# Patient Record
Sex: FEMALE | Race: WHITE | URBAN - METROPOLITAN AREA
[De-identification: names, ages, dates, MRNs, and addresses within clinical notes are randomized per-mention and may not be internally consistent; named-entity substitution may affect disease eponyms.]

---

## 2018-08-23 ENCOUNTER — OFFICE (OUTPATIENT)
Dept: URBAN - METROPOLITAN AREA CLINIC 42 | Facility: CLINIC | Age: 73
End: 2018-08-23

## 2018-08-23 VITALS
HEART RATE: 80 BPM | HEIGHT: 63 IN | SYSTOLIC BLOOD PRESSURE: 141 MMHG | DIASTOLIC BLOOD PRESSURE: 95 MMHG | WEIGHT: 188 LBS

## 2018-08-23 DIAGNOSIS — K22.70 BARRETT'S ESOPHAGUS WITHOUT DYSPLASIA: ICD-10-CM

## 2018-08-23 DIAGNOSIS — R10.13 EPIGASTRIC PAIN: ICD-10-CM

## 2018-08-23 DIAGNOSIS — K21.9 GASTRO-ESOPHAGEAL REFLUX DISEASE WITHOUT ESOPHAGITIS: ICD-10-CM

## 2018-08-23 PROCEDURE — 99214 OFFICE O/P EST MOD 30 MIN: CPT

## 2018-08-23 RX ORDER — ESOMEPRAZOLE 20 MG/1
CAPSULE, DELAYED RELEASE ORAL
Qty: 60 | Refills: 0 | Status: ACTIVE
Start: 2018-08-23

## 2018-11-15 ENCOUNTER — OFFICE (OUTPATIENT)
Dept: URBAN - METROPOLITAN AREA CLINIC 42 | Facility: CLINIC | Age: 73
End: 2018-11-15

## 2018-11-15 DIAGNOSIS — K22.70 BARRETT'S ESOPHAGUS WITHOUT DYSPLASIA: ICD-10-CM

## 2018-11-15 DIAGNOSIS — K21.9 GASTRO-ESOPHAGEAL REFLUX DISEASE WITHOUT ESOPHAGITIS: ICD-10-CM

## 2018-11-15 PROCEDURE — 99213 OFFICE O/P EST LOW 20 MIN: CPT

## 2018-11-16 VITALS
SYSTOLIC BLOOD PRESSURE: 124 MMHG | WEIGHT: 191 LBS | DIASTOLIC BLOOD PRESSURE: 75 MMHG | HEART RATE: 80 BPM | HEIGHT: 63 IN

## 2019-04-23 ENCOUNTER — OFFICE (OUTPATIENT)
Dept: URBAN - METROPOLITAN AREA CLINIC 42 | Facility: CLINIC | Age: 74
End: 2019-04-23

## 2019-04-23 VITALS
TEMPERATURE: 97.8 F | WEIGHT: 189 LBS | SYSTOLIC BLOOD PRESSURE: 123 MMHG | DIASTOLIC BLOOD PRESSURE: 82 MMHG | HEART RATE: 84 BPM | HEIGHT: 63 IN

## 2019-04-23 DIAGNOSIS — K21.9 GASTRO-ESOPHAGEAL REFLUX DISEASE WITHOUT ESOPHAGITIS: ICD-10-CM

## 2019-04-23 DIAGNOSIS — K59.00 CONSTIPATION, UNSPECIFIED: ICD-10-CM

## 2019-04-23 DIAGNOSIS — Z80.0 FAMILY HISTORY OF MALIGNANT NEOPLASM OF DIGESTIVE ORGANS: ICD-10-CM

## 2019-04-23 DIAGNOSIS — K22.70 BARRETT'S ESOPHAGUS WITHOUT DYSPLASIA: ICD-10-CM

## 2019-04-23 PROCEDURE — 99213 OFFICE O/P EST LOW 20 MIN: CPT

## 2019-04-23 NOTE — SERVICEHPINOTES
Patient returns for follow-up visit. She has history of GERD and BE. Reflux symptoms well-controlled on esomeprazole 40 mg daily. She comes in today with complaint of constipation. She has always had mild constipation, recently worsened. She had a bout of food poisoning last week associated with nausea, vomiting, and diarrhea. Symptoms resolved after 2-3 days, now more constipated. She is doing well otherwise, no abdominal pain or overt GI bleed. Her appetite and weight unchanged. Colonoscopy (2015)BREGD (2017)

## 2019-04-23 NOTE — SERVICENOTES
Surveillance colonoscopy due 2020, patient prefers to wait to have surveillance EGD at the same time as colonoscopy

## 2021-11-06 ENCOUNTER — APPOINTMENT (OUTPATIENT)
Dept: MRI IMAGING | Facility: HOSPITAL | Age: 76
End: 2021-11-06

## 2021-11-06 ENCOUNTER — HOSPITAL ENCOUNTER (OUTPATIENT)
Facility: HOSPITAL | Age: 76
Setting detail: OBSERVATION
LOS: 1 days | Discharge: HOME | End: 2021-11-07
Attending: EMERGENCY MEDICINE | Admitting: INTERNAL MEDICINE

## 2021-11-06 ENCOUNTER — APPOINTMENT (OUTPATIENT)
Dept: CT IMAGING | Facility: HOSPITAL | Age: 76
End: 2021-11-06

## 2021-11-06 DIAGNOSIS — R42 VERTIGO: Primary | ICD-10-CM

## 2021-11-06 DIAGNOSIS — I63.532 CEREBROVASCULAR ACCIDENT (CVA) DUE TO OCCLUSION OF LEFT POSTERIOR CEREBRAL ARTERY (HCC): ICD-10-CM

## 2021-11-06 DIAGNOSIS — H81.11 BENIGN PAROXYSMAL POSITIONAL VERTIGO OF RIGHT EAR: ICD-10-CM

## 2021-11-06 DIAGNOSIS — G45.9 TIA (TRANSIENT ISCHEMIC ATTACK): ICD-10-CM

## 2021-11-06 DIAGNOSIS — R27.0 ATAXIA: ICD-10-CM

## 2021-11-06 DIAGNOSIS — I65.02 OCCLUSION OF LEFT VERTEBRAL ARTERY: ICD-10-CM

## 2021-11-06 PROBLEM — I63.9 ACUTE ISCHEMIC STROKE: Status: ACTIVE | Noted: 2021-11-06

## 2021-11-06 PROBLEM — I73.9 SMALL VESSEL DISEASE: Status: ACTIVE | Noted: 2021-11-06

## 2021-11-06 PROBLEM — E78.2 MIXED HYPERLIPIDEMIA: Status: ACTIVE | Noted: 2021-11-06

## 2021-11-06 LAB
ALBUMIN SERPL-MCNC: 4.2 G/DL (ref 3.5–5.2)
ALBUMIN/GLOB SERPL: 1.8 G/DL
ALP SERPL-CCNC: 46 U/L (ref 39–117)
ALT SERPL W P-5'-P-CCNC: 6 U/L (ref 1–33)
ANION GAP SERPL CALCULATED.3IONS-SCNC: 12.3 MMOL/L (ref 5–15)
AST SERPL-CCNC: 10 U/L (ref 1–32)
BASOPHILS # BLD AUTO: 0.03 10*3/MM3 (ref 0–0.2)
BASOPHILS NFR BLD AUTO: 0.5 % (ref 0–1.5)
BILIRUB SERPL-MCNC: 0.5 MG/DL (ref 0–1.2)
BUN SERPL-MCNC: 12 MG/DL (ref 8–23)
BUN/CREAT SERPL: 19 (ref 7–25)
CALCIUM SPEC-SCNC: 9.1 MG/DL (ref 8.6–10.5)
CHLORIDE SERPL-SCNC: 106 MMOL/L (ref 98–107)
CHOLEST SERPL-MCNC: 232 MG/DL (ref 0–200)
CO2 SERPL-SCNC: 24.7 MMOL/L (ref 22–29)
CREAT SERPL-MCNC: 0.63 MG/DL (ref 0.57–1)
DEPRECATED RDW RBC AUTO: 40.9 FL (ref 37–54)
EOSINOPHIL # BLD AUTO: 0.09 10*3/MM3 (ref 0–0.4)
EOSINOPHIL NFR BLD AUTO: 1.5 % (ref 0.3–6.2)
ERYTHROCYTE [DISTWIDTH] IN BLOOD BY AUTOMATED COUNT: 12.1 % (ref 12.3–15.4)
GFR SERPL CREATININE-BSD FRML MDRD: 92 ML/MIN/1.73
GLOBULIN UR ELPH-MCNC: 2.4 GM/DL
GLUCOSE SERPL-MCNC: 98 MG/DL (ref 65–99)
HCT VFR BLD AUTO: 41.2 % (ref 34–46.6)
HDLC SERPL-MCNC: 59 MG/DL (ref 40–60)
HGB BLD-MCNC: 14.5 G/DL (ref 12–15.9)
HOLD SPECIMEN: NORMAL
HOLD SPECIMEN: NORMAL
IMM GRANULOCYTES # BLD AUTO: 0.01 10*3/MM3 (ref 0–0.05)
IMM GRANULOCYTES NFR BLD AUTO: 0.2 % (ref 0–0.5)
LDLC SERPL CALC-MCNC: 160 MG/DL (ref 0–100)
LDLC/HDLC SERPL: 2.67 {RATIO}
LYMPHOCYTES # BLD AUTO: 1.64 10*3/MM3 (ref 0.7–3.1)
LYMPHOCYTES NFR BLD AUTO: 27.3 % (ref 19.6–45.3)
MCH RBC QN AUTO: 32.9 PG (ref 26.6–33)
MCHC RBC AUTO-ENTMCNC: 35.2 G/DL (ref 31.5–35.7)
MCV RBC AUTO: 93.4 FL (ref 79–97)
MONOCYTES # BLD AUTO: 0.53 10*3/MM3 (ref 0.1–0.9)
MONOCYTES NFR BLD AUTO: 8.8 % (ref 5–12)
NEUTROPHILS NFR BLD AUTO: 3.71 10*3/MM3 (ref 1.7–7)
NEUTROPHILS NFR BLD AUTO: 61.7 % (ref 42.7–76)
NRBC BLD AUTO-RTO: 0 /100 WBC (ref 0–0.2)
PLATELET # BLD AUTO: 168 10*3/MM3 (ref 140–450)
PMV BLD AUTO: 9.7 FL (ref 6–12)
POTASSIUM SERPL-SCNC: 3.8 MMOL/L (ref 3.5–5.2)
PROT SERPL-MCNC: 6.6 G/DL (ref 6–8.5)
QT INTERVAL: 432 MS
RBC # BLD AUTO: 4.41 10*6/MM3 (ref 3.77–5.28)
SARS-COV-2 RNA RESP QL NAA+PROBE: NOT DETECTED
SODIUM SERPL-SCNC: 143 MMOL/L (ref 136–145)
TRIGL SERPL-MCNC: 77 MG/DL (ref 0–150)
TROPONIN T SERPL-MCNC: <0.01 NG/ML (ref 0–0.03)
VLDLC SERPL-MCNC: 13 MG/DL (ref 5–40)
WBC # BLD AUTO: 6.01 10*3/MM3 (ref 3.4–10.8)
WHOLE BLOOD HOLD SPECIMEN: NORMAL
WHOLE BLOOD HOLD SPECIMEN: NORMAL

## 2021-11-06 PROCEDURE — 70498 CT ANGIOGRAPHY NECK: CPT

## 2021-11-06 PROCEDURE — 92610 EVALUATE SWALLOWING FUNCTION: CPT

## 2021-11-06 PROCEDURE — 80061 LIPID PANEL: CPT | Performed by: INTERNAL MEDICINE

## 2021-11-06 PROCEDURE — 84484 ASSAY OF TROPONIN QUANT: CPT | Performed by: PHYSICIAN ASSISTANT

## 2021-11-06 PROCEDURE — 85025 COMPLETE CBC W/AUTO DIFF WBC: CPT | Performed by: PHYSICIAN ASSISTANT

## 2021-11-06 PROCEDURE — G0378 HOSPITAL OBSERVATION PER HR: HCPCS

## 2021-11-06 PROCEDURE — 70496 CT ANGIOGRAPHY HEAD: CPT

## 2021-11-06 PROCEDURE — 93010 ELECTROCARDIOGRAM REPORT: CPT | Performed by: INTERNAL MEDICINE

## 2021-11-06 PROCEDURE — 96372 THER/PROPH/DIAG INJ SC/IM: CPT

## 2021-11-06 PROCEDURE — 25010000002 ENOXAPARIN PER 10 MG: Performed by: INTERNAL MEDICINE

## 2021-11-06 PROCEDURE — 99284 EMERGENCY DEPT VISIT MOD MDM: CPT

## 2021-11-06 PROCEDURE — 70551 MRI BRAIN STEM W/O DYE: CPT

## 2021-11-06 PROCEDURE — 99204 OFFICE O/P NEW MOD 45 MIN: CPT | Performed by: PSYCHIATRY & NEUROLOGY

## 2021-11-06 PROCEDURE — U0003 INFECTIOUS AGENT DETECTION BY NUCLEIC ACID (DNA OR RNA); SEVERE ACUTE RESPIRATORY SYNDROME CORONAVIRUS 2 (SARS-COV-2) (CORONAVIRUS DISEASE [COVID-19]), AMPLIFIED PROBE TECHNIQUE, MAKING USE OF HIGH THROUGHPUT TECHNOLOGIES AS DESCRIBED BY CMS-2020-01-R: HCPCS | Performed by: PHYSICIAN ASSISTANT

## 2021-11-06 PROCEDURE — C9803 HOPD COVID-19 SPEC COLLECT: HCPCS

## 2021-11-06 PROCEDURE — 80053 COMPREHEN METABOLIC PANEL: CPT | Performed by: PHYSICIAN ASSISTANT

## 2021-11-06 PROCEDURE — 25010000002 IOPAMIDOL 61 % SOLUTION: Performed by: EMERGENCY MEDICINE

## 2021-11-06 PROCEDURE — 93005 ELECTROCARDIOGRAM TRACING: CPT | Performed by: PHYSICIAN ASSISTANT

## 2021-11-06 RX ORDER — ACETAMINOPHEN 650 MG/1
650 SUPPOSITORY RECTAL EVERY 4 HOURS PRN
Status: DISCONTINUED | OUTPATIENT
Start: 2021-11-06 | End: 2021-11-07 | Stop reason: HOSPADM

## 2021-11-06 RX ORDER — ACETAMINOPHEN 325 MG/1
650 TABLET ORAL EVERY 4 HOURS PRN
Status: DISCONTINUED | OUTPATIENT
Start: 2021-11-06 | End: 2021-11-07 | Stop reason: HOSPADM

## 2021-11-06 RX ORDER — MELATONIN
1 DAILY
COMMUNITY

## 2021-11-06 RX ORDER — ASPIRIN 81 MG/1
324 TABLET, CHEWABLE ORAL ONCE
Status: COMPLETED | OUTPATIENT
Start: 2021-11-06 | End: 2021-11-06

## 2021-11-06 RX ORDER — CHOLECALCIFEROL (VITAMIN D3) 125 MCG
5 CAPSULE ORAL NIGHTLY PRN
Status: DISCONTINUED | OUTPATIENT
Start: 2021-11-06 | End: 2021-11-07 | Stop reason: HOSPADM

## 2021-11-06 RX ORDER — ATORVASTATIN CALCIUM 80 MG/1
80 TABLET, FILM COATED ORAL NIGHTLY
Status: DISCONTINUED | OUTPATIENT
Start: 2021-11-06 | End: 2021-11-07 | Stop reason: HOSPADM

## 2021-11-06 RX ORDER — SODIUM CHLORIDE 0.9 % (FLUSH) 0.9 %
10 SYRINGE (ML) INJECTION AS NEEDED
Status: DISCONTINUED | OUTPATIENT
Start: 2021-11-06 | End: 2021-11-07 | Stop reason: HOSPADM

## 2021-11-06 RX ORDER — CLOPIDOGREL BISULFATE 75 MG/1
75 TABLET ORAL DAILY
Status: DISCONTINUED | OUTPATIENT
Start: 2021-11-07 | End: 2021-11-07

## 2021-11-06 RX ORDER — LANOLIN ALCOHOL/MO/W.PET/CERES
1000 CREAM (GRAM) TOPICAL DAILY
COMMUNITY

## 2021-11-06 RX ORDER — DOCUSATE SODIUM 100 MG/1
100 CAPSULE, LIQUID FILLED ORAL 2 TIMES DAILY PRN
Status: DISCONTINUED | OUTPATIENT
Start: 2021-11-06 | End: 2021-11-07 | Stop reason: HOSPADM

## 2021-11-06 RX ORDER — LABETALOL HYDROCHLORIDE 5 MG/ML
20 INJECTION, SOLUTION INTRAVENOUS
Status: DISCONTINUED | OUTPATIENT
Start: 2021-11-06 | End: 2021-11-07 | Stop reason: HOSPADM

## 2021-11-06 RX ORDER — ONDANSETRON 4 MG/1
4 TABLET, FILM COATED ORAL EVERY 6 HOURS PRN
Status: DISCONTINUED | OUTPATIENT
Start: 2021-11-06 | End: 2021-11-07 | Stop reason: HOSPADM

## 2021-11-06 RX ORDER — SODIUM CHLORIDE 0.9 % (FLUSH) 0.9 %
3 SYRINGE (ML) INJECTION EVERY 12 HOURS SCHEDULED
Status: DISCONTINUED | OUTPATIENT
Start: 2021-11-06 | End: 2021-11-07 | Stop reason: HOSPADM

## 2021-11-06 RX ORDER — FAMOTIDINE 20 MG/1
20 TABLET, FILM COATED ORAL 2 TIMES DAILY PRN
Status: DISCONTINUED | OUTPATIENT
Start: 2021-11-06 | End: 2021-11-07 | Stop reason: HOSPADM

## 2021-11-06 RX ORDER — SODIUM CHLORIDE 0.9 % (FLUSH) 0.9 %
3-10 SYRINGE (ML) INJECTION AS NEEDED
Status: DISCONTINUED | OUTPATIENT
Start: 2021-11-06 | End: 2021-11-07 | Stop reason: HOSPADM

## 2021-11-06 RX ORDER — CLOPIDOGREL BISULFATE 75 MG/1
300 TABLET ORAL ONCE
Status: COMPLETED | OUTPATIENT
Start: 2021-11-06 | End: 2021-11-06

## 2021-11-06 RX ORDER — ONDANSETRON 2 MG/ML
4 INJECTION INTRAMUSCULAR; INTRAVENOUS EVERY 6 HOURS PRN
Status: DISCONTINUED | OUTPATIENT
Start: 2021-11-06 | End: 2021-11-07 | Stop reason: HOSPADM

## 2021-11-06 RX ORDER — ACETAMINOPHEN 160 MG/5ML
650 SOLUTION ORAL EVERY 4 HOURS PRN
Status: DISCONTINUED | OUTPATIENT
Start: 2021-11-06 | End: 2021-11-07 | Stop reason: HOSPADM

## 2021-11-06 RX ORDER — ASPIRIN 81 MG/1
81 TABLET ORAL DAILY
Status: DISCONTINUED | OUTPATIENT
Start: 2021-11-07 | End: 2021-11-07 | Stop reason: HOSPADM

## 2021-11-06 RX ORDER — MULTIVIT WITH MINERALS/LUTEIN
1000 TABLET ORAL DAILY
COMMUNITY

## 2021-11-06 RX ORDER — SODIUM CHLORIDE 0.9 % (FLUSH) 0.9 %
10 SYRINGE (ML) INJECTION EVERY 12 HOURS SCHEDULED
Status: DISCONTINUED | OUTPATIENT
Start: 2021-11-06 | End: 2021-11-07 | Stop reason: HOSPADM

## 2021-11-06 RX ADMIN — ENOXAPARIN SODIUM 40 MG: 40 INJECTION SUBCUTANEOUS at 14:07

## 2021-11-06 RX ADMIN — IOPAMIDOL 85 ML: 612 INJECTION, SOLUTION INTRAVENOUS at 09:32

## 2021-11-06 RX ADMIN — ATORVASTATIN CALCIUM 80 MG: 80 TABLET, FILM COATED ORAL at 20:30

## 2021-11-06 RX ADMIN — SODIUM CHLORIDE, PRESERVATIVE FREE 10 ML: 5 INJECTION INTRAVENOUS at 20:31

## 2021-11-06 RX ADMIN — SODIUM CHLORIDE, PRESERVATIVE FREE 3 ML: 5 INJECTION INTRAVENOUS at 20:30

## 2021-11-06 RX ADMIN — ASPIRIN 324 MG: 81 TABLET, CHEWABLE ORAL at 10:29

## 2021-11-06 RX ADMIN — Medication 5 MG: at 20:29

## 2021-11-06 RX ADMIN — CLOPIDOGREL 300 MG: 75 TABLET, FILM COATED ORAL at 10:29

## 2021-11-06 NOTE — THERAPY EVALUATION
"Acute Care - Speech Language Pathology   Swallow Initial Evaluation [facility]     Patient Name: Brigid Carballo  : 1945  MRN: [id number]  Today's Date: 2021               Admit Date: 2021    Visit Dx:     ICD-10-CM ICD-9-CM   1. Cerebrovascular accident (CVA) due to occlusion of left posterior cerebral artery (HCC)  I63.532 434.91   2. Occlusion of left vertebral artery  I65.02 433.20   3. Ataxia  R27.0 781.3     Patient Active Problem List   Diagnosis   • Occlusion of left vertebral artery   • Small vessel disease (HCC)   • Ataxia   • Vertigo   • Mixed hyperlipidemia     History reviewed. No pertinent past medical history.  No past surgical history on file.    SLP Recommendation and Plan  SLP Swallowing Diagnosis: swallow WFL (21)  SLP Diet Recommendation: regular textures, thin liquids (21)  Recommended Precautions and Strategies: upright posture during/after eating (21)  SLP Rec. for Method of Medication Administration: meds whole, as tolerated (21)     Monitor for Signs of Aspiration: yes (21)  Recommended Diagnostics: SLE/Cog/Motor Speech Evaluation (21)  Swallow Criteria for Skilled Therapeutic Interventions Met: no problems identified which require skilled intervention (21)  Anticipated Discharge Disposition (SLP): unknown (21)     Therapy Frequency (Swallow): evaluation only (21)                            Plan of Care Reviewed With: patient, spouse  Outcome Summary: Clinical swallow eval completed. When SLP arrived, pt getting up to walk to the bathroom on her own. SLP stopped her and indicated she would need help to avoid a fall. She did not understand the need for this, stating \"Why? They didn't give me any strong medicines or anything.\" SLP explained fall risk with ataxia to pt and  and they verbalized understanding. No s/s of aspiration or patterns of dysphagia. OK for regular " diet/thin liquids. Meds whole as tolerated. Upright for PO. Recommend cognitive evaluation.      SWALLOW EVALUATION (last 72 hours)     SLP Adult Swallow Evaluation     Row Name 11/06/21 1600                   Rehab Evaluation    Document Type evaluation  -CP        Subjective Information no complaints  -CP        Patient Observations alert; cooperative  -CP        Care Plan Review evaluation/treatment results reviewed; care plan/treatment goals reviewed; patient/other agree to care plan  -CP        Care Plan Review, Other Participant(s) significant other  -CP        Patient Effort good  -CP        Symptoms Noted During/After Treatment none  -CP                  General Information    Patient Profile Reviewed yes  -CP        Pertinent History Of Current Problem Admitted with acute PCA CVA, ataxia  -CP        Current Method of Nutrition NPO  -CP        Precautions/Limitations, Vision WFL  -CP        Precautions/Limitations, Hearing WFL  -CP        Prior Level of Function-Communication WFL  -CP        Prior Level of Function-Swallowing no diet consistency restrictions  -CP        Plans/Goals Discussed with patient; agreed upon  -CP        Barriers to Rehab none identified  -CP        Patient's Goals for Discharge return home; return to all previous roles/activities  -CP                  Pain    Additional Documentation Pain Scale: Numbers Pre/Post-Treatment (Group)  -CP                  Pain Scale: Numbers Pre/Post-Treatment    Pretreatment Pain Rating 0/10 - no pain  -CP        Posttreatment Pain Rating 0/10 - no pain  -CP                  Oral Motor Structure and Function    Dentition Assessment upper dentures/partial in place; lower dentures/partial in place  -CP        Secretion Management WNL/WFL  -CP        Mucosal Quality moist, healthy  -CP                  Oral Musculature and Cranial Nerve Assessment    Oral Motor General Assessment WFL  -CP                  General Eating/Swallowing Observations     "Respiratory Support Currently in Use room air  -CP        Eating/Swallowing Skills self-fed  -CP        Positioning During Eating upright 90 degree  -CP        Utensils Used cup; spoon; straw  -CP        Consistencies Trialed thin liquids; pureed; regular textures  -CP                  Clinical Swallow Eval    Clinical Swallow Evaluation Summary Clinical swallow eval completed. When SLP arrived, pt getting up to walk to the bathroom on her own. SLP stopped her and indicated she would need help to avoid a fall. She did not understand the need for this, stating \"Why? They didn't give me any strong medicines or anything.\" SLP explained fall risk with ataxia to pt and  and they verbalized understanding. No s/s of aspiration or patterns of dysphagia. OK for regular diet/thin liquids. Meds whole as tolerated. Upright for PO.  -CP                  Clinical Impression    SLP Swallowing Diagnosis swallow WFL  -CP        Functional Impact no impact on function  -CP        Swallow Criteria for Skilled Therapeutic Interventions Met no problems identified which require skilled intervention  -CP                  Recommendations    Therapy Frequency (Swallow) evaluation only  -CP        SLP Diet Recommendation regular textures; thin liquids  -CP        Recommended Diagnostics SLE/Cog/Motor Speech Evaluation  -CP        Recommended Precautions and Strategies upright posture during/after eating  -CP        Oral Care Recommendations Oral Care BID/PRN  -CP        SLP Rec. for Method of Medication Administration meds whole; as tolerated  -CP        Monitor for Signs of Aspiration yes  -CP        Anticipated Discharge Disposition (SLP) unknown  -CP              User Key  (r) = Recorded By, (t) = Taken By, (c) = Cosigned By    Initials Name Effective Dates    CP Amanda Fallon, MS CCC-SLP 06/16/21 -                 EDUCATION  The patient has been educated in the following areas:   Dysphagia (Swallowing Impairment).         SLP " Outcome Measures (last 72 hours)     SLP Outcome Measures     Row Name 11/06/21 1600             SLP Outcome Measures    Outcome Measure Used? Adult NOMS  -CP              Adult FCM Scores    FCM Chosen Swallowing  -CP      Swallowing FCM Score 7  -CP            User Key  (r) = Recorded By, (t) = Taken By, (c) = Cosigned By    Initials Name Effective Dates    CP Amanda Fallon MS CCC-SLP 06/16/21 -                  Time Calculation:    Time Calculation- SLP     Row Name 11/06/21 1643             Time Calculation- SLP    SLP Start Time 1400  -CP      SLP Received On 11/06/21  -CP              Untimed Charges    SLP Eval/Re-eval  ST Eval Oral Pharyng Swallow - 66300  -CP      12810-WS Eval Oral Pharyng Swallow Minutes 45  -CP              Total Minutes    Untimed Charges Total Minutes 45  -CP       Total Minutes 45  -CP            User Key  (r) = Recorded By, (t) = Taken By, (c) = Cosigned By    Initials Name Provider Type    CP Amanda Fallon MS CCC-SLP Speech and Language Pathologist                Therapy Charges for Today     Code Description Service Date Service Provider Modifiers Qty    [id number] HC ST EVAL ORAL PHARYNG SWALLOW 3 11/6/2021 Amanda Fallon MS CCC-SLP GN 1        Patient was not wearing a face mask during this therapy encounter. Therapist used appropriate personal protective equipment including mask, eye protection and gloves.  Mask used was standard procedure mask. Appropriate PPE was worn during the entire therapy session. Hand hygiene was completed before and after therapy session. Patient is not in enhanced droplet precautions.            MS VIELKA Shah  11/6/2021

## 2021-11-06 NOTE — ED PROVIDER NOTES
Pt presents to the ED c/o  dizziness and vertigo which started yesterday evening after getting her hair done.  Vertigo is described as a spinning sensation and was associated nausea and vomiting.  She remains ataxic this morning.     On exam,   Awake and alert.  No acute distress  CV-regular rate  Lungs are clear to auscultation bilaterally  Neuro-NIH is 0     Plan: CT angiogram of the brain showed occlusion of the mid to upper left vertebral artery.  No acute intracranial disease were found.  The patient will be admitted for a MRI of the brain.  She was given 30 mg of Plavix and full dose aspirin.  Neurology will see in consultation.      Appropriate PPE was worn by myself and the patient throughout her entire interaction.       Attestation:  The MARIANGEL and I have discussed this patient's history, physical exam, and treatment plan.  I have reviewed the documentation and personally had a face to face interaction with the patient. I affirm the documentation and agree with the treatment and plan.  The attached note describes my personal findings.            Pineda Fernandes MD  11/06/21 4885

## 2021-11-06 NOTE — ED PROVIDER NOTES
EMERGENCY DEPARTMENT ENCOUNTER    Room Number:  06/06  Date of encounter:  11/6/2021  PCP: Aditi Spangler MD  Historian: Patient, spouse      I used full protective equipment while examining this patient.  This includes face mask, gloves and protective eyewear.  I washed my hands before entering the room and immediately upon leaving the room      HPI:  Chief Complaint: Dizziness, off-balance  A complete HPI/ROS/PMH/PSH/SH/FH are unobtainable due to: Nothing    Context: Brigid Carballo is a 76 y.o. female who presents to the ED c/o sudden onset of vertiginous dizziness yesterday evening.  Patient states she was getting her hair done with her head in the sink.  She states that she went to pull her head out of the sink and had a sudden onset of vertiginous dizziness at 6 PM yesterday.  Patient describes it as a room spinning sensation.  The symptoms at that time are worse with movement.  She did have associated nausea and vomiting.  She had difficulty walking at that time.  She was able to make it home and eventually make it to the bed.  This morning she states her dizziness has completely resolved, however she states she is unable to walk straight.  She states she is staggering and having a difficult time ambulating.  Patient is otherwise healthy and denies any comorbidities of hypertension, diabetes, hyperlipidemia.  She is a daily smoker.    Review of Medical Records  I reviewed patient's last family medicine office visit from 8/23/2021.  Patient being treated for hyperlipidemia.    PAST MEDICAL HISTORY  Active Ambulatory Problems     Diagnosis Date Noted   • No Active Ambulatory Problems     Resolved Ambulatory Problems     Diagnosis Date Noted   • No Resolved Ambulatory Problems     No Additional Past Medical History         PAST SURGICAL HISTORY  No past surgical history on file.      FAMILY HISTORY  No family history on file.      SOCIAL HISTORY  Social History     Socioeconomic History   • Marital status:           ALLERGIES  Codeine        REVIEW OF SYSTEMS  All systems reviewed and negative except for those discussed in HPI.       PHYSICAL EXAM    I have reviewed the triage vital signs and nursing notes.    ED Triage Vitals [11/06/21 0633]   Temp Heart Rate Resp BP SpO2   97.1 °F (36.2 °C) 74 16 138/80 98 %      Temp src Heart Rate Source Patient Position BP Location FiO2 (%)   Temporal Monitor Sitting Right arm --       Physical Exam  GENERAL: Alert, oriented, not distressed  HENT: head atraumatic, no nuchal rigidity  EYES: Extraocular movements intact, no nystagmus  CV: regular rhythm, regular rate, no murmur  RESPIRATORY: normal effort, CTA  ABDOMEN: soft, nontender  MUSCULOSKELETAL: no deformity, FROM, no calf swelling or tenderness  NEURO: alert, normal cerebellar function, cranial nerves II through XII grossly intact  NIH: 0  SKIN: warm, dry        LAB RESULTS  Recent Results (from the past 24 hour(s))   Green Top (Gel)    Collection Time: 11/06/21  6:49 AM   Result Value Ref Range    Extra Tube Hold for add-ons.    Lavender Top    Collection Time: 11/06/21  6:49 AM   Result Value Ref Range    Extra Tube hold for add-on    Gold Top - SST    Collection Time: 11/06/21  6:49 AM   Result Value Ref Range    Extra Tube Hold for add-ons.    Light Blue Top    Collection Time: 11/06/21  6:49 AM   Result Value Ref Range    Extra Tube hold for add-on    Comprehensive Metabolic Panel    Collection Time: 11/06/21  6:49 AM    Specimen: Blood   Result Value Ref Range    Glucose 98 65 - 99 mg/dL    BUN 12 8 - 23 mg/dL    Creatinine 0.63 0.57 - 1.00 mg/dL    Sodium 143 136 - 145 mmol/L    Potassium 3.8 3.5 - 5.2 mmol/L    Chloride 106 98 - 107 mmol/L    CO2 24.7 22.0 - 29.0 mmol/L    Calcium 9.1 8.6 - 10.5 mg/dL    Total Protein 6.6 6.0 - 8.5 g/dL    Albumin 4.20 3.50 - 5.20 g/dL    ALT (SGPT) 6 1 - 33 U/L    AST (SGOT) 10 1 - 32 U/L    Alkaline Phosphatase 46 39 - 117 U/L    Total Bilirubin 0.5 0.0 - 1.2 mg/dL    eGFR Non   Amer 92 >60 mL/min/1.73    Globulin 2.4 gm/dL    A/G Ratio 1.8 g/dL    BUN/Creatinine Ratio 19.0 7.0 - 25.0    Anion Gap 12.3 5.0 - 15.0 mmol/L   Troponin    Collection Time: 11/06/21  6:49 AM    Specimen: Blood   Result Value Ref Range    Troponin T <0.010 0.000 - 0.030 ng/mL   CBC Auto Differential    Collection Time: 11/06/21  6:49 AM    Specimen: Blood   Result Value Ref Range    WBC 6.01 3.40 - 10.80 10*3/mm3    RBC 4.41 3.77 - 5.28 10*6/mm3    Hemoglobin 14.5 12.0 - 15.9 g/dL    Hematocrit 41.2 34.0 - 46.6 %    MCV 93.4 79.0 - 97.0 fL    MCH 32.9 26.6 - 33.0 pg    MCHC 35.2 31.5 - 35.7 g/dL    RDW 12.1 (L) 12.3 - 15.4 %    RDW-SD 40.9 37.0 - 54.0 fl    MPV 9.7 6.0 - 12.0 fL    Platelets 168 140 - 450 10*3/mm3    Neutrophil % 61.7 42.7 - 76.0 %    Lymphocyte % 27.3 19.6 - 45.3 %    Monocyte % 8.8 5.0 - 12.0 %    Eosinophil % 1.5 0.3 - 6.2 %    Basophil % 0.5 0.0 - 1.5 %    Immature Grans % 0.2 0.0 - 0.5 %    Neutrophils, Absolute 3.71 1.70 - 7.00 10*3/mm3    Lymphocytes, Absolute 1.64 0.70 - 3.10 10*3/mm3    Monocytes, Absolute 0.53 0.10 - 0.90 10*3/mm3    Eosinophils, Absolute 0.09 0.00 - 0.40 10*3/mm3    Basophils, Absolute 0.03 0.00 - 0.20 10*3/mm3    Immature Grans, Absolute 0.01 0.00 - 0.05 10*3/mm3    nRBC 0.0 0.0 - 0.2 /100 WBC   ECG 12 Lead    Collection Time: 11/06/21  8:14 AM   Result Value Ref Range    QT Interval 432 ms   COVID-19, CHARLI IN-HOUSE CEPHEID/HAMIDA NP SWAB IN TRANSPORT MEDIA 8-12 HR TAT - Swab, Nasopharynx    Collection Time: 11/06/21  8:48 AM    Specimen: Nasopharynx; Swab   Result Value Ref Range    COVID19 Not Detected Not Detected - Ref. Range       Ordered the above labs and independently reviewed the results.        RADIOLOGY  CT Angiogram Head, CT Angiogram Neck    Result Date: 11/6/2021  CT ANGIOGRAM HEAD-, CT ANGIOGRAM NECK-  INDICATIONS: Vertigo, ataxia  TECHNIQUE: Radiation dose reduction techniques were utilized, including automated exposure control and exposure  modulation based on body size.Noncontrast head CT was performed, followed by enhanced CT angiography of the head and neck. Three-dimensional reconstructions were created, reviewed, and assessed according to NASCET criteria.  COMPARISON: None available  FINDINGS:  No acute intracranial hemorrhage, midline shift or mass effect. No acute territorial infarct is identified.  Mild periventricular hypodensity suggests chronic small vessel ischemic change in a patient this age.  No enhancing lesions of brain.  Ventricles, cisterns, cerebral sulci are unremarkable for patient age.  The visualized paranasal sinuses, orbits, mastoid air cells are unremarkable.  The CT angiography images show occlusion of the mid to upper left vertebral artery (to the level of C1). Right vertebral artery is dominant. About 20% narrowing at the origins of the bilateral ICAs. Scattered calcifications are seen elsewhere in the carotid arteries without high-grade stenosis (0-49% stenosis). Otherwise, no hemodynamically significant focal stenosis, aneurysm, or dissection in the cervical carotid or vertebral arteries, or in the arteries at the base of the brain.  Bilateral thyroid nodularity is suggested but suboptimally evaluated with this technique, ultrasound correlation is suggested as indicated.  Facet and uncovertebral joint hypertrophy contribute to neuroforaminal narrowing, more prominent on the left at C5/6. Disc osteophyte complex contributes to mild central stenosis at C4/5, C5/6, C6/7.  Biapical pulmonary fibronodular and emphysematous changes are seen.        1. 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 Otherwise, no hemodynamically significant focal stenosis, aneurysm, or dissection in the cervical carotid or vertebral arteries or the arteries at the base of the brain.  2. No acute intracranial hemorrhage or hydrocephalus. No enhancing lesion in brain. If there is further clinical concern, MRI could be considered for further  evaluation.  3. Incidental findings, as noted above.  Discussed by telephone with  Martín Gamez at time of interpretation, 1006, 11/06/2021.  This report was finalized on 11/6/2021 10:09 AM by Dr. Jonathan Wolfe M.D.        I ordered the above noted radiological studies. Reviewed by me and discussed with radiologist.  See dictation for official radiology interpretation.      MEDICATIONS GIVEN IN ER    Medications   sodium chloride 0.9 % flush 10 mL (has no administration in time range)   sodium chloride 0.9 % flush 10 mL (has no administration in time range)   sodium chloride 0.9 % flush 10 mL (has no administration in time range)   sodium chloride 0.9 % flush 10 mL (has no administration in time range)   atorvastatin (LIPITOR) tablet 80 mg (has no administration in time range)   enoxaparin (LOVENOX) syringe 40 mg (has no administration in time range)   labetalol (NORMODYNE,TRANDATE) injection 20 mg (has no administration in time range)   sodium chloride 0.9 % flush 3 mL (has no administration in time range)   sodium chloride 0.9 % flush 3-10 mL (has no administration in time range)   acetaminophen (TYLENOL) tablet 650 mg (has no administration in time range)     Or   acetaminophen (TYLENOL) 160 MG/5ML solution 650 mg (has no administration in time range)     Or   acetaminophen (TYLENOL) suppository 650 mg (has no administration in time range)   famotidine (PEPCID) tablet 20 mg (has no administration in time range)   melatonin tablet 5 mg (has no administration in time range)   ondansetron (ZOFRAN) tablet 4 mg (has no administration in time range)     Or   ondansetron (ZOFRAN) injection 4 mg (has no administration in time range)   docusate sodium (COLACE) capsule 100 mg (has no administration in time range)   aspirin EC tablet 81 mg (has no administration in time range)   clopidogrel (PLAVIX) tablet 75 mg (has no administration in time range)   iopamidol (ISOVUE-300) 61 % injection 100 mL (85 mL Intravenous Given  by Other 11/6/21 0932)   clopidogrel (PLAVIX) tablet 300 mg (300 mg Oral Given 11/6/21 1029)   aspirin chewable tablet 324 mg (324 mg Oral Given 11/6/21 1029)         PROGRESS, DATA ANALYSIS, CONSULTS, AND MEDICAL DECISION MAKING    All labs have been independently reviewed by me.  All radiology studies have been reviewed by me and discussed with radiologist dictating the report.   EKG's independently viewed and interpreted by me.  Discussion below represents my analysis of pertinent findings related to patient's condition, differential diagnosis, treatment plan and final disposition.    I have discussed case with Dr. Fernandes, emergency room physician.  He has performed his own bedside examination and agrees with treatment plan.    ED Course as of 11/06/21 1250   Sat Nov 06, 2021   0725 Patient presents with sudden onset of vertigo yesterday.  She states her dizziness has resolved, however this morning she feels off balance.  Patient is ataxic with walking here.  No other neuro deficits.  Plan to discuss with stroke neurology. [EE]   0728 I discussed case with Dr. Dobson, stroke neurology.  He would like us to perform a CTA of the head and neck, with likely subsequent admission for MRI.  Patient is not a TPA candidate given onset of symptoms. [EE]   0732 NIH Stroke scale 0 [EE]   0758 WBC: 6.01 [EE]   0758 Hemoglobin: 14.5 [EE]   0758 Creatinine: 0.63 [EE]   0758 Troponin T: <0.010 [EE]   0926 Dr. Dobson has seen and evaluated patient.  He believes symptoms are likely BPPV in nature however it is difficult to account for patient's continued ataxia.  We are still awaiting CTA of the head neck.  He would like to continue plan for admission and MRI. [EE]   1009 I discussed CTA findings of the head and neck with Dr. Wolfe. Patient does have a complete occlusion of the mid to upper left vertebral artery. No evidence of acute hemorrhage. [EE]   1014 I reviewed CTA findings with Dr. Dobson. He request that we give  Plavix 300 mg, aspirin, and admit to hospital. [EE]   1014 I discussed case with Dr. Velazquez [facility].  He agrees to admit the patient. [EE]      ED Course User Index  [EE] Martín Gamez PA       AS OF 12:50 EDT VITALS:    BP - 151/89  HR - 91  TEMP - 98.4 °F (36.9 °C) (Temporal)  O2 SATS - 96%        DIAGNOSIS  Final diagnoses:   Occlusion of left vertebral artery   Ataxia   Cerebrovascular accident (CVA) due to occlusion of left posterior cerebral artery (HCC)         DISPOSITION  Admitted           Martín Gamez PA  11/06/21 1250

## 2021-11-06 NOTE — CONSULTS
"Neurology Consult Note    Consult Date: 11/6/2021    Referring MD: Dr. Fernandes    Reason for Consult I have been asked to see the patient in neurological consultation to render advice and opinion regarding ataxia, vertigo    Brigid Carballo is a 76 y.o. female with past medical history of cardiomyopathy, no prior history of stroke or A. fib.  She was at her hairdresser yesterday and when she sat up had abrupt onset of counterclockwise vertigo.  This was worse with changing position or turning her head.  This morning her vertigo had improved however she noticed upon awakening that she was markedly ataxic.  She feels she is \"walking like I am drunk\".  She denies any associated unilateral weakness or clumsiness or vision loss or diplopia.  Her ataxia has not improved since onset.  We were asked to see her for possible stroke.  I performed Sebastián-Hallpike at the bedside which was positive on the right side.  Epley maneuver improved her vertigo however it did not improve her ataxia and she still has a very significant ataxia on exam that is unchanged.    Past Medical/Surgical Hx:  Cardiomyopathy, tobacco abuse      Allergies:  Allergies   Allergen Reactions   • Codeine GI Intolerance       Social Hx:  Social History     Socioeconomic History   • Marital status:        Family Hx:  No family history of early CAD or stroke    Review of systems  Constitutional: [No fevers, chills]  Eye: [No vision loss, diplopia]  HEENT: [no hearing loss, + tinnitus, vertigo]  Cardiovascular: [No Chest pain, palpitations]  Neurologic: [No weakness, numbness, + ataxia]  Psychiatric: [No anxiety, depression]    All other systems reviewed and are negative    Exam    /80   Pulse 61   Temp 97.1 °F (36.2 °C) (Temporal)   Resp 16   Ht 162.6 cm (64\")   Wt 68.4 kg (150 lb 11.2 oz)   SpO2 98%   BMI 25.87 kg/m²   gen: NAD, vitals reviewed  Eyes: fundus sharp with no papilledema or retinal hemorrhages  HEENT: no nuchal rigidity  CVS: RRR, " S1, S2  MS: oriented x3, recent/remote memory intact, normal attention/concentration, language intact, no neglect, normal fund of knowledge  CN: visual acuity grossly normal, visual fields full, PERRL, EOMI with some slight left-sided nystagmus, positive head impulse test of the right, positive Sebastián-Hallpike to the right, facial sensation equal, no facial droop, hearing symmetric, palate elevates symmetrically, shoulder shrug equal, tongue midline  Motor: 5/5 throughout upper and lower extremities, normal tone  Sensation: intact to vibration and temperature throughout  Reflexes: 2+ throughout upper and lower extremities, downgoing plantars  Coordination: no dysmetria with finger to nose bilaterally  Gait: Unsteady station, moderate ataxia, unable to walk without assistance    DATA:    Lab Results   Component Value Date    GLUCOSE 98 11/06/2021    CALCIUM 9.1 11/06/2021     11/06/2021    K 3.8 11/06/2021    CO2 24.7 11/06/2021     11/06/2021    BUN 12 11/06/2021    CREATININE 0.63 11/06/2021    EGFRIFNONA 92 11/06/2021    BCR 19.0 11/06/2021    ANIONGAP 12.3 11/06/2021     Lab Results   Component Value Date    WBC 6.01 11/06/2021    HGB 14.5 11/06/2021    HCT 41.2 11/06/2021    MCV 93.4 11/06/2021     11/06/2021     No results found for: LDL  No results found for: HGBA1C  No results found for: INR, PROTIME    Lab review: CBC, BMP unremarkable    Imaging review: CTA head and neck ordered    Diagnoses:  Acute ataxia  Benign paroxysmal positional vertigo, right ear  History of cardiomyopathy    Comment: History is suggestive of BPPV and Sebastián-Hallpike appears consistent with that however despite some improvement of symptoms with Epley she still has a very significant grade 2 ataxia which is a red flag for an acute cerebellar stroke.  In light of her examination findings and stroke risk factors I do think the patient needs to be ruled out despite coexistent symptoms of BPPV.    PLAN:  CTA head and  neck  MRI brain without contrast  2D echocardiogram complete  Aspirin, statin    Recommendations discussed with LATHA Lazo. We will follow    Addendum: CTA prelim read showing left vertebral occlusion. I reviewed the images. I think her left vertebral artery may be dissected.  Recommend inpatient admission and MRI as well as aspirin and 300 mg Plavix load.

## 2021-11-06 NOTE — H&P
[facility] Medicine Services  HISTORY AND PHYSICAL    Patient Name: Brigid Carballo  : 1945  MRN: [id number]  Primary Care Physician: Aditi Spangler MD  Date of admission: 2021    Subjective   Subjective   Chief Complaint:  Dizzy    HPI:  Brigid Carballo is a 76 y.o. female with past medical history of longstanding smoking who reports yesterday when she was at her hairdresser and was abruptly moving her head she developed sudden onset of severe vertigo that was worse with movement of the head and improved sometimes when laying down.  Patient felt that she had difficulty controlling her left leg.  She denied any vision changes.  She feels her arm movement has improved much.  She denies taking any home prescription medications.  She is an ongoing smoker.  She was found to have cerebrovascular disease on CT scan in the emergency room and there is concern for ischemic stroke.  She is being hospitalized for further medical management.      Review of Systems   Constitutional: Negative.    HENT: Negative.    Eyes: Negative.    Respiratory: Negative.    Cardiovascular: Negative.    Gastrointestinal: Negative.    Endocrine: Negative.    Genitourinary: Negative.    Musculoskeletal: Negative.    Skin: Negative.    Allergic/Immunologic: Negative.    Neurological: Positive for dizziness. Negative for headaches.   Hematological: Negative.    Psychiatric/Behavioral: Negative.         All other systems reviewed and are negative.     Personal History     No past medical history on file.   Past medical history includes tobacco use and hyperlipidemia      Family History: pertinent FHx was reviewed and unremarkable.     Social History:    Patient is current smoker    Medications:  Available home medication information reviewed.    Allergies   Allergen Reactions   • Codeine GI Intolerance       Objective   Objective   Vital Signs:   Temp:  [97.1 °F (36.2 °C)-98.4 °F (36.9 °C)] 98.4 °F (36.9 °C)  Heart Rate:  [61-91]  91  Resp:  [16-18] 18  BP: (138-151)/(80-89) 151/89        Physical Exam   Constitutional: Awake, alert  Eyes: PERRLA, sclerae anicteric, no conjunctival injection  HENT: NCAT, mucous membranes moist  Neck: Supple, no thyromegaly, no lymphadenopathy, trachea midline  Respiratory: Clear to auscultation bilaterally, nonlabored respirations   Cardiovascular: RRR,+ radial pulses bilaterally  Gastrointestinal: Positive bowel sounds, soft, nontender, nondistended  Musculoskeletal: No bilateral ankle edema, no clubbing or cyanosis to extremities  Psychiatric: Appropriate affect, cooperative  Neurologic: Patient is oriented, no signs of confusion, she has ataxia with her left foot, coordination appears intact in her upper extremities, no slurred speech or facial droop, follows commands well  Skin: No rashes or jaundice    Results from last 7 days   Lab Units 11/06/21  0649   WBC 10*3/mm3 6.01   HEMOGLOBIN g/dL 14.5   HEMATOCRIT % 41.2   PLATELETS 10*3/mm3 168     Results from last 7 days   Lab Units 11/06/21  0649   SODIUM mmol/L 143   POTASSIUM mmol/L 3.8   CHLORIDE mmol/L 106   CO2 mmol/L 24.7   BUN mg/dL 12   CREATININE mg/dL 0.63   GLUCOSE mg/dL 98   CALCIUM mg/dL 9.1   ALT (SGPT) U/L 6   AST (SGOT) U/L 10   TROPONIN T ng/mL <0.010     Estimated Creatinine Clearance: 56 mL/min (by C-G formula based on SCr of 0.63 mg/dL).  Brief Urine Lab Results     None        Imaging Results (Last 24 Hours)     Procedure Component Value Units Date/Time    CT Angiogram Head [[id number]] Collected: 11/06/21 0959     Updated: 11/06/21 1012    Narrative:      CT ANGIOGRAM HEAD-, CT ANGIOGRAM NECK-     INDICATIONS: Vertigo, ataxia     TECHNIQUE: Radiation dose reduction techniques were utilized, including  automated exposure control and exposure modulation based on body  size.Noncontrast head CT was performed, followed by enhanced CT  angiography of the head and neck. Three-dimensional reconstructions were  created, reviewed, and assessed  according to NASCET criteria.     COMPARISON: None available     FINDINGS:     No acute intracranial hemorrhage, midline shift or mass effect. No acute  territorial infarct is identified.     Mild periventricular hypodensity suggests chronic small vessel ischemic  change in a patient this age.     No enhancing lesions of brain.     Ventricles, cisterns, cerebral sulci are unremarkable for patient age.     The visualized paranasal sinuses, orbits, mastoid air cells are  unremarkable.      The CT angiography images show occlusion of the mid to upper left  vertebral artery (to the level of C1). Right vertebral artery is  dominant. About 20% narrowing at the origins of the bilateral ICAs.  Scattered calcifications are seen elsewhere in the carotid arteries  without high-grade stenosis (0-49% stenosis). Otherwise, no  hemodynamically significant focal stenosis, aneurysm, or dissection in  the cervical carotid or vertebral arteries, or in the arteries at the  base of the brain.     Bilateral thyroid nodularity is suggested but suboptimally evaluated  with this technique, ultrasound correlation is suggested as indicated.     Facet and uncovertebral joint hypertrophy contribute to neuroforaminal  narrowing, more prominent on the left at C5/6. Disc osteophyte complex  contributes to mild central stenosis at C4/5, C5/6, C6/7.     Biapical pulmonary fibronodular and emphysematous changes are seen.             Impression:      1. 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 Otherwise, no  hemodynamically significant focal stenosis, aneurysm, or dissection in  the cervical carotid or vertebral arteries or the arteries at the base  of the brain.     2. No acute intracranial hemorrhage or hydrocephalus. No enhancing  lesion in brain. If there is further clinical concern, MRI could be  considered for further evaluation.     3. Incidental findings, as noted above.     Discussed by telephone with  Martín Gamez at time of  interpretation, 1006,  11/06/2021.     This report was finalized on 11/6/2021 10:09 AM by Dr. Jonathan Wolfe M.D.       CT Angiogram Neck [[id number]] Collected: 11/06/21 0959     Updated: 11/06/21 1012    Narrative:      CT ANGIOGRAM HEAD-, CT ANGIOGRAM NECK-     INDICATIONS: Vertigo, ataxia     TECHNIQUE: Radiation dose reduction techniques were utilized, including  automated exposure control and exposure modulation based on body  size.Noncontrast head CT was performed, followed by enhanced CT  angiography of the head and neck. Three-dimensional reconstructions were  created, reviewed, and assessed according to NASCET criteria.     COMPARISON: None available     FINDINGS:     No acute intracranial hemorrhage, midline shift or mass effect. No acute  territorial infarct is identified.     Mild periventricular hypodensity suggests chronic small vessel ischemic  change in a patient this age.     No enhancing lesions of brain.     Ventricles, cisterns, cerebral sulci are unremarkable for patient age.     The visualized paranasal sinuses, orbits, mastoid air cells are  unremarkable.      The CT angiography images show occlusion of the mid to upper left  vertebral artery (to the level of C1). Right vertebral artery is  dominant. About 20% narrowing at the origins of the bilateral ICAs.  Scattered calcifications are seen elsewhere in the carotid arteries  without high-grade stenosis (0-49% stenosis). Otherwise, no  hemodynamically significant focal stenosis, aneurysm, or dissection in  the cervical carotid or vertebral arteries, or in the arteries at the  base of the brain.     Bilateral thyroid nodularity is suggested but suboptimally evaluated  with this technique, ultrasound correlation is suggested as indicated.     Facet and uncovertebral joint hypertrophy contribute to neuroforaminal  narrowing, more prominent on the left at C5/6. Disc osteophyte complex  contributes to mild central stenosis at C4/5, C5/6,  C6/7.     Biapical pulmonary fibronodular and emphysematous changes are seen.             Impression:      1. 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 Otherwise, no  hemodynamically significant focal stenosis, aneurysm, or dissection in  the cervical carotid or vertebral arteries or the arteries at the base  of the brain.     2. No acute intracranial hemorrhage or hydrocephalus. No enhancing  lesion in brain. If there is further clinical concern, MRI could be  considered for further evaluation.     3. Incidental findings, as noted above.     Discussed by telephone with  Martín Gamez at time of interpretation, 1006,  11/06/2021.     This report was finalized on 11/6/2021 10:09 AM by Dr. Jonathan Wolfe M.D.               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    Discussion/plan:  Plan for antiplatelet therapy.  Statin therapy.  Echocardiogram.  MRI of the brain.  Neurochecks.  Supportive care and symptom treatment.  Check lipids.  Check A1c.  Physical therapy assessment.    DVT prophylaxis:  Lovenox  CODE STATUS:     Code Status and Medical Interventions:   Ordered at: 11/06/21 1028     Level Of Support Discussed With:    Patient     Code Status (Patient has no pulse and is not breathing):    CPR (Attempt to Resuscitate)     Medical Interventions (Patient has pulse or is breathing):    Full Support       Admission Status:  I believe this patient meets OBSERVATION status, however if further evaluation or treatment plans warrant, status may  change.  Based upon current information, I predict patient's care encounter to be less than or equal to 2 midnights.  Patient will become inpatient MRI confirmed stroke.      Ray Velazquez MD  11/06/21

## 2021-11-06 NOTE — ED NOTES
"Pt to triage from home with c/o dizziness that started last night.  Pt states is \"staggering all over the place.\"  Pt states dizziness gets worse with movement and change in position. Pt also c/o abdominal pain currently.   Pt wearing mask in triage.  Triage personnel wore appropriate PPE       Charley Ho RN  11/06/21 0636    "

## 2021-11-06 NOTE — PLAN OF CARE
"Goal Outcome Evaluation:  Plan of Care Reviewed With: patient, spouse             Problem: Adult Inpatient Plan of Care  Goal: Plan of Care Review  Flowsheets (Taken 11/6/2021 0412)  Plan of Care Reviewed With:   patient   spouse  Outcome Summary: Clinical swallow eval completed. When SLP arrived, pt getting up to walk to the bathroom on her own. SLP stopped her and indicated she would need help to avoid a fall. She did not understand the need for this, stating \"Why? They didn't give me any strong medicines or anything.\" SLP explained fall risk with ataxia to pt and  and they verbalized understanding. No s/s of aspiration or patterns of dysphagia.   OK for regular diet/thin liquids.   Meds whole as tolerated. Upright for PO.   Recommend cognitive evaluation.     "

## 2021-11-06 NOTE — PROGRESS NOTES
Clinical Pharmacy Services: Medication History    Brigid Carballo is a 76 y.o. female presenting to [facility] for   Chief Complaint   Patient presents with   • Dizziness   • Abdominal Pain   • Nausea       She  has no past medical history on file.    Allergies as of 11/06/2021 - Reviewed 11/06/2021   Allergen Reaction Noted   • Amoxicillin-pot clavulanate Itching 11/06/2021   • Codeine Itching 11/06/2021       Medication information was obtained from: Patient  Pharmacy and Phone Number:     Prior to Admission Medications     Prescriptions Last Dose Informant Patient Reported? Taking?    ascorbic acid (VITAMIN C) 1000 MG tablet  Self Yes Yes    Take 1,000 mg by mouth Daily.    cholecalciferol (Cholecalciferol) 25 MCG (1000 UT) tablet   Yes Yes    Take 1 tablet by mouth Daily.    vitamin B-12 (CYANOCOBALAMIN) 1000 MCG tablet  Self Yes Yes    Take 1,000 mcg by mouth Daily.            Medication notes: Patient clarified with me that she is allergic to Codeine which cause her to itch.     This medication list is complete to the best of my knowledge as of 11/6/2021    Please call if questions.      11/6/2021 13:42 EDT

## 2021-11-06 NOTE — ED NOTES
Pt wearing mask. Myself had mask, and eye wear/PPE when present with pt. Hand hygiene performed before and after pt care.     Lulu Iyer, PCT  11/06/21 0823

## 2021-11-07 ENCOUNTER — DOCUMENTATION (OUTPATIENT)
Dept: NEUROLOGY | Facility: CLINIC | Age: 76
End: 2021-11-07

## 2021-11-07 VITALS
DIASTOLIC BLOOD PRESSURE: 81 MMHG | RESPIRATION RATE: 18 BRPM | OXYGEN SATURATION: 94 % | HEIGHT: 64 IN | HEART RATE: 66 BPM | WEIGHT: 150.7 LBS | SYSTOLIC BLOOD PRESSURE: 115 MMHG | TEMPERATURE: 98.1 F | BODY MASS INDEX: 25.73 KG/M2

## 2021-11-07 PROBLEM — R27.0 ATAXIA: Status: RESOLVED | Noted: 2021-11-06 | Resolved: 2021-11-07

## 2021-11-07 PROBLEM — R42 VERTIGO: Status: RESOLVED | Noted: 2021-11-06 | Resolved: 2021-11-07

## 2021-11-07 PROBLEM — G45.9 TIA (TRANSIENT ISCHEMIC ATTACK): Status: ACTIVE | Noted: 2021-11-07

## 2021-11-07 PROBLEM — H81.10 BPV (BENIGN POSITIONAL VERTIGO): Status: ACTIVE | Noted: 2021-11-07

## 2021-11-07 LAB
ANION GAP SERPL CALCULATED.3IONS-SCNC: 9.5 MMOL/L (ref 5–15)
BASOPHILS # BLD AUTO: 0.05 10*3/MM3 (ref 0–0.2)
BASOPHILS NFR BLD AUTO: 0.6 % (ref 0–1.5)
BUN SERPL-MCNC: 9 MG/DL (ref 8–23)
BUN/CREAT SERPL: 18.8 (ref 7–25)
CALCIUM SPEC-SCNC: 8.6 MG/DL (ref 8.6–10.5)
CHLORIDE SERPL-SCNC: 108 MMOL/L (ref 98–107)
CHOLEST SERPL-MCNC: 192 MG/DL (ref 0–200)
CO2 SERPL-SCNC: 24.5 MMOL/L (ref 22–29)
CREAT SERPL-MCNC: 0.48 MG/DL (ref 0.57–1)
DEPRECATED RDW RBC AUTO: 38.7 FL (ref 37–54)
EOSINOPHIL # BLD AUTO: 0.1 10*3/MM3 (ref 0–0.4)
EOSINOPHIL NFR BLD AUTO: 1.2 % (ref 0.3–6.2)
ERYTHROCYTE [DISTWIDTH] IN BLOOD BY AUTOMATED COUNT: 11.7 % (ref 12.3–15.4)
GFR SERPL CREATININE-BSD FRML MDRD: 125 ML/MIN/1.73
GLUCOSE BLDC GLUCOMTR-MCNC: 145 MG/DL (ref 70–130)
GLUCOSE BLDC GLUCOMTR-MCNC: 84 MG/DL (ref 70–130)
GLUCOSE SERPL-MCNC: 83 MG/DL (ref 65–99)
HBA1C MFR BLD: 4.96 % (ref 4.8–5.6)
HCT VFR BLD AUTO: 39.1 % (ref 34–46.6)
HDLC SERPL-MCNC: 45 MG/DL (ref 40–60)
HGB BLD-MCNC: 13.4 G/DL (ref 12–15.9)
IMM GRANULOCYTES # BLD AUTO: 0.02 10*3/MM3 (ref 0–0.05)
IMM GRANULOCYTES NFR BLD AUTO: 0.2 % (ref 0–0.5)
LDLC SERPL CALC-MCNC: 132 MG/DL (ref 0–100)
LDLC/HDLC SERPL: 2.9 {RATIO}
LYMPHOCYTES # BLD AUTO: 1.78 10*3/MM3 (ref 0.7–3.1)
LYMPHOCYTES NFR BLD AUTO: 21.7 % (ref 19.6–45.3)
MCH RBC QN AUTO: 31.8 PG (ref 26.6–33)
MCHC RBC AUTO-ENTMCNC: 34.3 G/DL (ref 31.5–35.7)
MCV RBC AUTO: 92.7 FL (ref 79–97)
MONOCYTES # BLD AUTO: 0.69 10*3/MM3 (ref 0.1–0.9)
MONOCYTES NFR BLD AUTO: 8.4 % (ref 5–12)
NEUTROPHILS NFR BLD AUTO: 5.55 10*3/MM3 (ref 1.7–7)
NEUTROPHILS NFR BLD AUTO: 67.9 % (ref 42.7–76)
NRBC BLD AUTO-RTO: 0 /100 WBC (ref 0–0.2)
PLATELET # BLD AUTO: 150 10*3/MM3 (ref 140–450)
PMV BLD AUTO: 9.9 FL (ref 6–12)
POTASSIUM SERPL-SCNC: 3.8 MMOL/L (ref 3.5–5.2)
RBC # BLD AUTO: 4.22 10*6/MM3 (ref 3.77–5.28)
SODIUM SERPL-SCNC: 142 MMOL/L (ref 136–145)
TRIGL SERPL-MCNC: 83 MG/DL (ref 0–150)
VLDLC SERPL-MCNC: 15 MG/DL (ref 5–40)
WBC # BLD AUTO: 8.19 10*3/MM3 (ref 3.4–10.8)

## 2021-11-07 PROCEDURE — 82962 GLUCOSE BLOOD TEST: CPT

## 2021-11-07 PROCEDURE — 80061 LIPID PANEL: CPT | Performed by: INTERNAL MEDICINE

## 2021-11-07 PROCEDURE — 97161 PT EVAL LOW COMPLEX 20 MIN: CPT

## 2021-11-07 PROCEDURE — 80048 BASIC METABOLIC PNL TOTAL CA: CPT | Performed by: INTERNAL MEDICINE

## 2021-11-07 PROCEDURE — 83036 HEMOGLOBIN GLYCOSYLATED A1C: CPT | Performed by: INTERNAL MEDICINE

## 2021-11-07 PROCEDURE — 36415 COLL VENOUS BLD VENIPUNCTURE: CPT | Performed by: INTERNAL MEDICINE

## 2021-11-07 PROCEDURE — G0378 HOSPITAL OBSERVATION PER HR: HCPCS

## 2021-11-07 PROCEDURE — 99214 OFFICE O/P EST MOD 30 MIN: CPT | Performed by: NURSE PRACTITIONER

## 2021-11-07 PROCEDURE — 85025 COMPLETE CBC W/AUTO DIFF WBC: CPT | Performed by: INTERNAL MEDICINE

## 2021-11-07 RX ORDER — ATORVASTATIN CALCIUM 40 MG/1
40 TABLET, FILM COATED ORAL NIGHTLY
Qty: 30 TABLET | Refills: 0 | Status: SHIPPED | OUTPATIENT
Start: 2021-11-07 | End: 2022-04-11 | Stop reason: SDUPTHER

## 2021-11-07 RX ORDER — ASPIRIN 81 MG/1
81 TABLET ORAL DAILY
Qty: 90 TABLET | Refills: 0 | Status: SHIPPED | OUTPATIENT
Start: 2021-11-08

## 2021-11-07 RX ORDER — ACETAMINOPHEN 325 MG/1
650 TABLET ORAL EVERY 6 HOURS PRN
Start: 2021-11-07

## 2021-11-07 RX ADMIN — ASPIRIN 81 MG: 81 TABLET, COATED ORAL at 08:09

## 2021-11-07 RX ADMIN — SODIUM CHLORIDE, PRESERVATIVE FREE 3 ML: 5 INJECTION INTRAVENOUS at 08:09

## 2021-11-07 RX ADMIN — CLOPIDOGREL 75 MG: 75 TABLET, FILM COATED ORAL at 08:09

## 2021-11-07 RX ADMIN — SODIUM CHLORIDE, PRESERVATIVE FREE 10 ML: 5 INJECTION INTRAVENOUS at 08:09

## 2021-11-07 NOTE — DISCHARGE SUMMARY
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 female with ongoing cigarette smoking, history of hyperlipidemia, and reported history of cardiomyopathy presents to the hospital with acute ataxia and vertigo that occurred at the hairdresser when she had her hair back in the sink concerning for posterior stroke and was found to have occlusion of left vertebral artery on CT angiogram.  MRI of the brain was checked and did show small vessel disease but no acute ischemic stroke.  Patient symptoms have now completely resolved.  She is thought to have a TIA.  I spoke with neurologist and it is unclear if perhaps due to patient's cerebrovascular disease in the posterior aspect of her brain perhaps positionally when she was leaning back in the hairdresser sink this could have altered blood flow temporarily causing the symptoms however transient ischemia of a different nature is also very likely as she has risk factors for stroke.  She was found to have mixed hyperlipidemia and will need to be on statin at discharge.  She was initially placed on aspirin and Plavix but  after stroke was ruled out it was felt she could benefit from aspirin monotherapy.  Patient did have some very scant blood-tinged urine on dual antiplatelet therapy after the Plavix load.  I recommend primary care follow-up and urinalysis at follow-up.  If patient continues to have microscopic hematuria recommend referral to outpatient urology for consideration for cystoscopy.  Patient is not having any gross hematuria at this time.  I have talked to patient at great length about her need to quit his smoking.  It does not appear she is extremely motivated but I have tried to give her strong encouragement.  I recommend she get nicotine gum and nicotine patches at discharge and wear the patch and if she still has a desire to smoke to chew the gum as well.  She agrees with this plan.  I also told her she could discuss Wellbutrin with her primary care provider at follow-up if gum and patch does not work.  Patient is now back to her baseline and requesting discharge home as soon as possible.  She appears he medically stable to discharge.  Neurology team wish to follow her up in 3 months for repeat CT angiogram to see if the left vertebral artery will recanalize.     At the time of discharge patient was told to take all medications as prescribed, keep all follow-up appointments, and call their doctor or return to the hospital with any worsening or concerning symptoms.    Please note that this note was made using Dragon voice recognition software        Day of Discharge     HPI:   Ataxia resolved.  Feels back to her baseline.  No further issues and no new neurologic symptoms.  Vertigo resolved.    ROS:  No current fevers or chills  No current shortness of breath or cough  No current nausea, vomiting, or diarrhea  No current chest pain or palpitations    Vital Signs:   Temp:  [97.9 °F (36.6 °C)-98.1 °F (36.7 °C)] 98.1 °F (36.7 °C)  Heart Rate:  [65-79] 66  Resp:  [16-18] 18  BP: (104-144)/(59-84) 115/81     Physical  Exam:  Constitutional:Awake, alert  HENT: NCAT, mucous membranes moist, neck supple  Respiratory: Clear to auscultation bilaterally, respiratory effort normal, nonlabored breathing   Cardiovascular: RRR, normal radial pulses  Gastrointestinal: Positive bowel sounds, soft, nontender, nondistended  Musculoskeletal: Left foot ataxia resolved, normal musculature for age, no lower extremity edema, BMI 25  Psychiatric: Appropriate affect, cooperative, conversational  Neurologic: Left foot ataxia resolved, no slurred speech or facial droop, follows commands  Skin: No rashes or jaundice, warm      Pertinent  and/or Most Recent Results     Results from last 7 days   Lab Units 11/07/21  0658 11/06/21  0649   WBC 10*3/mm3 8.19 6.01   HEMOGLOBIN g/dL 13.4 14.5   HEMATOCRIT % 39.1 41.2   PLATELETS 10*3/mm3 150 168   SODIUM mmol/L 142 143   POTASSIUM mmol/L 3.8 3.8   CHLORIDE mmol/L 108* 106   CO2 mmol/L 24.5 24.7   BUN mg/dL 9 12   CREATININE mg/dL 0.48* 0.63   GLUCOSE mg/dL 83 98   CALCIUM mg/dL 8.6 9.1     Results from last 7 days   Lab Units 11/06/21  0649   BILIRUBIN mg/dL 0.5   ALK PHOS U/L 46   ALT (SGPT) U/L 6   AST (SGOT) U/L 10     Results from last 7 days   Lab Units 11/07/21  0658 11/06/21  0649   CHOLESTEROL mg/dL 192 232*   TRIGLYCERIDES mg/dL 83 77   HDL CHOL mg/dL 45 59     Results from last 7 days   Lab Units 11/07/21  0658 11/06/21  0649   HEMOGLOBIN A1C % 4.96  --    TROPONIN T ng/mL  --  <0.010       Brief Urine Lab Results     None          Microbiology Results Abnormal     Procedure Component Value - Date/Time    COVID PRE-OP / PRE-PROCEDURE SCREENING ORDER (NO ISOLATION) - Swab, Nasopharynx [[id number]]  (Normal) Collected: 11/06/21 0848    Lab Status: Final result Specimen: Swab from Nasopharynx Updated: 11/06/21 1025    Narrative:      The following orders were created for panel order COVID PRE-OP / PRE-PROCEDURE SCREENING ORDER (NO ISOLATION) - Swab, Nasopharynx.  Procedure                                Abnormality         Status                     ---------                               -----------         ------                     COVID-19,[facility] IN-HOUSE...[[id number]]  Normal              Final result                 Please view results for these tests on the individual orders.    COVID-19,[facility] IN-HOUSE CEPHEID/HAMIDA NP SWAB IN TRANSPORT MEDIA 8-12 HR TAT - Swab, Nasopharynx [[id number]]  (Normal) Collected: 11/06/21 0848    Lab Status: Final result Specimen: Swab from Nasopharynx Updated: 11/06/21 1025     COVID19 Not Detected    Narrative:      Fact sheet for providers: https://www.fda.gov/media/611105/download     Fact sheet for patients: https://www.fda.gov/media/813689/download          Imaging Results (All)     Procedure Component Value Units Date/Time    MRI Brain Without Contrast [[id number]] Collected: 11/06/21 2018     Updated: 11/06/21 2025    Narrative:      MRI BRAIN WO CONTRAST-     CLINICAL HISTORY: Vertigo. Ataxia. Left vertebral occlusion seen on CTA.     TECHNIQUE: Multiple axial T1, T2, gradient echo and diffusion images  were obtained. Sagittal T1-weighted images were also acquired.      COMPARISON: CTA of the head and neck dated 11/06/2021.     FINDINGS: The brain and ventricular system appear structurally normal  for a patient this age. There is no evidence of acute infarct or  hemorrhage. No foci of restricted diffusion are identified within the  brain or brainstem. There are scattered small nodular foci of T2  hyperintensity within the white matter of both cerebral hemispheres and  also in the roseline that are consistent with sequela of mild small vessel  chronic ischemic change. There are no masses. Normal flow voids are  observed in the major vascular structures. The paranasal sinuses are  well aerated.       Impression:      Evidence of mild small vessel chronic ischemic change as  described. Otherwise unremarkable MRI of the brain.     This report was finalized on 11/6/2021 8:22 PM by  Dr. Juancarlos Mcclellan M.D.       CT Angiogram Head [[id number]] Collected: 11/06/21 0959     Updated: 11/06/21 1012    Narrative:      CT ANGIOGRAM HEAD-, CT ANGIOGRAM NECK-     INDICATIONS: Vertigo, ataxia     TECHNIQUE: Radiation dose reduction techniques were utilized, including  automated exposure control and exposure modulation based on body  size.Noncontrast head CT was performed, followed by enhanced CT  angiography of the head and neck. Three-dimensional reconstructions were  created, reviewed, and assessed according to NASCET criteria.     COMPARISON: None available     FINDINGS:     No acute intracranial hemorrhage, midline shift or mass effect. No acute  territorial infarct is identified.     Mild periventricular hypodensity suggests chronic small vessel ischemic  change in a patient this age.     No enhancing lesions of brain.     Ventricles, cisterns, cerebral sulci are unremarkable for patient age.     The visualized paranasal sinuses, orbits, mastoid air cells are  unremarkable.      The CT angiography images show occlusion of the mid to upper left  vertebral artery (to the level of C1). Right vertebral artery is  dominant. About 20% narrowing at the origins of the bilateral ICAs.  Scattered calcifications are seen elsewhere in the carotid arteries  without high-grade stenosis (0-49% stenosis). Otherwise, no  hemodynamically significant focal stenosis, aneurysm, or dissection in  the cervical carotid or vertebral arteries, or in the arteries at the  base of the brain.     Bilateral thyroid nodularity is suggested but suboptimally evaluated  with this technique, ultrasound correlation is suggested as indicated.     Facet and uncovertebral joint hypertrophy contribute to neuroforaminal  narrowing, more prominent on the left at C5/6. Disc osteophyte complex  contributes to mild central stenosis at C4/5, C5/6, C6/7.     Biapical pulmonary fibronodular and emphysematous changes are seen.              Impression:      1. 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 Otherwise, no  hemodynamically significant focal stenosis, aneurysm, or dissection in  the cervical carotid or vertebral arteries or the arteries at the base  of the brain.     2. No acute intracranial hemorrhage or hydrocephalus. No enhancing  lesion in brain. If there is further clinical concern, MRI could be  considered for further evaluation.     3. Incidental findings, as noted above.     Discussed by telephone with  Martín Gamez at time of interpretation, 1006,  11/06/2021.     This report was finalized on 11/6/2021 10:09 AM by Dr. Jonathan Wolfe M.D.       CT Angiogram Neck [[id number]] Collected: 11/06/21 0959     Updated: 11/06/21 1012    Narrative:      CT ANGIOGRAM HEAD-, CT ANGIOGRAM NECK-     INDICATIONS: Vertigo, ataxia     TECHNIQUE: Radiation dose reduction techniques were utilized, including  automated exposure control and exposure modulation based on body  size.Noncontrast head CT was performed, followed by enhanced CT  angiography of the head and neck. Three-dimensional reconstructions were  created, reviewed, and assessed according to NASCET criteria.     COMPARISON: None available     FINDINGS:     No acute intracranial hemorrhage, midline shift or mass effect. No acute  territorial infarct is identified.     Mild periventricular hypodensity suggests chronic small vessel ischemic  change in a patient this age.     No enhancing lesions of brain.     Ventricles, cisterns, cerebral sulci are unremarkable for patient age.     The visualized paranasal sinuses, orbits, mastoid air cells are  unremarkable.      The CT angiography images show occlusion of the mid to upper left  vertebral artery (to the level of C1). Right vertebral artery is  dominant. About 20% narrowing at the origins of the bilateral ICAs.  Scattered calcifications are seen elsewhere in the carotid arteries  without high-grade stenosis (0-49% stenosis).  Otherwise, no  hemodynamically significant focal stenosis, aneurysm, or dissection in  the cervical carotid or vertebral arteries, or in the arteries at the  base of the brain.     Bilateral thyroid nodularity is suggested but suboptimally evaluated  with this technique, ultrasound correlation is suggested as indicated.     Facet and uncovertebral joint hypertrophy contribute to neuroforaminal  narrowing, more prominent on the left at C5/6. Disc osteophyte complex  contributes to mild central stenosis at C4/5, C5/6, C6/7.     Biapical pulmonary fibronodular and emphysematous changes are seen.             Impression:      1. 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 Otherwise, no  hemodynamically significant focal stenosis, aneurysm, or dissection in  the cervical carotid or vertebral arteries or the arteries at the base  of the brain.     2. No acute intracranial hemorrhage or hydrocephalus. No enhancing  lesion in brain. If there is further clinical concern, MRI could be  considered for further evaluation.     3. Incidental findings, as noted above.     Discussed by telephone with  Martín Gamez at time of interpretation, 1006,  11/06/2021.     This report was finalized on 11/6/2021 10:09 AM by Dr. Jonathan Wolfe M.D.               Discharge Details        Discharge Medications      New Medications      Instructions Start Date   acetaminophen 325 MG tablet  Commonly known as: TYLENOL   650 mg, Oral, Every 6 Hours PRN      aspirin 81 MG EC tablet   81 mg, Oral, Daily   Start Date: November 8, 2021     atorvastatin 40 MG tablet  Commonly known as: LIPITOR   40 mg, Oral, Nightly         Continue These Medications      Instructions Start Date   ascorbic acid 1000 MG tablet  Commonly known as: VITAMIN C   1,000 mg, Oral, Daily      cholecalciferol 25 MCG (1000 UT) tablet  Commonly known as: VITAMIN D3   1 tablet, Oral, Daily      vitamin B-12 1000 MCG tablet  Commonly known as: CYANOCOBALAMIN   1,000 mcg,  Oral, Daily             Allergies   Allergen Reactions   • Amoxicillin-Pot Clavulanate Itching   • Codeine Itching         Discharge Disposition:  Home or Self Care    Diet:  Hospital:  Diet Order   Procedures   • Diet Regular; Cardiac, Consistent Carbohydrate       Activity:  Activity Instructions     Activity as Tolerated      Other Activity Instructions      Activity Instructions: Please avoid having your head back at the hairdresser's in the future.               CODE STATUS:    Code Status and Medical Interventions:   Ordered at: 11/06/21 1028     Level Of Support Discussed With:    Patient     Code Status (Patient has no pulse and is not breathing):    CPR (Attempt to Resuscitate)     Medical Interventions (Patient has pulse or is breathing):    Full Support         Additional Instructions for the Follow-ups that You Need to Schedule     Discharge Follow-up with PCP   As directed       Currently Documented PCP:    Aditi Spangler MD    PCP Phone Number:    [phone]     Follow Up Details: Follow-up with primary care provider within 1 week for general hospital follow-up.         Discharge Follow-up with Specified Provider: Follow-up in neurology clinic with Dr. Carey in 3 months.  He plans to get repeat CT angiogram to reassess vertebral artery occlusion to see if it improves.   As directed      To: Follow-up in neurology clinic with Dr. Carey in 3 months.  He plans to get repeat CT angiogram to reassess vertebral artery occlusion to see if it improves.                     Ray Velazquez MD  11/07/21      Time Spent on Discharge:  I spent greater than 30 minutes on this discharge activity which included: face-to-face encounter with the patient, reviewing the data in the system, coordination of the care with the nursing staff as well as consultants, documentation, and entering orders.

## 2021-11-07 NOTE — PROGRESS NOTES
Patient was unable to get her echocardiogram on the day of discharge.  Case was discussed further with attending neurologist Dr. Carey.  Ultimately he feels that since the episode patient suffered was related to the left vertebral occlusion versus dissection and this is not felt to be embolic in nature that echocardiogram is not felt to be needed at this point.  He plans to follow-up in clinic and will decide what further work-up will be needed at that time.  Patient is aware of this and agrees to follow-up.  Also patient strongly needs to quit smoking.  Electronically signed by Ray Velazquez MD

## 2021-11-07 NOTE — PLAN OF CARE
Goal Outcome Evaluation:  Plan of Care Reviewed With: patient           Outcome Summary: Pt is a 77 yo F who was admitted with dizziness and ataxia. Pt presents to PT with normal strnegth and was able to perform bed mobility and transfers independently. Pt reports dizziness and gait are much improved but did have some unsteadiness with ambulation No gross LOB was noted. Pt may benefit from OPPT if balance does not continue to improve. Pt is scheduled to discharge home today. if DC is delayed, acute care PT will continue to follow to address strength, and balance.

## 2021-11-07 NOTE — PROGRESS NOTES
"DOS: 2021  NAME: Brigid Carballo   : 1945  PCP: Aditi Spangler MD  Chief Complaint   Patient presents with   • Dizziness   • Abdominal Pain   • Nausea   Patient seen in follow-up today; new to me      Stroke    Subjective: Patient continues to report some intermittent \"unsteadiness\" with ambulating.  She denies any lightheadedness and/or vertiginous specific symptoms on my exam.  He denies any other complaints and or concerns.     at bedside    Objective:  Vital signs: /81 (BP Location: Right arm, Patient Position: Lying)   Pulse 66   Temp 98.1 °F (36.7 °C) (Oral)   Resp 18   Ht 162.6 cm (64\")   Wt 68.4 kg (150 lb 11.2 oz)   SpO2 94%   BMI 25.87 kg/m²       HEENT: Normocephalic, atraumatic   COR: RRR  Resp: Even and unlabored  Extremities: Equal pulses, nondistal embolization    Neurological:   MS: AO. Language normal. No neglect. Higher integrative function normal  CN: II-XII normal  Motor: 5/5, normal tone  Reflexes: Toes downgoing  Sensory: Intact-to light  Coordination: Normal-finger-to-nose    Laboratory results:  Lab Results   Component Value Date    GLUCOSE 83 2021    CALCIUM 8.6 2021     2021    K 3.8 2021    CO2 24.5 2021     (H) 2021    BUN 9 2021    CREATININE 0.48 (L) 2021    EGFRIFNONA 125 2021    BCR 18.8 2021    ANIONGAP 9.5 2021     Lab Results   Component Value Date    WBC 8.19 2021    HGB 13.4 2021    HCT 39.1 2021    MCV 92.7 2021     2021     Lab Results   Component Value Date    CHOL 192 2021    CHOL 232 (H) 2021     Lab Results   Component Value Date    HDL 45 2021    HDL 59 2021     Lab Results   Component Value Date     (H) 2021     (H) 2021     Lab Results   Component Value Date    TRIG 83 2021    TRIG 77 2021         Lab 21  0658   HEMOGLOBIN A1C 4.96      Review and " interpretation of imaging:  MRI BRAIN WO CONTRAST-     CLINICAL HISTORY: Vertigo. Ataxia. Left vertebral occlusion seen on CTA.     TECHNIQUE: Multiple axial T1, T2, gradient echo and diffusion images  were obtained. Sagittal T1-weighted images were also acquired.      COMPARISON: CTA of the head and neck dated 11/06/2021.     FINDINGS: The brain and ventricular system appear structurally normal  for a patient this age. There is no evidence of acute infarct or  hemorrhage. No foci of restricted diffusion are identified within the  brain or brainstem. There are scattered small nodular foci of T2  hyperintensity within the white matter of both cerebral hemispheres and  also in the roseline that are consistent with sequela of mild small vessel  chronic ischemic change. There are no masses. Normal flow voids are  observed in the major vascular structures. The paranasal sinuses are  well aerated.     IMPRESSION:  Evidence of mild small vessel chronic ischemic change as  described. Otherwise unremarkable MRI of the brain.     This report was finalized on 11/6/2021 8:22 PM by Dr. Juancarlos Mcclellan M.D.     CT ANGIOGRAM HEAD-, CT ANGIOGRAM NECK-     INDICATIONS: Vertigo, ataxia     TECHNIQUE: Radiation dose reduction techniques were utilized, including  automated exposure control and exposure modulation based on body  size.Noncontrast head CT was performed, followed by enhanced CT  angiography of the head and neck. Three-dimensional reconstructions were  created, reviewed, and assessed according to NASCET criteria.     COMPARISON: None available     FINDINGS:     No acute intracranial hemorrhage, midline shift or mass effect. No acute  territorial infarct is identified.     Mild periventricular hypodensity suggests chronic small vessel ischemic  change in a patient this age.     No enhancing lesions of brain.     Ventricles, cisterns, cerebral sulci are unremarkable for patient age.     The visualized paranasal sinuses, orbits,  mastoid air cells are  unremarkable.      The CT angiography images show occlusion of the mid to upper left  vertebral artery (to the level of C1). Right vertebral artery is  dominant. About 20% narrowing at the origins of the bilateral ICAs.  Scattered calcifications are seen elsewhere in the carotid arteries  without high-grade stenosis (0-49% stenosis). Otherwise, no  hemodynamically significant focal stenosis, aneurysm, or dissection in  the cervical carotid or vertebral arteries, or in the arteries at the  base of the brain.     Bilateral thyroid nodularity is suggested but suboptimally evaluated  with this technique, ultrasound correlation is suggested as indicated.     Facet and uncovertebral joint hypertrophy contribute to neuroforaminal  narrowing, more prominent on the left at C5/6. Disc osteophyte complex  contributes to mild central stenosis at C4/5, C5/6, C6/7.     Biapical pulmonary fibronodular and emphysematous changes are seen.           IMPRESSION:  1. 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 Otherwise, no  hemodynamically significant focal stenosis, aneurysm, or dissection in  the cervical carotid or vertebral arteries or the arteries at the base  of the brain.     2. No acute intracranial hemorrhage or hydrocephalus. No enhancing  lesion in brain. If there is further clinical concern, MRI could be  considered for further evaluation.     3. Incidental findings, as noted above.     Discussed by telephone with  Martín Gamez at time of interpretation, 1006,  11/06/2021.     This report was finalized on 11/6/2021 10:09 AM by Dr. Jonathan Wolfe M.D.    Impression: This is a 76-year-old female with history of cardiomyopathy and tobacco abuse (1 pack/day) who presented to [facility] 11/6 due to complaint of counterclockwise vertigo which she experienced while being at the [facility] day prior to arrival.  Symptoms are worse with position changes and turning her head; next  "morning symptoms improved somewhat although patient had ataxic symptoms and reported that she \"walking like I am drunk\".  He had no associated unilateral weakness/clumsiness/diplopia and/or other vision/speech loss/issues. [place] was completed: Positive for right BPPV therefore Epley was completed and vertiginous symptoms improved although patient had significant grade 2 ataxia still present which is felt to be a red flag for acute cerebellar stroke therefore MRI of the brain was ordered which was negative for acute findings; evidence of mild small vessel chronic ischemic changes noted.  CTA head and neck reported as occlusion of mid to upper left vertebral artery occlusion although concern for left vert dissection.  No acute intracranial hemorrhage and or hydrocephalus noted and/or enhancing lesions.    Neurologically, stable without evidence of vertiginous and/or ataxic symptoms.  TTE pending.  We will recommend dual oral antiplatelets x21 days then Plavix alone there after.  Also recommend repeat CTA head and neck in 3 months and follow-up in outpatient clinic with attending neurologist Dr. Jason Carey.No further neurology workup indicated. We will sign off and see again upon request.    Diagnosis:   1.  Acute ataxia thought to be representative of CVA although MRI unremarkable.  Evidence left vert occlusion versus left vert dissection-we will repeat again in 3 months to further evaluate  2.  BPPV; right  3.  History of cardiomyopathy  4.  Tobacco abuse; quit now [place] information provided    Plan:  TTE- pending   Aspirin 81mg- not on PTA x21 days then discontinue  Plavix 75 mg - not on PTA   Lipitor 80 mg (); on no statin PTA   Neurochecks  BP control  Stroke Education  CECI/SCDs  PT/OT/ST  Consider vestibular rehab if BPPV symptoms return  Repeat CTA head and neck in 3 months-order placed    Case reviewed w/ attending Neurologist Dr. Jason Dobson and he agrees with treatment plan above. "     Rosina West, APRN

## 2021-11-07 NOTE — THERAPY EVALUATION
Patient Name: Brigid Carballo  : 1945    MRN: [id number]                              Today's Date: 2021       Admit Date: 2021    Visit Dx:     ICD-10-CM ICD-9-CM   1. Vertigo  R42 780.4   2. Occlusion of left vertebral artery  I65.02 433.20   3. Ataxia  R27.0 781.3   4. Cerebrovascular accident (CVA) due to occlusion of left posterior cerebral artery (HCC)  I63.532 434.91   5. Benign paroxysmal positional vertigo of right ear  H81.11 386.11     Patient Active Problem List   Diagnosis   • Occlusion of left vertebral artery   • Small vessel disease (HCC)   • Mixed hyperlipidemia   • BPV (benign positional vertigo)   • TIA (transient ischemic attack)     History reviewed. No pertinent past medical history.  History reviewed. No pertinent surgical history.   General Information     Row Name 21 1130          Physical Therapy Time and Intention    Document Type evaluation  -     Mode of Treatment individual therapy; physical therapy  -     Row Name 21 1130          General Information    Prior Level of Function independent:; gait; transfer; bed mobility  -     Existing Precautions/Restrictions fall  -     Barriers to Rehab medically complex  -     Row Name 21 1130          Cognition    Orientation Status (Cognition) oriented x 4  -     Row Name 21 1130          Safety Issues, Functional Mobility    Impairments Affecting Function (Mobility) balance  -           User Key  (r) = Recorded By, (t) = Taken By, (c) = Cosigned By    Initials Name Provider Type     Mariela Rivera PT Physical Therapist               Mobility     Row Name 21 1132          Bed Mobility    Bed Mobility supine-sit; sit-supine  -     Supine-Sit [place] (Bed Mobility) supervision  -     Sit-Supine [place] (Bed Mobility) supervision  -     Row Name 21 1132          Sit-Stand Transfer    Sit-Stand [place] (Transfers) supervision  -     Row Name 21 1132           Gait/Stairs (Locomotion)    [place] Level (Gait) verbal cues; nonverbal cues (demo/gesture); contact guard  -     Distance in Feet (Gait) 120  -CH     Deviations/Abnormal Patterns (Gait) jeanie decreased; scissoring  -     Comment (Gait/Stairs) Pt with slightly unsteady gait with occassional minor scissoring but no gross LOB noted  -           User Key  (r) = Recorded By, (t) = Taken By, (c) = Cosigned By    Initials Name Provider Type     Mariela Rivera PT Physical Therapist               Obj/Interventions     Row Name 11/07/21 1134          Range of Motion Comprehensive    General Range of Motion no range of motion deficits identified  -     Row Name 11/07/21 1134          Strength Comprehensive (MMT)    General Manual Muscle Testing (MMT) Assessment no strength deficits identified  -     Comment, General Manual Muscle Testing (MMT) Assessment no unilateral weakness noted B LE grossly 4+/5  -CH     Row Name 11/07/21 1134          Balance    Balance Assessment standing static balance; standing dynamic balance  -     Static Standing Balance WFL  -     Dynamic Standing Balance mild impairment  -           User Key  (r) = Recorded By, (t) = Taken By, (c) = Cosigned By    Initials Name Provider Type     Mariela Rivera, PT Physical Therapist               Goals/Plan     Row Name 11/07/21 1146          Gait Training Goal 1 (PT)    Activity/Assistive Device (Gait Training Goal 1, PT) gait (walking locomotion)  -     [place] Level (Gait Training Goal 1, PT) supervision required  -     Distance (Gait Training Goal 1, PT) 200  -CH     Time Frame (Gait Training Goal 1, PT) 1 week  -           User Key  (r) = Recorded By, (t) = Taken By, (c) = Cosigned By    Initials Name Provider Type     Mariela Rivera, PT Physical Therapist               Clinical Impression     Row Name 11/07/21 1136          Pain    Additional Documentation Pain Scale: FACES Pre/Post-Treatment  (Group)  -     Row Name 11/07/21 1136          Pain Scale: FACES Pre/Post-Treatment    Pain: FACES Scale, Pretreatment 0-->no hurt  -CH     Posttreatment Pain Rating 0-->no hurt  -CH     Row Name 11/07/21 1143          Plan of Care Review    Plan of Care Reviewed With patient  -CH     Outcome Summary Pt is a 75 yo F who was admitted with dizziness and ataxia. Pt presents to PT with normal strnegth and was able to perform bed mobility and transfers independently. Pt reports dizziness and gait are much improved but did have some unsteadiness with ambulation No gross LOB was noted. Pt may benefit from OPPT if balance does not continue to improve. Pt is scheduled to discharge home today. if DC is delayed, acute care PT will continue to follow to address strength, and balance.  -     Row Name 11/07/21 1136          Therapy Assessment/Plan (PT)    Patient/Family Therapy Goals Statement (PT) to go home  -CH     Rehab Potential (PT) good, to achieve stated therapy goals  -     Criteria for Skilled Interventions Met (PT) yes  -     Row Name 11/07/21 1136          Positioning and Restraints    Pre-Treatment Position in bed  -CH     Post Treatment Position bed  -CH     In Bed supine; call light within reach; encouraged to call for assist; exit alarm on; with family/caregiver  -           User Key  (r) = Recorded By, (t) = Taken By, (c) = Cosigned By    Initials Name Provider Type     Mariela Rivera, PT Physical Therapist               Outcome Measures     Row Name 11/07/21 1146          How much help from another person do you currently need...    Turning from your back to your side while in flat bed without using bedrails? 4  -CH     Moving from lying on back to sitting on the side of a flat bed without bedrails? 4  -CH     Moving to and from a bed to a chair (including a wheelchair)? 4  -CH     Standing up from a chair using your arms (e.g., wheelchair, bedside chair)? 4  -CH     Climbing 3-5 steps with a  railing? 3  -     To walk in hospital room? 3  -     AM-PAC 6 Clicks Score (PT) 22  -     Row Name 11/07/21 1146          Modified [place] Scale    Modified [place] Scale 1 - No significant disability despite symptoms.  Able to carry out all usual duties and activities.  -     Row Name 11/07/21 1146          Functional Assessment    Outcome Measure Options AM-PAC 6 Clicks Basic Mobility (PT); Modified [place]  -           User Key  (r) = Recorded By, (t) = Taken By, (c) = Cosigned By    Initials Name Provider Type     Mariela Rivera PT Physical Therapist                             Physical Therapy Education                 Title: PT OT SLP Therapies (Done)     Topic: Physical Therapy (Done)     Point: Mobility training (Done)     Learning Progress Summary           Patient Acceptance, E,TB,D, VU,NR by  at 11/7/2021 1147                   Point: Body mechanics (Done)     Learning Progress Summary           Patient Acceptance, E,TB,D, VU,NR by  at 11/7/2021 1147                   Point: Precautions (Done)     Learning Progress Summary           Patient Acceptance, E,TB,D, VU,NR by  at 11/7/2021 1147                               User Key     Initials Effective Dates Name Provider Type [facility] 06/16/21 -  Mariela Rivera PT Physical Therapist PT              PT Recommendation and Plan  Planned Therapy Interventions (PT): balance training, gait training, home exercise program, patient/family education, strengthening, transfer training  Plan of Care Reviewed With: patient  Outcome Summary: Pt is a 75 yo F who was admitted with dizziness and ataxia. Pt presents to PT with normal strnegth and was able to perform bed mobility and transfers independently. Pt reports dizziness and gait are much improved but did have some unsteadiness with ambulation No gross LOB was noted. Pt may benefit from OPPT if balance does not continue to improve. Pt is scheduled to discharge home today. if DC is  delayed, acute care PT will continue to follow to address strength, and balance.     Time Calculation:    PT Charges     Row Name 11/07/21 1148             Time Calculation    Start Time 0930  -      Stop Time 0940  -      Time Calculation (min) 10 min  -      PT Received On 11/07/21  -      PT - Next Appointment 11/08/21  -      PT Goal Re-Cert Due Date 11/14/21  -              Untimed Charges    PT Eval/Re-eval Minutes 15  -CH              Total Minutes    Untimed Charges Total Minutes 15  -CH       Total Minutes 15  -CH            User Key  (r) = Recorded By, (t) = Taken By, (c) = Cosigned By    Initials Name Provider Type     Mariela Rivera, PT Physical Therapist              Therapy Charges for Today     Code Description Service Date Service Provider Modifiers Qty    [id number] HC PT EVAL LOW COMPLEXITY 2 11/7/2021 Mariela Rivera PT GP 1          PT G-Codes  Outcome Measure Options: AM-PAC 6 Clicks Basic Mobility (PT), Modified [place]  AM-PAC 6 Clicks Score (PT): 22  Modified [place] Scale: 1 - No significant disability despite symptoms.  Able to carry out all usual duties and activities.    Mariela Rivera, EDSON  11/7/2021

## 2021-11-07 NOTE — PLAN OF CARE
Problem: Adult Inpatient Plan of Care  Goal: Plan of Care Review  Outcome: Met  Flowsheets (Taken 11/7/2021 1136)  Plan of Care Reviewed With: patient  Outcome Summary: VSS, RA.  No complaints of pain.  A&Ox4. Discharge to home today.  Goal: Patient-Specific Goal (Individualized)  Outcome: Met  Goal: Absence of Hospital-Acquired Illness or Injury  Outcome: Met  Intervention: Identify and Manage Fall Risk  Recent Flowsheet Documentation  Taken 11/7/2021 0815 by Beau Rinaldi RN  Safety Promotion/Fall Prevention:   activity supervised   assistive device/personal items within reach   clutter free environment maintained   fall prevention program maintained   gait belt   lighting adjusted   mobility aid in reach   muscle strengthening facilitated   nonskid shoes/slippers when out of bed   room organization consistent   safety round/check completed  Intervention: Prevent Skin Injury  Recent Flowsheet Documentation  Taken 11/7/2021 0815 by Beau Rinaldi RN  Body Position: supine  Goal: Optimal Comfort and Wellbeing  Outcome: Met  Intervention: Provide Person-Centered Care  Recent Flowsheet Documentation  Taken 11/7/2021 0815 by Beau Rinaldi RN  Trust Relationship/Rapport:   care explained   choices provided  Goal: Readiness for Transition of Care  Outcome: Met     Problem: Fall Injury Risk  Goal: Absence of Fall and Fall-Related Injury  Outcome: Met  Intervention: Promote Injury-Free Environment  Recent Flowsheet Documentation  Taken 11/7/2021 0815 by Beau Rinaldi RN  Safety Promotion/Fall Prevention:   activity supervised   assistive device/personal items within reach   clutter free environment maintained   fall prevention program maintained   gait belt   lighting adjusted   mobility aid in reach   muscle strengthening facilitated   nonskid shoes/slippers when out of bed   room organization consistent   safety round/check completed   Goal Outcome Evaluation:  Plan of Care Reviewed With: patient            Outcome Summary: VSS, RA.  No complaints of pain.  A&Ox4. Discharge to home today.

## 2021-11-07 NOTE — DISCHARGE INSTRUCTIONS
Aspirin 81mg- not on PTA x21 days then discontinue  Plavix 75 mg - not on PTA   Lipitor 80 mg (); on no statin PTA   Repeat CTA Head and Neck  In 3 months     At discharge: to prevent recurrent stroke we recommend :  Blood pressure < 130/80  B12 > 500   TSH in normal range   LDL < 70; HbA1c < 6.5 and smoking and alcohol cessation if applicable.

## 2021-11-08 ENCOUNTER — READMISSION MANAGEMENT (OUTPATIENT)
Dept: CALL CENTER | Facility: HOSPITAL | Age: 76
End: 2021-11-08

## 2021-11-08 NOTE — PROGRESS NOTES
Acute rehab referral received via stroke order set. Patient noted to have discharged.    Thank you!    Pablo Batista RN  p

## 2021-11-08 NOTE — OUTREACH NOTE
Prep Survey      Responses   [facility] facility patient discharged from? [place]   Is LACE score < 7 ? Yes   Emergency Room discharge w/ pulse ox? No   Eligibility Readm Mgmt   Discharge diagnosis TIA (transient ischemic attack)   Does the patient have one of the following disease processes/diagnoses(primary or secondary)? Stroke (TIA)   Does the patient have Home health ordered? No   Is there a DME ordered? No   Comments regarding appointments Appts needed   Medication alerts for this patient ASA and Lipitor   Prep survey completed? Yes          Joana Desir RN

## 2021-11-09 ENCOUNTER — TELEPHONE (OUTPATIENT)
Dept: NEUROLOGY | Facility: CLINIC | Age: 76
End: 2021-11-09

## 2021-11-09 DIAGNOSIS — H81.10 BENIGN PAROXYSMAL POSITIONAL VERTIGO, UNSPECIFIED LATERALITY: Primary | ICD-10-CM

## 2021-11-09 RX ORDER — MECLIZINE HYDROCHLORIDE 25 MG/1
25 TABLET ORAL 3 TIMES DAILY PRN
Qty: 90 TABLET | Refills: 1 | Status: SHIPPED | OUTPATIENT
Start: 2021-11-09 | End: 2022-04-11

## 2021-11-09 NOTE — TELEPHONE ENCOUNTER
"Spoke with patient.  She states that the nausea and lightheadedness is constant, worse when moving, but states that the room is not spinning.  She does state that it is improved from when she was at the hospital, she no longer feels \"intoxicated\" and she is no longer staggering.    Discussed with SHE Ram who recommended trying Meclizine or a Scopolamine patch, and vestibular therapy as patient was positive for BPPV.  Patient states that she is open to trying any medication, and is agreeable to whatever is felt to be the best course of action in order to improve her symptoms.  "

## 2021-11-09 NOTE — TELEPHONE ENCOUNTER
Provider: THORTON  Caller: JERMAINE  Relationship to Patient: SELF  Phone Number: [phone]  Reason for Call: PT CALLED IN ASKING IF ITS NORMAL FOR HER TO FEEL LIGHT HEADED AND NAUSEOUS SINCE SHE LEFT THE HOSPITAL.   When was the patient last seen: 11-6/21  When did it start: 11-7-21  Where is it located:HEAD AND STOMACH  Timing- Is it constant or intermittent: CONSTANT       PLEASE ADVISE.

## 2021-11-09 NOTE — TELEPHONE ENCOUNTER
Spoke with patient to let her know that Meclizine Rx was sent and that vestibular therapy was ordered.  Patient voiced understanding.

## 2021-11-10 ENCOUNTER — READMISSION MANAGEMENT (OUTPATIENT)
Dept: CALL CENTER | Facility: HOSPITAL | Age: 76
End: 2021-11-10

## 2021-11-10 NOTE — OUTREACH NOTE
Stroke Week 1 Survey      Responses   [facility] patient discharged from? [place]   Does the patient have one of the following disease processes/diagnoses(primary or secondary)? Stroke (TIA)   Week 1 attempt successful? Yes   Call start time 1448   Call end time 1451   Discharge diagnosis TIA (transient ischemic attack)   List who call center can speak with    Meds reviewed with patient/caregiver? Yes   Is the patient having any side effects they believe may be caused by any medication additions or changes? No   Does the patient have all medications ordered at discharge? Yes   Is the patient taking all medications as directed (includes completed medication regime)? Yes   Does the patient have a primary care provider?  Yes   Does the patient have an appointment with their PCP within 7 days of discharge? Yes   Comments regarding PCP PCP f/u 11-12   Psychosocial issues? No   Does the patient require any assistance with activities of daily living such as eating, bathing, dressing, walking, etc.? No   Does the patient have any residual symptoms from stroke/TIA? No   Residual symptoms comments Denies any issues   Does the patient understand the diet ordered at discharge? Yes   Comments If pt bends head back, dizziness returns. She is concerned about tear in back of neck.    Did the patient receive a copy of their discharge instructions? Yes   Nursing interventions Reviewed instructions with patient   What is the patient's perception of their health status since discharge? Improving   Nursing interventions Nurse provided patient education   Is the patient/caregiver able to teach back the risk factors for a stroke? High Cholesterol,  Smoking   Is the patient/caregiver able to teach back signs and symptoms related to disease process for when to call 911? Yes   If the patient is a current smoker, are they able to teach back resources for cessation? --  [1/2 pk/day.]   Is the patient/caregiver able to teach  back the hierarchy of who to call/visit for symptoms/problems? PCP, Specialist, Home health nurse, Urgent Care, ED, 911 Yes   Week 1 call completed? Yes          Loree Alvarez RN

## 2021-11-17 ENCOUNTER — READMISSION MANAGEMENT (OUTPATIENT)
Dept: CALL CENTER | Facility: HOSPITAL | Age: 76
End: 2021-11-17

## 2021-11-17 NOTE — OUTREACH NOTE
Stroke Week 2 Survey      Responses   [facility], [place], operated by [facility] patient discharged from? [place]   Does the patient have one of the following disease processes/diagnoses(primary or secondary)? Stroke (TIA)   Week 2 attempt successful? Yes   Call start time 1500   Call end time 1513   Discharge diagnosis TIA (transient ischemic attack), occlusion of left vertebral artery   Is patient permission given to speak with other caregiver? Yes   List who call center can speak with    Person spoke with today (if not patient) and relationship patient   Meds reviewed with patient/caregiver? Yes   Does the patient have all medications ordered at discharge? Yes   Is the patient taking all medications as directed (includes completed medication regime)? Yes   Comments regarding appointments Neurology appt 2/16/22 Dr Dobson   Does the patient have a primary care provider?  Yes   Comments regarding PCP PCP Dr Aditi Spangler. Patient has seen since discharge.    Has the patient kept scheduled appointments due by today? Yes   Has home health visited the patient within 72 hours of discharge? N/A   Psychosocial issues? No   Does the patient require any assistance with activities of daily living such as eating, bathing, dressing, walking, etc.? No   Does the patient have any residual symptoms from stroke/TIA? No   Does the patient understand the diet ordered at discharge? Yes   Did the patient receive a copy of their discharge instructions? Yes   Nursing interventions Reviewed instructions with patient   What is the patient's perception of their health status since discharge? Improving   Is the patient able to teach back FAST for Stroke? Yes   Is the patient/caregiver able to teach back the risk factors for a stroke? High Cholesterol,  Smoking   Is the patient/caregiver able to teach back signs and symptoms related to disease process for when to call PCP? Yes   Is the patient/caregiver able to teach back signs and symptoms related to  disease process for when to call 911? Yes   Is the patient/caregiver able to teach back the hierarchy of who to call/visit for symptoms/problems? PCP, Specialist, Home health nurse, Urgent Care, ED, 911 Yes   Week 2 call completed? Yes   Revoked No further contact(revokes)-requires comment   Is the patient interested in additional calls from an ambulatory ?  NOTE:  applies to high risk patients requiring additional follow-up. No   Graduated/Revoked comments Patient reports that she is doing well. She states she and  are planning to leave for [place]. Declines further calls.           Lovely Haq RN

## 2022-01-06 ENCOUNTER — APPOINTMENT (OUTPATIENT)
Dept: CT IMAGING | Facility: HOSPITAL | Age: 77
End: 2022-01-06

## 2022-01-10 ENCOUNTER — APPOINTMENT (OUTPATIENT)
Dept: CT IMAGING | Facility: HOSPITAL | Age: 77
End: 2022-01-10

## 2022-01-10 ENCOUNTER — HOSPITAL ENCOUNTER (OUTPATIENT)
Dept: CT IMAGING | Facility: HOSPITAL | Age: 77
Discharge: HOME | End: 2022-01-10
Admitting: NURSE PRACTITIONER

## 2022-01-10 DIAGNOSIS — I65.02 OCCLUSION OF LEFT VERTEBRAL ARTERY: ICD-10-CM

## 2022-01-10 DIAGNOSIS — R42 VERTIGO: ICD-10-CM

## 2022-01-10 DIAGNOSIS — R27.0 ATAXIA: ICD-10-CM

## 2022-01-10 DIAGNOSIS — H81.11 BENIGN PAROXYSMAL POSITIONAL VERTIGO OF RIGHT EAR: ICD-10-CM

## 2022-01-10 LAB — CREAT BLDA-MCNC: 0.7 MG/DL (ref 0.6–1.3)

## 2022-01-10 PROCEDURE — 0 IOPAMIDOL PER 1 ML: Performed by: NURSE PRACTITIONER

## 2022-01-10 PROCEDURE — 82565 ASSAY OF CREATININE: CPT

## 2022-01-10 PROCEDURE — 70496 CT ANGIOGRAPHY HEAD: CPT

## 2022-01-10 PROCEDURE — 70498 CT ANGIOGRAPHY NECK: CPT

## 2022-01-10 RX ADMIN — IOPAMIDOL 95 ML: 755 INJECTION, SOLUTION INTRAVENOUS at 15:30

## 2022-02-01 ENCOUNTER — TELEPHONE (OUTPATIENT)
Dept: NEUROLOGY | Facility: CLINIC | Age: 77
End: 2022-02-01

## 2022-02-23 ENCOUNTER — OFFICE VISIT (OUTPATIENT)
Dept: NEUROLOGY | Facility: CLINIC | Age: 77
End: 2022-02-23

## 2022-02-23 VITALS
HEART RATE: 70 BPM | BODY MASS INDEX: 26.12 KG/M2 | SYSTOLIC BLOOD PRESSURE: 124 MMHG | WEIGHT: 153 LBS | DIASTOLIC BLOOD PRESSURE: 82 MMHG | OXYGEN SATURATION: 94 % | HEIGHT: 64 IN

## 2022-02-23 DIAGNOSIS — H81.11 BENIGN PAROXYSMAL POSITIONAL VERTIGO OF RIGHT EAR: Primary | ICD-10-CM

## 2022-02-23 PROCEDURE — 99214 OFFICE O/P EST MOD 30 MIN: CPT | Performed by: PSYCHIATRY & NEUROLOGY

## 2022-02-23 NOTE — PROGRESS NOTES
"DOS: 2022  NAME: Brigid Carballo   : 1945  PCP: Aditi Spangler MD  Chief Complaint   Patient presents with   • Gait Problem       Chief complaint: vertigo, follow-up for suspected vertebral dissection  Subjective: This is a 76-year-old female with a history of cardiomyopathy, strong family history of cardiovascular disease who I saw in the hospital in November of last year for vertigo and ataxia. She presented with a history suggestive of right-sided benign positional vertigo. Its Hallpike was positive and Epley was performed. She had persistent ataxia and MRI was done which showed small vessel disease but no acute infarct. CTA showed occlusion of the left vertebral artery at the distal V2 segment suggestive of dissection. She was started on dual antiplatelet therapy and Lipitor and discharged. She reports that her vertigo had mostly improved but she continues to have episodic positional vertigo with turning her head to the right or leaning her head backwards. Because of this her primary care doctor asked her not to drive until she saw me. Follow-up CTA was done. She is currently taking low-dose aspirin, not taking Lipitor and smoking 1/2 pack/day.    She denies any symptoms of left upper extremity claudication or increase in her vertigo or ataxia or double vision with usage of the left arm.    Objective:  Vital signs: /82 (BP Location: Left arm, Patient Position: Sitting, Cuff Size: Adult)   Pulse 70   Ht 162.6 cm (64\")   Wt 69.4 kg (153 lb)   SpO2 94%   BMI 26.26 kg/m²    Gen: NAD, vitals reviewed  MS: oriented x3, recent/remote memory intact, normal attention/concentration, language intact, no neglect.  CN: visual acuity grossly normal, PERRL, EOMI, positive [place]-Hallpike to the right, no facial droop, no dysarthria  Motor: 5/5 throughout upper and lower extremities, normal tone  Sensory: intact to light touch all 4 ext.  Gait: Normal station, no ataxia    ROS:  + Vertigo, no hearing loss  No " diplopia or vision loss  No weakness, numbness  No fevers, chills      Laboratory results:  Lab Results   Component Value Date    GLUCOSE 83 11/07/2021    CALCIUM 8.6 11/07/2021     11/07/2021    K 3.8 11/07/2021    CO2 24.5 11/07/2021     (H) 11/07/2021    BUN 9 11/07/2021    CREATININE 0.70 01/10/2022    EGFRIFNONA 125 11/07/2021    BCR 18.8 11/07/2021    ANIONGAP 9.5 11/07/2021     Lab Results   Component Value Date    WBC 8.19 11/07/2021    HGB 13.4 11/07/2021    HCT 39.1 11/07/2021    MCV 92.7 11/07/2021     11/07/2021     Lab Results   Component Value Date     (H) 11/07/2021     (H) 11/06/2021            Review of labs: LDL was 132 in the hospital    Review and interpretation of imaging: I personally reviewed her follow-up CTA of the head and neck performed last month which shows patent left vertebral artery, nondominant, as well as a fairly significant subclavian stenosis on the left proximal to the origin of the left vertebral artery. Radiology report reviewed    Diagnoses:  Benign paroxysmal positional vertigo, right ear  Left subclavian stenosis  Cerebral white matter disease due to ischemia  Tobacco use    Comment: History, exam is consistent with persistent right-sided BPPV. I did perform Epley today with significant improvement of her vertigo. I will go ahead and refer her for vestibular PT in case her symptoms persist or worsen. With regard to her suspected left vertebral dissection, radiology thinks that she has some degree of subclavian steal that accounts for her initial CTA. The follow-up shows that she does not have any impaired flow of the left vertebral artery at this time. Patient does not have any symptoms typical of subclavian steal. I talked to her about the pathophysiology of atherosclerosis and recommended that she stop smoking, continue aspirin and restart her Lipitor.    Plan:  1. Epley performed, I also referred her for vestibular PT for right-sided  BPPV  2. Cleared to drive from neurology standpoint  3. Continue aspirin 81, restart Lipitor 40  4. Discussed smoking cessation  5. Consider follow-up imaging if she develops any symptoms of subclavian steal in the future. Currently her subclavian stenosis is only moderate and I think this is pretty unlikely.    Discussed with patient,     I spent greater than 35 minutes on this visit including chart review, imaging review, patient assessment and examination, as well as counseling and education.    Follow-up with me on an as-needed basis

## 2022-04-11 ENCOUNTER — OFFICE VISIT (OUTPATIENT)
Dept: FAMILY MEDICINE CLINIC | Facility: CLINIC | Age: 77
End: 2022-04-11

## 2022-04-11 VITALS
HEIGHT: 64 IN | HEART RATE: 72 BPM | WEIGHT: 152 LBS | TEMPERATURE: 98.2 F | DIASTOLIC BLOOD PRESSURE: 78 MMHG | OXYGEN SATURATION: 97 % | BODY MASS INDEX: 25.95 KG/M2 | SYSTOLIC BLOOD PRESSURE: 110 MMHG

## 2022-04-11 DIAGNOSIS — E55.9 VITAMIN D DEFICIENCY: ICD-10-CM

## 2022-04-11 DIAGNOSIS — E53.8 VITAMIN B12 DEFICIENCY: ICD-10-CM

## 2022-04-11 DIAGNOSIS — I73.9 SMALL VESSEL DISEASE: ICD-10-CM

## 2022-04-11 DIAGNOSIS — I65.02 OCCLUSION OF LEFT VERTEBRAL ARTERY: ICD-10-CM

## 2022-04-11 DIAGNOSIS — E53.8 COBALAMIN DEFICIENCY: ICD-10-CM

## 2022-04-11 DIAGNOSIS — F17.200 CURRENT SMOKER: ICD-10-CM

## 2022-04-11 DIAGNOSIS — K21.9 GASTROESOPHAGEAL REFLUX DISEASE, UNSPECIFIED WHETHER ESOPHAGITIS PRESENT: ICD-10-CM

## 2022-04-11 DIAGNOSIS — H81.10 BENIGN PAROXYSMAL POSITIONAL VERTIGO, UNSPECIFIED LATERALITY: ICD-10-CM

## 2022-04-11 DIAGNOSIS — E78.2 MIXED HYPERLIPIDEMIA: Primary | ICD-10-CM

## 2022-04-11 PROBLEM — R07.9 CHEST PAIN: Status: RESOLVED | Noted: 2020-10-05 | Resolved: 2022-04-11

## 2022-04-11 PROBLEM — M79.10 MYALGIA: Status: ACTIVE | Noted: 2020-03-02

## 2022-04-11 PROBLEM — R55 SYNCOPE AND COLLAPSE: Status: RESOLVED | Noted: 2019-04-16 | Resolved: 2022-04-11

## 2022-04-11 PROBLEM — M19.90 ARTHRITIS: Status: ACTIVE | Noted: 2022-04-11

## 2022-04-11 PROBLEM — R07.9 CHEST PAIN: Status: ACTIVE | Noted: 2020-10-05

## 2022-04-11 PROBLEM — M21.939: Status: ACTIVE | Noted: 2018-05-03

## 2022-04-11 PROBLEM — G47.00 INSOMNIA: Status: ACTIVE | Noted: 2017-09-11

## 2022-04-11 PROBLEM — D03.70: Status: RESOLVED | Noted: 2021-11-12 | Resolved: 2022-04-11

## 2022-04-11 PROBLEM — M79.10 MYALGIA: Status: RESOLVED | Noted: 2020-03-02 | Resolved: 2022-04-11

## 2022-04-11 PROBLEM — R63.4 UNEXPLAINED WEIGHT LOSS: Status: ACTIVE | Noted: 2021-04-21

## 2022-04-11 PROBLEM — R53.83 FATIGUE: Status: ACTIVE | Noted: 2017-09-11

## 2022-04-11 PROBLEM — F41.9 ANXIETY: Status: RESOLVED | Noted: 2022-04-11 | Resolved: 2022-04-11

## 2022-04-11 PROBLEM — N20.0 RENAL STONE: Status: RESOLVED | Noted: 2020-03-02 | Resolved: 2022-04-11

## 2022-04-11 PROBLEM — J30.9 ALLERGIC RHINITIS: Status: ACTIVE | Noted: 2019-03-18

## 2022-04-11 PROBLEM — N20.0 RENAL STONE: Status: ACTIVE | Noted: 2020-03-02

## 2022-04-11 PROBLEM — I65.23 BILATERAL CAROTID ARTERY STENOSIS: Status: ACTIVE | Noted: 2022-04-11

## 2022-04-11 PROBLEM — K44.9 HIATAL HERNIA: Status: ACTIVE | Noted: 2022-04-11

## 2022-04-11 PROBLEM — M21.939: Status: RESOLVED | Noted: 2018-05-03 | Resolved: 2022-04-11

## 2022-04-11 PROBLEM — F41.9 ANXIETY: Status: ACTIVE | Noted: 2022-04-11

## 2022-04-11 PROBLEM — G47.00 INSOMNIA: Status: RESOLVED | Noted: 2017-09-11 | Resolved: 2022-04-11

## 2022-04-11 PROBLEM — R63.4 UNEXPLAINED WEIGHT LOSS: Status: RESOLVED | Noted: 2021-04-21 | Resolved: 2022-04-11

## 2022-04-11 PROBLEM — D03.70: Status: ACTIVE | Noted: 2021-11-12

## 2022-04-11 PROBLEM — G45.9 TIA (TRANSIENT ISCHEMIC ATTACK): Status: RESOLVED | Noted: 2021-11-07 | Resolved: 2022-04-11

## 2022-04-11 PROBLEM — R55 SYNCOPE AND COLLAPSE: Status: ACTIVE | Noted: 2019-04-16

## 2022-04-11 PROCEDURE — 99215 OFFICE O/P EST HI 40 MIN: CPT | Performed by: NURSE PRACTITIONER

## 2022-04-11 RX ORDER — ATORVASTATIN CALCIUM 40 MG/1
40 TABLET, FILM COATED ORAL NIGHTLY
Qty: 90 TABLET | Refills: 0 | Status: SHIPPED | OUTPATIENT
Start: 2022-04-11 | End: 2022-08-01

## 2022-04-11 NOTE — PATIENT INSTRUCTIONS
Resume Atorvastatin daily.  Come back for labs in 1 month.  Continue to work on healthy diet and exercise.  Gradually decrease number of cigarettes smoked daily as we discussed.  Follow up pending lab results.  Follow up 5 months for Medicare Wellness, or sooner if problems or concerns.

## 2022-04-11 NOTE — PROGRESS NOTES
Subjective   Brigid Carballo is a 76 y.o. female.     Chief Complaint   Patient presents with   • Hyperlipidemia     Follow up        History of Present Illness   New patient, here to establish care; previous PCP Dr. Spangler with [facility]; previous [place], this office [place]; patient presents for follow up Hyperlipidemia: takes Atorvastatin daily; has been out of medication for about 1 month; no myalgias; watches intake of saturated fats; occasionally too many sweets; walks a lot; fasting today.    F/U Vitamin D deficiency: takes OTC Vitamin D daily.    F/U Vitamin B12 deficiency: takes OTC Vitamin B12 daily.    F/U VANESA/[place]'s esophagus: takes OTC Nexium daily and works well; no reflux; has had EGD and colonoscopy in past; at facility off [facility] and at [facility].    F/U vertigo/TIA: went to ER 11/2021 with c/o vertigo/dizziness; symptoms had started after leaning back to get hair washed at  and room started spinning, so went to ER; MRI 11/2021 showed small vessel disease, but no acute infarct; CTA showed occlusion of left vertebral artery at distal V2 segment suggestive of dissection; started on dual antiplatelet therapy and Lipitor; symptoms resolved and thought to be due to TIA; changed to just ASA and stopped Plavix; saw Dr. Dobson, neurology for hospital follow up 2/2022; recommended to stop smoking and continue ASA and Lipitor; told has 50% blocked artery on left; no problems in left arm (subclavian); when went to ER, could not turn head or look up or would get dizzy, but symptoms resolved; no problems recently; no longer taking Meclizine; has had treatment with Epley maneuver per neurology and helped; referred for vestibular rehab, but did not need due to symptoms resolved; neurology told no additional follow up needed; to follow up PRN.    Has not seen cardiology for many years; had normal heart cath in past; previously saw Dr. Mayen, cardiology.    Not ready to stop  smoking.     was present during the history-taking and subsequent discussion with this patient.  Patient agrees to the presence of the individual during this visit.      The following portions of the patient's history were reviewed and updated as appropriate: allergies, current medications, past family history, past medical history, past social history, past surgical history and problem list.      Current Outpatient Medications   Medication Sig Dispense Refill   • ascorbic acid (VITAMIN C) 1000 MG tablet Take 1,000 mg by mouth Daily.     • aspirin 81 MG EC tablet Take 1 tablet by mouth Daily. 90 tablet 0   • atorvastatin (LIPITOR) 40 MG tablet Take 1 tablet by mouth Every Night. 90 tablet 0   • cholecalciferol (VITAMIN D3) 25 MCG (1000 UT) tablet Take 1 tablet by mouth Daily.     • esomeprazole (nexIUM) 20 MG capsule Take 20 mg by mouth Every Morning Before Breakfast.     • vitamin B-12 (CYANOCOBALAMIN) 1000 MCG tablet Take 1,000 mcg by mouth Daily.     • acetaminophen (TYLENOL) 325 MG tablet Take 2 tablets by mouth Every 6 (Six) Hours As Needed for Mild Pain  or Headache.       No current facility-administered medications for this visit.       Past Medical History:   Diagnosis Date   • Angina pectoris (HCC)    • Anxiety    • [place]'s esophagus 09/29/2015   • BPV (benign positional vertigo) 11/07/2021   • Bunion of left foot 06/14/2016   • Cancer ([facility])     skin   • Carpal tunnel syndrome    • Chest pain 10/05/2020   • DDD (degenerative disc disease), cervical 11/04/2014   • Deformity of wrist joint 05/03/2018   • Insomnia 09/11/2017   • Kidney stones    • Low back pain 11/04/2014   • Melanoma in situ of unspecified lower limb, including hip (HCC) 06/2021   • Occlusion of left vertebral artery 11/06/2021    1/10/22 CTA neck: beyond its origin, left vertebral artery is widely patent throughout its cervical and intracranial course to the vertebrobasilar junction without stenosis   • Osteoarthritis of left knee  06/27/2016   • Renal stone 03/02/2020   • Spinal stenosis of lumbar region 12/29/2014   • Tear of lateral meniscus of knee 09/30/2014   • TIA (transient ischemic attack) 11/07/2021   • Unexplained weight loss 04/21/2021       Past Surgical History:   Procedure Laterality Date   • APPENDECTOMY     • BREAST BIOPSY      multiple   • CARDIAC CATHETERIZATION     • CARPAL TUNNEL RELEASE Bilateral    • COLONOSCOPY  05/17/2021    polyp removed; per Dr. Guy   • CYSTOSCOPY W/ URETERAL STENT PLACEMENT  10/22/2019   • HYSTERECTOMY     • INGUINAL HERNIA REPAIR Right    • KNEE ARTHROSCOPY Right     meniscus   • TONSILLECTOMY     • UPPER GASTROINTESTINAL ENDOSCOPY  05/17/2021    per Dr. Guy       Family History   Problem Relation Age of Onset   • Emphysema Mother    • Heart disease Mother    • Heart disease Father    • Cancer Brother    • Heart disease Brother    • Skin cancer Brother    • Stomach cancer Maternal Aunt    • Breast cancer Maternal Grandmother    • Breast cancer Paternal Grandmother        Social History     Socioeconomic History   • Marital status:    Tobacco Use   • Smoking status: Current Every Day Smoker     Packs/day: 0.50   • Smokeless tobacco: Never Used   • Tobacco comment: Quit now [place] info provided   Vaping Use   • Vaping Use: Never used   Substance and Sexual Activity   • Alcohol use: Yes     Comment: social   • Drug use: Never   • Sexual activity: Defer       Review of Systems   Constitutional: Negative for appetite change (does not eat as much as used to), chills, fatigue, fever, unexpected weight gain and unexpected weight loss.   HENT: Negative for ear pain, sinus pressure, sore throat and trouble swallowing. Postnasal drip: some at times. Rhinorrhea: has runny/stuffy nose; no OTC treatment.    Eyes: Negative for blurred vision.   Respiratory: Positive for cough (productive cough--sometimes white, sometimes brown). Negative for chest tightness and shortness of breath.    Cardiovascular:  "Negative for chest pain, palpitations and leg swelling.   Gastrointestinal: Negative for abdominal pain, blood in stool, constipation, diarrhea, GERD and indigestion.   Endocrine: Negative for cold intolerance, heat intolerance and polydipsia.   Genitourinary: Negative for dysuria and frequency.   Musculoskeletal: Negative for arthralgias and back pain (no unexplained ache in back).   Skin: Negative for rash.   Neurological: Negative for dizziness, syncope, weakness, light-headedness and headache.   Hematological: Does not bruise/bleed easily.   Psychiatric/Behavioral: Negative for sleep disturbance and depressed mood. The patient is not nervous/anxious.        Objective   Vitals:    04/11/22 0931   BP: 110/78   BP Location: Left arm   Patient Position: Sitting   Cuff Size: Adult   Pulse: 72   Temp: 98.2 °F (36.8 °C)   SpO2: 97%   Weight: 68.9 kg (152 lb)   Height: 162.6 cm (64\")     Body mass index is 26.09 kg/m².    Physical Exam  Vitals and nursing note reviewed.   Constitutional:       General: She is not in acute distress.     Appearance: She is well-developed and well-groomed. She is not ill-appearing or diaphoretic.   HENT:      Head: Normocephalic.      Right Ear: Tympanic membrane and external ear normal. No decreased hearing noted.      Left Ear: Tympanic membrane and external ear normal. No decreased hearing noted.      Nose: Nose normal.      Right Sinus: No maxillary sinus tenderness or frontal sinus tenderness.      Left Sinus: No maxillary sinus tenderness or frontal sinus tenderness.      Mouth/Throat:      Mouth: Mucous membranes are moist.      Pharynx: No oropharyngeal exudate (mild drainage in posterior pharynx) or posterior oropharyngeal erythema.   Eyes:      Conjunctiva/sclera: Conjunctivae normal.   Neck:      Vascular: No carotid bruit.   Cardiovascular:      Rate and Rhythm: Normal rate and regular rhythm.      Pulses: Normal pulses.      Heart sounds: Normal heart sounds. No murmur " heard.  Pulmonary:      Effort: Pulmonary effort is normal. No respiratory distress.      Breath sounds: Normal breath sounds. No rhonchi or rales.   Abdominal:      General: Bowel sounds are normal.      Palpations: Abdomen is soft. There is no hepatomegaly or splenomegaly.      Tenderness: There is no abdominal tenderness. There is no guarding.   Musculoskeletal:      Cervical back: Normal range of motion and neck supple. No bony tenderness.      Thoracic back: No bony tenderness.      Lumbar back: No bony tenderness.      Right lower leg: No edema.      Left lower leg: No edema.   Lymphadenopathy:      Cervical: No cervical adenopathy.   Skin:     General: Skin is warm and dry.      Findings: No rash.   Neurological:      Mental Status: She is alert and oriented to person, place, and time.      Gait: Gait normal.      Deep Tendon Reflexes:      Reflex Scores:       Patellar reflexes are 2+ on the right side and 2+ on the left side.  Psychiatric:         Mood and Affect: Mood normal.         Behavior: Behavior normal.         Thought Content: Thought content normal.         Cognition and Memory: Cognition normal.         Judgment: Judgment normal.         Lab Results   Component Value Date    WBC 8.19 11/07/2021    RBC 4.22 11/07/2021    HGB 13.4 11/07/2021    HCT 39.1 11/07/2021    MCV 92.7 11/07/2021    MCH 31.8 11/07/2021    MCHC 34.3 11/07/2021    RDW 11.7 (L) 11/07/2021    RDWSD 38.7 11/07/2021    MPV 9.9 11/07/2021     11/07/2021    NEUTRORELPCT 67.9 11/07/2021    LYMPHORELPCT 21.7 11/07/2021    MONORELPCT 8.4 11/07/2021    EOSRELPCT 1.2 11/07/2021    BASORELPCT 0.6 11/07/2021    AUTOIGPER 0.2 11/07/2021    NEUTROABS 5.55 11/07/2021    LYMPHSABS 1.78 11/07/2021    MONOSABS 0.69 11/07/2021    EOSABS 0.10 11/07/2021    BASOSABS 0.05 11/07/2021    AUTOIGNUM 0.02 11/07/2021    NRBC 0.0 11/07/2021     Lab Results   Component Value Date    GLUCOSE 83 11/07/2021    BUN 9 11/07/2021    CREATININE 0.70  01/10/2022    EGFRIFNONA 125 11/07/2021    BCR 18.8 11/07/2021    K 3.8 11/07/2021    CO2 24.5 11/07/2021    CALCIUM 8.6 11/07/2021    ALBUMIN 4.20 11/06/2021    AST 10 11/06/2021    ALT 6 11/06/2021      Lab Results   Component Value Date    TRIG 83 11/07/2021    HDL 45 11/07/2021    VLDL 15 11/07/2021     (H) 11/07/2021     Lab Results   Component Value Date    HGBA1C 4.96 11/07/2021 2/23/22 Dr. Dobson, neurology office note reviewed after hospitalization for vertigo and ataxia; history suggestive of right-sided benign positional vertigo; had persistent ataxia despite Epley maneuver and had MRI; MRI showed small vessel disease, but no acute infarct; CTA showed occlusion of left vertebral artery at the distal V2 segment suggestive of dissection; started on dual antiplatelet therapy and Lipitor; vertigo mostly improved since discharge, but continues to have episodic positional vertigo with turning her head to the right or leaning backwards; had follow up CTA head and neck which showed patent left vertebral artery, nondominant, as well as fairly significant subclavian stenosis on left proximal to the origin of the left vertebral artery; currently taking low dose aspirin, not taking Lipitor and smoking 0.5 pack per day; exam consistent with persistent right-sided BPPV; did Epley that day and pt had significant improvement of vertigo; referred for vestibular PT in case symptoms persist or worsen; in regard to suspected left vertebral dissection, radiology thinks may have some degree of subclavian steal accounting for initial CTA results; follow up CTA shows no impaired flow of left vertebral artery; patient did not have any symptoms of typical subclavian steal; recommended to stop smoking, continue ASA, and resume Lipitor; to follow up as needed.    1/10/22 CTA neck:  1. CT of the head is without change when compared to MRI of the brain and head CT on 11/06/2021. It demonstrates minimal small vessel  disease in the cerebral white matter and is otherwise within normal limits. 2. There is biapical pleural parenchymal scarring and prominent emphysematous changes in the lung apices. There is mild cervical spondylosis with some disc space narrowing and degenerative endplate changes and posterior spurring and uncovertebral joint hypertrophy contributing to mild canal and moderate left bony foraminal narrowing at C5-6, mild canal and mild foraminal narrowing at C6-7. 3. There are calcified plaques in the aortic arch. Calcified plaque extends into and moderately narrows the origin and proximal 2 cm of the left subclavian artery and then there is a mixed calcified and noncalcified plaque that focally severely narrows the junction of the proximal and middle thirds of the left subclavian artery just proximal to the left vertebral artery origin. Unfortunately, the left vertebral artery origin is not optimally assessed but is most probably stenotic. There is collateral branch that extends into the anterior wall of the left vertebral artery at the C7-T1 level. Beyond its origin, the left vertebral artery is widely patent throughout its cervical and intracranial course to the vertebrobasilar junction without stenosis. On the prior CT angiogram of the head and neck on 11/06/2021 there was diminished enhancement of the upper cervical segment of the left vertebral artery and there was excellent filling of the post PICA segment of the left vertebral artery likely from retrograde flow from the basilar artery during the arterial phase but there was filling the upper cervical segment of the left vertebral artery on the delayed postcontrast head CT and I suspect that there is slower flow in the cervical segment of the left vertebral artery due to the left subclavian stenosis.. 4. Calcified plaque mildly narrows the left common carotid origin and heavily calcified plaque moderately narrows the left common carotid bifurcation by 50%,  extends into the origin of the left internal carotid artery and narrows the origin of the left internal carotid artery by 40% to 50% using the NASCET criteria. Calcified plaque mildly narrows the right common carotid bifurcation, extends into the origin and mildly narrows the origin and the proximal right internal carotid artery by 20% using the NASCET criteria. There is focal moderate-to-severe stenosis at the origin of the right external carotid artery.  5. The remainder of the CT angiogram of the head and neck is unremarkable.       Assessment/Plan .  Problem List Items Addressed This Visit     Small vessel disease (HCC)    Current Assessment & Plan     Continue ASA daily.  Resume Atorvastatin daily.  Gradually decrease number of cigarettes smoked daily as we discussed.           Hyperlipidemia - Primary    Current Assessment & Plan     Resume Atorvastatin daily.  Come back for labs in 1 month.  Continue to work on healthy diet and exercise.           Relevant Medications    atorvastatin (LIPITOR) 40 MG tablet    Other Relevant Orders    Comprehensive Metabolic Panel    Lipid Panel With LDL / HDL Ratio    Gastroesophageal reflux disease    Current Assessment & Plan     Stable. Continue Nexium daily.           Relevant Medications    esomeprazole (nexIUM) 20 MG capsule    Other Relevant Orders    CBC & Differential    Vitamin B12 deficiency    Relevant Orders    Vitamin B12 & Folate    Current smoker    Current Assessment & Plan     Gradually decrease number of cigarettes smoked daily as we discussed.           Vitamin D deficiency    Relevant Orders    Vitamin D 25 Hydroxy    RESOLVED: Occlusion of left vertebral artery    Overview     1/10/22 CTA neck: beyond its origin, left vertebral artery is widely patent throughout its cervical and intracranial course to the vertebrobasilar junction without stenosis           RESOLVED: BPV (benign positional vertigo)        Come back for labs when back on  Atorvastatin.  Return in about 5 months (around 9/11/2022) for Medicare Wellness, Recheck; labs in 1 month.or sooner if problems or concerns.  Has been out of Atorvastatin for about 1 month; will come back for labs after back on medication.       I spent 45 minutes caring for Brigid on this date of service. This time includes time spent by me in the following activities:reviewing tests, obtaining and/or reviewing a separately obtained history, performing a medically appropriate examination and/or evaluation , counseling and educating the patient/family/caregiver, ordering medications, tests, or procedures and documenting information in the medical record    COVID-19 Precautions - Patient was compliant in wearing a mask. When I saw the patient, I used appropriate personal protective equipment (PPE) including mask, gloves, and eye shield (standard procedure).  Hand hygiene was completed before and after seeing the patient.

## 2022-04-12 NOTE — ASSESSMENT & PLAN NOTE
Resume Atorvastatin daily.  Come back for labs in 1 month.  Continue to work on healthy diet and exercise.

## 2022-04-12 NOTE — ASSESSMENT & PLAN NOTE
Continue ASA daily.  Resume Atorvastatin daily.  Gradually decrease number of cigarettes smoked daily as we discussed.

## 2022-05-10 DIAGNOSIS — E87.5 HYPERKALEMIA: Primary | ICD-10-CM

## 2022-06-01 ENCOUNTER — OFFICE VISIT (OUTPATIENT)
Dept: FAMILY MEDICINE CLINIC | Facility: CLINIC | Age: 77
End: 2022-06-01

## 2022-06-01 VITALS
TEMPERATURE: 98.3 F | HEIGHT: 64 IN | HEART RATE: 95 BPM | DIASTOLIC BLOOD PRESSURE: 70 MMHG | WEIGHT: 148.2 LBS | SYSTOLIC BLOOD PRESSURE: 128 MMHG | BODY MASS INDEX: 25.3 KG/M2 | OXYGEN SATURATION: 95 %

## 2022-06-01 DIAGNOSIS — R31.29 OTHER MICROSCOPIC HEMATURIA: ICD-10-CM

## 2022-06-01 DIAGNOSIS — E78.2 MIXED HYPERLIPIDEMIA: ICD-10-CM

## 2022-06-01 DIAGNOSIS — E01.0 THYROMEGALY: ICD-10-CM

## 2022-06-01 DIAGNOSIS — J20.9 ACUTE BRONCHITIS, UNSPECIFIED ORGANISM: ICD-10-CM

## 2022-06-01 DIAGNOSIS — R41.3 MEMORY DEFICIT: Primary | ICD-10-CM

## 2022-06-01 DIAGNOSIS — Z11.59 NEED FOR HEPATITIS C SCREENING TEST: ICD-10-CM

## 2022-06-01 DIAGNOSIS — J01.90 ACUTE NON-RECURRENT SINUSITIS, UNSPECIFIED LOCATION: ICD-10-CM

## 2022-06-01 LAB
BILIRUB BLD-MCNC: ABNORMAL MG/DL
CLARITY, POC: CLEAR
COLOR UR: YELLOW
EXPIRATION DATE: ABNORMAL
GLUCOSE UR STRIP-MCNC: NEGATIVE MG/DL
KETONES UR QL: NEGATIVE
LEUKOCYTE EST, POC: NEGATIVE
Lab: ABNORMAL
NITRITE UR-MCNC: NEGATIVE MG/ML
PH UR: 5.5 [PH] (ref 5–8)
PROT UR STRIP-MCNC: ABNORMAL MG/DL
RBC # UR STRIP: ABNORMAL /UL
SP GR UR: 1.03 (ref 1–1.03)
UROBILINOGEN UR QL: NORMAL

## 2022-06-01 PROCEDURE — 81003 URINALYSIS AUTO W/O SCOPE: CPT | Performed by: NURSE PRACTITIONER

## 2022-06-01 PROCEDURE — 99214 OFFICE O/P EST MOD 30 MIN: CPT | Performed by: NURSE PRACTITIONER

## 2022-06-01 RX ORDER — DOXYCYCLINE 100 MG/1
100 CAPSULE ORAL 2 TIMES DAILY
Qty: 14 CAPSULE | Refills: 0 | Status: SHIPPED | OUTPATIENT
Start: 2022-06-01 | End: 2022-06-08

## 2022-06-01 NOTE — PROGRESS NOTES
Subjective   Brigid Carballo is a 76 y.o. female.     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    Also, c/o trouble hearing in right ear; has had sensation of fluid in ears; has noted some mild dizziness with turning head, no room spinning; has had runny nose, clear drainage; no sore throat; no sinus pressure; has had productive cough x2-3 weeks, sometimes yellow sputum; no fever; no SOA; had bronchitis about 2 months ago; finished [employer] and helped.    F/U Hyperlipidemia: resumed Atorvastatin daily; no myalgias; watches intake of saturated fats; had recent labs and WNL.    Patient is supposed to serve jury duty and needs letter stating not fit to serve due to memory concerns and trouble hearing.    The following portions of the patient's history were reviewed and updated as appropriate: allergies, current medications, past family history, past medical history, past social history, past surgical history and problem list.    Current Outpatient Medications on File Prior to Visit   Medication Sig   • acetaminophen (TYLENOL) 325 MG tablet Take 2 tablets by mouth Every 6 (Six) Hours As Needed for Mild Pain  or Headache.   • ascorbic acid (VITAMIN C) 1000 MG tablet Take 1,000 mg by mouth Daily.   • aspirin 81 MG EC tablet Take 1 tablet by mouth Daily.   • atorvastatin (LIPITOR) 40 MG tablet Take 1 tablet by mouth Every Night.   • cholecalciferol (VITAMIN D3) 25 MCG (1000 UT) tablet Take 1 tablet by mouth Daily.   • esomeprazole (nexIUM) 20 MG capsule Take 20 mg by mouth Every Morning  Before Breakfast.   • vitamin B-12 (CYANOCOBALAMIN) 1000 MCG tablet Take 1,000 mcg by mouth Daily.     No current facility-administered medications on file prior to visit.        Past Medical History:   Diagnosis Date   • Angina pectoris (HCC)    • Anxiety    • [place]'s esophagus 09/29/2015   • BPV (benign positional vertigo) 11/07/2021   • Bunion of left foot 06/14/2016   • Cancer (HCC)     skin   • Carpal tunnel syndrome    • Chest pain 10/05/2020   • DDD (degenerative disc disease), cervical 11/04/2014   • Deformity of wrist joint 05/03/2018   • Insomnia 09/11/2017   • Kidney stones    • Low back pain 11/04/2014   • Melanoma in situ of unspecified lower limb, including hip (HCC) 06/2021   • Occlusion of left vertebral artery 11/06/2021    1/10/22 CTA neck: beyond its origin, left vertebral artery is widely patent throughout its cervical and intracranial course to the vertebrobasilar junction without stenosis   • Osteoarthritis of left knee 06/27/2016   • Renal stone 03/02/2020   • Spinal stenosis of lumbar region 12/29/2014   • Tear of lateral meniscus of knee 09/30/2014   • TIA (transient ischemic attack) 11/07/2021   • Unexplained weight loss 04/21/2021       Past Surgical History:   Procedure Laterality Date   • APPENDECTOMY     • BREAST BIOPSY      multiple   • CARDIAC CATHETERIZATION     • CARPAL TUNNEL RELEASE Bilateral    • COLONOSCOPY  05/17/2021    polyp removed; per Dr. Guy   • CYSTOSCOPY W/ URETERAL STENT PLACEMENT  10/22/2019   • HYSTERECTOMY     • INGUINAL HERNIA REPAIR Right    • KNEE ARTHROSCOPY Right     meniscus   • TONSILLECTOMY     • UPPER GASTROINTESTINAL ENDOSCOPY  05/17/2021    per Dr. Guy       Family History   Problem Relation Age of Onset   • Emphysema Mother    • Heart disease Mother    • Heart disease Father    • Cancer Brother    • Heart disease Brother    • Skin cancer Brother    • Stomach cancer Maternal Aunt    • Breast cancer Maternal Grandmother    • Breast cancer Paternal  "Grandmother        Social History     Socioeconomic History   • Marital status:    Tobacco Use   • Smoking status: Current Every Day Smoker     Packs/day: 0.50   • Smokeless tobacco: Never Used   • Tobacco comment: Quit now [place] info provided   Vaping Use   • Vaping Use: Never used   Substance and Sexual Activity   • Alcohol use: Yes     Comment: social   • Drug use: Never   • Sexual activity: Defer       Review of Systems   Constitutional: Positive for fatigue (feels tired much of the time; does not sleep good). Negative for appetite change, chills, fever, unexpected weight gain and unexpected weight loss.   HENT: Positive for postnasal drip. Negative for ear pain (see HPI), sinus pressure and trouble swallowing.    Eyes: Negative for blurred vision.   Respiratory: Positive for cough. Negative for chest tightness and shortness of breath. Apnea: does snore some; declines sleep study.    Cardiovascular: Negative for chest pain, palpitations and leg swelling.   Gastrointestinal: Negative for abdominal pain, blood in stool, constipation, diarrhea, GERD (stable on Nexium daily) and indigestion.   Endocrine: Cold intolerance: some at times.   Genitourinary: Negative for dysuria and frequency.   Musculoskeletal: Negative for arthralgias and back pain.   Skin: Negative for rash.   Neurological: Negative for dizziness and headache. Light-headedness: mild on occasion.   Hematological: Bruises/bleeds easily: no change in easy bruising.     PHQ-2 Depression Screening  Little interest or pleasure in doing things? 0-->not at all   Feeling down, depressed, or hopeless? 0-->not at all   PHQ-2 Total Score 0         Objective   Vitals:    06/01/22 1105   BP: 128/70   BP Location: Left arm   Patient Position: Sitting   Cuff Size: Adult   Pulse: 95   Temp: 98.3 °F (36.8 °C)   SpO2: 95%   Weight: 67.2 kg (148 lb 3.2 oz)   Height: 162.6 cm (64\")     Body mass index is 25.44 kg/m².    Physical Exam  Vitals and nursing note " reviewed.   Constitutional:       General: She is not in acute distress.     Appearance: She is well-developed and well-groomed. She is not diaphoretic.   HENT:      Head: Normocephalic.      Jaw: No tenderness or pain on movement.      Right Ear: External ear normal. Right ear decreased hearing: able to hear finger rub. Right ear middle ear effusion: mild. Tympanic membrane is not erythematous.      Left Ear: External ear normal. Left ear decreased hearing: able to hear finger rub. A middle ear effusion is present. Left ear injected TM: mild.      Nose: Nose normal.      Right Sinus: No maxillary sinus tenderness or frontal sinus tenderness.      Left Sinus: No maxillary sinus tenderness or frontal sinus tenderness.      Mouth/Throat:      Mouth: Mucous membranes are moist.      Pharynx: No oropharyngeal exudate or posterior oropharyngeal erythema.   Eyes:      Extraocular Movements: Extraocular movements intact.      Conjunctiva/sclera: Conjunctivae normal.      Pupils: Pupils are equal, round, and reactive to light.   Neck:      Thyroid: Thyromegaly: mild.      Vascular: No carotid bruit.   Cardiovascular:      Rate and Rhythm: Normal rate and regular rhythm.      Pulses: Normal pulses.      Heart sounds: Normal heart sounds. No murmur heard.  Pulmonary:      Effort: Pulmonary effort is normal. No respiratory distress.      Breath sounds: Normal breath sounds. Wheezes: occasional. Rhonchi: coarse in lower lobes, improves with cough.   Abdominal:      General: Bowel sounds are normal.      Palpations: Abdomen is soft. There is no hepatomegaly or splenomegaly.      Tenderness: There is no abdominal tenderness. There is no guarding.   Musculoskeletal:      Cervical back: Normal range of motion and neck supple.      Right lower leg: No edema.      Left lower leg: No edema.   Lymphadenopathy:      Cervical: No cervical adenopathy.   Skin:     General: Skin is warm and dry.      Findings: No rash.   Neurological:       Mental Status: She is alert and oriented to person, place, and time.      Cranial Nerves: Cranial nerves are intact.      Motor: Motor function is intact.      Gait: Abnormal gait: guarded gait.   Psychiatric:         Mood and Affect: Mood normal.         Behavior: Behavior normal.         Thought Content: Thought content normal.         Cognition and Memory: Cognition normal.         Judgment: Judgment normal.         Lab Results   Component Value Date    WBC 5.8 05/09/2022    RBC 4.79 05/09/2022    HGB 15.0 05/09/2022    HCT 45.7 05/09/2022    MCV 95 05/09/2022    MCH 31.3 05/09/2022    MCHC 32.8 05/09/2022    RDW 12.2 05/09/2022    RDWSD 38.7 11/07/2021    MPV 9.9 11/07/2021     05/09/2022    NEUTRORELPCT 55 05/09/2022    LYMPHORELPCT 34 05/09/2022    MONORELPCT 8 05/09/2022    EOSRELPCT 2 05/09/2022    BASORELPCT 1 05/09/2022    AUTOIGPER 0.2 11/07/2021    NEUTROABS 3.3 05/09/2022    LYMPHSABS 2.0 05/09/2022    MONOSABS 0.4 05/09/2022    EOSABS 0.1 05/09/2022    BASOSABS 0.1 05/09/2022    AUTOIGNUM 0.02 11/07/2021    NRBC 0.0 11/07/2021     Lab Results   Component Value Date    GLUCOSE 89 05/31/2022    BUN 15 05/31/2022    CREATININE 0.80 05/31/2022    EGFRIFNONA 125 11/07/2021    BCR 19 05/31/2022    K 3.8 05/31/2022    CO2 22 05/31/2022    CALCIUM 9.5 05/31/2022    PROTENTOTREF 6.0 05/09/2022    ALBUMIN 4.0 05/09/2022    LABIL2 2.0 05/09/2022    AST 16 05/09/2022    ALT 11 05/09/2022      Lab Results   Component Value Date    CHLPL 138 05/09/2022    TRIG 54 05/09/2022    HDL 53 05/09/2022    VLDL 12 05/09/2022    LDL 73 05/09/2022     Lab Results   Component Value Date    HGBA1C 4.96 11/07/2021 11/6/21 MRI brain: Evidence of mild small vessel chronic ischemic change as described. Otherwise unremarkable MRI of the brain.  11/6/21 CTA neck: Bilateral thyroid nodularity is suggested but suboptimally evaluated with this technique, ultrasound correlation is suggested as indicated.    Assessment     Problem List Items Addressed This Visit     Hyperlipidemia    Current Assessment & Plan     Continue Atorvastatin daily.  Continue to work on healthy diet.           Memory deficit - Primary    Relevant Orders    RPR    TSH Rfx On Abnormal To Free T4    Other microscopic hematuria    Relevant Orders    POC Urinalysis Dipstick, Automated (Completed)    Urine Culture - Urine, Urine, Clean Catch    Thyromegaly    Relevant Orders    US Thyroid    Acute non-recurrent sinusitis    Current Assessment & Plan     Make sure drinking adequate liquids.  Try plain Mucinex to thin secretions.           Relevant Medications    doxycycline (MONODOX) 100 MG capsule    Acute bronchitis    Current Assessment & Plan     Make sure drinking adequate liquids.  Try plain Mucinex to thin secretions.           Relevant Medications    doxycycline (MONODOX) 100 MG capsule      Other Visit Diagnoses     Need for hepatitis C screening test        Relevant Orders    Hepatitis C Antibody          Return in about 3 months (around 9/1/2022) for Medicare Wellness, Recheck.or sooner if symptoms persist or worsen.  Thyroid feels mildly enlarged; CTA neck results from November 21 reviewed and noted possible thyroid nodules not well visualized; will get ultrasound of thyroid for further evaluation.  Microscopic hematuria noted on urinalysis in the past; hematuria also noted again today; will send urine for culture; will consider referral to urology pending results.  [facility] examination: 25 with highest level of education 10th grade, considered WNL; will check labs today for further evaluation of memory.     COVID-19 Precautions - Patient was compliant in wearing a mask. When I saw the patient, I used appropriate personal protective equipment (PPE) including mask, gloves, and eye shield (standard procedure).  Hand hygiene was completed before and after seeing the patient.

## 2022-06-01 NOTE — PATIENT INSTRUCTIONS
Make sure drinking adequate liquids.  Try plain Mucinex to thin secretions.  Follow up pending lab results.  Follow up in 3 months for Medicare Wellness, or sooner if symptoms persist or worsen.

## 2022-06-02 PROBLEM — J20.9 ACUTE BRONCHITIS: Status: ACTIVE | Noted: 2022-06-02

## 2022-06-02 PROBLEM — E01.0 THYROMEGALY: Status: ACTIVE | Noted: 2022-06-02

## 2022-06-02 PROBLEM — J01.90 ACUTE NON-RECURRENT SINUSITIS: Status: ACTIVE | Noted: 2022-06-02

## 2022-06-02 PROBLEM — R31.29 OTHER MICROSCOPIC HEMATURIA: Status: ACTIVE | Noted: 2022-06-02

## 2022-06-02 PROBLEM — R41.3 MEMORY DEFICIT: Status: ACTIVE | Noted: 2022-06-02

## 2022-06-08 LAB
BACTERIA UR CULT: ABNORMAL
BACTERIA UR CULT: ABNORMAL
OTHER ANTIBIOTIC SUSC ISLT: ABNORMAL

## 2022-06-09 DIAGNOSIS — N30.01 ACUTE CYSTITIS WITH HEMATURIA: Primary | ICD-10-CM

## 2022-06-09 RX ORDER — CIPROFLOXACIN 250 MG/1
250 TABLET, FILM COATED ORAL 2 TIMES DAILY
Qty: 6 TABLET | Refills: 0 | Status: SHIPPED | OUTPATIENT
Start: 2022-06-09 | End: 2022-06-12

## 2022-07-11 ENCOUNTER — HOSPITAL ENCOUNTER (OUTPATIENT)
Dept: ULTRASOUND IMAGING | Facility: HOSPITAL | Age: 77
Discharge: HOME | End: 2022-07-11
Admitting: NURSE PRACTITIONER

## 2022-07-11 DIAGNOSIS — E01.0 THYROMEGALY: ICD-10-CM

## 2022-07-11 PROCEDURE — 76536 US EXAM OF HEAD AND NECK: CPT

## 2022-07-31 DIAGNOSIS — E78.2 MIXED HYPERLIPIDEMIA: ICD-10-CM

## 2022-08-01 RX ORDER — ATORVASTATIN CALCIUM 40 MG/1
TABLET, FILM COATED ORAL
Qty: 90 TABLET | Refills: 0 | Status: SHIPPED | OUTPATIENT
Start: 2022-08-01 | End: 2022-10-28

## 2022-08-01 NOTE — TELEPHONE ENCOUNTER
Rx Refill Note  Requested Prescriptions     Pending Prescriptions Disp Refills   • atorvastatin (LIPITOR) 40 MG tablet [Pharmacy Med Name: Atorvastatin Calcium 40 MG Oral Tablet] 90 tablet 0     Sig: TAKE 1 TABLET BY MOUTH ONCE DAILY AT NIGHT      Last office visit with prescribing clinician: 6/1/2022      Next office visit with prescribing clinician: 9/12/2022

## 2022-09-12 ENCOUNTER — OFFICE VISIT (OUTPATIENT)
Dept: FAMILY MEDICINE CLINIC | Facility: CLINIC | Age: 77
End: 2022-09-12

## 2022-09-12 VITALS
TEMPERATURE: 98 F | DIASTOLIC BLOOD PRESSURE: 74 MMHG | BODY MASS INDEX: 25.99 KG/M2 | SYSTOLIC BLOOD PRESSURE: 116 MMHG | OXYGEN SATURATION: 95 % | HEART RATE: 89 BPM | WEIGHT: 152.2 LBS | HEIGHT: 64 IN

## 2022-09-12 DIAGNOSIS — K21.9 GASTROESOPHAGEAL REFLUX DISEASE, UNSPECIFIED WHETHER ESOPHAGITIS PRESENT: ICD-10-CM

## 2022-09-12 DIAGNOSIS — E78.2 MIXED HYPERLIPIDEMIA: ICD-10-CM

## 2022-09-12 DIAGNOSIS — Z00.00 ENCOUNTER FOR MEDICARE ANNUAL WELLNESS EXAM: Primary | ICD-10-CM

## 2022-09-12 DIAGNOSIS — I65.22 OCCLUSION AND STENOSIS OF LEFT CAROTID ARTERY: ICD-10-CM

## 2022-09-12 DIAGNOSIS — R31.29 OTHER MICROSCOPIC HEMATURIA: ICD-10-CM

## 2022-09-12 DIAGNOSIS — Z23 ENCOUNTER FOR IMMUNIZATION: ICD-10-CM

## 2022-09-12 DIAGNOSIS — I65.29 STENOSIS OF CAROTID ARTERY, UNSPECIFIED LATERALITY: ICD-10-CM

## 2022-09-12 DIAGNOSIS — Z78.0 POSTMENOPAUSE: ICD-10-CM

## 2022-09-12 DIAGNOSIS — I73.9 SMALL VESSEL DISEASE: ICD-10-CM

## 2022-09-12 LAB
BILIRUB BLD-MCNC: NEGATIVE MG/DL
CLARITY, POC: CLEAR
COLOR UR: YELLOW
EXPIRATION DATE: ABNORMAL
GLUCOSE UR STRIP-MCNC: NEGATIVE MG/DL
KETONES UR QL: NEGATIVE
LEUKOCYTE EST, POC: ABNORMAL
Lab: ABNORMAL
NITRITE UR-MCNC: NEGATIVE MG/ML
PH UR: 7 [PH] (ref 5–8)
PROT UR STRIP-MCNC: NEGATIVE MG/DL
RBC # UR STRIP: ABNORMAL /UL
SP GR UR: 1.01 (ref 1–1.03)
UROBILINOGEN UR QL: NORMAL

## 2022-09-12 PROCEDURE — 81003 URINALYSIS AUTO W/O SCOPE: CPT | Performed by: NURSE PRACTITIONER

## 2022-09-12 PROCEDURE — G0439 PPPS, SUBSEQ VISIT: HCPCS | Performed by: NURSE PRACTITIONER

## 2022-09-12 PROCEDURE — 90677 PCV20 VACCINE IM: CPT | Performed by: NURSE PRACTITIONER

## 2022-09-12 PROCEDURE — 1159F MED LIST DOCD IN RCRD: CPT | Performed by: NURSE PRACTITIONER

## 2022-09-12 PROCEDURE — 99213 OFFICE O/P EST LOW 20 MIN: CPT | Performed by: NURSE PRACTITIONER

## 2022-09-12 PROCEDURE — 1170F FXNL STATUS ASSESSED: CPT | Performed by: NURSE PRACTITIONER

## 2022-09-12 PROCEDURE — 96160 PT-FOCUSED HLTH RISK ASSMT: CPT | Performed by: NURSE PRACTITIONER

## 2022-09-12 NOTE — PATIENT INSTRUCTIONS
Continue to work on healthy diet and exercise.  Follow up pending lab results.  Follow up in 6 months, or sooner if symptoms persist or worsen.  Consider Tdap at pharmacy.      Medicare Wellness  Personal Prevention Plan of Service     Date of Office Visit:    Encounter Provider:  SHE Teresa  Place of Service:  [facility]  Patient Name: Brigid Carballo  :  1945    As part of the Medicare Wellness portion of your visit today, we are providing you with this personalized preventive plan of services (PPPS). This plan is based upon recommendations of the [place] Preventive Services Task Force (USPSTF) and the Advisory Committee on Immunization Practices (ACIP).    This lists the preventive care services that should be considered, and provides dates of when you are due. Items listed as completed are up-to-date and do not require any further intervention.    Health Maintenance   Topic Date Due    DXA SCAN  Never done    TDAP/TD VACCINES (1 - Tdap) Never done    Pneumococcal Vaccine 65+ (2 - PPSV23 or PCV20) 2017    ZOSTER VACCINE (1 of 2) 2023 (Originally 10/30/1994)    COVID-19 Vaccine (1) 2023 (Originally 1946)    INFLUENZA VACCINE  10/01/2022    LIPID PANEL  2023    ANNUAL WELLNESS VISIT  2023    HEPATITIS C SCREENING  Completed       Orders Placed This Encounter   Procedures    DEXA Bone Density Axial     Standing Status:   Future     Standing Expiration Date:   2023     Order Specific Question:   Reason for Exam:     Answer:   postmenopause    Pneumococcal Conjugate Vaccine 20-Valent All    Lipid Panel With LDL / HDL Ratio     Order Specific Question:   Release to patient     Answer:   Routine Release    Comprehensive Metabolic Panel     Order Specific Question:   Release to patient     Answer:   Routine Release    POC Urinalysis Dipstick, Automated     Order Specific Question:   Release to patient     Answer:   Routine Release        Return in about 6 months (around 3/12/2023) for Recheck.

## 2022-09-12 NOTE — PROGRESS NOTES
The ABCs of the Annual Wellness Visit  Subsequent Medicare Wellness Visit    Chief Complaint   Patient presents with   • Annual Exam     [place]       Subjective    History of Present Illness:  Brigid Carballo is a 76 y.o. female who presents for a Subsequent Medicare Wellness Visit.  In addition, we addressed the following health issues:    F/U Hyperlipidemia: takes Atorvastatin daily; no myalgias; watches saturated fats; walks regularly; fasting today other than ate banana.    F/U VANESA: takes Nexium daily and works well.    Will be seeing Dr. Nunez, cardiology on 9/19/22.    No memory concerns recently.     was present during the history-taking and subsequent discussion with this patient.  Patient agrees to the presence of the individual during this visit.      The following portions of the patient's history were reviewed and   updated as appropriate: allergies, current medications, past family history, past medical history, past social history, past surgical history and problem list.    Compared to one year ago, the patient feels her physical   health is the same.    Compared to one year ago, the patient feels her mental   health is the same.    Recent Hospitalizations:  This patient has not had a hospital admission record on file within the last 365 days.    Current Medical Providers:  Patient Care Team:  Kaya Mckeon APRN as PCP - General (Family Medicine)    Outpatient Medications Prior to Visit   Medication Sig Dispense Refill   • acetaminophen (TYLENOL) 325 MG tablet Take 2 tablets by mouth Every 6 (Six) Hours As Needed for Mild Pain  or Headache.     • ascorbic acid (VITAMIN C) 1000 MG tablet Take 1,000 mg by mouth Daily.     • aspirin 81 MG EC tablet Take 1 tablet by mouth Daily. 90 tablet 0   • atorvastatin (LIPITOR) 40 MG tablet TAKE 1 TABLET BY MOUTH ONCE DAILY AT NIGHT 90 tablet 0   • cholecalciferol (VITAMIN D3) 25 MCG (1000 UT) tablet Take 1 tablet by mouth Daily.     • esomeprazole (nexIUM) 20 MG  capsule Take 20 mg by mouth Every Morning Before Breakfast.     • vitamin B-12 (CYANOCOBALAMIN) 1000 MCG tablet Take 1,000 mcg by mouth Daily.       No facility-administered medications prior to visit.       No opioid medication identified on active medication list. I have reviewed chart for other potential  high risk medication/s and harmful drug interactions in the elderly.          Aspirin is on active medication list. Aspirin use is indicated based on review of current medical condition/s. Pros and cons of this therapy have been discussed today. Benefits of this medication outweigh potential harm.  Patient has been encouraged to continue taking this medication.  .      Patient Active Problem List   Diagnosis   • Small vessel disease (HCC)   • Hyperlipidemia   • Allergic rhinitis   • Arthritis   • Gastroesophageal reflux disease   • Vitamin B12 deficiency   • Fatigue   • Hiatal hernia   • Polyp of colon   • Bilateral carotid artery stenosis   • Current smoker   • Vitamin D deficiency   • Memory deficit   • Other microscopic hematuria   • Thyromegaly   • Acute non-recurrent sinusitis   • Acute bronchitis   • Stenosis of carotid artery   • Postmenopause     Advance Care Planning  Advance Directive is not on file.  ACP discussion was held with the patient during this visit. Patient has an advance directive (not in EMR), copy requested.    Review of Systems   Constitutional: Negative for appetite change, chills, fatigue and fever.   HENT: Negative for ear pain, sinus pressure, sore throat and trouble swallowing.    Eyes: Negative for discharge and visual disturbance.   Respiratory: Negative for cough, chest tightness and shortness of breath.    Cardiovascular: Negative for chest pain and palpitations. Leg swelling: some swelling in right leg if up on feet all day, resolves with elevation of right leg.   Gastrointestinal: Negative for abdominal pain, blood in stool, constipation and diarrhea.   Endocrine: Negative for  "heat intolerance and polydipsia. Cold intolerance: some.   Genitourinary: Negative for dysuria and frequency.   Musculoskeletal: Negative for arthralgias and back pain.   Skin: Negative for rash.   Neurological: Negative for dizziness, syncope and light-headedness.   Hematological: Does not bruise/bleed easily.   Psychiatric/Behavioral: Negative for dysphoric mood and sleep disturbance. The patient is not nervous/anxious.         Objective    Vitals:    09/12/22 1012   BP: 116/74   BP Location: Left arm   Patient Position: Sitting   Cuff Size: Adult   Pulse: 89   Temp: 98 °F (36.7 °C)   SpO2: 95%   Weight: 69 kg (152 lb 3.2 oz)   Height: 162.6 cm (64\")     Estimated body mass index is 26.13 kg/m² as calculated from the following:    Height as of this encounter: 162.6 cm (64\").    Weight as of this encounter: 69 kg (152 lb 3.2 oz).    BMI is >= 25 and <30. (Overweight) The following options were offered after discussion;: nutrition counseling/recommendations      Does the patient have evidence of cognitive impairment? No    Physical Exam  Vitals and nursing note reviewed.   Constitutional:       General: She is not in acute distress.     Appearance: She is well-developed and well-groomed. She is not diaphoretic.   HENT:      Head: Normocephalic and atraumatic.      Jaw: No tenderness or pain on movement.      Right Ear: External ear normal. No decreased hearing noted. Right ear middle ear effusion: TM dull. Tympanic membrane is not erythematous.      Left Ear: External ear normal. No decreased hearing noted. Left ear middle ear effusion: mild. Tympanic membrane is not erythematous.      Nose: Nose normal.      Right Sinus: No maxillary sinus tenderness or frontal sinus tenderness.      Left Sinus: No maxillary sinus tenderness or frontal sinus tenderness.      Mouth/Throat:      Mouth: Mucous membranes are moist.      Pharynx: No oropharyngeal exudate or posterior oropharyngeal erythema.   Eyes:      Extraocular " Movements: Extraocular movements intact.      Conjunctiva/sclera: Conjunctivae normal.      Pupils: Pupils are equal, round, and reactive to light.   Neck:      Thyroid: No thyromegaly.      Vascular: No carotid bruit.      Trachea: No tracheal deviation.   Cardiovascular:      Rate and Rhythm: Normal rate and regular rhythm.      Pulses: Normal pulses.      Heart sounds: Normal heart sounds. No murmur heard.  Pulmonary:      Effort: Pulmonary effort is normal. No respiratory distress.      Breath sounds: Normal breath sounds.   Abdominal:      General: Bowel sounds are normal.      Palpations: Abdomen is soft. There is no hepatomegaly or splenomegaly.      Tenderness: There is no abdominal tenderness. There is no guarding.   Musculoskeletal:         General: Normal range of motion.      Cervical back: Normal range of motion and neck supple. No bony tenderness.      Thoracic back: No bony tenderness.      Lumbar back: No bony tenderness.      Right lower leg: No edema.      Left lower leg: No edema.   Lymphadenopathy:      Cervical: No cervical adenopathy.   Skin:     General: Skin is warm and dry.      Findings: No rash.          Neurological:      Mental Status: She is alert and oriented to person, place, and time.      Cranial Nerves: No cranial nerve deficit.      Motor: Motor function is intact.      Coordination: Coordination normal.      Gait: Gait normal.      Deep Tendon Reflexes: Reflexes are normal and symmetric.   Psychiatric:         Mood and Affect: Mood normal.         Behavior: Behavior normal.         Thought Content: Thought content normal.         Cognition and Memory: Cognition normal.         Judgment: Judgment normal.                 HEALTH RISK ASSESSMENT    Smoking Status:  Social History     Tobacco Use   Smoking Status Current Every Day Smoker   • Packs/day: 0.50   Smokeless Tobacco Never Used   Tobacco Comment    Quit now [place] info provided     Has decreased number of cigarettes smoked  daily.    Alcohol Consumption:  Social History     Substance and Sexual Activity   Alcohol Use Yes    Comment: social     Fall Risk Screen:    TONO Fall Risk Assessment was completed, and patient is at LOW risk for falls.Assessment completed on:9/12/2022    Depression Screening:  PHQ-2/PHQ-9 Depression Screening 9/12/2022   Little Interest or Pleasure in Doing Things 0-->not at all   Feeling Down, Depressed or Hopeless 0-->not at all   PHQ-9: Brief Depression Severity Measure Score 0       Health Habits and Functional and Cognitive Screening:  Functional & Cognitive Status 9/12/2022   Do you have difficulty preparing food and eating? No   Do you have difficulty bathing yourself, getting dressed or grooming yourself? No   Do you have difficulty using the toilet? No   Do you have difficulty moving around from place to place? No   Do you have trouble with steps or getting out of a bed or a chair? No   Current Diet Well Balanced Diet   Dental Exam Up to date   Eye Exam Up to date   Exercise (times per week) 7 times per week   Current Exercises Include Walking   Do you need help using the phone?  No   Are you deaf or do you have serious difficulty hearing?  Yes   Do you need help with transportation? No   Do you need help shopping? No   Do you need help preparing meals?  No   Do you need help with housework?  No   Do you need help with laundry? No   Do you need help taking your medications? No   Do you need help managing money? No   Do you ever drive or ride in a car without wearing a seat belt? No   Have you felt unusual stress, anger or loneliness in the last month? No   Who do you live with? Spouse   If you need help, do you have trouble finding someone available to you? No   Have you been bothered in the last four weeks by sexual problems? No   Do you have difficulty concentrating, remembering or making decisions? No       Age-appropriate Screening Schedule:  Refer to the list below for future screening  recommendations based on patient's age, sex and/or medical conditions. Orders for these recommended tests are listed in the plan section. The patient has been provided with a written plan.    Health Maintenance   Topic Date Due   • DXA SCAN  Never done   • TDAP/TD VACCINES (1 - Tdap) Never done   • ZOSTER VACCINE (1 of 2) 09/12/2023 (Originally 10/30/1994)   • INFLUENZA VACCINE  10/01/2022   • LIPID PANEL  05/09/2023       Has had hearing test in past and no hearing loss; has trouble hearing with masks.  Will consider Tdap at pharmacy.       Assessment & Plan   CMS Preventative Services Quick Reference  Risk Factors Identified During Encounter  Immunizations Discussed/Encouraged (specific Immunizations; Tdap and Prevnar 20 (Pneumococcal 20-valent conjugate)  The above risks/problems have been discussed with the patient.    Discussed low risk for falls and recommended avoiding throw rugs, installing grab bars in bathroom, making sure have handrails on steps, etc.    Follow up actions/plans if indicated are seen below in the Assessment/Plan Section.  Pertinent information has been shared with the patient in the After Visit Summary.    Diagnoses and all orders for this visit:    1. Encounter for Medicare annual wellness exam (Primary)    2. Postmenopause  -     DEXA Bone Density Axial; Future    3. Other microscopic hematuria  -     POC Urinalysis Dipstick, Automated  -     Urine Culture - Urine, Urine, Clean Catch; Future    4. Encounter for immunization  -     Pneumococcal Conjugate Vaccine 20-Valent All    5. Mixed hyperlipidemia  Assessment & Plan:  Continue Atorvastatin daily.  Continue to work on healthy diet and exercise.    Orders:  -     Lipid Panel With LDL / HDL Ratio  -     Comprehensive Metabolic Panel    6. Small vessel disease (HCC)  Assessment & Plan:  Continue daily ASA.  Continue Atorvastatin daily.  Decrease number of cigarettes smoked daily.      7. Gastroesophageal reflux disease, unspecified  whether esophagitis present  Assessment & Plan:  Stable.  Continue Nexium daily.      8. Stenosis of carotid artery, unspecified laterality  -     Duplex Carotid Ultrasound CAR; Future    9. Occlusion and stenosis of left carotid artery   -     Duplex Carotid Ultrasound CAR; Future      Follow Up:   Return in about 6 months (around 3/12/2023) for Recheck. or sooner if problems or concerns.    An After Visit Summary and PPPS were made available to the patient.

## 2022-09-13 LAB
ALBUMIN SERPL-MCNC: 4.6 G/DL (ref 3.5–5.2)
ALBUMIN/GLOB SERPL: 3.1 G/DL
ALP SERPL-CCNC: 57 U/L (ref 39–117)
ALT SERPL-CCNC: 12 U/L (ref 1–33)
AST SERPL-CCNC: 16 U/L (ref 1–32)
BILIRUB SERPL-MCNC: 0.4 MG/DL (ref 0–1.2)
BUN SERPL-MCNC: 15 MG/DL (ref 8–23)
BUN/CREAT SERPL: 19.7 (ref 7–25)
CALCIUM SERPL-MCNC: 9.5 MG/DL (ref 8.6–10.5)
CHLORIDE SERPL-SCNC: 106 MMOL/L (ref 98–107)
CHOLEST SERPL-MCNC: 165 MG/DL (ref 0–200)
CO2 SERPL-SCNC: 27 MMOL/L (ref 22–29)
CREAT SERPL-MCNC: 0.76 MG/DL (ref 0.57–1)
EGFRCR-CYS SERPLBLD CKD-EPI 2021: 80.8 ML/MIN/1.73
GLOBULIN SER CALC-MCNC: 1.5 GM/DL
GLUCOSE SERPL-MCNC: 86 MG/DL (ref 65–99)
HDLC SERPL-MCNC: 67 MG/DL (ref 40–60)
LDLC SERPL CALC-MCNC: 88 MG/DL (ref 0–100)
LDLC/HDLC SERPL: 1.33 {RATIO}
POTASSIUM SERPL-SCNC: 5.7 MMOL/L (ref 3.5–5.2)
PROT SERPL-MCNC: 6.1 G/DL (ref 6–8.5)
SODIUM SERPL-SCNC: 143 MMOL/L (ref 136–145)
TRIGL SERPL-MCNC: 45 MG/DL (ref 0–150)
VLDLC SERPL CALC-MCNC: 10 MG/DL (ref 5–40)

## 2022-09-15 DIAGNOSIS — E87.5 HYPERKALEMIA: Primary | ICD-10-CM

## 2022-09-17 LAB
BACTERIA UR CULT: ABNORMAL
BACTERIA UR CULT: ABNORMAL
OTHER ANTIBIOTIC SUSC ISLT: ABNORMAL

## 2022-09-19 ENCOUNTER — OFFICE VISIT (OUTPATIENT)
Dept: CARDIOLOGY | Facility: CLINIC | Age: 77
End: 2022-09-19

## 2022-09-19 VITALS
SYSTOLIC BLOOD PRESSURE: 112 MMHG | HEART RATE: 90 BPM | RESPIRATION RATE: 18 BRPM | HEIGHT: 64 IN | BODY MASS INDEX: 26.12 KG/M2 | WEIGHT: 153 LBS | DIASTOLIC BLOOD PRESSURE: 70 MMHG

## 2022-09-19 DIAGNOSIS — Z91.89 CHRONIC CHEST PAIN WITH HIGH RISK FOR CAD: Primary | ICD-10-CM

## 2022-09-19 DIAGNOSIS — G89.29 CHRONIC CHEST PAIN WITH HIGH RISK FOR CAD: Primary | ICD-10-CM

## 2022-09-19 DIAGNOSIS — R07.9 CHRONIC CHEST PAIN WITH HIGH RISK FOR CAD: Primary | ICD-10-CM

## 2022-09-19 PROCEDURE — 93000 ELECTROCARDIOGRAM COMPLETE: CPT | Performed by: INTERNAL MEDICINE

## 2022-09-19 PROCEDURE — 99204 OFFICE O/P NEW MOD 45 MIN: CPT | Performed by: INTERNAL MEDICINE

## 2022-09-19 NOTE — PROGRESS NOTES
Subjective:     Encounter Date:09/19/2022      Patient ID: Brigid Carballo is a 76 y.o. female.    Chief Complaint:  Chief Complaint   Patient presents with   • Establish Care       HPI:  Ms Carballo is a 75 yo who presents for evaluation of chest pain.  Her past medical history is significant for HLD and tobacco use.  She presented to the ED in 11/2021 with an episodes of dizziness and ataxia.  CTA of the head and neck showed complete occlusion of the left vertebral artery and she was thought to have had a posterior circulation TIA.  She was discharged on ASA and statin.  She had a carotid doppler study 9/2022 which showed nonocclusive plaque in both carotids.    She complains of having substernal chest pressure and burning related to exertion and relieved by rest.  There is no associated nausea, diaphoresis or palpitations.These symptoms have been present for awhile and seem to be stable.  She also denies PND, orthopnea or peripheral edema.      The following portions of the patient's history were reviewed and updated as appropriate: current medications, past family history, past medical history, past social history, past surgical history and problem list.    Problem List:  Patient Active Problem List   Diagnosis   • Small vessel disease (HCC)   • Hyperlipidemia   • Allergic rhinitis   • Arthritis   • Gastroesophageal reflux disease   • Vitamin B12 deficiency   • Fatigue   • Hiatal hernia   • Polyp of colon   • Bilateral carotid artery stenosis   • Current smoker   • Vitamin D deficiency   • Memory deficit   • Other microscopic hematuria   • Thyromegaly   • Acute non-recurrent sinusitis   • Acute bronchitis   • Stenosis of carotid artery   • Postmenopause       Active Med List:    Current Outpatient Medications:   •  acetaminophen (TYLENOL) 325 MG tablet, Take 2 tablets by mouth Every 6 (Six) Hours As Needed for Mild Pain  or Headache., Disp: , Rfl:   •  ascorbic acid (VITAMIN C) 1000 MG tablet, Take 1,000 mg by  mouth Daily., Disp: , Rfl:   •  aspirin 81 MG EC tablet, Take 1 tablet by mouth Daily., Disp: 90 tablet, Rfl: 0  •  atorvastatin (LIPITOR) 40 MG tablet, TAKE 1 TABLET BY MOUTH ONCE DAILY AT NIGHT, Disp: 90 tablet, Rfl: 0  •  cholecalciferol (VITAMIN D3) 25 MCG (1000 UT) tablet, Take 1 tablet by mouth Daily., Disp: , Rfl:   •  esomeprazole (nexIUM) 20 MG capsule, Take 20 mg by mouth Every Morning Before Breakfast., Disp: , Rfl:   •  vitamin B-12 (CYANOCOBALAMIN) 1000 MCG tablet, Take 1,000 mcg by mouth Daily., Disp: , Rfl:   •  ciprofloxacin (CIPRO) 250 MG tablet, Take 1 tablet by mouth 2 (Two) Times a Day for 7 days., Disp: 14 tablet, Rfl: 0     Past Medical History:  Past Medical History:   Diagnosis Date   • Angina pectoris (HCC)    • Anxiety    • Lancaster's esophagus 09/29/2015   • BPV (benign positional vertigo) 11/07/2021   • Bunion of left foot 06/14/2016   • Cancer (HCC)     skin   • Carpal tunnel syndrome    • Chest pain 10/05/2020   • DDD (degenerative disc disease), cervical 11/04/2014   • Deformity of wrist joint 05/03/2018   • Insomnia 09/11/2017   • Kidney stones    • Low back pain 11/04/2014   • Melanoma in situ of unspecified lower limb, including hip (HCC) 06/2021   • Occlusion of left vertebral artery 11/06/2021    1/10/22 CTA neck: beyond its origin, left vertebral artery is widely patent throughout its cervical and intracranial course to the vertebrobasilar junction without stenosis   • Osteoarthritis of left knee 06/27/2016   • Renal stone 03/02/2020   • Spinal stenosis of lumbar region 12/29/2014   • Tear of lateral meniscus of knee 09/30/2014   • TIA (transient ischemic attack) 11/07/2021   • Unexplained weight loss 04/21/2021       Past Surgical History:  Past Surgical History:   Procedure Laterality Date   • APPENDECTOMY     • BREAST BIOPSY      multiple   • CARDIAC CATHETERIZATION     • CARPAL TUNNEL RELEASE Bilateral    • COLONOSCOPY  05/17/2021    polyp removed; per Dr. Guy   • CYSTOSCOPY  "W/ URETERAL STENT PLACEMENT  10/22/2019   • HYSTERECTOMY     • INGUINAL HERNIA REPAIR Right    • KNEE ARTHROSCOPY Right     meniscus   • TONSILLECTOMY     • UPPER GASTROINTESTINAL ENDOSCOPY  05/17/2021    per Dr. Guy       Social History:  Social History     Socioeconomic History   • Marital status:    Tobacco Use   • Smoking status: Current Every Day Smoker     Packs/day: 0.50   • Smokeless tobacco: Never Used   • Tobacco comment: Quit now [place] info provided   Vaping Use   • Vaping Use: Never used   Substance and Sexual Activity   • Alcohol use: Yes     Comment: social   • Drug use: Never   • Sexual activity: Defer       Allergies:  Allergies   Allergen Reactions   • Amoxicillin-Pot Clavulanate Itching   • Codeine Itching   • Latex Unknown - Low Severity   • Penicillins Unknown - High Severity       Immunizations:  Immunization History   Administered Date(s) Administered   • Flu Vaccine Quad PF >36MO 09/28/2018   • Flu Vaccine Split Quad 09/28/2018   • Fluad Quad 65+ 10/19/2020   • Fluzone High Dose =>65 Years ([facility] ONLY) 09/29/2015, 10/12/2016, 10/11/2017, 01/02/2020   • Fluzone High-Dose 65+yrs 11/12/2021   • Pneumococcal Conjugate 13-Valent (PCV13) 08/22/2016   • Pneumococcal Conjugate 20-Valent (PCV20) 09/12/2022          Objective:         Review of Systems   Constitutional: Negative for fatigue.   Respiratory: Negative for shortness of breath.    Cardiovascular: Positive for chest pain. Negative for palpitations and leg swelling.   Neurological: Negative for syncope and light-headedness.   All other systems reviewed and are negative.       /70   Pulse 90   Resp 18   Ht 162.6 cm (64\")   Wt 69.4 kg (153 lb)   BMI 26.26 kg/m²     Constitutional:       General: Not in acute distress.     Appearance: Well-developed.   Eyes:      General: No scleral icterus.     Conjunctiva/sclera: Conjunctivae normal.      Pupils: Pupils are equal, round, and reactive to light.   HENT:      Head: " Normocephalic and atraumatic.   Neck:      Thyroid: No thyromegaly.   Pulmonary:      Effort: Pulmonary effort is normal.      Breath sounds: Normal breath sounds.   Cardiovascular:      Normal rate. Regular rhythm.   Abdominal:      General: Bowel sounds are normal.      Palpations: Abdomen is soft.   Musculoskeletal: Normal range of motion.      Cervical back: Normal range of motion. Skin:     General: Skin is warm and dry.   Neurological:      Mental Status: Alert and oriented to person, place, and time.         In-Office Procedure(s):    ECG 12 Lead    Date/Time: 9/19/2022 3:01 PM  Performed by: Pawel Nunez MD  Authorized by: Pawel Nunez MD   Comparison: compared with previous ECG from 11/6/2021  Comparison to previous ECG: NSR, no rmal  Rhythm: sinus rhythm    Clinical impression: normal ECG          ECG 12 Lead    (Results Pending)        ASCVD RIsk Score::  The ASCVD Risk score (Brittany WILLIAM Oakley., et al., 2013) failed to calculate for the following reasons:    The patient has a prior MI or stroke diagnosis    Recent Radiology:          Lab Review:       Recent labs reviewed and interpreted for clinical significance and application          Assessment:          Diagnosis Plan   1. Chronic chest pain with high risk for CAD  Adult Transthoracic Echo Complete W/ Color, Spectral and Contrast if Necessary Per Protocol    Stress Test With Myocardial Perfusion One Day    ECG 12 Lead          Plan:      1. Chest pain - consistent with angina, risk factors include family history, tobacco, HLD and vascular disease.  Will get echo and Lexiscan.  Further recommendations to follow.    2. HLD - statin, followed by PCP    3. Carotid vascular disease - on ASA, statin, followed by PCP    4. Tobacco use - encouraged to stop      Level of Care:                 Pawel Nunez MD  09/19/22

## 2022-09-20 DIAGNOSIS — N30.01 ACUTE CYSTITIS WITH HEMATURIA: Primary | ICD-10-CM

## 2022-09-20 RX ORDER — CIPROFLOXACIN 250 MG/1
250 TABLET, FILM COATED ORAL 2 TIMES DAILY
Qty: 14 TABLET | Refills: 0 | Status: SHIPPED | OUTPATIENT
Start: 2022-09-20 | End: 2022-09-27

## 2022-10-24 ENCOUNTER — HOSPITAL ENCOUNTER (OUTPATIENT)
Dept: NUCLEAR MEDICINE | Facility: HOSPITAL | Age: 77
Discharge: HOME | End: 2022-10-24

## 2022-10-24 DIAGNOSIS — Z91.89 CHRONIC CHEST PAIN WITH HIGH RISK FOR CAD: ICD-10-CM

## 2022-10-24 DIAGNOSIS — R07.9 CHRONIC CHEST PAIN WITH HIGH RISK FOR CAD: ICD-10-CM

## 2022-10-24 DIAGNOSIS — G89.29 CHRONIC CHEST PAIN WITH HIGH RISK FOR CAD: ICD-10-CM

## 2022-10-24 LAB
BH CV NUCLEAR PRIOR STUDY: 2
BH CV REST NUCLEAR ISOTOPE DOSE: 10.7 MCI
BH CV STRESS COMMENTS STAGE 1: NORMAL
BH CV STRESS DOSE REGADENOSON STAGE 1: 0.4
BH CV STRESS DURATION MIN STAGE 1: 0
BH CV STRESS DURATION SEC STAGE 1: 10
BH CV STRESS NUCLEAR ISOTOPE DOSE: 33 MCI
BH CV STRESS PROTOCOL 1: NORMAL
BH CV STRESS RECOVERY BP: NORMAL MMHG
BH CV STRESS RECOVERY HR: 93 BPM
BH CV STRESS STAGE 1: 1
LV EF NUC BP: 63 %
MAXIMAL PREDICTED HEART RATE: 143 BPM
PERCENT MAX PREDICTED HR: 72.03 %
STRESS BASELINE BP: NORMAL MMHG
STRESS BASELINE HR: 69 BPM
STRESS PERCENT HR: 85 %
STRESS POST PEAK BP: NORMAL MMHG
STRESS POST PEAK HR: 103 BPM
STRESS TARGET HR: 122 BPM

## 2022-10-24 PROCEDURE — 78452 HT MUSCLE IMAGE SPECT MULT: CPT | Performed by: INTERNAL MEDICINE

## 2022-10-24 PROCEDURE — 0 TECHNETIUM SESTAMIBI: Performed by: INTERNAL MEDICINE

## 2022-10-24 PROCEDURE — A9500 TC99M SESTAMIBI: HCPCS | Performed by: INTERNAL MEDICINE

## 2022-10-24 PROCEDURE — 25010000002 REGADENOSON 0.4 MG/5ML SOLUTION: Performed by: INTERNAL MEDICINE

## 2022-10-24 PROCEDURE — 93016 CV STRESS TEST SUPVJ ONLY: CPT | Performed by: INTERNAL MEDICINE

## 2022-10-24 PROCEDURE — 78452 HT MUSCLE IMAGE SPECT MULT: CPT

## 2022-10-24 PROCEDURE — 93018 CV STRESS TEST I&R ONLY: CPT | Performed by: INTERNAL MEDICINE

## 2022-10-24 PROCEDURE — 93017 CV STRESS TEST TRACING ONLY: CPT

## 2022-10-24 RX ADMIN — REGADENOSON 0.4 MG: 0.08 INJECTION, SOLUTION INTRAVENOUS at 13:04

## 2022-10-24 RX ADMIN — TECHNETIUM TC 99M SESTAMIBI 1 DOSE: 1 INJECTION INTRAVENOUS at 13:04

## 2022-10-24 RX ADMIN — TECHNETIUM TC 99M SESTAMIBI 1 DOSE: 1 INJECTION INTRAVENOUS at 08:03

## 2022-10-27 DIAGNOSIS — E78.2 MIXED HYPERLIPIDEMIA: ICD-10-CM

## 2022-10-28 RX ORDER — ATORVASTATIN CALCIUM 40 MG/1
TABLET, FILM COATED ORAL
Qty: 90 TABLET | Refills: 1 | Status: SHIPPED | OUTPATIENT
Start: 2022-10-28

## 2022-10-28 NOTE — TELEPHONE ENCOUNTER
Rx Refill Note  Requested Prescriptions     Pending Prescriptions Disp Refills   • atorvastatin (LIPITOR) 40 MG tablet [Pharmacy Med Name: Atorvastatin Calcium 40 MG Oral Tablet] 90 tablet 0     Sig: TAKE 1 TABLET BY MOUTH ONCE DAILY AT NIGHT      Last office visit with prescribing clinician: 9/12/2022      Next office visit with prescribing clinician: Visit date not found

## 2022-10-31 ENCOUNTER — CLINICAL SUPPORT (OUTPATIENT)
Dept: FAMILY MEDICINE CLINIC | Facility: CLINIC | Age: 77
End: 2022-10-31

## 2022-10-31 DIAGNOSIS — Z23 NEED FOR VACCINATION: Primary | ICD-10-CM

## 2022-10-31 PROCEDURE — 90662 IIV NO PRSV INCREASED AG IM: CPT | Performed by: NURSE PRACTITIONER

## 2022-10-31 PROCEDURE — G0008 ADMIN INFLUENZA VIRUS VAC: HCPCS | Performed by: NURSE PRACTITIONER

## 2022-11-03 DIAGNOSIS — G89.29 CHRONIC CHEST PAIN WITH HIGH RISK FOR CAD: Primary | ICD-10-CM

## 2022-11-03 DIAGNOSIS — Z91.89 CHRONIC CHEST PAIN WITH HIGH RISK FOR CAD: Primary | ICD-10-CM

## 2022-11-03 DIAGNOSIS — R94.39 ABNORMAL NUCLEAR STRESS TEST: ICD-10-CM

## 2022-11-03 DIAGNOSIS — R07.9 CHRONIC CHEST PAIN WITH HIGH RISK FOR CAD: Primary | ICD-10-CM

## 2022-11-07 ENCOUNTER — HOSPITAL ENCOUNTER (OUTPATIENT)
Dept: CARDIOLOGY | Facility: HOSPITAL | Age: 77
Discharge: HOME | End: 2022-11-07
Admitting: INTERNAL MEDICINE

## 2022-11-07 ENCOUNTER — TELEPHONE (OUTPATIENT)
Dept: CARDIOLOGY | Facility: CLINIC | Age: 77
End: 2022-11-07

## 2022-11-07 VITALS
WEIGHT: 153 LBS | HEART RATE: 60 BPM | HEIGHT: 64 IN | SYSTOLIC BLOOD PRESSURE: 120 MMHG | DIASTOLIC BLOOD PRESSURE: 74 MMHG | BODY MASS INDEX: 26.12 KG/M2

## 2022-11-07 DIAGNOSIS — Z91.89 CHRONIC CHEST PAIN WITH HIGH RISK FOR CAD: ICD-10-CM

## 2022-11-07 DIAGNOSIS — G89.29 CHRONIC CHEST PAIN WITH HIGH RISK FOR CAD: ICD-10-CM

## 2022-11-07 DIAGNOSIS — R07.9 CHRONIC CHEST PAIN WITH HIGH RISK FOR CAD: ICD-10-CM

## 2022-11-07 LAB
ASCENDING AORTA: 3.1 CM
BH CV ECHO MEAS - ACS: 1.78 CM
BH CV ECHO MEAS - AO MAX PG: 5.8 MMHG
BH CV ECHO MEAS - AO MEAN PG: 3.5 MMHG
BH CV ECHO MEAS - AO ROOT DIAM: 3 CM
BH CV ECHO MEAS - AO V2 MAX: 120.2 CM/SEC
BH CV ECHO MEAS - AO V2 VTI: 27.1 CM
BH CV ECHO MEAS - AVA(I,D): 2.05 CM2
BH CV ECHO MEAS - EDV(CUBED): 94.7 ML
BH CV ECHO MEAS - EDV(MOD-SP2): 49 ML
BH CV ECHO MEAS - EDV(MOD-SP4): 60 ML
BH CV ECHO MEAS - EF(MOD-BP): 56.2 %
BH CV ECHO MEAS - EF(MOD-SP2): 53.1 %
BH CV ECHO MEAS - EF(MOD-SP4): 60 %
BH CV ECHO MEAS - ESV(CUBED): 37.5 ML
BH CV ECHO MEAS - ESV(MOD-SP2): 23 ML
BH CV ECHO MEAS - ESV(MOD-SP4): 24 ML
BH CV ECHO MEAS - FS: 26.6 %
BH CV ECHO MEAS - IVS/LVPW: 0.94 CM
BH CV ECHO MEAS - IVSD: 1.04 CM
BH CV ECHO MEAS - LAT PEAK E' VEL: 6.5 CM/SEC
BH CV ECHO MEAS - LV DIASTOLIC VOL/BSA (35-75): 34.4 CM2
BH CV ECHO MEAS - LV MASS(C)D: 172.8 GRAMS
BH CV ECHO MEAS - LV MAX PG: 2.7 MMHG
BH CV ECHO MEAS - LV MEAN PG: 1.36 MMHG
BH CV ECHO MEAS - LV SYSTOLIC VOL/BSA (12-30): 13.7 CM2
BH CV ECHO MEAS - LV V1 MAX: 82.3 CM/SEC
BH CV ECHO MEAS - LV V1 VTI: 17.6 CM
BH CV ECHO MEAS - LVIDD: 4.6 CM
BH CV ECHO MEAS - LVIDS: 3.3 CM
BH CV ECHO MEAS - LVOT AREA: 3.2 CM2
BH CV ECHO MEAS - LVOT DIAM: 2 CM
BH CV ECHO MEAS - LVPWD: 1.11 CM
BH CV ECHO MEAS - MED PEAK E' VEL: 7.6 CM/SEC
BH CV ECHO MEAS - MV A DUR: 0.12 SEC
BH CV ECHO MEAS - MV A MAX VEL: 86.5 CM/SEC
BH CV ECHO MEAS - MV DEC SLOPE: 383.8 CM/SEC2
BH CV ECHO MEAS - MV DEC TIME: 0.21 MSEC
BH CV ECHO MEAS - MV E MAX VEL: 77.6 CM/SEC
BH CV ECHO MEAS - MV E/A: 0.9
BH CV ECHO MEAS - MV MAX PG: 3.8 MMHG
BH CV ECHO MEAS - MV MEAN PG: 2.2 MMHG
BH CV ECHO MEAS - MV P1/2T: 68.7 MSEC
BH CV ECHO MEAS - MV V2 VTI: 24 CM
BH CV ECHO MEAS - MVA(P1/2T): 3.2 CM2
BH CV ECHO MEAS - MVA(VTI): 2.31 CM2
BH CV ECHO MEAS - PA ACC TIME: 0.11 SEC
BH CV ECHO MEAS - PA PR(ACCEL): 30.3 MMHG
BH CV ECHO MEAS - PA V2 MAX: 82.4 CM/SEC
BH CV ECHO MEAS - PULM A REVS DUR: 0.14 SEC
BH CV ECHO MEAS - PULM A REVS VEL: 32.1 CM/SEC
BH CV ECHO MEAS - PULM DIAS VEL: 37.7 CM/SEC
BH CV ECHO MEAS - PULM S/D: 1.41
BH CV ECHO MEAS - PULM SYS VEL: 53.1 CM/SEC
BH CV ECHO MEAS - QP/QS: 0.42
BH CV ECHO MEAS - RAP SYSTOLE: 3 MMHG
BH CV ECHO MEAS - RV MAX PG: 1.38 MMHG
BH CV ECHO MEAS - RV V1 MAX: 58.7 CM/SEC
BH CV ECHO MEAS - RV V1 VTI: 11.5 CM
BH CV ECHO MEAS - RVOT DIAM: 1.61 CM
BH CV ECHO MEAS - RVSP: 27 MMHG
BH CV ECHO MEAS - SI(MOD-SP2): 14.9 ML/M2
BH CV ECHO MEAS - SI(MOD-SP4): 20.6 ML/M2
BH CV ECHO MEAS - SUP REN AO DIAM: 1.9 CM
BH CV ECHO MEAS - SV(LVOT): 55.6 ML
BH CV ECHO MEAS - SV(MOD-SP2): 26 ML
BH CV ECHO MEAS - SV(MOD-SP4): 36 ML
BH CV ECHO MEAS - SV(RVOT): 23.3 ML
BH CV ECHO MEAS - TAPSE (>1.6): 1.83 CM
BH CV ECHO MEAS - TR MAX PG: 24.2 MMHG
BH CV ECHO MEAS - TR MAX VEL: 246.1 CM/SEC
BH CV ECHO MEASUREMENTS AVERAGE E/E' RATIO: 11.01
BH CV XLRA - RV BASE: 2.7 CM
BH CV XLRA - RV LENGTH: 6 CM
BH CV XLRA - RV MID: 2.7 CM
BH CV XLRA - TDI S': 12.1 CM/SEC
LEFT ATRIUM VOLUME INDEX: 17.2 ML/M2
MAXIMAL PREDICTED HEART RATE: 142 BPM
SINUS: 2.7 CM
STJ: 3.1 CM
STRESS TARGET HR: 121 BPM

## 2022-11-07 PROCEDURE — 93306 TTE W/DOPPLER COMPLETE: CPT | Performed by: INTERNAL MEDICINE

## 2022-11-07 PROCEDURE — 93306 TTE W/DOPPLER COMPLETE: CPT

## 2022-11-07 NOTE — TELEPHONE ENCOUNTER
Caller: Brigid Carballo    Relationship to patient: Self    Best call back number: [phone]    Patient is needing: PT CALLED IN TO GO OVER ABNORMAL STRESS TEST RESULTS

## 2022-11-07 NOTE — TELEPHONE ENCOUNTER
I spoke with patient and recommended a cath.  Can you please let the  know to put her on for a cath possible with any of the interventionalists, please.  TICO

## 2022-11-07 NOTE — TELEPHONE ENCOUNTER
Dr. Nunez would like for pt to get scheduled for a heart cath preferably on Monday Can you help us with this

## 2022-11-15 ENCOUNTER — TRANSCRIBE ORDERS (OUTPATIENT)
Dept: CARDIOLOGY | Facility: CLINIC | Age: 77
End: 2022-11-15

## 2022-11-15 DIAGNOSIS — Z01.810 PREOP CARDIOVASCULAR EXAM: Primary | ICD-10-CM

## 2022-11-15 PROBLEM — R94.39 ABNORMAL NUCLEAR STRESS TEST: Status: ACTIVE | Noted: 2022-11-15

## 2022-11-28 ENCOUNTER — LAB (OUTPATIENT)
Dept: LAB | Facility: HOSPITAL | Age: 77
End: 2022-11-28

## 2022-11-28 ENCOUNTER — TELEPHONE (OUTPATIENT)
Dept: CARDIOLOGY | Facility: CLINIC | Age: 77
End: 2022-11-28

## 2022-11-28 DIAGNOSIS — Z01.810 PREOP CARDIOVASCULAR EXAM: ICD-10-CM

## 2022-11-28 LAB
BASOPHILS # BLD AUTO: 0.04 10*3/MM3 (ref 0–0.2)
BASOPHILS NFR BLD AUTO: 0.7 % (ref 0–1.5)
DEPRECATED RDW RBC AUTO: 39.5 FL (ref 37–54)
EOSINOPHIL # BLD AUTO: 0.1 10*3/MM3 (ref 0–0.4)
EOSINOPHIL NFR BLD AUTO: 1.7 % (ref 0.3–6.2)
ERYTHROCYTE [DISTWIDTH] IN BLOOD BY AUTOMATED COUNT: 11.5 % (ref 12.3–15.4)
HCT VFR BLD AUTO: 42.6 % (ref 34–46.6)
HGB BLD-MCNC: 14.5 G/DL (ref 12–15.9)
IMM GRANULOCYTES # BLD AUTO: 0.02 10*3/MM3 (ref 0–0.05)
IMM GRANULOCYTES NFR BLD AUTO: 0.3 % (ref 0–0.5)
LYMPHOCYTES # BLD AUTO: 2.4 10*3/MM3 (ref 0.7–3.1)
LYMPHOCYTES NFR BLD AUTO: 39.7 % (ref 19.6–45.3)
MCH RBC QN AUTO: 31.6 PG (ref 26.6–33)
MCHC RBC AUTO-ENTMCNC: 34 G/DL (ref 31.5–35.7)
MCV RBC AUTO: 92.8 FL (ref 79–97)
MONOCYTES # BLD AUTO: 0.51 10*3/MM3 (ref 0.1–0.9)
MONOCYTES NFR BLD AUTO: 8.4 % (ref 5–12)
NEUTROPHILS NFR BLD AUTO: 2.97 10*3/MM3 (ref 1.7–7)
NEUTROPHILS NFR BLD AUTO: 49.2 % (ref 42.7–76)
NRBC BLD AUTO-RTO: 0 /100 WBC (ref 0–0.2)
PLATELET # BLD AUTO: 160 10*3/MM3 (ref 140–450)
PMV BLD AUTO: 9.7 FL (ref 6–12)
RBC # BLD AUTO: 4.59 10*6/MM3 (ref 3.77–5.28)
WBC NRBC COR # BLD: 6.04 10*3/MM3 (ref 3.4–10.8)

## 2022-11-28 PROCEDURE — 85025 COMPLETE CBC W/AUTO DIFF WBC: CPT

## 2022-11-28 PROCEDURE — 36415 COLL VENOUS BLD VENIPUNCTURE: CPT

## 2022-11-28 NOTE — TELEPHONE ENCOUNTER
PT CALLED IN STATING THAT THE LAB CALLED HER AND INFORMED HER THAT THEY NEED A ORDER FOR HER URINE

## 2022-11-28 NOTE — TELEPHONE ENCOUNTER
Caller: Brigid Carballo    Relationship: Self    Best call back number: [phone]    What is the best time to reach you: ANY    Who are you requesting to speak with (clinical staff, provider,  specific staff member): ANY    What was the call regarding: LABS    Do you require a callback: YES    PT CALLED IN STATING THAT THE LAB CALLED HER AND INFORMED HER THAT THEY NEED A ORDER FOR HER URINE.

## 2022-11-29 ENCOUNTER — HOSPITAL ENCOUNTER (OUTPATIENT)
Facility: HOSPITAL | Age: 77
Setting detail: HOSPITAL OUTPATIENT SURGERY
Discharge: HOME | End: 2022-11-29
Attending: INTERNAL MEDICINE | Admitting: INTERNAL MEDICINE

## 2022-11-29 VITALS
SYSTOLIC BLOOD PRESSURE: 116 MMHG | TEMPERATURE: 97.8 F | WEIGHT: 150 LBS | OXYGEN SATURATION: 98 % | HEIGHT: 63 IN | RESPIRATION RATE: 16 BRPM | HEART RATE: 67 BPM | DIASTOLIC BLOOD PRESSURE: 72 MMHG | BODY MASS INDEX: 26.58 KG/M2

## 2022-11-29 DIAGNOSIS — R94.39 ABNORMAL NUCLEAR STRESS TEST: ICD-10-CM

## 2022-11-29 LAB
ANION GAP SERPL CALCULATED.3IONS-SCNC: 7.3 MMOL/L (ref 5–15)
BUN SERPL-MCNC: 14 MG/DL (ref 8–23)
BUN/CREAT SERPL: 19.7 (ref 7–25)
CALCIUM SPEC-SCNC: 8.9 MG/DL (ref 8.6–10.5)
CHLORIDE SERPL-SCNC: 104 MMOL/L (ref 98–107)
CO2 SERPL-SCNC: 28.7 MMOL/L (ref 22–29)
CREAT SERPL-MCNC: 0.71 MG/DL (ref 0.57–1)
EGFRCR SERPLBLD CKD-EPI 2021: 87.2 ML/MIN/1.73
GLUCOSE SERPL-MCNC: 86 MG/DL (ref 65–99)
POTASSIUM SERPL-SCNC: 4.8 MMOL/L (ref 3.5–5.2)
SODIUM SERPL-SCNC: 140 MMOL/L (ref 136–145)

## 2022-11-29 PROCEDURE — S0260 H&P FOR SURGERY: HCPCS | Performed by: INTERNAL MEDICINE

## 2022-11-29 PROCEDURE — 0 IOPAMIDOL PER 1 ML: Performed by: INTERNAL MEDICINE

## 2022-11-29 PROCEDURE — 93458 L HRT ARTERY/VENTRICLE ANGIO: CPT | Performed by: INTERNAL MEDICINE

## 2022-11-29 PROCEDURE — C1894 INTRO/SHEATH, NON-LASER: HCPCS | Performed by: INTERNAL MEDICINE

## 2022-11-29 PROCEDURE — C1769 GUIDE WIRE: HCPCS | Performed by: INTERNAL MEDICINE

## 2022-11-29 PROCEDURE — 25010000002 HEPARIN (PORCINE) PER 1000 UNITS: Performed by: INTERNAL MEDICINE

## 2022-11-29 PROCEDURE — 25010000002 FENTANYL CITRATE (PF) 50 MCG/ML SOLUTION: Performed by: INTERNAL MEDICINE

## 2022-11-29 PROCEDURE — 80048 BASIC METABOLIC PNL TOTAL CA: CPT | Performed by: INTERNAL MEDICINE

## 2022-11-29 PROCEDURE — 25010000002 MIDAZOLAM PER 1 MG: Performed by: INTERNAL MEDICINE

## 2022-11-29 RX ORDER — HEPARIN SODIUM 1000 [USP'U]/ML
INJECTION, SOLUTION INTRAVENOUS; SUBCUTANEOUS
Status: DISCONTINUED | OUTPATIENT
Start: 2022-11-29 | End: 2022-11-29 | Stop reason: HOSPADM

## 2022-11-29 RX ORDER — SODIUM CHLORIDE 0.9 % (FLUSH) 0.9 %
10 SYRINGE (ML) INJECTION EVERY 12 HOURS SCHEDULED
Status: DISCONTINUED | OUTPATIENT
Start: 2022-11-29 | End: 2022-11-29 | Stop reason: HOSPADM

## 2022-11-29 RX ORDER — HYDROCODONE BITARTRATE AND ACETAMINOPHEN 5; 325 MG/1; MG/1
1 TABLET ORAL EVERY 4 HOURS PRN
Status: DISCONTINUED | OUTPATIENT
Start: 2022-11-29 | End: 2022-11-29 | Stop reason: HOSPADM

## 2022-11-29 RX ORDER — ONDANSETRON 4 MG/1
4 TABLET, FILM COATED ORAL EVERY 6 HOURS PRN
Status: DISCONTINUED | OUTPATIENT
Start: 2022-11-29 | End: 2022-11-29 | Stop reason: HOSPADM

## 2022-11-29 RX ORDER — VERAPAMIL HYDROCHLORIDE 2.5 MG/ML
INJECTION, SOLUTION INTRAVENOUS
Status: DISCONTINUED | OUTPATIENT
Start: 2022-11-29 | End: 2022-11-29 | Stop reason: HOSPADM

## 2022-11-29 RX ORDER — ACETAMINOPHEN 325 MG/1
650 TABLET ORAL EVERY 4 HOURS PRN
Status: DISCONTINUED | OUTPATIENT
Start: 2022-11-29 | End: 2022-11-29 | Stop reason: HOSPADM

## 2022-11-29 RX ORDER — ONDANSETRON 2 MG/ML
4 INJECTION INTRAMUSCULAR; INTRAVENOUS EVERY 6 HOURS PRN
Status: DISCONTINUED | OUTPATIENT
Start: 2022-11-29 | End: 2022-11-29 | Stop reason: HOSPADM

## 2022-11-29 RX ORDER — SODIUM CHLORIDE 9 MG/ML
75 INJECTION, SOLUTION INTRAVENOUS CONTINUOUS
Status: DISCONTINUED | OUTPATIENT
Start: 2022-11-29 | End: 2022-11-29 | Stop reason: HOSPADM

## 2022-11-29 RX ORDER — MIDAZOLAM HYDROCHLORIDE 1 MG/ML
INJECTION INTRAMUSCULAR; INTRAVENOUS
Status: DISCONTINUED | OUTPATIENT
Start: 2022-11-29 | End: 2022-11-29 | Stop reason: HOSPADM

## 2022-11-29 RX ORDER — LIDOCAINE HYDROCHLORIDE 20 MG/ML
INJECTION, SOLUTION INFILTRATION; PERINEURAL
Status: DISCONTINUED | OUTPATIENT
Start: 2022-11-29 | End: 2022-11-29 | Stop reason: HOSPADM

## 2022-11-29 RX ORDER — SODIUM CHLORIDE 9 MG/ML
100 INJECTION, SOLUTION INTRAVENOUS CONTINUOUS
Status: DISCONTINUED | OUTPATIENT
Start: 2022-11-29 | End: 2022-11-29 | Stop reason: HOSPADM

## 2022-11-29 RX ORDER — SODIUM CHLORIDE 0.9 % (FLUSH) 0.9 %
10 SYRINGE (ML) INJECTION AS NEEDED
Status: DISCONTINUED | OUTPATIENT
Start: 2022-11-29 | End: 2022-11-29 | Stop reason: HOSPADM

## 2022-11-29 RX ORDER — FENTANYL CITRATE 50 UG/ML
INJECTION, SOLUTION INTRAMUSCULAR; INTRAVENOUS
Status: DISCONTINUED | OUTPATIENT
Start: 2022-11-29 | End: 2022-11-29 | Stop reason: HOSPADM

## 2022-11-29 RX ADMIN — SODIUM CHLORIDE 75 ML/HR: 9 INJECTION, SOLUTION INTRAVENOUS at 08:24

## 2022-11-30 ENCOUNTER — TELEPHONE (OUTPATIENT)
Dept: CARDIAC REHAB | Facility: HOSPITAL | Age: 77
End: 2022-11-30

## 2022-11-30 NOTE — TELEPHONE ENCOUNTER
Patient has declined cardiac rehab at this time. States has a part time job and does a lot of walking. Provided patient with contact information if she were to change her mind and like to attend.

## 2023-01-09 ENCOUNTER — HOSPITAL ENCOUNTER (OUTPATIENT)
Dept: CARDIOLOGY | Facility: HOSPITAL | Age: 78
Discharge: HOME | End: 2023-01-09
Admitting: NURSE PRACTITIONER
Payer: MEDICARE

## 2023-01-09 DIAGNOSIS — I65.29 STENOSIS OF CAROTID ARTERY, UNSPECIFIED LATERALITY: ICD-10-CM

## 2023-01-09 DIAGNOSIS — I65.22 OCCLUSION AND STENOSIS OF LEFT CAROTID ARTERY: ICD-10-CM

## 2023-01-09 LAB
BH CV XLRA MEAS LEFT DIST CCA EDV: 17.1 CM/SEC
BH CV XLRA MEAS LEFT DIST CCA PSV: 60.6 CM/SEC
BH CV XLRA MEAS LEFT DIST ICA EDV: -36.7 CM/SEC
BH CV XLRA MEAS LEFT DIST ICA PSV: -83.9 CM/SEC
BH CV XLRA MEAS LEFT ICA/CCA RATIO: 1.88
BH CV XLRA MEAS LEFT MID ICA EDV: -24.3 CM/SEC
BH CV XLRA MEAS LEFT MID ICA PSV: -76.6 CM/SEC
BH CV XLRA MEAS LEFT PROX CCA EDV: 22.4 CM/SEC
BH CV XLRA MEAS LEFT PROX CCA PSV: 67.1 CM/SEC
BH CV XLRA MEAS LEFT PROX ECA EDV: -8.8 CM/SEC
BH CV XLRA MEAS LEFT PROX ECA PSV: -72.4 CM/SEC
BH CV XLRA MEAS LEFT PROX ICA EDV: 42.9 CM/SEC
BH CV XLRA MEAS LEFT PROX ICA PSV: 113.7 CM/SEC
BH CV XLRA MEAS LEFT PROX SCLA PSV: 225 CM/SEC
BH CV XLRA MEAS LEFT VERTEBRAL A PSV: 22.6 CM/SEC
BH CV XLRA MEAS RIGHT DIST CCA EDV: 15.7 CM/SEC
BH CV XLRA MEAS RIGHT DIST CCA PSV: 52.1 CM/SEC
BH CV XLRA MEAS RIGHT DIST ICA EDV: -26.4 CM/SEC
BH CV XLRA MEAS RIGHT DIST ICA PSV: -89.3 CM/SEC
BH CV XLRA MEAS RIGHT ICA/CCA RATIO: 1.85
BH CV XLRA MEAS RIGHT MID ICA EDV: 26.3 CM/SEC
BH CV XLRA MEAS RIGHT MID ICA PSV: 96.6 CM/SEC
BH CV XLRA MEAS RIGHT PROX CCA EDV: 12.3 CM/SEC
BH CV XLRA MEAS RIGHT PROX CCA PSV: 53.6 CM/SEC
BH CV XLRA MEAS RIGHT PROX ECA EDV: -7.7 CM/SEC
BH CV XLRA MEAS RIGHT PROX ECA PSV: -132.7 CM/SEC
BH CV XLRA MEAS RIGHT PROX ICA EDV: -34.6 CM/SEC
BH CV XLRA MEAS RIGHT PROX ICA PSV: -88.9 CM/SEC
BH CV XLRA MEAS RIGHT PROX SCLA PSV: 87 CM/SEC
BH CV XLRA MEAS RIGHT VERTEBRAL A EDV: 13.8 CM/SEC
BH CV XLRA MEAS RIGHT VERTEBRAL A PSV: 61.9 CM/SEC
LEFT ARM BP: NORMAL MMHG
MAXIMAL PREDICTED HEART RATE: 142 BPM
RIGHT ARM BP: NORMAL MMHG
STRESS TARGET HR: 121 BPM

## 2023-01-09 PROCEDURE — 93880 EXTRACRANIAL BILAT STUDY: CPT

## 2023-01-10 DIAGNOSIS — I65.23 BILATERAL CAROTID ARTERY STENOSIS: Primary | ICD-10-CM

## 2023-01-10 PROBLEM — I65.29 STENOSIS OF CAROTID ARTERY: Status: RESOLVED | Noted: 2022-09-12 | Resolved: 2023-01-10

## 2024-01-29 ENCOUNTER — OFFICE VISIT (OUTPATIENT)
Dept: FAMILY MEDICINE CLINIC | Facility: CLINIC | Age: 79
End: 2024-01-29
Payer: MEDICARE

## 2024-01-29 VITALS
WEIGHT: 148 LBS | OXYGEN SATURATION: 96 % | BODY MASS INDEX: 26.22 KG/M2 | HEART RATE: 72 BPM | DIASTOLIC BLOOD PRESSURE: 62 MMHG | HEIGHT: 63 IN | SYSTOLIC BLOOD PRESSURE: 112 MMHG

## 2024-01-29 DIAGNOSIS — K21.9 GASTROESOPHAGEAL REFLUX DISEASE, UNSPECIFIED WHETHER ESOPHAGITIS PRESENT: ICD-10-CM

## 2024-01-29 DIAGNOSIS — Z00.00 ENCOUNTER FOR MEDICARE ANNUAL WELLNESS EXAM: Primary | ICD-10-CM

## 2024-01-29 DIAGNOSIS — R10.12 LEFT UPPER QUADRANT ABDOMINAL PAIN: ICD-10-CM

## 2024-01-29 DIAGNOSIS — Z78.0 POSTMENOPAUSE: ICD-10-CM

## 2024-01-29 DIAGNOSIS — R31.29 OTHER MICROSCOPIC HEMATURIA: ICD-10-CM

## 2024-01-29 DIAGNOSIS — I65.23 BILATERAL CAROTID ARTERY STENOSIS: ICD-10-CM

## 2024-01-29 DIAGNOSIS — E53.8 VITAMIN B12 DEFICIENCY: ICD-10-CM

## 2024-01-29 DIAGNOSIS — E55.9 VITAMIN D DEFICIENCY: ICD-10-CM

## 2024-01-29 DIAGNOSIS — E78.2 MIXED HYPERLIPIDEMIA: ICD-10-CM

## 2024-01-29 DIAGNOSIS — Z23 ENCOUNTER FOR IMMUNIZATION: ICD-10-CM

## 2024-01-29 LAB
BILIRUB BLD-MCNC: NEGATIVE MG/DL
CLARITY, POC: CLEAR
COLOR UR: YELLOW
EXPIRATION DATE: NORMAL
GLUCOSE UR STRIP-MCNC: NEGATIVE MG/DL
KETONES UR QL: NEGATIVE
LEUKOCYTE EST, POC: NEGATIVE
Lab: NORMAL
NITRITE UR-MCNC: NEGATIVE MG/ML
PH UR: 6 [PH] (ref 5–8)
PROT UR STRIP-MCNC: NEGATIVE MG/DL
RBC # UR STRIP: NEGATIVE /UL
SP GR UR: 1.02 (ref 1–1.03)
UROBILINOGEN UR QL: NORMAL

## 2024-01-29 RX ORDER — CHLORAL HYDRATE 500 MG
1000 CAPSULE ORAL
COMMUNITY

## 2024-01-29 NOTE — PROGRESS NOTES
The ABCs of the Annual Wellness Visit  Subsequent Medicare Wellness Visit    Subjective    Brigid Carballo is a 78 y.o. female who presents for a Subsequent Medicare Wellness Visit.    The following portions of the patient's history were reviewed and   updated as appropriate: allergies, current medications, past family history, past medical history, past social history, past surgical history, and problem list.    Compared to one year ago, the patient feels her physical   health is better.    Compared to one year ago, the patient feels her mental   health is better.    Recent Hospitalizations:  She was not admitted to the hospital during the last year.       Current Medical Providers:  Patient Care Team:  Kaya Mckeon APRN as PCP - General (Family Medicine)  Deam, Pawel Hanson MD as Consulting Physician (Cardiac Electrophysiology)    Outpatient Medications Prior to Visit   Medication Sig Dispense Refill    ascorbic acid (VITAMIN C) 1000 MG tablet Take 1 tablet by mouth Daily.      cholecalciferol (VITAMIN D3) 25 MCG (1000 UT) tablet Take 1 tablet by mouth Daily.      esomeprazole (nexIUM) 20 MG capsule Take 1 capsule by mouth Every Morning Before Breakfast.      Omega-3 Fatty Acids (fish oil) 1000 MG capsule capsule Take 1 capsule by mouth Daily With Breakfast.      vitamin B-12 (CYANOCOBALAMIN) 1000 MCG tablet Take 1 tablet by mouth Daily.      aspirin 81 MG EC tablet Take 1 tablet by mouth Daily. (Patient not taking: Reported on 1/29/2024) 90 tablet 0    atorvastatin (LIPITOR) 40 MG tablet TAKE 1 TABLET BY MOUTH ONCE DAILY AT NIGHT (Patient not taking: Reported on 1/29/2024) 90 tablet 1    acetaminophen (TYLENOL) 325 MG tablet Take 2 tablets by mouth Every 6 (Six) Hours As Needed for Mild Pain  or Headache.       No facility-administered medications prior to visit.       No opioid medication identified on active medication list. I have reviewed chart for other potential  high risk medication/s and harmful drug  "interactions in the elderly.        Aspirin is on active medication list. Aspirin use is indicated based on review of current medical condition/s. Pros and cons of this therapy have been discussed today. Benefits of this medication outweigh potential harm.  Patient has been encouraged to continue taking this medication.  .      Patient Active Problem List   Diagnosis    Small vessel disease    Hyperlipidemia    Allergic rhinitis    Arthritis    Gastroesophageal reflux disease    Vitamin B12 deficiency    Fatigue    Hiatal hernia    Polyp of colon    Bilateral carotid artery stenosis    Current smoker    Vitamin D deficiency    Memory deficit    Other microscopic hematuria    Thyromegaly    Acute non-recurrent sinusitis    Acute bronchitis    Postmenopause    Abnormal nuclear stress test    Left upper quadrant abdominal pain     Advance Care Planning   Advance Care Planning     Advance Directive is not on file.  ACP discussion was held with the patient during this visit. Patient does not have an advance directive, information provided.     Objective    Vitals:    01/29/24 0957   BP: 112/62   BP Location: Right arm   Patient Position: Sitting   Cuff Size: Adult   Pulse: 72   SpO2: 96%   Weight: 67.1 kg (148 lb)   Height: 160 cm (63\")     Estimated body mass index is 26.22 kg/m² as calculated from the following:    Height as of this encounter: 160 cm (63\").    Weight as of this encounter: 67.1 kg (148 lb).    BMI is >= 25 and <30. (Overweight) The following options were offered after discussion;: exercise counseling/recommendations and nutrition counseling/recommendations      Does the patient have evidence of cognitive impairment?  No as evidenced by normal mini-cog, see results.          HEALTH RISK ASSESSMENT    Smoking Status:  Social History     Tobacco Use   Smoking Status Every Day    Packs/day: 0.50    Years: 65.00    Additional pack years: 0.00    Total pack years: 32.50    Types: Cigarettes   Smokeless " Tobacco Never   Tobacco Comments    Quit now [place] info provided   Does not smoke full cigarettes    Alcohol Consumption:  Social History     Substance and Sexual Activity   Alcohol Use Yes    Comment: social--rarely     Fall Risk Screen:    TONO Fall Risk Assessment was completed, and patient is at LOW risk for falls.Assessment completed on:2024    Depression Screenin/29/2024     9:59 AM   PHQ-2/PHQ-9 Depression Screening   Little Interest or Pleasure in Doing Things 0-->not at all   Feeling Down, Depressed or Hopeless 0-->not at all   PHQ-9: Brief Depression Severity Measure Score 0       Health Habits and Functional and Cognitive Screenin/29/2024    10:00 AM   Functional & Cognitive Status   Do you have difficulty preparing food and eating? No   Do you have difficulty bathing yourself, getting dressed or grooming yourself? No   Do you have difficulty using the toilet? No   Do you have difficulty moving around from place to place? No   Do you have trouble with steps or getting out of a bed or a chair? No   Current Diet Well Balanced Diet   Dental Exam Up to date   Eye Exam Up to date   Exercise (times per week) 6 times per week   Current Exercises Include Walking   Do you need help using the phone?  No   Are you deaf or do you have serious difficulty hearing?  No   Do you need help to go to places out of walking distance? No   Do you need help shopping? No   Do you need help preparing meals?  No   Do you need help with housework?  No   Do you need help with laundry? No   Do you need help taking your medications? No   Do you need help managing money? No   Do you ever drive or ride in a car without wearing a seat belt? No   Have you felt unusual stress, anger or loneliness in the last month? No   Who do you live with? Spouse   If you need help, do you have trouble finding someone available to you? No   Have you been bothered in the last four weeks by sexual problems? No   Do you have  difficulty concentrating, remembering or making decisions? No       Age-appropriate Screening Schedule:  Refer to the list below for future screening recommendations based on patient's age, sex and/or medical conditions. Orders for these recommended tests are listed in the plan section. The patient has been provided with a written plan.    Health Maintenance   Topic Date Due    DXA SCAN  Never done    TDAP/TD VACCINES (1 - Tdap) Never done    LIPID PANEL  09/12/2023    COVID-19 Vaccine (1) 01/29/2025 (Originally 4/30/1946)    ZOSTER VACCINE (1 of 2) 01/29/2025 (Originally 10/[birth year]/1994)    ANNUAL WELLNESS VISIT  01/29/2025    BMI FOLLOWUP  01/29/2025    HEPATITIS C SCREENING  Completed    INFLUENZA VACCINE  Completed    Pneumococcal Vaccine 65+  Completed     Has had bone density in past; due for repeat.  Will consider Tdap at pharmacy.           CMS Preventative Services Quick Reference  Risk Factors Identified During Encounter  Immunizations Discussed/Encouraged: Tdap, Influenza, Shingrix, and COVID19  The above risks/problems have been discussed with the patient.    Discussed low risk for falls and recommended avoiding throw rugs, installing grab bars in bathroom, making sure have handrails on steps, etc.    Pertinent information has been shared with the patient in the After Visit Summary.  An After Visit Summary and PPPS were made available to the patient.    Follow Up:   Next Medicare Wellness visit to be scheduled in 1 year.       Additional E&M Note during same encounter follows:  Patient has multiple medical problems which are significant and separately identifiable that require additional work above and beyond the Medicare Wellness Visit.      Chief Complaint  Annual Exam    Subjective        HPI  Brigid Carballo is also being seen today for follow up Hyperlipidemia: has not been taking Atorvastatin consistently; has been taking fish oil daily; not very healthy diet; regular exercise, walks 2-3 miles daily;  "fasting today.     F/U VANESA/[place]'s esophagus: takes Nexium daily and works well; had EGD/colonoscopy in 2015.     Saw cardiology, Dr. Nunez, 9/2022 and ordered stress Echo; had abnormal stress test and then had left heart cath 11/2022; heart cath noted 2 arteries 30% blocked; also has less than 50% blockage in bilateral carotid arteries; no longer taking aspirin daily, seemed to irritate stomach.    Review of Systems   Constitutional:  Negative for appetite change, chills, fatigue and fever.   HENT:  Negative for ear pain, sinus pressure, sore throat and trouble swallowing.    Eyes:  Negative for discharge and visual disturbance.   Respiratory:  Negative for cough, chest tightness and shortness of breath.    Cardiovascular:  Negative for chest pain (some at times when does too much; resolves with rest), palpitations and leg swelling.   Gastrointestinal:  Negative for blood in stool, constipation and diarrhea. Abdominal pain: occasional brief episode of sharp pain in LUQ and will start belching, not related to eating.  Endocrine: Positive for cold intolerance (when really tired). Negative for heat intolerance and polydipsia.   Genitourinary:  Negative for dysuria and frequency.   Musculoskeletal:  Negative for arthralgias and back pain.   Skin:  Negative for rash.   Neurological:  Negative for dizziness, syncope and light-headedness.   Hematological:  Does not bruise/bleed easily (some with thin skin).   Psychiatric/Behavioral:  Negative for dysphoric mood. The patient is not nervous/anxious.        Objective   Vital Signs:  /62 (BP Location: Right arm, Patient Position: Sitting, Cuff Size: Adult)   Pulse 72   Ht 160 cm (63\")   Wt 67.1 kg (148 lb)   SpO2 96%   BMI 26.22 kg/m²     Physical Exam  Vitals and nursing note reviewed.   Constitutional:       General: She is not in acute distress.     Appearance: She is well-developed and well-groomed. She is not diaphoretic.   HENT:      Head: Normocephalic and " atraumatic.      Jaw: No tenderness or pain on movement.      Right Ear: External ear normal. Decreased hearing noted. Right ear middle ear effusion: TM dull. Tympanic membrane is not erythematous.      Left Ear: External ear normal. No decreased hearing noted. Left ear middle ear effusion: TM dull. Tympanic membrane is not erythematous.      Nose: Nose normal.      Right Sinus: No maxillary sinus tenderness or frontal sinus tenderness.      Left Sinus: No maxillary sinus tenderness or frontal sinus tenderness.      Mouth/Throat:      Mouth: Mucous membranes are moist.      Dentition: Has dentures.      Pharynx: No oropharyngeal exudate or posterior oropharyngeal erythema.   Eyes:      Extraocular Movements: Extraocular movements intact.      Conjunctiva/sclera: Conjunctivae normal.      Pupils: Pupils are equal, round, and reactive to light.   Neck:      Thyroid: No thyromegaly.      Vascular: No carotid bruit.      Trachea: No tracheal deviation.   Cardiovascular:      Rate and Rhythm: Normal rate and regular rhythm.      Pulses: Normal pulses.      Heart sounds: Normal heart sounds. No murmur heard.  Pulmonary:      Effort: Pulmonary effort is normal. No respiratory distress.      Breath sounds: Normal breath sounds.   Abdominal:      General: Bowel sounds are normal.      Palpations: Abdomen is soft. There is no hepatomegaly or splenomegaly.      Tenderness: There is abdominal tenderness in the left upper quadrant. There is no guarding or rebound.   Musculoskeletal:         General: Normal range of motion.      Cervical back: Normal range of motion and neck supple. No bony tenderness.      Thoracic back: No bony tenderness.      Lumbar back: No bony tenderness.      Right lower leg: No edema.      Left lower leg: No edema.   Lymphadenopathy:      Cervical: No cervical adenopathy.   Skin:     General: Skin is warm and dry.      Findings: No rash.   Neurological:      Mental Status: She is alert and oriented to  person, place, and time.      Cranial Nerves: No cranial nerve deficit.      Motor: Motor function is intact.      Coordination: Coordination normal.      Gait: Gait normal.      Deep Tendon Reflexes: Reflexes are normal and symmetric.   Psychiatric:         Mood and Affect: Mood normal.         Behavior: Behavior normal.         Thought Content: Thought content normal.         Cognition and Memory: Cognition normal.         Judgment: Judgment normal.            Lab Results   Component Value Date    WBC 6.04 11/28/2022    RBC 4.59 11/28/2022    HGB 14.5 11/28/2022    HCT 42.6 11/28/2022    MCV 92.8 11/28/2022    MCH 31.6 11/28/2022    MCHC 34.0 11/28/2022    RDW 11.5 (L) 11/28/2022    RDWSD 39.5 11/28/2022    MPV 9.7 11/28/2022     11/28/2022    NEUTRORELPCT 49.2 11/28/2022    LYMPHORELPCT 39.7 11/28/2022    MONORELPCT 8.4 11/28/2022    EOSRELPCT 1.7 11/28/2022    BASORELPCT 0.7 11/28/2022    AUTOIGPER 0.3 11/28/2022    NEUTROABS 2.97 11/28/2022    LYMPHSABS 2.40 11/28/2022    MONOSABS 0.51 11/28/2022    EOSABS 0.10 11/28/2022    BASOSABS 0.04 11/28/2022    AUTOIGNUM 0.02 11/28/2022    NRBC 0.0 11/28/2022     Lab Results   Component Value Date    GLUCOSE 86 11/29/2022    BUN 14 11/29/2022    CREATININE 0.71 11/29/2022    EGFRIFNONA 125 11/07/2021    BCR 19.7 11/29/2022    K 4.8 11/29/2022    CO2 28.7 11/29/2022    CALCIUM 8.9 11/29/2022    PROTENTOTREF 6.1 09/12/2022    ALBUMIN 4.60 09/12/2022    LABIL2 3.1 09/12/2022    AST 16 09/12/2022    ALT 12 09/12/2022      Lab Results   Component Value Date    CHLPL 165 09/12/2022    TRIG 45 09/12/2022    HDL 67 (H) 09/12/2022    VLDL 10 09/12/2022    LDL 88 09/12/2022     Lab Results   Component Value Date    TSH 2.960 05/31/2022     Lab Results   Component Value Date    HGBA1C 4.96 11/07/2021     Lab Results   Component Value Date    COLORU Yellow 01/29/2024    CLARITYU Clear 01/29/2024    LEUKOCYTESUR Negative 01/29/2024    BILIRUBINUR Negative 01/29/2024           Assessment and Plan   Diagnoses and all orders for this visit:    1. Encounter for Medicare annual wellness exam (Primary)  -     Fluzone High-Dose 65+yrs  -     DEXA Bone Density Axial; Future  -     Comprehensive Metabolic Panel  -     Lipid Panel With LDL / HDL Ratio  -     CBC & Differential  -     Vitamin B12 & Folate  -     Vitamin D,25-Hydroxy    2. Bilateral carotid artery stenosis  -     Duplex Carotid Ultrasound CAR; Future    3. Encounter for immunization  -     Fluzone High-Dose 65+yrs    4. Postmenopause  -     DEXA Bone Density Axial; Future    5. Mixed hyperlipidemia  Assessment & Plan:  Continue fish oil daily.  Take Atorvastatin consistently.  Continue to work on healthy diet and exercise.    Orders:  -     Comprehensive Metabolic Panel  -     Lipid Panel With LDL / HDL Ratio    6. Vitamin D deficiency  -     Vitamin D,25-Hydroxy    7. Vitamin B12 deficiency  -     Vitamin B12 & Folate    8. Gastroesophageal reflux disease, unspecified whether esophagitis present  Assessment & Plan:  Stable.  Continue Nexium daily.    Orders:  -     CBC & Differential  -     Ambulatory Referral to Gastroenterology    9. Left upper quadrant abdominal pain  -     Comprehensive Metabolic Panel  -     CBC & Differential  -     Lipase  -     Ambulatory Referral to Gastroenterology    10. Other microscopic hematuria  -     POC Urinalysis Dipstick, Automated             Follow Up   Return in about 6 months (around 7/29/2024) for Recheck.or sooner if symptoms persist or worsen.  Patient has had episodes of LUQ discomfort; will check labs today and consider US abdomen pending lab results; will also refer to GI due to history of [place]'s esophagus.    Patient was given instructions and counseling regarding her condition or for health maintenance advice. Please see specific information pulled into the AVS if appropriate.

## 2024-01-30 DIAGNOSIS — E53.8 VITAMIN B12 DEFICIENCY: Primary | ICD-10-CM

## 2024-01-30 PROBLEM — R31.29 OTHER MICROSCOPIC HEMATURIA: Status: RESOLVED | Noted: 2022-06-02 | Resolved: 2024-01-30

## 2024-01-30 LAB
25(OH)D3+25(OH)D2 SERPL-MCNC: 46.1 NG/ML (ref 30–100)
ALBUMIN SERPL-MCNC: 4.1 G/DL (ref 3.5–5.2)
ALBUMIN/GLOB SERPL: 2.3 G/DL
ALP SERPL-CCNC: 55 U/L (ref 39–117)
ALT SERPL-CCNC: 10 U/L (ref 1–33)
AST SERPL-CCNC: 11 U/L (ref 1–32)
BASOPHILS # BLD AUTO: 0.04 10*3/MM3 (ref 0–0.2)
BASOPHILS NFR BLD AUTO: 0.7 % (ref 0–1.5)
BILIRUB SERPL-MCNC: 0.4 MG/DL (ref 0–1.2)
BUN SERPL-MCNC: 13 MG/DL (ref 8–23)
BUN/CREAT SERPL: 19.7 (ref 7–25)
CALCIUM SERPL-MCNC: 8.7 MG/DL (ref 8.6–10.5)
CHLORIDE SERPL-SCNC: 108 MMOL/L (ref 98–107)
CHOLEST SERPL-MCNC: 212 MG/DL (ref 0–200)
CO2 SERPL-SCNC: 27.8 MMOL/L (ref 22–29)
CREAT SERPL-MCNC: 0.66 MG/DL (ref 0.57–1)
EGFRCR SERPLBLD CKD-EPI 2021: 89.4 ML/MIN/1.73
EOSINOPHIL # BLD AUTO: 0.07 10*3/MM3 (ref 0–0.4)
EOSINOPHIL NFR BLD AUTO: 1.2 % (ref 0.3–6.2)
ERYTHROCYTE [DISTWIDTH] IN BLOOD BY AUTOMATED COUNT: 11.7 % (ref 12.3–15.4)
FOLATE SERPL-MCNC: 6.56 NG/ML (ref 4.78–24.2)
GLOBULIN SER CALC-MCNC: 1.8 GM/DL
GLUCOSE SERPL-MCNC: 84 MG/DL (ref 65–99)
HCT VFR BLD AUTO: 44.2 % (ref 34–46.6)
HDLC SERPL-MCNC: 70 MG/DL (ref 40–60)
HGB BLD-MCNC: 14.8 G/DL (ref 12–15.9)
IMM GRANULOCYTES # BLD AUTO: 0.02 10*3/MM3 (ref 0–0.05)
IMM GRANULOCYTES NFR BLD AUTO: 0.3 % (ref 0–0.5)
LDLC SERPL CALC-MCNC: 130 MG/DL (ref 0–100)
LDLC/HDLC SERPL: 1.83 {RATIO}
LIPASE SERPL-CCNC: 25 U/L (ref 13–60)
LYMPHOCYTES # BLD AUTO: 1.53 10*3/MM3 (ref 0.7–3.1)
LYMPHOCYTES NFR BLD AUTO: 25.9 % (ref 19.6–45.3)
MCH RBC QN AUTO: 32 PG (ref 26.6–33)
MCHC RBC AUTO-ENTMCNC: 33.5 G/DL (ref 31.5–35.7)
MCV RBC AUTO: 95.5 FL (ref 79–97)
MONOCYTES # BLD AUTO: 0.38 10*3/MM3 (ref 0.1–0.9)
MONOCYTES NFR BLD AUTO: 6.4 % (ref 5–12)
NEUTROPHILS # BLD AUTO: 3.86 10*3/MM3 (ref 1.7–7)
NEUTROPHILS NFR BLD AUTO: 65.5 % (ref 42.7–76)
NRBC BLD AUTO-RTO: 0 /100 WBC (ref 0–0.2)
PLATELET # BLD AUTO: 207 10*3/MM3 (ref 140–450)
POTASSIUM SERPL-SCNC: 4.9 MMOL/L (ref 3.5–5.2)
PROT SERPL-MCNC: 5.9 G/DL (ref 6–8.5)
RBC # BLD AUTO: 4.63 10*6/MM3 (ref 3.77–5.28)
SODIUM SERPL-SCNC: 144 MMOL/L (ref 136–145)
TRIGL SERPL-MCNC: 69 MG/DL (ref 0–150)
VIT B12 SERPL-MCNC: 1329 PG/ML (ref 211–946)
VLDLC SERPL CALC-MCNC: 12 MG/DL (ref 5–40)
WBC # BLD AUTO: 5.9 10*3/MM3 (ref 3.4–10.8)

## 2024-01-30 NOTE — ASSESSMENT & PLAN NOTE
Continue fish oil daily.  Take Atorvastatin consistently.  Continue to work on healthy diet and exercise.

## 2024-02-12 ENCOUNTER — HOSPITAL ENCOUNTER (OUTPATIENT)
Facility: HOSPITAL | Age: 79
Discharge: HOME | End: 2024-02-12
Payer: MEDICARE

## 2024-02-12 ENCOUNTER — HOSPITAL ENCOUNTER (OUTPATIENT)
Dept: CARDIOLOGY | Facility: HOSPITAL | Age: 79
Discharge: HOME | End: 2024-02-12
Payer: MEDICARE

## 2024-02-12 DIAGNOSIS — Z00.00 ENCOUNTER FOR MEDICARE ANNUAL WELLNESS EXAM: ICD-10-CM

## 2024-02-12 DIAGNOSIS — I65.23 BILATERAL CAROTID ARTERY STENOSIS: ICD-10-CM

## 2024-02-12 DIAGNOSIS — Z78.0 POSTMENOPAUSE: ICD-10-CM

## 2024-02-12 LAB
BH CV XLRA MEAS LEFT DIST CCA EDV: 13.8 CM/SEC
BH CV XLRA MEAS LEFT DIST CCA PSV: 62.9 CM/SEC
BH CV XLRA MEAS LEFT DIST ICA EDV: -22 CM/SEC
BH CV XLRA MEAS LEFT DIST ICA PSV: -83 CM/SEC
BH CV XLRA MEAS LEFT ICA/CCA RATIO: 1.6
BH CV XLRA MEAS LEFT MID ICA EDV: -17.4 CM/SEC
BH CV XLRA MEAS LEFT MID ICA PSV: -63.1 CM/SEC
BH CV XLRA MEAS LEFT PROX CCA EDV: 19.2 CM/SEC
BH CV XLRA MEAS LEFT PROX CCA PSV: 70.3 CM/SEC
BH CV XLRA MEAS LEFT PROX ECA EDV: -14.9 CM/SEC
BH CV XLRA MEAS LEFT PROX ECA PSV: -103.1 CM/SEC
BH CV XLRA MEAS LEFT PROX ICA EDV: 21.1 CM/SEC
BH CV XLRA MEAS LEFT PROX ICA PSV: 100.6 CM/SEC
BH CV XLRA MEAS LEFT PROX SCLA PSV: 84.5 CM/SEC
BH CV XLRA MEAS LEFT VERTEBRAL A EDV: -11.5 CM/SEC
BH CV XLRA MEAS LEFT VERTEBRAL A PSV: -39.5 CM/SEC
BH CV XLRA MEAS RIGHT DIST CCA EDV: -17 CM/SEC
BH CV XLRA MEAS RIGHT DIST CCA PSV: -67 CM/SEC
BH CV XLRA MEAS RIGHT DIST ICA EDV: -20.2 CM/SEC
BH CV XLRA MEAS RIGHT DIST ICA PSV: -77.5 CM/SEC
BH CV XLRA MEAS RIGHT ICA/CCA RATIO: 1.46
BH CV XLRA MEAS RIGHT MID ICA EDV: -24.3 CM/SEC
BH CV XLRA MEAS RIGHT MID ICA PSV: -86.6 CM/SEC
BH CV XLRA MEAS RIGHT PROX CCA EDV: 11 CM/SEC
BH CV XLRA MEAS RIGHT PROX CCA PSV: 65.3 CM/SEC
BH CV XLRA MEAS RIGHT PROX ECA EDV: 20.7 CM/SEC
BH CV XLRA MEAS RIGHT PROX ECA PSV: 209.8 CM/SEC
BH CV XLRA MEAS RIGHT PROX ICA EDV: -23.4 CM/SEC
BH CV XLRA MEAS RIGHT PROX ICA PSV: -97.9 CM/SEC
BH CV XLRA MEAS RIGHT PROX SCLA PSV: 75.9 CM/SEC
BH CV XLRA MEAS RIGHT VERTEBRAL A EDV: 15.5 CM/SEC
BH CV XLRA MEAS RIGHT VERTEBRAL A PSV: 54.1 CM/SEC
LEFT ARM BP: NORMAL MMHG
RIGHT ARM BP: NORMAL MMHG

## 2024-02-12 PROCEDURE — 77080 DXA BONE DENSITY AXIAL: CPT

## 2024-02-12 PROCEDURE — 93880 EXTRACRANIAL BILAT STUDY: CPT

## 2024-02-14 DIAGNOSIS — I65.23 BILATERAL CAROTID ARTERY STENOSIS: Primary | ICD-10-CM

## 2024-03-18 ENCOUNTER — TELEPHONE (OUTPATIENT)
Dept: FAMILY MEDICINE CLINIC | Facility: CLINIC | Age: 79
End: 2024-03-18

## 2024-03-18 NOTE — TELEPHONE ENCOUNTER
I spoke to patient and advised her to take zyrtec at night or in the evening, patient voiced understanding.  Brigid Gandhi, GALI/HREBERTR

## 2024-03-18 NOTE — TELEPHONE ENCOUNTER
Caller: Brigid Carballo    Relationship: Self    Best call back number: [phone]     What medication are you requesting: SOMETHING TO HELP DRY THIS UP     What are your current symptoms: RUNNING NOSE, COUGHING    How long have you been experiencing symptoms: 1 WEEK     Have you had these symptoms before:    [x] Yes  [] No    Have you been treated for these symptoms before:   [x] Yes  [] No    If a prescription is needed, what is your preferred pharmacy and phone number:    [address] - [phone] [facility] [phone]   Additional notes:    PLEASE CALL PATIENT WITH ANY FURTHER QUESTIONS.

## 2024-04-19 ENCOUNTER — OFFICE VISIT (OUTPATIENT)
Dept: FAMILY MEDICINE CLINIC | Facility: CLINIC | Age: 79
End: 2024-04-19
Payer: MEDICARE

## 2024-04-19 VITALS
BODY MASS INDEX: 25.45 KG/M2 | HEART RATE: 88 BPM | SYSTOLIC BLOOD PRESSURE: 110 MMHG | TEMPERATURE: 98 F | WEIGHT: 143.6 LBS | DIASTOLIC BLOOD PRESSURE: 80 MMHG | HEIGHT: 63 IN | OXYGEN SATURATION: 95 %

## 2024-04-19 DIAGNOSIS — I65.23 BILATERAL CAROTID ARTERY STENOSIS: ICD-10-CM

## 2024-04-19 DIAGNOSIS — J01.90 ACUTE NON-RECURRENT SINUSITIS, UNSPECIFIED LOCATION: Primary | ICD-10-CM

## 2024-04-19 DIAGNOSIS — H61.21 EXCESSIVE CERUMEN IN RIGHT EAR CANAL: ICD-10-CM

## 2024-04-19 DIAGNOSIS — E78.2 MIXED HYPERLIPIDEMIA: ICD-10-CM

## 2024-04-19 PROCEDURE — 1159F MED LIST DOCD IN RCRD: CPT | Performed by: NURSE PRACTITIONER

## 2024-04-19 PROCEDURE — 1160F RVW MEDS BY RX/DR IN RCRD: CPT | Performed by: NURSE PRACTITIONER

## 2024-04-19 PROCEDURE — 99213 OFFICE O/P EST LOW 20 MIN: CPT | Performed by: NURSE PRACTITIONER

## 2024-04-19 RX ORDER — FLUTICASONE PROPIONATE 50 MCG
2 SPRAY, SUSPENSION (ML) NASAL DAILY
Qty: 18.2 ML | Refills: 2 | Status: SHIPPED | OUTPATIENT
Start: 2024-04-19

## 2024-04-19 RX ORDER — DOXYCYCLINE 100 MG/1
100 CAPSULE ORAL 2 TIMES DAILY
Qty: 14 CAPSULE | Refills: 0 | Status: SHIPPED | OUTPATIENT
Start: 2024-04-19 | End: 2024-04-26

## 2024-04-19 NOTE — PROGRESS NOTES
Subjective   Brigid Carballo is a 78 y.o. female.     Chief Complaint   Patient presents with    Dizziness     Humming in ears   And fullness X 1 week        History of Present Illness   Patient presents with c/o dizziness and fullness in ears, left greater than right, x1 week; has humming in ears; symptoms will be intermittent; feels lightheaded with positions changes, particularly with bending over; no ear pain; has had cough for a couple of weeks; has had productive cough--light color; no SOA; no sore throat; no nausea, vomiting, or diarrhea; no OTC treatment.    F/U Hyperlipidemia/carotid stenosis: resumed Atorvastatin and aspirin daily; no myalgias.      The following portions of the patient's history were reviewed and updated as appropriate: allergies, current medications, past family history, past medical history, past social history, past surgical history and problem list.    Current Outpatient Medications on File Prior to Visit   Medication Sig    ascorbic acid (VITAMIN C) 1000 MG tablet Take 1 tablet by mouth Daily.    aspirin 81 MG EC tablet Take 1 tablet by mouth Daily.    atorvastatin (LIPITOR) 40 MG tablet TAKE 1 TABLET BY MOUTH ONCE DAILY AT NIGHT    cholecalciferol (VITAMIN D3) 25 MCG (1000 UT) tablet Take 1 tablet by mouth Daily.    esomeprazole (nexIUM) 20 MG capsule Take 1 capsule by mouth Every Morning Before Breakfast.    vitamin B-12 (CYANOCOBALAMIN) 1000 MCG tablet Take 1 tablet by mouth Daily.    Omega-3 Fatty Acids (fish oil) 1000 MG capsule capsule Take 1 capsule by mouth Daily With Breakfast. (Patient not taking: Reported on 4/19/2024)     No current facility-administered medications on file prior to visit.        Past Medical History:   Diagnosis Date    Angina pectoris     Anxiety     [place]'s esophagus 09/29/2015    BPV (benign positional vertigo) 11/07/2021    Bunion of left foot 06/14/2016    Cancer     skin    Carpal tunnel syndrome     Chest pain 10/05/2020    DDD (degenerative disc  disease), cervical 11/04/2014    Deformity of wrist joint 05/03/2018    Hyperlipidemia     Insomnia 09/11/2017    Kidney stones     Low back pain 11/04/2014    Melanoma in situ of unspecified lower limb, including hip 06/2021    Occlusion of left vertebral artery 11/06/2021    1/10/22 CTA neck: beyond its origin, left vertebral artery is widely patent throughout its cervical and intracranial course to the vertebrobasilar junction without stenosis    Osteoarthritis of left knee 06/27/2016    Renal stone 03/02/2020    Spinal stenosis of lumbar region 12/29/2014    Tear of lateral meniscus of knee 09/30/2014    Unexplained weight loss 04/21/2021       Past Surgical History:   Procedure Laterality Date    APPENDECTOMY      BREAST BIOPSY      multiple    CARDIAC CATHETERIZATION      CARDIAC CATHETERIZATION N/A 11/29/2022    Procedure: Left Heart Cath;  Surgeon: Brennen Nguyen MD;  Location:  [facility];  Service: Cardiology;  Laterality: N/A;    CARDIAC CATHETERIZATION N/A 11/29/2022    Procedure: Coronary angiography;  Surgeon: Brennen Nguyen MD;  Location:  [facility];  Service: Cardiology;  Laterality: N/A;    CARDIAC CATHETERIZATION N/A 11/29/2022    Procedure: Left ventriculography;  Surgeon: Brennen Nguyen MD;  Location:  [facility];  Service: Cardiology;  Laterality: N/A;    CARPAL TUNNEL RELEASE Bilateral     COLONOSCOPY  05/17/2021    polyp removed; per Dr. Velasquez    CYSTOSCOPY W/ URETERAL STENT PLACEMENT  10/22/2019    HYSTERECTOMY      INGUINAL HERNIA REPAIR Right     KNEE ARTHROSCOPY Right     meniscus    TONSILLECTOMY      UPPER GASTROINTESTINAL ENDOSCOPY  05/17/2021    per Dr. Velasquez       Family History   Problem Relation Age of Onset    Emphysema Mother     Heart disease Mother     Heart disease Father     Cancer Brother     Heart disease Brother     Skin cancer Brother     Stomach cancer Maternal Aunt     Breast cancer Maternal  "Grandmother     Breast cancer Paternal Grandmother        Social History     Socioeconomic History    Marital status:    Tobacco Use    Smoking status: Every Day     Current packs/day: 0.50     Average packs/day: 0.5 packs/day for 65.0 years (32.5 ttl pk-yrs)     Types: Cigarettes    Smokeless tobacco: Never    Tobacco comments:     Quit now [place] info provided   Vaping Use    Vaping status: Never Used   Substance and Sexual Activity    Alcohol use: Yes     Comment: social--rarely    Drug use: Never    Sexual activity: Defer       Review of Systems   Constitutional:  Positive for fatigue. Negative for appetite change, chills, fever, unexpected weight gain and unexpected weight loss (has not been eating as much).   HENT:  Negative for sinus pressure and trouble swallowing. Hearing loss: some decrease in left ear. Postnasal drip: some.   Respiratory:  Positive for cough. Negative for chest tightness and shortness of breath.    Cardiovascular:  Negative for chest pain, palpitations and leg swelling.   Gastrointestinal:  Negative for abdominal pain.   Genitourinary:  Negative for dysuria and frequency.   Skin:  Negative for rash.   Neurological:  Negative for headache.       Objective   Vitals:    04/19/24 1353 04/19/24 1417   BP: 110/80    BP Location: Left arm    Patient Position: Sitting    Cuff Size: Adult    Pulse: 94 88   Temp: 98 °F (36.7 °C)    SpO2: 94% 95%   Weight: 65.1 kg (143 lb 9.6 oz)    Height: 160 cm (63\")      Body mass index is 25.44 kg/m².    Physical Exam  Vitals and nursing note reviewed.   Constitutional:       General: She is not in acute distress.     Appearance: She is well-developed. She is not diaphoretic.   HENT:      Head: Normocephalic.      Right Ear: External ear normal. Impacted cerumen: cerumen partially blocking TM.      Left Ear: External ear normal. Left ear middle ear effusion: mild. Tympanic membrane is injected.   Eyes:      Conjunctiva/sclera: Conjunctivae normal. "   Neck:      Vascular: No carotid bruit.   Cardiovascular:      Rate and Rhythm: Normal rate and regular rhythm.      Pulses: Normal pulses.      Heart sounds: Normal heart sounds. No murmur heard.  Pulmonary:      Effort: Pulmonary effort is normal. No respiratory distress.      Breath sounds: Normal breath sounds. No wheezing, rhonchi or rales.   Abdominal:      General: Bowel sounds are normal.      Palpations: Abdomen is soft. There is no hepatomegaly or splenomegaly.      Tenderness: There is no abdominal tenderness. There is no guarding.   Musculoskeletal:      Cervical back: Normal range of motion and neck supple.      Right lower leg: Right lower leg edema: trace ankle.      Left lower leg: Left lower leg edema: trace ankle.   Lymphadenopathy:      Cervical: No cervical adenopathy.   Skin:     General: Skin is warm and dry.      Findings: No rash.   Neurological:      Mental Status: She is alert and oriented to person, place, and time.      Gait: Gait normal.   Psychiatric:         Mood and Affect: Mood normal.         Behavior: Behavior normal.         Thought Content: Thought content normal.         Cognition and Memory: Cognition normal.         Judgment: Judgment normal.     Ear Cerumen Removal    Date/Time: 4/19/2024 2:37 PM    Performed by: Kaya Mckeon APRN  Authorized by: Kaya Mckeon APRN    Anesthesia:  Local Anesthetic: none  Location details: right ear  Patient tolerance: patient tolerated the procedure well with no immediate complications  Comments: Cerumen removal successful; TM with fluid behind and loss of landmarks, no erythema  Procedure type: instrumentation, curette   Sedation:  Patient sedated: no          Lab Results   Component Value Date    WBC 5.90 01/29/2024    RBC 4.63 01/29/2024    HGB 14.8 01/29/2024    HCT 44.2 01/29/2024    MCV 95.5 01/29/2024    MCH 32.0 01/29/2024    MCHC 33.5 01/29/2024    RDW 11.7 (L) 01/29/2024    RDWSD 39.5 11/28/2022    MPV 9.7 11/28/2022    PLT  207 01/29/2024    NEUTRORELPCT 65.5 01/29/2024    LYMPHORELPCT 25.9 01/29/2024    MONORELPCT 6.4 01/29/2024    EOSRELPCT 1.2 01/29/2024    BASORELPCT 0.7 01/29/2024    AUTOIGPER 0.3 11/28/2022    NEUTROABS 3.86 01/29/2024    LYMPHSABS 1.53 01/29/2024    MONOSABS 0.38 01/29/2024    EOSABS 0.07 01/29/2024    BASOSABS 0.04 01/29/2024    AUTOIGNUM 0.02 11/28/2022    NRBC 0.0 01/29/2024     Lab Results   Component Value Date    GLUCOSE 84 01/29/2024    BUN 13 01/29/2024    CREATININE 0.66 01/29/2024    EGFRIFNONA 125 11/07/2021    BCR 19.7 01/29/2024    K 4.9 01/29/2024    CO2 27.8 01/29/2024    CALCIUM 8.7 01/29/2024    PROTENTOTREF 5.9 (L) 01/29/2024    ALBUMIN 4.1 01/29/2024    LABIL2 2.3 01/29/2024    AST 11 01/29/2024    ALT 10 01/29/2024      Lab Results   Component Value Date    CHLPL 212 (H) 01/29/2024    TRIG 69 01/29/2024    HDL 70 (H) 01/29/2024    VLDL 12 01/29/2024     (H) 01/29/2024     Lab Results   Component Value Date    TSH 2.960 05/31/2022     Lab Results   Component Value Date    HGBA1C 4.96 11/07/2021     Lab Results   Component Value Date    COLORU Yellow 01/29/2024    CLARITYU Clear 01/29/2024    LEUKOCYTESUR Negative 01/29/2024    BILIRUBINUR Negative 01/29/2024          Assessment    Problem List Items Addressed This Visit       Hyperlipidemia    Current Assessment & Plan     Continue Atorvastatin daily.  Continue to work on healthy diet and exercise as tolerated.         Bilateral carotid artery stenosis    Overview     1/10/22 CTA neck:  Calcified plaque mildly narrows the left common carotid origin and heavily calcified plaque moderately narrows the left common carotid bifurcation by 50%, extends into the origin of the left internal carotid artery and narrows the origin of the left internal carotid artery by 40% to 50% using the NASCET criteria. Calcified plaque mildly narrows the right common carotid bifurcation, extends into the origin and mildly narrows the origin and the proximal  right internal carotid artery by 20% using the NASCET criteria. There is focal moderate-to-severe stenosis at the origin of the right external carotid artery.  1/10/23 US carotids: 16-49% stenosis bilaterally         Current Assessment & Plan     Continue Atorvastatin and aspirin daily.         Acute non-recurrent sinusitis - Primary    Current Assessment & Plan     Increase intake of clear liquids and rest.  Try nasal steroid, such as Flonase.         Relevant Medications    doxycycline (MONODOX) 100 MG capsule    fluticasone (FLONASE) 50 MCG/ACT nasal spray        Return if symptoms worsen or fail to improve.  Patient has had trouble with ears and sinuses x 1 week and cough x 2 weeks; will send Rx for Doxycycline and see if helps; will also try Flonase.

## 2024-04-19 NOTE — PATIENT INSTRUCTIONS
Increase intake of clear liquids and rest.  Try nasal steroid, such as Flonase.  Follow up if symptoms persist or worsen.

## 2024-06-28 ENCOUNTER — APPOINTMENT (OUTPATIENT)
Dept: GENERAL RADIOLOGY | Facility: HOSPITAL | Age: 79
End: 2024-06-28
Payer: MEDICARE

## 2024-06-28 ENCOUNTER — HOSPITAL ENCOUNTER (INPATIENT)
Facility: HOSPITAL | Age: 79
LOS: 8 days | Discharge: HOME | End: 2024-07-06
Attending: EMERGENCY MEDICINE | Admitting: INTERNAL MEDICINE
Payer: MEDICARE

## 2024-06-28 DIAGNOSIS — R09.02 HYPOXIA: ICD-10-CM

## 2024-06-28 DIAGNOSIS — J20.4 PARAINFLUENZA VIRUS BRONCHITIS: Primary | ICD-10-CM

## 2024-06-28 PROBLEM — B34.8 PARAINFLUENZA: Status: ACTIVE | Noted: 2024-06-28

## 2024-06-28 LAB
ALBUMIN SERPL-MCNC: 4.1 G/DL (ref 3.5–5.2)
ALBUMIN/GLOB SERPL: 2 G/DL
ALP SERPL-CCNC: 44 U/L (ref 39–117)
ALT SERPL W P-5'-P-CCNC: 10 U/L (ref 1–33)
ANION GAP SERPL CALCULATED.3IONS-SCNC: 10 MMOL/L (ref 5–15)
AST SERPL-CCNC: 15 U/L (ref 1–32)
B PARAPERT DNA SPEC QL NAA+PROBE: NOT DETECTED
B PERT DNA SPEC QL NAA+PROBE: NOT DETECTED
BASOPHILS # BLD AUTO: 0.04 10*3/MM3 (ref 0–0.2)
BASOPHILS NFR BLD AUTO: 1 % (ref 0–1.5)
BILIRUB SERPL-MCNC: 0.4 MG/DL (ref 0–1.2)
BUN SERPL-MCNC: 17 MG/DL (ref 8–23)
BUN/CREAT SERPL: 21 (ref 7–25)
C PNEUM DNA NPH QL NAA+NON-PROBE: NOT DETECTED
CALCIUM SPEC-SCNC: 9 MG/DL (ref 8.6–10.5)
CHLORIDE SERPL-SCNC: 108 MMOL/L (ref 98–107)
CO2 SERPL-SCNC: 25 MMOL/L (ref 22–29)
CREAT SERPL-MCNC: 0.81 MG/DL (ref 0.57–1)
DEPRECATED RDW RBC AUTO: 41.4 FL (ref 37–54)
EGFRCR SERPLBLD CKD-EPI 2021: 73.9 ML/MIN/1.73
EOSINOPHIL # BLD AUTO: 0.06 10*3/MM3 (ref 0–0.4)
EOSINOPHIL NFR BLD AUTO: 1.5 % (ref 0.3–6.2)
ERYTHROCYTE [DISTWIDTH] IN BLOOD BY AUTOMATED COUNT: 11.8 % (ref 12.3–15.4)
FLUAV SUBTYP SPEC NAA+PROBE: NOT DETECTED
FLUBV RNA ISLT QL NAA+PROBE: NOT DETECTED
GLOBULIN UR ELPH-MCNC: 2.1 GM/DL
GLUCOSE SERPL-MCNC: 84 MG/DL (ref 65–99)
HADV DNA SPEC NAA+PROBE: NOT DETECTED
HCOV 229E RNA SPEC QL NAA+PROBE: NOT DETECTED
HCOV HKU1 RNA SPEC QL NAA+PROBE: NOT DETECTED
HCOV NL63 RNA SPEC QL NAA+PROBE: NOT DETECTED
HCOV OC43 RNA SPEC QL NAA+PROBE: NOT DETECTED
HCT VFR BLD AUTO: 43 % (ref 34–46.6)
HGB BLD-MCNC: 14.6 G/DL (ref 12–15.9)
HMPV RNA NPH QL NAA+NON-PROBE: NOT DETECTED
HPIV1 RNA ISLT QL NAA+PROBE: NOT DETECTED
HPIV2 RNA SPEC QL NAA+PROBE: NOT DETECTED
HPIV3 RNA NPH QL NAA+PROBE: NOT DETECTED
HPIV4 P GENE NPH QL NAA+PROBE: DETECTED
IMM GRANULOCYTES # BLD AUTO: 0.01 10*3/MM3 (ref 0–0.05)
IMM GRANULOCYTES NFR BLD AUTO: 0.3 % (ref 0–0.5)
LYMPHOCYTES # BLD AUTO: 1.23 10*3/MM3 (ref 0.7–3.1)
LYMPHOCYTES NFR BLD AUTO: 31.1 % (ref 19.6–45.3)
M PNEUMO IGG SER IA-ACNC: NOT DETECTED
MCH RBC QN AUTO: 32.2 PG (ref 26.6–33)
MCHC RBC AUTO-ENTMCNC: 34 G/DL (ref 31.5–35.7)
MCV RBC AUTO: 94.9 FL (ref 79–97)
MONOCYTES # BLD AUTO: 0.7 10*3/MM3 (ref 0.1–0.9)
MONOCYTES NFR BLD AUTO: 17.7 % (ref 5–12)
NEUTROPHILS NFR BLD AUTO: 1.92 10*3/MM3 (ref 1.7–7)
NEUTROPHILS NFR BLD AUTO: 48.4 % (ref 42.7–76)
NRBC BLD AUTO-RTO: 0 /100 WBC (ref 0–0.2)
NT-PROBNP SERPL-MCNC: 350 PG/ML (ref 0–1800)
PLATELET # BLD AUTO: 146 10*3/MM3 (ref 140–450)
PMV BLD AUTO: 9.3 FL (ref 6–12)
POTASSIUM SERPL-SCNC: 4 MMOL/L (ref 3.5–5.2)
PROT SERPL-MCNC: 6.2 G/DL (ref 6–8.5)
QT INTERVAL: 370 MS
QTC INTERVAL: 432 MS
RBC # BLD AUTO: 4.53 10*6/MM3 (ref 3.77–5.28)
RHINOVIRUS RNA SPEC NAA+PROBE: NOT DETECTED
RSV RNA NPH QL NAA+NON-PROBE: NOT DETECTED
SARS-COV-2 RNA NPH QL NAA+NON-PROBE: NOT DETECTED
SODIUM SERPL-SCNC: 143 MMOL/L (ref 136–145)
TROPONIN T SERPL HS-MCNC: 8 NG/L
WBC NRBC COR # BLD AUTO: 3.96 10*3/MM3 (ref 3.4–10.8)

## 2024-06-28 PROCEDURE — 93005 ELECTROCARDIOGRAM TRACING: CPT | Performed by: EMERGENCY MEDICINE

## 2024-06-28 PROCEDURE — 94760 N-INVAS EAR/PLS OXIMETRY 1: CPT

## 2024-06-28 PROCEDURE — 84484 ASSAY OF TROPONIN QUANT: CPT | Performed by: EMERGENCY MEDICINE

## 2024-06-28 PROCEDURE — 94799 UNLISTED PULMONARY SVC/PX: CPT

## 2024-06-28 PROCEDURE — 94761 N-INVAS EAR/PLS OXIMETRY MLT: CPT

## 2024-06-28 PROCEDURE — 36415 COLL VENOUS BLD VENIPUNCTURE: CPT

## 2024-06-28 PROCEDURE — 99285 EMERGENCY DEPT VISIT HI MDM: CPT

## 2024-06-28 PROCEDURE — 93010 ELECTROCARDIOGRAM REPORT: CPT | Performed by: STUDENT IN AN ORGANIZED HEALTH CARE EDUCATION/TRAINING PROGRAM

## 2024-06-28 PROCEDURE — 71045 X-RAY EXAM CHEST 1 VIEW: CPT

## 2024-06-28 PROCEDURE — 80053 COMPREHEN METABOLIC PANEL: CPT | Performed by: EMERGENCY MEDICINE

## 2024-06-28 PROCEDURE — 25010000002 METHYLPREDNISOLONE PER 125 MG: Performed by: EMERGENCY MEDICINE

## 2024-06-28 PROCEDURE — 85025 COMPLETE CBC W/AUTO DIFF WBC: CPT | Performed by: EMERGENCY MEDICINE

## 2024-06-28 PROCEDURE — 83880 ASSAY OF NATRIURETIC PEPTIDE: CPT | Performed by: EMERGENCY MEDICINE

## 2024-06-28 PROCEDURE — 94640 AIRWAY INHALATION TREATMENT: CPT

## 2024-06-28 PROCEDURE — 94664 DEMO&/EVAL PT USE INHALER: CPT

## 2024-06-28 PROCEDURE — 0202U NFCT DS 22 TRGT SARS-COV-2: CPT | Performed by: EMERGENCY MEDICINE

## 2024-06-28 RX ORDER — IPRATROPIUM BROMIDE AND ALBUTEROL SULFATE 2.5; .5 MG/3ML; MG/3ML
3 SOLUTION RESPIRATORY (INHALATION) ONCE
Status: COMPLETED | OUTPATIENT
Start: 2024-06-28 | End: 2024-06-28

## 2024-06-28 RX ORDER — ACETAMINOPHEN 325 MG/1
650 TABLET ORAL EVERY 4 HOURS PRN
Status: DISCONTINUED | OUTPATIENT
Start: 2024-06-28 | End: 2024-07-06 | Stop reason: HOSPADM

## 2024-06-28 RX ORDER — ASPIRIN 81 MG/1
81 TABLET ORAL DAILY
Status: DISCONTINUED | OUTPATIENT
Start: 2024-06-29 | End: 2024-07-06 | Stop reason: HOSPADM

## 2024-06-28 RX ORDER — ACETAMINOPHEN 650 MG/1
650 SUPPOSITORY RECTAL EVERY 4 HOURS PRN
Status: DISCONTINUED | OUTPATIENT
Start: 2024-06-28 | End: 2024-07-06 | Stop reason: HOSPADM

## 2024-06-28 RX ORDER — MELATONIN
1000 DAILY
Status: DISCONTINUED | OUTPATIENT
Start: 2024-06-29 | End: 2024-07-06 | Stop reason: HOSPADM

## 2024-06-28 RX ORDER — POLYETHYLENE GLYCOL 3350 17 G/17G
17 POWDER, FOR SOLUTION ORAL DAILY PRN
Status: DISCONTINUED | OUTPATIENT
Start: 2024-06-28 | End: 2024-07-06 | Stop reason: HOSPADM

## 2024-06-28 RX ORDER — UREA 10 %
2.5 LOTION (ML) TOPICAL NIGHTLY PRN
Status: DISCONTINUED | OUTPATIENT
Start: 2024-06-28 | End: 2024-07-06 | Stop reason: HOSPADM

## 2024-06-28 RX ORDER — METHYLPREDNISOLONE SODIUM SUCCINATE 125 MG/2ML
125 INJECTION, POWDER, LYOPHILIZED, FOR SOLUTION INTRAMUSCULAR; INTRAVENOUS ONCE
Status: COMPLETED | OUTPATIENT
Start: 2024-06-28 | End: 2024-06-28

## 2024-06-28 RX ORDER — ALBUTEROL SULFATE 2.5 MG/3ML
2.5 SOLUTION RESPIRATORY (INHALATION) ONCE
Status: COMPLETED | OUTPATIENT
Start: 2024-06-28 | End: 2024-06-28

## 2024-06-28 RX ORDER — ASCORBIC ACID 500 MG
1000 TABLET ORAL DAILY
Status: DISCONTINUED | OUTPATIENT
Start: 2024-06-29 | End: 2024-07-06 | Stop reason: HOSPADM

## 2024-06-28 RX ORDER — BISACODYL 10 MG
10 SUPPOSITORY, RECTAL RECTAL DAILY PRN
Status: DISCONTINUED | OUTPATIENT
Start: 2024-06-28 | End: 2024-07-06 | Stop reason: HOSPADM

## 2024-06-28 RX ORDER — ATORVASTATIN CALCIUM 20 MG/1
40 TABLET, FILM COATED ORAL NIGHTLY
Status: DISCONTINUED | OUTPATIENT
Start: 2024-06-29 | End: 2024-07-06 | Stop reason: HOSPADM

## 2024-06-28 RX ORDER — ACETAMINOPHEN 160 MG/5ML
650 SOLUTION ORAL EVERY 4 HOURS PRN
Status: DISCONTINUED | OUTPATIENT
Start: 2024-06-28 | End: 2024-07-06 | Stop reason: HOSPADM

## 2024-06-28 RX ORDER — FLUTICASONE PROPIONATE 50 MCG
2 SPRAY, SUSPENSION (ML) NASAL DAILY
Status: DISCONTINUED | OUTPATIENT
Start: 2024-06-29 | End: 2024-07-06 | Stop reason: HOSPADM

## 2024-06-28 RX ORDER — ONDANSETRON 4 MG/1
4 TABLET, ORALLY DISINTEGRATING ORAL EVERY 6 HOURS PRN
Status: DISCONTINUED | OUTPATIENT
Start: 2024-06-28 | End: 2024-07-06 | Stop reason: HOSPADM

## 2024-06-28 RX ORDER — METHYLPREDNISOLONE SODIUM SUCCINATE 40 MG/ML
40 INJECTION, POWDER, LYOPHILIZED, FOR SOLUTION INTRAMUSCULAR; INTRAVENOUS EVERY 8 HOURS
Status: DISCONTINUED | OUTPATIENT
Start: 2024-06-29 | End: 2024-06-30

## 2024-06-28 RX ORDER — ALBUTEROL SULFATE 2.5 MG/3ML
2.5 SOLUTION RESPIRATORY (INHALATION) EVERY 6 HOURS PRN
Status: DISCONTINUED | OUTPATIENT
Start: 2024-06-28 | End: 2024-07-06 | Stop reason: HOSPADM

## 2024-06-28 RX ORDER — ONDANSETRON 2 MG/ML
4 INJECTION INTRAMUSCULAR; INTRAVENOUS EVERY 6 HOURS PRN
Status: DISCONTINUED | OUTPATIENT
Start: 2024-06-28 | End: 2024-07-06 | Stop reason: HOSPADM

## 2024-06-28 RX ORDER — UREA 10 %
1000 LOTION (ML) TOPICAL DAILY
Status: DISCONTINUED | OUTPATIENT
Start: 2024-06-29 | End: 2024-07-06 | Stop reason: HOSPADM

## 2024-06-28 RX ORDER — AMOXICILLIN 250 MG
2 CAPSULE ORAL 2 TIMES DAILY PRN
Status: DISCONTINUED | OUTPATIENT
Start: 2024-06-28 | End: 2024-07-06 | Stop reason: HOSPADM

## 2024-06-28 RX ORDER — PANTOPRAZOLE SODIUM 40 MG/1
40 TABLET, DELAYED RELEASE ORAL
Status: DISCONTINUED | OUTPATIENT
Start: 2024-06-29 | End: 2024-07-06 | Stop reason: HOSPADM

## 2024-06-28 RX ORDER — BISACODYL 5 MG/1
5 TABLET, DELAYED RELEASE ORAL DAILY PRN
Status: DISCONTINUED | OUTPATIENT
Start: 2024-06-28 | End: 2024-07-06 | Stop reason: HOSPADM

## 2024-06-28 RX ORDER — IPRATROPIUM BROMIDE AND ALBUTEROL SULFATE 2.5; .5 MG/3ML; MG/3ML
3 SOLUTION RESPIRATORY (INHALATION)
Status: DISCONTINUED | OUTPATIENT
Start: 2024-06-28 | End: 2024-06-30

## 2024-06-28 RX ADMIN — IPRATROPIUM BROMIDE AND ALBUTEROL SULFATE 3 ML: .5; 3 SOLUTION RESPIRATORY (INHALATION) at 16:36

## 2024-06-28 RX ADMIN — ALBUTEROL SULFATE 2.5 MG: 2.5 SOLUTION RESPIRATORY (INHALATION) at 17:47

## 2024-06-28 RX ADMIN — ATORVASTATIN CALCIUM 40 MG: 20 TABLET, FILM COATED ORAL at 23:55

## 2024-06-28 RX ADMIN — IPRATROPIUM BROMIDE AND ALBUTEROL SULFATE 3 ML: .5; 3 SOLUTION RESPIRATORY (INHALATION) at 19:49

## 2024-06-28 RX ADMIN — METHYLPREDNISOLONE SODIUM SUCCINATE 125 MG: 125 INJECTION INTRAMUSCULAR; INTRAVENOUS at 17:37

## 2024-06-28 NOTE — ED PROVIDER NOTES
EMERGENCY DEPARTMENT ENCOUNTER    Room Number:  28/28  PCP: Kaya Mckeon APRN  Historian: Patient      HPI:  Chief Complaint: Chest pain shortness of breath cough  A complete HPI/ROS/PMH/PSH/SH/FH are unobtainable due to: None  Context: Brigid Carballo is a 78 y.o. female who presents to the ED c/o chest pain.  Patient states started with cough yesterday.  Patient is a smoker.  Has had no fevers or chills.  Has had some mild lower extremity swelling.  States she developed chest discomfort today.  Describes it as a weight or heaviness on her chest.  Patient states it does get worse with exertion.  Patient was seen at outside facility and sent here for evaluation.  Patient has had prior heart catheterization but no angioplasty or stent placement.  No history of COPD or emphysema.            PAST MEDICAL HISTORY  Active Ambulatory Problems     Diagnosis Date Noted    Small vessel disease 11/06/2021    Hyperlipidemia 08/19/2014    Allergic rhinitis 03/18/2019    Arthritis 04/11/2022    Gastroesophageal reflux disease 11/04/2014    Vitamin B12 deficiency 11/04/2014    Fatigue 09/11/2017    Hiatal hernia     Polyp of colon 11/04/2014    Bilateral carotid artery stenosis 04/11/2022    Current smoker 04/11/2022    Vitamin D deficiency 04/11/2022    Memory deficit 06/02/2022    Thyromegaly 06/02/2022    Acute non-recurrent sinusitis 06/02/2022    Acute bronchitis 06/02/2022    Postmenopause 09/12/2022    Abnormal nuclear stress test 11/15/2022    Left upper quadrant abdominal pain 01/29/2024     Resolved Ambulatory Problems     Diagnosis Date Noted    Occlusion of left vertebral artery 11/06/2021    Ataxia 11/06/2021    Vertigo 11/06/2021    BPV (benign positional vertigo) 11/07/2021    TIA (transient ischemic attack) 11/07/2021    Anxiety 04/11/2022    Lancaster's esophagus 09/29/2015    Bunion of left foot 06/14/2016    Chest pain 10/05/2020    Low back pain 11/04/2014    DDD (degenerative disc disease), cervical 11/04/2014     Deformity of wrist joint 05/03/2018    Insomnia 09/11/2017    Melanoma in situ of unspecified lower limb, including hip 11/12/2021    Myalgia 03/02/2020    Osteoarthritis of left knee 06/27/2016    Renal stone 03/02/2020    Spinal stenosis of lumbar region 12/29/2014    Syncope and collapse 04/16/2019    Tear of lateral meniscus of knee 09/30/2014    Tear of medial meniscus of knee 09/30/2014    Unexplained weight loss 04/21/2021    Other microscopic hematuria 06/02/2022    Stenosis of carotid artery 09/12/2022     Past Medical History:   Diagnosis Date    Angina pectoris     Cancer     Carpal tunnel syndrome     Kidney stones          PAST SURGICAL HISTORY  Past Surgical History:   Procedure Laterality Date    APPENDECTOMY      BREAST BIOPSY      multiple    CARDIAC CATHETERIZATION      CARDIAC CATHETERIZATION N/A 11/29/2022    Procedure: Left Heart Cath;  Surgeon: Brennen Nguyen MD;  Location:  [facility];  Service: Cardiology;  Laterality: N/A;    CARDIAC CATHETERIZATION N/A 11/29/2022    Procedure: Coronary angiography;  Surgeon: Brennen Nguyen MD;  Location:  [facility];  Service: Cardiology;  Laterality: N/A;    CARDIAC CATHETERIZATION N/A 11/29/2022    Procedure: Left ventriculography;  Surgeon: Brennen Nguyen MD;  Location:  [facility];  Service: Cardiology;  Laterality: N/A;    CARPAL TUNNEL RELEASE Bilateral     COLONOSCOPY  05/17/2021    polyp removed; per Dr. Velasquez    CYSTOSCOPY W/ URETERAL STENT PLACEMENT  10/22/2019    HYSTERECTOMY      INGUINAL HERNIA REPAIR Right     KNEE ARTHROSCOPY Right     meniscus    TONSILLECTOMY      UPPER GASTROINTESTINAL ENDOSCOPY  05/17/2021    per Dr. Velasquez         FAMILY HISTORY  Family History   Problem Relation Age of Onset    Emphysema Mother     Heart disease Mother     Heart disease Father     Cancer Brother     Heart disease Brother     Skin cancer Brother     Stomach cancer Maternal  Aunt     Breast cancer Maternal Grandmother     Breast cancer Paternal Grandmother          SOCIAL HISTORY  Social History     Socioeconomic History    Marital status:    Tobacco Use    Smoking status: Every Day     Current packs/day: 0.50     Average packs/day: 0.5 packs/day for 65.0 years (32.5 ttl pk-yrs)     Types: Cigarettes    Smokeless tobacco: Never    Tobacco comments:     Quit now [place] info provided   Vaping Use    Vaping status: Never Used   Substance and Sexual Activity    Alcohol use: Yes     Comment: social--rarely    Drug use: Never    Sexual activity: Defer         ALLERGIES  Amoxicillin-pot clavulanate, Codeine, Latex, and Penicillins        REVIEW OF SYSTEMS  Review of Systems   Cough chest pain shortness of breath      PHYSICAL EXAM  ED Triage Vitals   Temp Heart Rate Resp BP SpO2   06/28/24 1603 06/28/24 1603 06/28/24 1603 06/28/24 1617 06/28/24 1603   98.4 °F (36.9 °C) 103 20 138/96 93 %      Temp src Heart Rate Source Patient Position BP Location FiO2 (%)   06/28/24 1603 06/28/24 1603 -- -- --   Tympanic Monitor          Physical Exam      GENERAL: no acute distress  HENT: nares patent  EYES: no scleral icterus  CV: regular rhythm, normal rate  RESPIRATORY: normal effort.  Wheezes bilaterally.  Prolonged expiratory phase  ABDOMEN: soft  MUSCULOSKELETAL: no deformity  NEURO: alert, moves all extremities, follows commands  PSYCH:  calm, cooperative  SKIN: warm, dry    Vital signs and nursing notes reviewed.          LAB RESULTS  Recent Results (from the past 24 hour(s))   ECG 12 Lead Chest Pain    Collection Time: 06/28/24  4:23 PM   Result Value Ref Range    QT Interval 370 ms    QTC Interval 432 ms   Respiratory Panel PCR w/COVID-19(SARS-CoV-2) [facility], NP Swab in UTM/VTM, 2 HR TAT - Swab, Nasopharynx    Collection Time: 06/28/24  4:28 PM    Specimen: Nasopharynx; Swab   Result Value Ref Range    ADENOVIRUS, PCR Not Detected Not Detected    Coronavirus 229E  Not Detected Not Detected    Coronavirus HKU1 Not Detected Not Detected    Coronavirus NL63 Not Detected Not Detected    Coronavirus OC43 Not Detected Not Detected    COVID19 Not Detected Not Detected - Ref. Range    Human Metapneumovirus Not Detected Not Detected    Human Rhinovirus/Enterovirus Not Detected Not Detected    Influenza A PCR Not Detected Not Detected    Influenza B PCR Not Detected Not Detected    Parainfluenza Virus 1 Not Detected Not Detected    Parainfluenza Virus 2 Not Detected Not Detected    Parainfluenza Virus 3 Not Detected Not Detected    Parainfluenza Virus 4 Detected (A) Not Detected    RSV, PCR Not Detected Not Detected    Bordetella pertussis pcr Not Detected Not Detected    Bordetella parapertussis PCR Not Detected Not Detected    Chlamydophila pneumoniae PCR Not Detected Not Detected    Mycoplasma pneumo by PCR Not Detected Not Detected   Comprehensive Metabolic Panel    Collection Time: 06/28/24  4:33 PM    Specimen: Blood   Result Value Ref Range    Glucose 84 65 - 99 mg/dL    BUN 17 8 - 23 mg/dL    Creatinine 0.81 0.57 - 1.00 mg/dL    Sodium 143 136 - 145 mmol/L    Potassium 4.0 3.5 - 5.2 mmol/L    Chloride 108 (H) 98 - 107 mmol/L    CO2 25.0 22.0 - 29.0 mmol/L    Calcium 9.0 8.6 - 10.5 mg/dL    Total Protein 6.2 6.0 - 8.5 g/dL    Albumin 4.1 3.5 - 5.2 g/dL    ALT (SGPT) 10 1 - 33 U/L    AST (SGOT) 15 1 - 32 U/L    Alkaline Phosphatase 44 39 - 117 U/L    Total Bilirubin 0.4 0.0 - 1.2 mg/dL    Globulin 2.1 gm/dL    A/G Ratio 2.0 g/dL    BUN/Creatinine Ratio 21.0 7.0 - 25.0    Anion Gap 10.0 5.0 - 15.0 mmol/L    eGFR 73.9 >60.0 mL/min/1.73   BNP    Collection Time: 06/28/24  4:33 PM    Specimen: Blood   Result Value Ref Range    proBNP 350.0 0.0 - 1,800.0 pg/mL   Single High Sensitivity Troponin T    Collection Time: 06/28/24  4:33 PM    Specimen: Blood   Result Value Ref Range    HS Troponin T 8 <14 ng/L   CBC Auto Differential    Collection Time: 06/28/24  4:33 PM    Specimen: Blood    Result Value Ref Range    WBC 3.96 3.40 - 10.80 10*3/mm3    RBC 4.53 3.77 - 5.28 10*6/mm3    Hemoglobin 14.6 12.0 - 15.9 g/dL    Hematocrit 43.0 34.0 - 46.6 %    MCV 94.9 79.0 - 97.0 fL    MCH 32.2 26.6 - 33.0 pg    MCHC 34.0 31.5 - 35.7 g/dL    RDW 11.8 (L) 12.3 - 15.4 %    RDW-SD 41.4 37.0 - 54.0 fl    MPV 9.3 6.0 - 12.0 fL    Platelets 146 140 - 450 10*3/mm3    Neutrophil % 48.4 42.7 - 76.0 %    Lymphocyte % 31.1 19.6 - 45.3 %    Monocyte % 17.7 (H) 5.0 - 12.0 %    Eosinophil % 1.5 0.3 - 6.2 %    Basophil % 1.0 0.0 - 1.5 %    Immature Grans % 0.3 0.0 - 0.5 %    Neutrophils, Absolute 1.92 1.70 - 7.00 10*3/mm3    Lymphocytes, Absolute 1.23 0.70 - 3.10 10*3/mm3    Monocytes, Absolute 0.70 0.10 - 0.90 10*3/mm3    Eosinophils, Absolute 0.06 0.00 - 0.40 10*3/mm3    Basophils, Absolute 0.04 0.00 - 0.20 10*3/mm3    Immature Grans, Absolute 0.01 0.00 - 0.05 10*3/mm3    nRBC 0.0 0.0 - 0.2 /100 WBC       Ordered the above labs and reviewed the results.        RADIOLOGY  XR Chest 1 View    Result Date: 6/28/2024  XR CHEST 1 VW-  HISTORY: Female who is 78 years-old, short of breath  TECHNIQUE: Frontal view of the chest  COMPARISON: None available  FINDINGS: The heart size is normal. Aorta is calcified. Pulmonary vasculature is unremarkable. Minimal likely scarring or atelectasis at the bases. No pleural effusion, or pneumothorax. No acute osseous process.      Minimal likely scarring or atelectasis at the bases. Follow-up as clinical indications persist.  This report was finalized on 6/28/2024 5:25 PM by Dr. Jonathan Wolfe M.D on Workstation: [email or web address]       Ordered the above noted radiological studies.  Chest x-ray independent interpreted by me and shows no evidence of pneumonia          PROCEDURES  Procedures      EKG          EKG time: 1623  Rhythm/Rate: Normal sinus rhythm 82  P waves and [place]: Normal P waves  QRS, axis: Normal QRS  ST and T waves: Normal ST-T wave    Interpreted Contemporaneously by me, independently  viewed  Unchanged compared to prior 11/6/2021      MEDICATIONS GIVEN IN ER  Medications   acetaminophen (TYLENOL) tablet 650 mg (has no administration in time range)     Or   acetaminophen (TYLENOL) 160 MG/5ML oral solution 650 mg (has no administration in time range)     Or   acetaminophen (TYLENOL) suppository 650 mg (has no administration in time range)   ondansetron ODT (ZOFRAN-ODT) disintegrating tablet 4 mg (has no administration in time range)     Or   ondansetron (ZOFRAN) injection 4 mg (has no administration in time range)   melatonin tablet 2.5 mg (has no administration in time range)   sennosides-docusate (PERICOLACE) 8.6-50 MG per tablet 2 tablet (has no administration in time range)     And   polyethylene glycol (MIRALAX) packet 17 g (has no administration in time range)     And   bisacodyl (DULCOLAX) EC tablet 5 mg (has no administration in time range)     And   bisacodyl (DULCOLAX) suppository 10 mg (has no administration in time range)   ipratropium-albuterol (DUO-NEB) nebulizer solution 3 mL (3 mL Nebulization Given 6/28/24 1636)   methylPREDNISolone sodium succinate (SOLU-Medrol) injection 125 mg (125 mg Intravenous Given 6/28/24 1737)   albuterol (PROVENTIL) nebulizer solution 0.083% 2.5 mg/3mL (2.5 mg Nebulization Given 6/28/24 1747)                   MEDICAL DECISION MAKING, PROGRESS, and CONSULTS    All labs have been independently reviewed by me.  All radiology studies have been reviewed by me and I have also reviewed the radiology report.   EKG's independently viewed and interpreted by me.  Discussion below represents my analysis of pertinent findings related to patient's condition, differential diagnosis, treatment plan and final disposition.      Additional sources:  - Discussed/ obtained information from independent historians: None    - External (non-ED) record review: Epic reviewed patient was seen at urgent care today for chest pain    - Chronic or social conditions impacting care:  None    - Shared decision making: None      Orders placed during this visit:  Orders Placed This Encounter   Procedures    Respiratory Panel PCR w/COVID-19(SARS-CoV-2) [facility], NP Swab in UTM/VTM, 2 HR TAT - Swab, Nasopharynx    XR Chest 1 View    Comprehensive Metabolic Panel    BNP    Single High Sensitivity Troponin T    CBC Auto Differential    Basic Metabolic Panel    CBC (No Diff)    Diet: Cardiac; Healthy Heart (2-3 Na+); Fluid Consistency: Thin (IDDSI 0)    Vital Signs    Up with assistance    Daily Weights    Strict Intake & Output    Oral Care    Place Sequential Compression Device    Maintain Sequential Compression Device    Code Status and Medical Interventions:    [facility] (on-call MD unless specified) Details    ECG 12 Lead Chest Pain    Inpatient Admission    Inpatient Admission    CBC & Differential         Additional orders considered but not ordered:  None        Differential diagnosis includes but is not limited to:    Asthma exacerbation versus COPD exacerbation versus viral pneumonia      Independent interpretation of labs, radiology studies, and discussions with consultants:  ED Course as of 06/28/24 1808   Fri Jun 28, 2024   1750 17:50 EDT  Patient presents for evaluation of shortness of breath.  Patient has a history of smoking.  Patient is wheezing here.  Has had some chest discomfort.  Patient has been given albuterol as well as steroids.  She is positive for parainfluenza.  Patient is hypoxic here.  Discussed with Dr. Calderon who will admit. [SL]      ED Course User Index  [SL] Kane Chung MD                 DIAGNOSIS  Final diagnoses:   Parainfluenza virus bronchitis   Hypoxia         DISPOSITION  admit            Latest Documented Vital Signs:  As of 18:08 EDT  BP- 138/96 HR- 103 Temp- 98.4 °F (36.9 °C) (Tympanic) O2 sat- 93%              --    Please note that portions of this were completed with a voice recognition program.       Note Disclaimer: At [facility]  [facility], we believe that sharing information builds trust and better relationships. You are receiving this note because you are receiving care at [facility] or recently visited. It is possible you will see health information before a provider has talked with you about it. This kind of information can be easy to misunderstand. To help you fully understand what it means for your health, we urge you to discuss this note with your provider.            Kane Chung MD  06/28/24 [id number]

## 2024-06-28 NOTE — Clinical Note
Level of Care: Telemetry [5]   Diagnosis: Parainfluenza [202193]   Admitting Physician: FRAN ERAZO [[id number]]   Attending Physician: FRAN ERAZO [[id number]]   Certification: I certify that inpatient services are medically necessary for this patient for a duration of greater than two midnights. See H&P and MD Progress Notes for additional information about the patient's course of treatment.

## 2024-06-28 NOTE — ED NOTES
Nursing report ED to floor  Brigid Carballo  78 y.o.  female    HPI :  HPI (Adult)  Stated Reason for Visit: SOA and congestion  History Obtained From: patient  78 y.o. female who presents to the ED c/o chest pain.  Patient states started with cough yesterday     Chief Complaint  Chief Complaint   Patient presents with    Shortness of Breath       Admitting doctor:   Brenda Calderon MD    Admitting diagnosis:   The primary encounter diagnosis was Parainfluenza virus bronchitis. A diagnosis of Hypoxia was also pertinent to this visit.    Code status:   Current Code Status       Date Active Code Status Order ID Comments User Context       6/28/2024 1752 CPR (Attempt to Resuscitate) [id number]  Brenda Calderon MD ED        Question Answer    Code Status (Patient has no pulse and is not breathing) CPR (Attempt to Resuscitate)    Medical Interventions (Patient has pulse or is breathing) Full                    Allergies:   Amoxicillin-pot clavulanate, Codeine, Latex, and Penicillins    Isolation:   No active isolations    Intake and Output  No intake or output data in the 24 hours ending 06/28/24 1806    Weight:       06/28/24  1603   Weight: 63.5 kg (140 lb)       Most recent vitals:   Vitals:    06/28/24 1617 06/28/24 1620 06/28/24 1636 06/28/24 1747   BP: 138/96      Pulse:   87 103   Resp:   20 24   Temp:       TempSrc:       SpO2:  (!) 85% 92% 93%   Weight:       Height:           Active LDAs/IV Access:   Lines, Drains & Airways       Active LDAs       Name Placement date Placement time Site Days    Peripheral IV 06/28/24 1737 Anterior;Proximal;Right Forearm 06/28/24  1737  Forearm  less than 1                    Labs (abnormal labs have a star):   Labs Reviewed   RESPIRATORY PANEL PCR W/ COVID-19 (SARS-COV-2), NP SWAB IN UTM/VTP, 2 HR TAT - Abnormal; Notable for the following components:       Result Value    Parainfluenza Virus 4 Detected (*)     All other components within normal limits    Narrative:      In the setting of a positive respiratory panel with a viral infection PLUS a negative procalcitonin without other underlying concern for bacterial infection, consider observing off antibiotics or discontinuation of antibiotics and continue supportive care. If the respiratory panel is positive for atypical bacterial infection (Bordetella pertussis, Chlamydophila pneumoniae, or Mycoplasma pneumoniae), consider antibiotic de-escalation to target atypical bacterial infection.   COMPREHENSIVE METABOLIC PANEL - Abnormal; Notable for the following components:    Chloride 108 (*)     All other components within normal limits    Narrative:     GFR Normal >60  Chronic Kidney Disease <60  Kidney Failure <15    The GFR formula is only valid for adults with stable renal function between ages 18 and 70.   CBC WITH AUTO DIFFERENTIAL - Abnormal; Notable for the following components:    RDW 11.8 (*)     Monocyte % 17.7 (*)     All other components within normal limits   BNP (IN-HOUSE) - Normal    Narrative:     This assay is used as an aid in the diagnosis of individuals suspected of having heart failure. It can be used as an aid in the diagnosis of acute decompensated heart failure (ADHF) in patients presenting with signs and symptoms of ADHF to the emergency department (ED). In addition, NT-proBNP of <300 pg/mL indicates ADHF is not likely.    Age Range Result Interpretation  NT-proBNP Concentration (pg/mL:      <50             Positive            >450                   Gray                 300-450                    Negative             <300    50-75           Positive            >900                  Gray                300-900                  Negative            <300      >75             Positive            >1800                  Gray                300-1800                  Negative            <300   SINGLE HS TROPONIN T - Normal    Narrative:     High Sensitive Troponin T Reference Range:  <14.0 ng/L- Negative Female for  AMI  <22.0 ng/L- Negative Male for AMI  >=14 - Abnormal Female indicating possible myocardial injury.  >=22 - Abnormal Male indicating possible myocardial injury.   Clinicians would have to utilize clinical acumen, EKG, Troponin, and serial changes to determine if it is an Acute Myocardial Infarction or myocardial injury due to an underlying chronic condition.        CBC AND DIFFERENTIAL    Narrative:     The following orders were created for panel order CBC & Differential.  Procedure                               Abnormality         Status                     ---------                               -----------         ------                     CBC Auto Differential[[id number]]        Abnormal            Final result                 Please view results for these tests on the individual orders.       EKG:   ECG 12 Lead Chest Pain   Final Result   HEART RATE=82  bpm   RR Yypzqlbj=118  ms   DC Uqmyafai=889  ms   P Horizontal Axis=-5  deg   P Front Axis=77  deg   QRSD Interval=95  ms   QT Hcjsuxrh=866  ms   KWlJ=850  ms   QRS Axis=83  deg   T Wave Axis=39  deg   - BORDERLINE ECG -   Sinus rhythm   Atrial premature complex   Borderline  short DC interval   Probable  left atrial enlargement   When compared with ECG of 06-Nov-2021 08:14:48,   Significant rate increase   Electronically Signed By: Levy [place] ([facility]) 2024-06-28 17:21:11   Date and Time of Study:2024-06-28 16:23:59          Meds given in ED:   Medications   acetaminophen (TYLENOL) tablet 650 mg (has no administration in time range)     Or   acetaminophen (TYLENOL) 160 MG/5ML oral solution 650 mg (has no administration in time range)     Or   acetaminophen (TYLENOL) suppository 650 mg (has no administration in time range)   ondansetron ODT (ZOFRAN-ODT) disintegrating tablet 4 mg (has no administration in time range)     Or   ondansetron (ZOFRAN) injection 4 mg (has no administration in time range)   melatonin tablet 2.5 mg (has no administration in time range)    sennosides-docusate (PERICOLACE) 8.6-50 MG per tablet 2 tablet (has no administration in time range)     And   polyethylene glycol (MIRALAX) packet 17 g (has no administration in time range)     And   bisacodyl (DULCOLAX) EC tablet 5 mg (has no administration in time range)     And   bisacodyl (DULCOLAX) suppository 10 mg (has no administration in time range)   ipratropium-albuterol (DUO-NEB) nebulizer solution 3 mL (3 mL Nebulization Given 6/28/24 1636)   methylPREDNISolone sodium succinate (SOLU-Medrol) injection 125 mg (125 mg Intravenous Given 6/28/24 1737)   albuterol (PROVENTIL) nebulizer solution 0.083% 2.5 mg/3mL (2.5 mg Nebulization Given 6/28/24 1747)       Imaging results:  XR Chest 1 View    Result Date: 6/28/2024  Minimal likely scarring or atelectasis at the bases. Follow-up as clinical indications persist.  This report was finalized on 6/28/2024 5:25 PM by Dr. Jonathan Wolfe M.D on Workstation: [email or web address]       Ambulatory status:   - ad amadeo      Social issues:   Social History     Socioeconomic History    Marital status:    Tobacco Use    Smoking status: Every Day     Current packs/day: 0.50     Average packs/day: 0.5 packs/day for 65.0 years (32.5 ttl pk-yrs)     Types: Cigarettes    Smokeless tobacco: Never    Tobacco comments:     Quit now [place] info provided   Vaping Use    Vaping status: Never Used   Substance and Sexual Activity    Alcohol use: Yes     Comment: social--rarely    Drug use: Never    Sexual activity: Defer       Peripheral Neurovascular  Peripheral Neurovascular (Adult)  Peripheral Neurovascular WDL: WDL    Neuro Cognitive  Neuro Cognitive (Adult)  Cognitive/Neuro/Behavioral WDL: WDL    Learning  Learning Assessment (Adult)  Learning Readiness and Ability: no barriers identified    Respiratory  Respiratory WDL  Respiratory WDL: .WDL except  Breath Sounds  Breath Sounds: All Fields  All Lung Fields Breath Sounds: Anterior:, crackles, wheezes, expiratory  Breath Sounds  Post-Respiratory Treatment  Breath Sounds Posttreatment All Fields: All Fields  Breath Sounds Posttreatment All Fields: Anterior:, crackles    Abdominal Pain       Pain Assessments  Pain (Adult)  (0-10) Pain Rating: Rest: 0  (0-10) Pain Rating: Activity: 0    NIH Stroke Scale       Bertin Terrell RN  06/28/24 18:06 EDT

## 2024-06-29 PROBLEM — Z72.0 TOBACCO USE: Status: ACTIVE | Noted: 2024-06-29

## 2024-06-29 PROBLEM — J20.9 BRONCHOSPASM WITH BRONCHITIS, ACUTE: Status: ACTIVE | Noted: 2024-06-29

## 2024-06-29 LAB
ANION GAP SERPL CALCULATED.3IONS-SCNC: 8 MMOL/L (ref 5–15)
BUN SERPL-MCNC: 20 MG/DL (ref 8–23)
BUN/CREAT SERPL: 34.5 (ref 7–25)
CALCIUM SPEC-SCNC: 9.2 MG/DL (ref 8.6–10.5)
CHLORIDE SERPL-SCNC: 107 MMOL/L (ref 98–107)
CO2 SERPL-SCNC: 26 MMOL/L (ref 22–29)
CREAT SERPL-MCNC: 0.58 MG/DL (ref 0.57–1)
DEPRECATED RDW RBC AUTO: 39.8 FL (ref 37–54)
EGFRCR SERPLBLD CKD-EPI 2021: 92.2 ML/MIN/1.73
ERYTHROCYTE [DISTWIDTH] IN BLOOD BY AUTOMATED COUNT: 11.5 % (ref 12.3–15.4)
GEN 5 2HR TROPONIN T REFLEX: 8 NG/L
GLUCOSE SERPL-MCNC: 130 MG/DL (ref 65–99)
HCT VFR BLD AUTO: 40 % (ref 34–46.6)
HGB BLD-MCNC: 13.3 G/DL (ref 12–15.9)
MAGNESIUM SERPL-MCNC: 2.3 MG/DL (ref 1.6–2.4)
MCH RBC QN AUTO: 32 PG (ref 26.6–33)
MCHC RBC AUTO-ENTMCNC: 33.3 G/DL (ref 31.5–35.7)
MCV RBC AUTO: 96.4 FL (ref 79–97)
PLATELET # BLD AUTO: 144 10*3/MM3 (ref 140–450)
PMV BLD AUTO: 10 FL (ref 6–12)
POTASSIUM SERPL-SCNC: 4.1 MMOL/L (ref 3.5–5.2)
RBC # BLD AUTO: 4.15 10*6/MM3 (ref 3.77–5.28)
SODIUM SERPL-SCNC: 141 MMOL/L (ref 136–145)
TROPONIN T DELTA: -1 NG/L
TROPONIN T SERPL HS-MCNC: 9 NG/L
WBC NRBC COR # BLD AUTO: 2.66 10*3/MM3 (ref 3.4–10.8)

## 2024-06-29 PROCEDURE — 94799 UNLISTED PULMONARY SVC/PX: CPT

## 2024-06-29 PROCEDURE — 93005 ELECTROCARDIOGRAM TRACING: CPT | Performed by: INTERNAL MEDICINE

## 2024-06-29 PROCEDURE — 36415 COLL VENOUS BLD VENIPUNCTURE: CPT | Performed by: INTERNAL MEDICINE

## 2024-06-29 PROCEDURE — 83735 ASSAY OF MAGNESIUM: CPT | Performed by: INTERNAL MEDICINE

## 2024-06-29 PROCEDURE — 94761 N-INVAS EAR/PLS OXIMETRY MLT: CPT

## 2024-06-29 PROCEDURE — 94664 DEMO&/EVAL PT USE INHALER: CPT

## 2024-06-29 PROCEDURE — 93010 ELECTROCARDIOGRAM REPORT: CPT | Performed by: STUDENT IN AN ORGANIZED HEALTH CARE EDUCATION/TRAINING PROGRAM

## 2024-06-29 PROCEDURE — 25010000002 METHYLPREDNISOLONE PER 40 MG: Performed by: INTERNAL MEDICINE

## 2024-06-29 PROCEDURE — 84484 ASSAY OF TROPONIN QUANT: CPT | Performed by: INTERNAL MEDICINE

## 2024-06-29 PROCEDURE — 25010000002 ENOXAPARIN PER 10 MG: Performed by: INTERNAL MEDICINE

## 2024-06-29 PROCEDURE — 80048 BASIC METABOLIC PNL TOTAL CA: CPT | Performed by: INTERNAL MEDICINE

## 2024-06-29 PROCEDURE — 85027 COMPLETE CBC AUTOMATED: CPT | Performed by: INTERNAL MEDICINE

## 2024-06-29 RX ORDER — DEXTROMETHORPHAN POLISTIREX 30 MG/5ML
60 SUSPENSION ORAL EVERY 12 HOURS SCHEDULED
Status: DISCONTINUED | OUTPATIENT
Start: 2024-06-29 | End: 2024-07-04

## 2024-06-29 RX ORDER — GUAIFENESIN 600 MG/1
1200 TABLET, EXTENDED RELEASE ORAL EVERY 12 HOURS SCHEDULED
Status: DISCONTINUED | OUTPATIENT
Start: 2024-06-29 | End: 2024-07-06 | Stop reason: HOSPADM

## 2024-06-29 RX ORDER — ENOXAPARIN SODIUM 100 MG/ML
40 INJECTION SUBCUTANEOUS EVERY 24 HOURS
Status: DISCONTINUED | OUTPATIENT
Start: 2024-06-29 | End: 2024-07-06 | Stop reason: HOSPADM

## 2024-06-29 RX ORDER — BENZONATATE 100 MG/1
100 CAPSULE ORAL 3 TIMES DAILY PRN
Status: DISCONTINUED | OUTPATIENT
Start: 2024-06-29 | End: 2024-07-06 | Stop reason: HOSPADM

## 2024-06-29 RX ADMIN — METHYLPREDNISOLONE SODIUM SUCCINATE 40 MG: 40 INJECTION, POWDER, FOR SOLUTION INTRAMUSCULAR; INTRAVENOUS at 09:31

## 2024-06-29 RX ADMIN — IPRATROPIUM BROMIDE AND ALBUTEROL SULFATE 3 ML: .5; 3 SOLUTION RESPIRATORY (INHALATION) at 07:19

## 2024-06-29 RX ADMIN — FLUTICASONE PROPIONATE 2 SPRAY: 50 SPRAY, METERED NASAL at 09:31

## 2024-06-29 RX ADMIN — IPRATROPIUM BROMIDE AND ALBUTEROL SULFATE 3 ML: .5; 3 SOLUTION RESPIRATORY (INHALATION) at 10:43

## 2024-06-29 RX ADMIN — IPRATROPIUM BROMIDE AND ALBUTEROL SULFATE 3 ML: .5; 3 SOLUTION RESPIRATORY (INHALATION) at 19:40

## 2024-06-29 RX ADMIN — Medication 1000 MCG: at 09:31

## 2024-06-29 RX ADMIN — METHYLPREDNISOLONE SODIUM SUCCINATE 40 MG: 40 INJECTION, POWDER, FOR SOLUTION INTRAMUSCULAR; INTRAVENOUS at 17:34

## 2024-06-29 RX ADMIN — GUAIFENESIN 1200 MG: 600 TABLET, EXTENDED RELEASE ORAL at 20:19

## 2024-06-29 RX ADMIN — Medication 1000 UNITS: at 09:32

## 2024-06-29 RX ADMIN — DEXTROMETHORPHAN POLISTIREX 60 MG: 30 SUSPENSION ORAL at 20:19

## 2024-06-29 RX ADMIN — ASPIRIN 81 MG: 81 TABLET, COATED ORAL at 09:31

## 2024-06-29 RX ADMIN — ENOXAPARIN SODIUM 40 MG: 100 INJECTION SUBCUTANEOUS at 13:35

## 2024-06-29 RX ADMIN — GUAIFENESIN 1200 MG: 600 TABLET, EXTENDED RELEASE ORAL at 13:35

## 2024-06-29 RX ADMIN — PANTOPRAZOLE SODIUM 40 MG: 40 TABLET, DELAYED RELEASE ORAL at 06:11

## 2024-06-29 RX ADMIN — ATORVASTATIN CALCIUM 40 MG: 20 TABLET, FILM COATED ORAL at 20:19

## 2024-06-29 RX ADMIN — DEXTROMETHORPHAN POLISTIREX 60 MG: 30 SUSPENSION ORAL at 13:35

## 2024-06-29 RX ADMIN — OXYCODONE HYDROCHLORIDE AND ACETAMINOPHEN 1000 MG: 500 TABLET ORAL at 09:31

## 2024-06-29 RX ADMIN — METHYLPREDNISOLONE SODIUM SUCCINATE 40 MG: 40 INJECTION, POWDER, FOR SOLUTION INTRAMUSCULAR; INTRAVENOUS at 01:05

## 2024-06-29 NOTE — PROGRESS NOTES
Name: Brigid Carballo ADMIT: 2024   : 1945  PCP: Cielo Mckeonwoo VAZQUEZ APRSHARMAINE    MRN: [id number] LOS: 1 days   AGE/SEX: 78 y.o. female  ROOM: [facility]   Subjective   Chief Complaint   Patient presents with    Shortness of Breath     77 yo with history of tobacco usewho presents with dyspnea and congestion. She came to [facility] ER and was found to have +parainfluenza virus and hypoxia    Still with wheezing  On steroids  On oxgen    ROS  No f/c  No n/v  No cp/palp  + soa/cough    Objective   Vital Signs  Temp:  [97.3 °F (36.3 °C)-98.4 °F (36.9 °C)] 97.9 °F (36.6 °C)  Heart Rate:  [] 86  Resp:  [18-24] 18  BP: (100-138)/(42-96) 109/56  SpO2:  [85 %-96 %] 92 %  on  Flow (L/min):  [1-2] 1;   Device (Oxygen Therapy): nasal cannula  Body mass index is 25.61 kg/m².    Physical Exam  Constitutional:       General: She is not in acute distress.     Appearance: She is ill-appearing.   HENT:      Head: Normocephalic and atraumatic.   Eyes:      General: No scleral icterus.  Cardiovascular:      Rate and Rhythm: Regular rhythm.      Heart sounds: Normal heart sounds.   Pulmonary:      Effort: Respiratory distress present.      Breath sounds: Wheezing present.   Abdominal:      General: There is no distension.      Palpations: Abdomen is soft.   Musculoskeletal:      Cervical back: Neck supple.   Neurological:      Mental Status: She is alert.   Psychiatric:         Behavior: Behavior normal.         Results Review:       I reviewed the patient's new clinical results.  Results from last 7 days   Lab Units 24  0525 24  1633   WBC 10*3/mm3 2.66* 3.96   HEMOGLOBIN g/dL 13.3 14.6   PLATELETS 10*3/mm3 144 146     Results from last 7 days   Lab Units 24  0525 24  1633   SODIUM mmol/L 141 143   POTASSIUM mmol/L 4.1 4.0   CHLORIDE mmol/L 107 108*   CO2 mmol/L 26.0 25.0   BUN mg/dL 20 17   CREATININE mg/dL 0.58 0.81   GLUCOSE mg/dL 130* 84   Estimated Creatinine Clearance: 68.9 mL/min (by C-G formula based on  "SCr of 0.58 mg/dL).  Results from last 7 days   Lab Units 06/28/24  1633   ALBUMIN g/dL 4.1   BILIRUBIN mg/dL 0.4   ALK PHOS U/L 44   AST (SGOT) U/L 15   ALT (SGPT) U/L 10     Results from last 7 days   Lab Units 06/29/24  0525 06/28/24  1633   CALCIUM mg/dL 9.2 9.0   ALBUMIN g/dL  --  4.1         Coag     HbA1C   Lab Results   Component Value Date    HGBA1C 4.96 11/07/2021     Infection     Radiology(recent) XR Chest 1 View    Result Date: 6/28/2024  Minimal likely scarring or atelectasis at the bases. Follow-up as clinical indications persist.  This report was finalized on 6/28/2024 5:25 PM by Dr. Jonathan Wolfe M.D on Workstation: [facility]     HS Troponin T   Date Value Ref Range Status   06/28/2024 8 <14 ng/L Final     No components found for: \"TSH;2\"    ascorbic acid, 1,000 mg, Oral, Daily  aspirin, 81 mg, Oral, Daily  atorvastatin, 40 mg, Oral, Nightly  cholecalciferol, 1,000 Units, Oral, Daily  fluticasone, 2 spray, Nasal, Daily  ipratropium-albuterol, 3 mL, Nebulization, Q6H While Awake - RT  methylPREDNISolone sodium succinate, 40 mg, Intravenous, Q8H  pantoprazole, 40 mg, Oral, Q AM  vitamin B-12, 1,000 mcg, Oral, Daily       Diet: Cardiac; Healthy Heart (2-3 Na+); Fluid Consistency: Thin (IDDSI 0)      Assessment & Plan      Active Hospital Problems    Diagnosis  POA    **Parainfluenza [B34.8]  Yes    Tobacco use [Z72.0]  Yes    Bronchospasm with bronchitis, acute [J20.9]  Unknown    Hypoxia [R09.02]  Yes    Acute bronchitis [J20.9]  Yes    Current smoker [F17.200]  Yes      Resolved Hospital Problems   No resolved problems to display.     77 yo with history of tobacco usewho presents with dyspnea and congestion. She came to [facility] ER and was found to have +parainfluenza virus and hypoxia    On IV steroids, bronchodilators, oxygen  Add agents for cough/congestion  Statin       DW staff    Dispo- to home in 1-2 days when symptoms improving and off oxygen       Sal Osorio MD  [facility]  06/29/24  07:38 EDT

## 2024-06-29 NOTE — PLAN OF CARE
Problem: Adult Inpatient Plan of Care  Goal: Plan of Care Review  Flowsheets (Taken 6/29/2024 1806)  Progress: no change  Plan of Care Reviewed With: patient  Outcome Evaluation: vitals stable, 19beat run vtach, electrolytes wnl, patient complaints of chest pressure during run of laurel, md aware, pt continues with expiratory wheezes, congested cough, iv steroids,  Goal: Absence of Hospital-Acquired Illness or Injury  Intervention: Identify and Manage Fall Risk  Recent Flowsheet Documentation  Taken 6/29/2024 1603 by Anamaria Potter RN  Safety Promotion/Fall Prevention:   safety round/check completed   room organization consistent   nonskid shoes/slippers when out of bed   lighting adjusted   fall prevention program maintained   clutter free environment maintained   assistive device/personal items within reach  Taken 6/29/2024 1450 by Anamaria Potter RN  Safety Promotion/Fall Prevention:   safety round/check completed   room organization consistent   nonskid shoes/slippers when out of bed   lighting adjusted   fall prevention program maintained   clutter free environment maintained   assistive device/personal items within reach  Taken 6/29/2024 1145 by Anamaria Potter RN  Safety Promotion/Fall Prevention:   safety round/check completed   room organization consistent   nonskid shoes/slippers when out of bed   lighting adjusted   fall prevention program maintained   clutter free environment maintained   assistive device/personal items within reach  Taken 6/29/2024 1037 by Anamaria Potter RN  Safety Promotion/Fall Prevention:   safety round/check completed   room organization consistent   nonskid shoes/slippers when out of bed   lighting adjusted   fall prevention program maintained   clutter free environment maintained   assistive device/personal items within reach  Taken 6/29/2024 0930 by Anamaria Potter RN  Safety Promotion/Fall Prevention:   safety round/check completed   room organization  consistent   nonskid shoes/slippers when out of bed   fall prevention program maintained   clutter free environment maintained   assistive device/personal items within reach  Intervention: Prevent Skin Injury  Recent Flowsheet Documentation  Taken 6/29/2024 0930 by Anamaria Potter RN  Body Position: position changed independently  Goal: Optimal Comfort and Wellbeing  Intervention: Provide Person-Centered Care  Recent Flowsheet Documentation  Taken 6/29/2024 0930 by Anamaria Potter RN  Trust Relationship/Rapport:   care explained   questions answered   thoughts/feelings acknowledged   reassurance provided   Goal Outcome Evaluation:  Plan of Care Reviewed With: patient        Progress: no change  Outcome Evaluation: vitals stable, 19beat run vtach, electrolytes wnl, patient complaints of chest pressure during run of md laurel aware, pt continues with expiratory wheezes, congested cough, iv steroids,

## 2024-06-29 NOTE — H&P
HISTORY AND PHYSICAL   [facility]        Date of Admission: 2024  Patient Identification:  Name: Brigid Carballo  Age: 78 y.o.  Sex: female  :  1945  MRN: [id number]                     Primary Care Physician: Kaya Mckeon APRN    Chief Complaint:  78 year old female presented to the emergency room with shortness of breath, cough and congestion for the last few days; she continues to smoke; she denies fever or chills; she has had some chest pain; no sick contacts    History of Present Illness:   As above    Past Medical History:  Past Medical History:   Diagnosis Date    Angina pectoris     Anxiety     L[place]'s esophagus 2015    BPV (benign positional vertigo) 2021    Bunion of left foot 2016    Cancer     skin    Carpal tunnel syndrome     Chest pain 10/05/2020    DDD (degenerative disc disease), cervical 2014    Deformity of wrist joint 2018    Hyperlipidemia     Insomnia 2017    Kidney stones     Low back pain 2014    Melanoma in situ of unspecified lower limb, including hip 2021    Occlusion of left vertebral artery 2021    1/10/22 CTA neck: beyond its origin, left vertebral artery is widely patent throughout its cervical and intracranial course to the vertebrobasilar junction without stenosis    Osteoarthritis of left knee 2016    Renal stone 2020    Spinal stenosis of lumbar region 2014    Tear of lateral meniscus of knee 2014    Unexplained weight loss 2021     Past Surgical History:  Past Surgical History:   Procedure Laterality Date    APPENDECTOMY      BREAST BIOPSY      multiple    CARDIAC CATHETERIZATION      CARDIAC CATHETERIZATION N/A 2022    Procedure: Left Heart Cath;  Surgeon: Brennen Nguyen MD;  Location: [facility] CATH INVASIVE LOCATION;  Service: Cardiology;  Laterality: N/A;    CARDIAC CATHETERIZATION N/A 2022    Procedure: Coronary angiography;  Surgeon: Patrick  Brennen ZHOU MD;  Location:  [facility] CATH INVASIVE LOCATION;  Service: Cardiology;  Laterality: N/A;    CARDIAC CATHETERIZATION N/A 11/29/2022    Procedure: Left ventriculography;  Surgeon: Brennen Nguyen MD;  Location:  [facility] CATH INVASIVE LOCATION;  Service: Cardiology;  Laterality: N/A;    CARPAL TUNNEL RELEASE Bilateral     COLONOSCOPY  05/17/2021    polyp removed; per Dr. Velasquez    CYSTOSCOPY W/ URETERAL STENT PLACEMENT  10/22/2019    HYSTERECTOMY      INGUINAL HERNIA REPAIR Right     KNEE ARTHROSCOPY Right     meniscus    TONSILLECTOMY      UPPER GASTROINTESTINAL ENDOSCOPY  05/17/2021    per Dr. Velasquez      Home Meds:  Medications Prior to Admission   Medication Sig Dispense Refill Last Dose    ascorbic acid (VITAMIN C) 1000 MG tablet Take 1 tablet by mouth Daily.   6/27/2024    aspirin 81 MG EC tablet Take 1 tablet by mouth Daily. 90 tablet 0 6/28/2024    atorvastatin (LIPITOR) 40 MG tablet TAKE 1 TABLET BY MOUTH ONCE DAILY AT NIGHT 90 tablet 1 6/27/2024    cholecalciferol (VITAMIN D3) 25 MCG (1000 UT) tablet Take 1 tablet by mouth Daily.   6/28/2024    esomeprazole (nexIUM) 20 MG capsule Take 1 capsule by mouth Every Morning Before Breakfast.   6/28/2024    vitamin B-12 (CYANOCOBALAMIN) 1000 MCG tablet Take 1 tablet by mouth Daily.   6/28/2024    fluticasone (FLONASE) 50 MCG/ACT nasal spray 2 sprays into the nostril(s) as directed by provider Daily. 18.2 mL 2 Unknown    Omega-3 Fatty Acids (fish oil) 1000 MG capsule capsule Take 1 capsule by mouth Daily With Breakfast. (Patient not taking: Reported on 4/19/2024)   Unknown       Allergies:  Allergies   Allergen Reactions    Amoxicillin-Pot Clavulanate Itching    Codeine Itching    Latex Unknown - Low Severity    Penicillins Unknown - High Severity     Immunizations:  Immunization History   Administered Date(s) Administered    Flu Vaccine Quad PF >36MO 09/28/2018    Flu Vaccine Split Quad 09/28/2018    Fluad Quad 65+ 10/19/2020    Fluzone (or Fluarix &  Flulaval for VFC) >6mos 2018    Fluzone High Dose =>65 Years ([employer] ONLY) 2015, 10/12/2016, 10/11/2017, 2020    Fluzone High-Dose 65+yrs 2021, 10/31/2022, 2024    Pneumococcal Conjugate 13-Valent (PCV13) 2016    Pneumococcal Conjugate 20-Valent (PCV20) 2022     Social History:   Social History     Social History Narrative    Not on file     Social History     Socioeconomic History    Marital status:    Tobacco Use    Smoking status: Every Day     Current packs/day: 0.50     Average packs/day: 0.5 packs/day for 65.0 years (32.5 ttl pk-yrs)     Types: Cigarettes    Smokeless tobacco: Never    Tobacco comments:     Quit now [place] info provided   Vaping Use    Vaping status: Never Used   Substance and Sexual Activity    Alcohol use: Yes     Comment: social--rarely    Drug use: Never    Sexual activity: Defer       Family History:  Family History   Problem Relation Age of Onset    Emphysema Mother     Heart disease Mother     Heart disease Father     Cancer Brother     Heart disease Brother     Skin cancer Brother     Stomach cancer Maternal Aunt     Breast cancer Maternal Grandmother     Breast cancer Paternal Grandmother         Review of Systems  See history of present illness and past medical history.  Patient denies headache, dizziness, syncope, falls, trauma, change in vision, change in hearing, change in taste, changes in weight, changes in appetite, focal weakness, numbness, or paresthesia.  Patient denies chest pain, palpitations,sinus pressure, rhinorrhea, epistaxis, hemoptysis, nausea, vomiting,hematemesis, diarrhea, constipation or hematochezia.  Denies cold or heat intolerance, polydipsia, polyuria, polyphagia. Denies hematuria, pyuria, dysuria, hesitancy, frequency or urgency. Denies consumption of raw and under cooked meats foods or change in water source       Objective:  T Max 24 hrs: Temp (24hrs), Av.9 °F (36.6 °C), Min:97.3 °F (36.3 °C),  "Max:98.4 °F (36.9 °C)    Vitals Ranges:   Temp:  [97.3 °F (36.3 °C)-98.4 °F (36.9 °C)] 97.3 °F (36.3 °C)  Heart Rate:  [] 75  Resp:  [18-24] 18  BP: (117-138)/(66-96) 117/82      Exam:  /82 (BP Location: Left arm, Patient Position: Lying)   Pulse 75   Temp 97.3 °F (36.3 °C) (Oral)   Resp 18   Ht 157.5 cm (62\")   Wt 63.5 kg (140 lb)   SpO2 93%   BMI 25.61 kg/m²     General Appearance:    Alert, cooperative, no distress, appears stated age   Head:    Normocephalic, without obvious abnormality, atraumatic   Eyes:    PERRL, conjunctivae/corneas clear, EOM's intact, both eyes   Ears:    Normal external ear canals, both ears   Nose:   Nares normal, septum midline, mucosa normal, no drainage    or sinus tenderness   Throat:   Lips, mucosa, and tongue normal   Neck:   Supple, symmetrical, trachea midline, no adenopathy;     thyroid:  no enlargement/tenderness/nodules; no carotid    bruit or JVD   Back:     Symmetric, no curvature, ROM normal, no CVA tenderness   Lungs:     Decreased breath sounds, wheezes bilaterally, respirations unlabored   Chest Wall:    No tenderness or deformity    Heart:    Regular rate and rhythm, S1 and S2 normal, no murmur, rub   or gallop   Abdomen:     Soft, nontender, bowel sounds active all four quadrants,     no masses, no hepatomegaly, no splenomegaly   Extremities:   Extremities normal, atraumatic, no cyanosis or edema                       .    Data Review:  Labs in chart were reviewed.  WBC   Date Value Ref Range Status   06/28/2024 3.96 3.40 - 10.80 10*3/mm3 Final     Hemoglobin   Date Value Ref Range Status   06/28/2024 14.6 12.0 - 15.9 g/dL Final     Hematocrit   Date Value Ref Range Status   06/28/2024 43.0 34.0 - 46.6 % Final     Platelets   Date Value Ref Range Status   06/28/2024 146 140 - 450 10*3/mm3 Final     Sodium   Date Value Ref Range Status   06/28/2024 143 136 - 145 mmol/L Final     Potassium   Date Value Ref Range Status   06/28/2024 4.0 3.5 - 5.2 mmol/L " Final     Chloride   Date Value Ref Range Status   06/28/2024 108 (H) 98 - 107 mmol/L Final     CO2   Date Value Ref Range Status   06/28/2024 25.0 22.0 - 29.0 mmol/L Final     BUN   Date Value Ref Range Status   06/28/2024 17 8 - 23 mg/dL Final     Creatinine   Date Value Ref Range Status   06/28/2024 0.81 0.57 - 1.00 mg/dL Final     Glucose   Date Value Ref Range Status   06/28/2024 84 65 - 99 mg/dL Final     Calcium   Date Value Ref Range Status   06/28/2024 9.0 8.6 - 10.5 mg/dL Final                Imaging Results (All)       Procedure Component Value Units Date/Time    XR Chest 1 View [[id number]] Collected: 06/28/24 1724     Updated: 06/28/24 1728    Narrative:      XR CHEST 1 VW-     HISTORY: Female who is 78 years-old, short of breath     TECHNIQUE: Frontal view of the chest     COMPARISON: None available     FINDINGS: The heart size is normal. Aorta is calcified. Pulmonary  vasculature is unremarkable. Minimal likely scarring or atelectasis at  the bases. No pleural effusion, or pneumothorax. No acute osseous  process.       Impression:      Minimal likely scarring or atelectasis at the bases.  Follow-up as clinical indications persist.     This report was finalized on 6/28/2024 5:25 PM by Dr. Jonathan Wolfe M.D on Workstation: [id number]                 Assessment:  Active Hospital Problems    Diagnosis  POA    **Parainfluenza [B34.8]  Yes    Hypoxia [R09.02]  Yes      Resolved Hospital Problems   No resolved problems to display.   Tobacco use  Hyperlipidemia      Plan:  Will continue steroids,mininebs  Wean oxygen as tolerated  Check ct chest  Trend labs  Dw patient and ed provider    Brenda Calderon MD  6/28/2024  23:04 EDT

## 2024-06-29 NOTE — PLAN OF CARE
Goal Outcome Evaluation:  Plan of Care Reviewed With: patient     VSS. 1 L nc. Coarse lung sounds. Orders for chest CT today. Up ad amadeo.      Progress: improving

## 2024-06-30 ENCOUNTER — APPOINTMENT (OUTPATIENT)
Dept: CT IMAGING | Facility: HOSPITAL | Age: 79
End: 2024-06-30
Payer: MEDICARE

## 2024-06-30 LAB
ANION GAP SERPL CALCULATED.3IONS-SCNC: 10 MMOL/L (ref 5–15)
BUN SERPL-MCNC: 20 MG/DL (ref 8–23)
BUN/CREAT SERPL: 30.8 (ref 7–25)
CALCIUM SPEC-SCNC: 9 MG/DL (ref 8.6–10.5)
CHLORIDE SERPL-SCNC: 107 MMOL/L (ref 98–107)
CO2 SERPL-SCNC: 23 MMOL/L (ref 22–29)
CREAT SERPL-MCNC: 0.65 MG/DL (ref 0.57–1)
EGFRCR SERPLBLD CKD-EPI 2021: 89.7 ML/MIN/1.73
GLUCOSE SERPL-MCNC: 133 MG/DL (ref 65–99)
MAGNESIUM SERPL-MCNC: 3.1 MG/DL (ref 1.6–2.4)
POTASSIUM SERPL-SCNC: 4.4 MMOL/L (ref 3.5–5.2)
QT INTERVAL: 421 MS
QTC INTERVAL: 445 MS
SODIUM SERPL-SCNC: 140 MMOL/L (ref 136–145)

## 2024-06-30 PROCEDURE — 94799 UNLISTED PULMONARY SVC/PX: CPT

## 2024-06-30 PROCEDURE — 25010000002 METHYLPREDNISOLONE PER 125 MG: Performed by: INTERNAL MEDICINE

## 2024-06-30 PROCEDURE — 80048 BASIC METABOLIC PNL TOTAL CA: CPT | Performed by: INTERNAL MEDICINE

## 2024-06-30 PROCEDURE — 25010000002 ENOXAPARIN PER 10 MG: Performed by: INTERNAL MEDICINE

## 2024-06-30 PROCEDURE — 94664 DEMO&/EVAL PT USE INHALER: CPT

## 2024-06-30 PROCEDURE — 83735 ASSAY OF MAGNESIUM: CPT | Performed by: INTERNAL MEDICINE

## 2024-06-30 PROCEDURE — 94761 N-INVAS EAR/PLS OXIMETRY MLT: CPT

## 2024-06-30 PROCEDURE — 25010000002 METHYLPREDNISOLONE PER 40 MG: Performed by: INTERNAL MEDICINE

## 2024-06-30 PROCEDURE — 71250 CT THORAX DX C-: CPT

## 2024-06-30 RX ORDER — IPRATROPIUM BROMIDE AND ALBUTEROL SULFATE 2.5; .5 MG/3ML; MG/3ML
3 SOLUTION RESPIRATORY (INHALATION)
Status: DISCONTINUED | OUTPATIENT
Start: 2024-06-30 | End: 2024-07-06 | Stop reason: HOSPADM

## 2024-06-30 RX ORDER — METHYLPREDNISOLONE SODIUM SUCCINATE 125 MG/2ML
60 INJECTION, POWDER, LYOPHILIZED, FOR SOLUTION INTRAMUSCULAR; INTRAVENOUS EVERY 8 HOURS
Status: DISCONTINUED | OUTPATIENT
Start: 2024-06-30 | End: 2024-06-30

## 2024-06-30 RX ORDER — METHYLPREDNISOLONE SODIUM SUCCINATE 125 MG/2ML
60 INJECTION, POWDER, LYOPHILIZED, FOR SOLUTION INTRAMUSCULAR; INTRAVENOUS EVERY 8 HOURS
Status: DISCONTINUED | OUTPATIENT
Start: 2024-06-30 | End: 2024-07-01

## 2024-06-30 RX ADMIN — GUAIFENESIN 1200 MG: 600 TABLET, EXTENDED RELEASE ORAL at 08:40

## 2024-06-30 RX ADMIN — FLUTICASONE PROPIONATE 2 SPRAY: 50 SPRAY, METERED NASAL at 08:40

## 2024-06-30 RX ADMIN — METHYLPREDNISOLONE SODIUM SUCCINATE 40 MG: 40 INJECTION, POWDER, FOR SOLUTION INTRAMUSCULAR; INTRAVENOUS at 01:04

## 2024-06-30 RX ADMIN — ENOXAPARIN SODIUM 40 MG: 100 INJECTION SUBCUTANEOUS at 19:06

## 2024-06-30 RX ADMIN — ASPIRIN 81 MG: 81 TABLET, COATED ORAL at 08:40

## 2024-06-30 RX ADMIN — Medication 1000 MCG: at 08:40

## 2024-06-30 RX ADMIN — GUAIFENESIN 1200 MG: 600 TABLET, EXTENDED RELEASE ORAL at 20:54

## 2024-06-30 RX ADMIN — Medication 1000 UNITS: at 08:40

## 2024-06-30 RX ADMIN — DEXTROMETHORPHAN POLISTIREX 60 MG: 30 SUSPENSION ORAL at 20:54

## 2024-06-30 RX ADMIN — ATORVASTATIN CALCIUM 40 MG: 20 TABLET, FILM COATED ORAL at 20:54

## 2024-06-30 RX ADMIN — METHYLPREDNISOLONE SODIUM SUCCINATE 60 MG: 125 INJECTION, POWDER, FOR SOLUTION INTRAMUSCULAR; INTRAVENOUS at 20:54

## 2024-06-30 RX ADMIN — IPRATROPIUM BROMIDE AND ALBUTEROL SULFATE 3 ML: .5; 3 SOLUTION RESPIRATORY (INHALATION) at 19:51

## 2024-06-30 RX ADMIN — IPRATROPIUM BROMIDE AND ALBUTEROL SULFATE 3 ML: .5; 3 SOLUTION RESPIRATORY (INHALATION) at 10:38

## 2024-06-30 RX ADMIN — IPRATROPIUM BROMIDE AND ALBUTEROL SULFATE 3 ML: .5; 3 SOLUTION RESPIRATORY (INHALATION) at 14:05

## 2024-06-30 RX ADMIN — IPRATROPIUM BROMIDE AND ALBUTEROL SULFATE 3 ML: .5; 3 SOLUTION RESPIRATORY (INHALATION) at 06:48

## 2024-06-30 RX ADMIN — METHYLPREDNISOLONE SODIUM SUCCINATE 60 MG: 125 INJECTION, POWDER, FOR SOLUTION INTRAMUSCULAR; INTRAVENOUS at 13:31

## 2024-06-30 RX ADMIN — PANTOPRAZOLE SODIUM 40 MG: 40 TABLET, DELAYED RELEASE ORAL at 06:21

## 2024-06-30 RX ADMIN — DEXTROMETHORPHAN POLISTIREX 60 MG: 30 SUSPENSION ORAL at 08:40

## 2024-06-30 RX ADMIN — OXYCODONE HYDROCHLORIDE AND ACETAMINOPHEN 1000 MG: 500 TABLET ORAL at 08:40

## 2024-06-30 NOTE — PROGRESS NOTES
Name: Brigid Carballo ADMIT: 2024   : 1945  PCP: Kaya Mckeon TAYLOR, APRN    MRN: [id number] LOS: 2 days   AGE/SEX: 78 y.o. female  ROOM: [facility]   Subjective   Chief Complaint   Patient presents with    Shortness of Breath     77 yo with history of tobacco usewho presents with dyspnea and congestion. She came to [facility] ER and was found to have +parainfluenza virus and hypoxia    Still with wheezing- not much improvement thus far  On steroids IV  On oxgen  +cough    ROS  No f/c  No n/v  No cp/palp  + soa/cough    Objective   Vital Signs  Temp:  [97.6 °F (36.4 °C)-98 °F (36.7 °C)] 97.8 °F (36.6 °C)  Heart Rate:  [62-81] 62  Resp:  [16-20] 18  BP: (101-120)/(59-97) 120/97  SpO2:  [92 %-96 %] 93 %  on  Flow (L/min):  [1-2] 2;   Device (Oxygen Therapy): nasal cannula  Body mass index is 25.93 kg/m².    Physical Exam  Constitutional:       General: She is not in acute distress.     Appearance: She is ill-appearing.   HENT:      Head: Normocephalic and atraumatic.   Eyes:      General: No scleral icterus.  Cardiovascular:      Rate and Rhythm: Regular rhythm.      Heart sounds: Normal heart sounds.   Pulmonary:      Effort: Respiratory distress present.      Breath sounds: Wheezing present.   Abdominal:      General: There is no distension.      Palpations: Abdomen is soft.   Musculoskeletal:      Cervical back: Neck supple.   Neurological:      Mental Status: She is alert.   Psychiatric:         Behavior: Behavior normal.         Results Review:       I reviewed the patient's new clinical results.  Results from last 7 days   Lab Units 24  0525 24  1633   WBC 10*3/mm3 2.66* 3.96   HEMOGLOBIN g/dL 13.3 14.6   PLATELETS 10*3/mm3 144 146     Results from last 7 days   Lab Units 24  0520 24  0525 24  1633   SODIUM mmol/L 140 141 143   POTASSIUM mmol/L 4.4 4.1 4.0   CHLORIDE mmol/L 107 107 108*   CO2 mmol/L 23.0 26.0 25.0   BUN mg/dL 20 20 17   CREATININE mg/dL 0.65 0.58 0.81   GLUCOSE mg/dL  "133* 130* 84   Estimated Creatinine Clearance: 61.8 mL/min (by C-G formula based on SCr of 0.65 mg/dL).  Results from last 7 days   Lab Units 06/28/24  1633   ALBUMIN g/dL 4.1   BILIRUBIN mg/dL 0.4   ALK PHOS U/L 44   AST (SGOT) U/L 15   ALT (SGPT) U/L 10     Results from last 7 days   Lab Units 06/30/24  0520 06/29/24  0525 06/28/24  1633   CALCIUM mg/dL 9.0 9.2 9.0   ALBUMIN g/dL  --   --  4.1   MAGNESIUM mg/dL 3.1* 2.3  --          Coag     HbA1C   Lab Results   Component Value Date    HGBA1C 4.96 11/07/2021     Infection     Radiology(recent) XR Chest 1 View    Result Date: 6/28/2024  Minimal likely scarring or atelectasis at the bases. Follow-up as clinical indications persist.  This report was finalized on 6/28/2024 5:25 PM by Dr. Jonathan Wolfe M.D on Workstation: [id number]     HS Troponin T   Date Value Ref Range Status   06/29/2024 8 <14 ng/L Final   06/29/2024 9 <14 ng/L Final     No components found for: \"TSH;2\"    ascorbic acid, 1,000 mg, Oral, Daily  aspirin, 81 mg, Oral, Daily  atorvastatin, 40 mg, Oral, Nightly  cholecalciferol, 1,000 Units, Oral, Daily  dextromethorphan polistirex ER, 60 mg, Oral, Q12H  enoxaparin, 40 mg, Subcutaneous, Q24H  fluticasone, 2 spray, Nasal, Daily  guaiFENesin, 1,200 mg, Oral, Q12H  ipratropium-albuterol, 3 mL, Nebulization, Q6H While Awake - RT  methylPREDNISolone sodium succinate, 60 mg, Intravenous, Q8H  pantoprazole, 40 mg, Oral, Q AM  vitamin B-12, 1,000 mcg, Oral, Daily       Diet: Cardiac; Healthy Heart (2-3 Na+); Fluid Consistency: Thin (IDDSI 0)      Assessment & Plan      Active Hospital Problems    Diagnosis  POA    **Parainfluenza [B34.8]  Yes    Tobacco use [Z72.0]  Yes    Bronchospasm with bronchitis, acute [J20.9]  Unknown    Hypoxia [R09.02]  Yes    Acute bronchitis [J20.9]  Yes    Current smoker [F17.200]  Yes      Resolved Hospital Problems   No resolved problems to display.     79 yo with history of tobacco usewho presents with dyspnea and " congestion. She came to [facility] ER and was found to have +parainfluenza virus and hypoxia    On IV steroids, bronchodilators, oxygen. Still wheezing will increase steroid dose.   Add agents for cough/congestion  Statin       DW staff    Dispo- to home in 1-2 days when symptoms improving and off oxygen       Sal Osorio MD  [facility]  06/30/24  07:38 EDT

## 2024-06-30 NOTE — PLAN OF CARE
Problem: Adult Inpatient Plan of Care  Goal: Plan of Care Review  Outcome: Ongoing, Progressing  Flowsheets  Taken 6/30/2024 0612 by Kandi Stephens RN  Progress: improving  Taken 6/29/2024 1806 by Anamaria Potter RN  Plan of Care Reviewed With: patient  Goal: Patient-Specific Goal (Individualized)  Outcome: Ongoing, Progressing  Goal: Absence of Hospital-Acquired Illness or Injury  Outcome: Ongoing, Progressing  Intervention: Identify and Manage Fall Risk  Recent Flowsheet Documentation  Taken 6/30/2024 0611 by Kandi Stephens RN  Safety Promotion/Fall Prevention:   activity supervised   assistive device/personal items within reach   clutter free environment maintained   fall prevention program maintained   nonskid shoes/slippers when out of bed   safety round/check completed   room organization consistent  Taken 6/30/2024 0452 by Kandi Stephens RN  Safety Promotion/Fall Prevention:   activity supervised   assistive device/personal items within reach   clutter free environment maintained   fall prevention program maintained   nonskid shoes/slippers when out of bed   room organization consistent   safety round/check completed  Taken 6/30/2024 0228 by Kandi Stephens RN  Safety Promotion/Fall Prevention:   activity supervised   assistive device/personal items within reach   clutter free environment maintained   fall prevention program maintained   nonskid shoes/slippers when out of bed   room organization consistent   safety round/check completed  Taken 6/30/2024 0011 by Kandi Stephens, RN  Safety Promotion/Fall Prevention:   activity supervised   assistive device/personal items within reach   clutter free environment maintained   fall prevention program maintained   nonskid shoes/slippers when out of bed   safety round/check completed   room organization consistent  Taken 6/29/2024 2216 by Kandi Stephens, RN  Safety Promotion/Fall Prevention:   activity supervised   assistive device/personal items  within reach   clutter free environment maintained   fall prevention program maintained   nonskid shoes/slippers when out of bed   room organization consistent   safety round/check completed  Taken 6/29/2024 2045 by Kandi Stephens RN  Safety Promotion/Fall Prevention:   activity supervised   assistive device/personal items within reach   clutter free environment maintained   fall prevention program maintained   nonskid shoes/slippers when out of bed   room organization consistent  Intervention: Prevent Skin Injury  Recent Flowsheet Documentation  Taken 6/30/2024 0611 by Kandi Stephens RN  Body Position: position changed independently  Taken 6/30/2024 0452 by Kandi Stephens RN  Body Position: position changed independently  Taken 6/30/2024 0228 by Kandi Stephens RN  Body Position: position changed independently  Taken 6/30/2024 0011 by Kandi Stephens RN  Body Position: position changed independently  Taken 6/29/2024 2216 by Kandi Stephens RN  Body Position: position changed independently  Taken 6/29/2024 2045 by Kandi Stephens RN  Body Position: position changed independently  Skin Protection:   adhesive use limited   incontinence pads utilized   tubing/devices free from skin contact  Intervention: Prevent and Manage VTE (Venous Thromboembolism) Risk  Recent Flowsheet Documentation  Taken 6/29/2024 2045 by Kandi Stephens RN  Activity Management: activity encouraged  Intervention: Prevent Infection  Recent Flowsheet Documentation  Taken 6/29/2024 2045 by Kandi Stephens RN  Infection Prevention:   hand hygiene promoted   rest/sleep promoted   single patient room provided  Goal: Optimal Comfort and Wellbeing  Outcome: Ongoing, Progressing  Intervention: Provide Person-Centered Care  Recent Flowsheet Documentation  Taken 6/29/2024 2045 by Kandi Stephens RN  Trust Relationship/Rapport:   care explained   questions answered   questions encouraged  Goal: Readiness for Transition of  Care  Outcome: Ongoing, Progressing     Problem: Gas Exchange Impaired  Goal: Optimal Gas Exchange  Outcome: Ongoing, Progressing  Intervention: Optimize Oxygenation and Ventilation  Recent Flowsheet Documentation  Taken 6/30/2024 0611 by Kandi Stephens RN  Head of Bed (HOB) Positioning: HOB at 20-30 degrees  Taken 6/30/2024 0452 by Kandi Stephens RN  Head of Bed (HOB) Positioning: HOB at 20-30 degrees  Taken 6/30/2024 0228 by Kandi Stephens RN  Head of Bed (HOB) Positioning: HOB at 20-30 degrees  Taken 6/30/2024 0011 by Kandi Stephens RN  Head of Bed (HOB) Positioning: HOB at 20-30 degrees  Taken 6/29/2024 2216 by Kandi Stephens RN  Head of Bed (HOB) Positioning: HOB at 20-30 degrees  Taken 6/29/2024 2045 by Kandi Stephens RN  Head of Bed (HOB) Positioning: HOB at 20-30 degrees     Problem: Fall Injury Risk  Goal: Absence of Fall and Fall-Related Injury  Outcome: Ongoing, Progressing  Intervention: Identify and Manage Contributors  Recent Flowsheet Documentation  Taken 6/29/2024 2045 by Kandi Stephens RN  Medication Review/Management: medications reviewed  Self-Care Promotion:   independence encouraged   BADL personal objects within reach  Intervention: Promote Injury-Free Environment  Recent Flowsheet Documentation  Taken 6/30/2024 0611 by Kandi Stephens RN  Safety Promotion/Fall Prevention:   activity supervised   assistive device/personal items within reach   clutter free environment maintained   fall prevention program maintained   nonskid shoes/slippers when out of bed   safety round/check completed   room organization consistent  Taken 6/30/2024 0452 by Kandi Stephens RN  Safety Promotion/Fall Prevention:   activity supervised   assistive device/personal items within reach   clutter free environment maintained   fall prevention program maintained   nonskid shoes/slippers when out of bed   room organization consistent   safety round/check completed  Taken 6/30/2024 0228 by Kat  Kandi, RN  Safety Promotion/Fall Prevention:   activity supervised   assistive device/personal items within reach   clutter free environment maintained   fall prevention program maintained   nonskid shoes/slippers when out of bed   room organization consistent   safety round/check completed  Taken 6/30/2024 0011 by aKndi Stephens, RN  Safety Promotion/Fall Prevention:   activity supervised   assistive device/personal items within reach   clutter free environment maintained   fall prevention program maintained   nonskid shoes/slippers when out of bed   safety round/check completed   room organization consistent  Taken 6/29/2024 2216 by Kandi Stephens, RN  Safety Promotion/Fall Prevention:   activity supervised   assistive device/personal items within reach   clutter free environment maintained   fall prevention program maintained   nonskid shoes/slippers when out of bed   room organization consistent   safety round/check completed  Taken 6/29/2024 2045 by Kandi Stephens, RN  Safety Promotion/Fall Prevention:   activity supervised   assistive device/personal items within reach   clutter free environment maintained   fall prevention program maintained   nonskid shoes/slippers when out of bed   room organization consistent   Goal Outcome Evaluation:  Plan of Care Reviewed With: patient        Progress: improving

## 2024-06-30 NOTE — PLAN OF CARE
Problem: Adult Inpatient Plan of Care  Goal: Plan of Care Review  Outcome: Ongoing, Progressing  Flowsheets (Taken 6/30/2024 1519)  Progress: improving  Plan of Care Reviewed With: patient  Outcome Evaluation: vss, soa with excertion, wheezes, iv steroid dose increased  Goal: Absence of Hospital-Acquired Illness or Injury  Intervention: Identify and Manage Fall Risk  Recent Flowsheet Documentation  Taken 6/30/2024 1232 by Anamaria Potter RN  Safety Promotion/Fall Prevention:   safety round/check completed   nonskid shoes/slippers when out of bed   room organization consistent   lighting adjusted   fall prevention program maintained   assistive device/personal items within reach   clutter free environment maintained  Taken 6/30/2024 1030 by Anamaria Potter RN  Safety Promotion/Fall Prevention:   safety round/check completed   nonskid shoes/slippers when out of bed   room organization consistent   lighting adjusted   fall prevention program maintained   assistive device/personal items within reach   clutter free environment maintained  Taken 6/30/2024 0840 by Anamaria Potter RN  Safety Promotion/Fall Prevention:   safety round/check completed   room organization consistent   nonskid shoes/slippers when out of bed   lighting adjusted   fall prevention program maintained   assistive device/personal items within reach   clutter free environment maintained  Goal: Optimal Comfort and Wellbeing  Intervention: Provide Person-Centered Care  Recent Flowsheet Documentation  Taken 6/30/2024 0840 by Anamaria Potter RN  Trust Relationship/Rapport: care explained   Goal Outcome Evaluation:  Plan of Care Reviewed With: patient        Progress: improving  Outcome Evaluation: vss, soa with excertion, wheezes, iv steroid dose increased

## 2024-07-01 ENCOUNTER — APPOINTMENT (OUTPATIENT)
Dept: ULTRASOUND IMAGING | Facility: HOSPITAL | Age: 79
End: 2024-07-01
Payer: MEDICARE

## 2024-07-01 PROBLEM — R91.1 LUNG NODULE: Status: ACTIVE | Noted: 2024-07-01

## 2024-07-01 PROCEDURE — 76604 US EXAM CHEST: CPT

## 2024-07-01 PROCEDURE — 25010000002 METHYLPREDNISOLONE PER 125 MG: Performed by: INTERNAL MEDICINE

## 2024-07-01 PROCEDURE — 94664 DEMO&/EVAL PT USE INHALER: CPT

## 2024-07-01 PROCEDURE — 94799 UNLISTED PULMONARY SVC/PX: CPT

## 2024-07-01 PROCEDURE — 25010000002 METHYLPREDNISOLONE PER 40 MG: Performed by: INTERNAL MEDICINE

## 2024-07-01 PROCEDURE — 94761 N-INVAS EAR/PLS OXIMETRY MLT: CPT

## 2024-07-01 PROCEDURE — 25010000002 ENOXAPARIN PER 10 MG: Performed by: INTERNAL MEDICINE

## 2024-07-01 RX ORDER — SODIUM CHLORIDE FOR INHALATION 7 %
4 VIAL, NEBULIZER (ML) INHALATION
Status: DISCONTINUED | OUTPATIENT
Start: 2024-07-01 | End: 2024-07-04

## 2024-07-01 RX ORDER — METHYLPREDNISOLONE SODIUM SUCCINATE 40 MG/ML
40 INJECTION, POWDER, LYOPHILIZED, FOR SOLUTION INTRAMUSCULAR; INTRAVENOUS EVERY 8 HOURS
Status: DISCONTINUED | OUTPATIENT
Start: 2024-07-01 | End: 2024-07-03

## 2024-07-01 RX ORDER — BUDESONIDE AND FORMOTEROL FUMARATE DIHYDRATE 160; 4.5 UG/1; UG/1
2 AEROSOL RESPIRATORY (INHALATION)
Status: DISCONTINUED | OUTPATIENT
Start: 2024-07-01 | End: 2024-07-06 | Stop reason: HOSPADM

## 2024-07-01 RX ADMIN — ATORVASTATIN CALCIUM 40 MG: 20 TABLET, FILM COATED ORAL at 20:37

## 2024-07-01 RX ADMIN — DEXTROMETHORPHAN POLISTIREX 60 MG: 30 SUSPENSION ORAL at 20:37

## 2024-07-01 RX ADMIN — PANTOPRAZOLE SODIUM 40 MG: 40 TABLET, DELAYED RELEASE ORAL at 07:34

## 2024-07-01 RX ADMIN — IPRATROPIUM BROMIDE AND ALBUTEROL SULFATE 3 ML: .5; 3 SOLUTION RESPIRATORY (INHALATION) at 10:30

## 2024-07-01 RX ADMIN — METHYLPREDNISOLONE SODIUM SUCCINATE 40 MG: 40 INJECTION, POWDER, FOR SOLUTION INTRAMUSCULAR; INTRAVENOUS at 16:10

## 2024-07-01 RX ADMIN — BUDESONIDE AND FORMOTEROL FUMARATE DIHYDRATE 2 PUFF: 160; 4.5 AEROSOL RESPIRATORY (INHALATION) at 19:49

## 2024-07-01 RX ADMIN — METHYLPREDNISOLONE SODIUM SUCCINATE 40 MG: 40 INJECTION, POWDER, FOR SOLUTION INTRAMUSCULAR; INTRAVENOUS at 23:35

## 2024-07-01 RX ADMIN — Medication 4 ML: at 14:52

## 2024-07-01 RX ADMIN — Medication 1000 MCG: at 08:53

## 2024-07-01 RX ADMIN — Medication 1000 UNITS: at 08:53

## 2024-07-01 RX ADMIN — ASPIRIN 81 MG: 81 TABLET, COATED ORAL at 08:53

## 2024-07-01 RX ADMIN — ENOXAPARIN SODIUM 40 MG: 100 INJECTION SUBCUTANEOUS at 14:46

## 2024-07-01 RX ADMIN — IPRATROPIUM BROMIDE AND ALBUTEROL SULFATE 3 ML: .5; 3 SOLUTION RESPIRATORY (INHALATION) at 19:48

## 2024-07-01 RX ADMIN — BUDESONIDE AND FORMOTEROL FUMARATE DIHYDRATE 2 PUFF: 160; 4.5 AEROSOL RESPIRATORY (INHALATION) at 14:52

## 2024-07-01 RX ADMIN — DEXTROMETHORPHAN POLISTIREX 60 MG: 30 SUSPENSION ORAL at 08:52

## 2024-07-01 RX ADMIN — GUAIFENESIN 1200 MG: 600 TABLET, EXTENDED RELEASE ORAL at 08:52

## 2024-07-01 RX ADMIN — OXYCODONE HYDROCHLORIDE AND ACETAMINOPHEN 1000 MG: 500 TABLET ORAL at 08:53

## 2024-07-01 RX ADMIN — FLUTICASONE PROPIONATE 2 SPRAY: 50 SPRAY, METERED NASAL at 08:54

## 2024-07-01 RX ADMIN — IPRATROPIUM BROMIDE AND ALBUTEROL SULFATE 3 ML: .5; 3 SOLUTION RESPIRATORY (INHALATION) at 23:39

## 2024-07-01 RX ADMIN — IPRATROPIUM BROMIDE AND ALBUTEROL SULFATE 3 ML: .5; 3 SOLUTION RESPIRATORY (INHALATION) at 14:52

## 2024-07-01 RX ADMIN — GUAIFENESIN 1200 MG: 600 TABLET, EXTENDED RELEASE ORAL at 20:37

## 2024-07-01 RX ADMIN — METHYLPREDNISOLONE SODIUM SUCCINATE 60 MG: 125 INJECTION, POWDER, FOR SOLUTION INTRAMUSCULAR; INTRAVENOUS at 07:35

## 2024-07-01 RX ADMIN — Medication 4 ML: at 19:48

## 2024-07-01 RX ADMIN — IPRATROPIUM BROMIDE AND ALBUTEROL SULFATE 3 ML: .5; 3 SOLUTION RESPIRATORY (INHALATION) at 07:17

## 2024-07-01 NOTE — CASE MANAGEMENT/SOCIAL WORK
Discharge Planning Assessment  [facility]     Patient Name: Brigid Carballo  MRN: [id number]  Today's Date: 7/1/2024    Admit Date: 6/28/2024    Plan: Home   Discharge Needs Assessment       Row Name 07/01/24 1554       Living Environment    People in Home spouse    Current Living Arrangements home    Potentially Unsafe Housing Conditions none    In the past 12 months has the electric, gas, oil, or water company threatened to shut off services in your home? No    Primary Care Provided by self    Provides Primary Care For no one    Family Caregiver if Needed child(karla), adult    Family Caregiver Names Javier Jaramillo [phone]    Quality of Family Relationships supportive    Able to Return to Prior Arrangements yes       Resource/Environmental Concerns    Resource/Environmental Concerns none    Transportation Concerns none       Transportation Needs    In the past 12 months, has lack of transportation kept you from medical appointments or from getting medications? no    In the past 12 months, has lack of transportation kept you from meetings, work, or from getting things needed for daily living? No       Food Insecurity    Within the past 12 months, you worried that your food would run out before you got the money to buy more. Never true    Within the past 12 months, the food you bought just didn't last and you didn't have money to get more. Never true       Transition Planning    Patient/Family Anticipates Transition to home with family    Patient/Family Anticipated Services at Transition none    Transportation Anticipated family or friend will provide       Discharge Needs Assessment    Equipment Currently Used at Home none    Concerns to be Addressed no discharge needs identified    Anticipated Changes Related to Illness none    Equipment Needed After Discharge none                   Discharge Plan       Row Name 07/01/24 1556       Plan    Plan Home    Patient/Family in Agreement with Plan yes    Plan Comments Met  with pt and  in room. Introduced self, explained  role, verified face sheet. Pt states her plan is to return home at discharge. She lives in a patio home with her . Her son will transport her home. She denies any need for DME, HH, or rehab. CCP to follow. ABDOUL Dexter RN                  Continued Care and Services - Admitted Since 6/28/2024    No active coordination exists for this encounter.          Demographic Summary       Row Name 07/01/24 4126       General Information    Admission Type inpatient    Preferred Language English                   Functional Status    No documentation.                  Psychosocial    No documentation.                  Abuse/Neglect    No documentation.                  Legal    No documentation.                  Substance Abuse    No documentation.                  Patient Forms    No documentation.                     Tristan Prado RN

## 2024-07-01 NOTE — PLAN OF CARE
Goal Outcome Evaluation:  Plan of Care Reviewed With: patient        Progress: improving  Outcome Evaluation: VSS, frequent productive cough, 2L NC, AOx4, call light within reach

## 2024-07-01 NOTE — CONSULTS
CONSULT NOTE    Patient Identification:  Brigid Carballo  78 y.o.  female  1945  [id number]            Requesting physician: Dr Kaiden Tamez    Reason for Consultation:  soa, lung nodule,     CC: soa    History of Present Illness:  Patient is a 77 yo with a pmh with parainfluenza viral infection anxiety, gerd, melenoma, who presented to the emergency room with worsening cough associated with chest pain and lower extremity swelling.  She described chest pain not as pleuritic but more so the heaviness/chest pressure.    No prior formal diagnosis of COPD however she is an active smoker.  No daily inhalers no prior pulmonary function testing.    She is found to have parainfluenza virus was admitted on the 28th.  She was started on IV Solu-Medrol DuoNebs and supplemental oxygen.    CT scan has shown a pulmonary nodule    Patient denies chest pressure at present time.  She would like her breathing is getting better.      Review of Systems:  As per HPI otherwise a12 system review of systems performed and all else negative    Past Medical History:   Diagnosis Date    Angina pectoris     Anxiety     [place]'s esophagus 09/29/2015    BPV (benign positional vertigo) 11/07/2021    Bunion of left foot 06/14/2016    Cancer     skin    Carpal tunnel syndrome     Chest pain 10/05/2020    DDD (degenerative disc disease), cervical 11/04/2014    Deformity of wrist joint 05/03/2018    Hyperlipidemia     Insomnia 09/11/2017    Kidney stones     Low back pain 11/04/2014    Melanoma in situ of unspecified lower limb, including hip 06/2021    Occlusion of left vertebral artery 11/06/2021    1/10/22 CTA neck: beyond its origin, left vertebral artery is widely patent throughout its cervical and intracranial course to the vertebrobasilar junction without stenosis    Osteoarthritis of left knee 06/27/2016    Renal stone 03/02/2020    Spinal stenosis of lumbar region 12/29/2014    Tear of lateral meniscus of knee 09/30/2014     Unexplained weight loss 04/21/2021       Past Surgical History:   Procedure Laterality Date    APPENDECTOMY      BREAST BIOPSY      multiple    CARDIAC CATHETERIZATION      CARDIAC CATHETERIZATION N/A 11/29/2022    Procedure: Left Heart Cath;  Surgeon: Brennen Nguyen MD;  Location:  [facility];  Service: Cardiology;  Laterality: N/A;    CARDIAC CATHETERIZATION N/A 11/29/2022    Procedure: Coronary angiography;  Surgeon: Brennen Nguyen MD;  Location:  [facility];  Service: Cardiology;  Laterality: N/A;    CARDIAC CATHETERIZATION N/A 11/29/2022    Procedure: Left ventriculography;  Surgeon: Brennen Nguyen MD;  Location:  [facility];  Service: Cardiology;  Laterality: N/A;    CARPAL TUNNEL RELEASE Bilateral     COLONOSCOPY  05/17/2021    polyp removed; per Dr. Velasquez    CYSTOSCOPY W/ URETERAL STENT PLACEMENT  10/22/2019    HYSTERECTOMY      INGUINAL HERNIA REPAIR Right     KNEE ARTHROSCOPY Right     meniscus    TONSILLECTOMY      UPPER GASTROINTESTINAL ENDOSCOPY  05/17/2021    per Dr. Velasquez        Medications Prior to Admission   Medication Sig Dispense Refill Last Dose    ascorbic acid (VITAMIN C) 1000 MG tablet Take 1 tablet by mouth Daily.   6/27/2024    aspirin 81 MG EC tablet Take 1 tablet by mouth Daily. 90 tablet 0 6/28/2024    atorvastatin (LIPITOR) 40 MG tablet TAKE 1 TABLET BY MOUTH ONCE DAILY AT NIGHT 90 tablet 1 6/27/2024    cholecalciferol (VITAMIN D3) 25 MCG (1000 UT) tablet Take 1 tablet by mouth Daily.   6/28/2024    esomeprazole (nexIUM) 20 MG capsule Take 1 capsule by mouth Every Morning Before Breakfast.   6/28/2024    vitamin B-12 (CYANOCOBALAMIN) 1000 MCG tablet Take 1 tablet by mouth Daily.   6/28/2024    fluticasone (FLONASE) 50 MCG/ACT nasal spray 2 sprays into the nostril(s) as directed by provider Daily. 18.2 mL 2 Unknown    Omega-3 Fatty Acids (fish oil) 1000 MG capsule capsule Take 1 capsule by mouth Daily With  "Breakfast. (Patient not taking: Reported on 4/19/2024)   Unknown       Allergies   Allergen Reactions    Amoxicillin-Pot Clavulanate Itching    Codeine Itching    Latex Unknown - Low Severity    Penicillins Unknown - High Severity       Social History     Socioeconomic History    Marital status:    Tobacco Use    Smoking status: Every Day     Current packs/day: 0.50     Average packs/day: 0.5 packs/day for 65.0 years (32.5 ttl pk-yrs)     Types: Cigarettes    Smokeless tobacco: Never    Tobacco comments:     Quit now [place] info provided   Vaping Use    Vaping status: Never Used   Substance and Sexual Activity    Alcohol use: Yes     Comment: social--rarely    Drug use: Never    Sexual activity: Defer       Family History   Problem Relation Age of Onset    Emphysema Mother     Heart disease Mother     Heart disease Father     Cancer Brother     Heart disease Brother     Skin cancer Brother     Stomach cancer Maternal Aunt     Breast cancer Maternal Grandmother     Breast cancer Paternal Grandmother        Physical Exam:  /81 (BP Location: Left arm, Patient Position: Sitting)   Pulse 64   Temp 97.7 °F (36.5 °C) (Oral)   Resp 20   Ht 157.5 cm (62\")   Wt 64.1 kg (141 lb 5 oz)   SpO2 92%   BMI 25.85 kg/m²   Body mass index is 25.85 kg/m².   General appearance: NAD, conversant   Eyes: Anicteric sclerae, moist conjunctivae; no lid lag;   HENT: Atraumatic; oropharynx clear with moist mucous membranes and no mucosal ulcerations; normal hard and soft palate  Neck: Trachea midline;supple, no thyromegaly or lymphadenopathy  Lungs: Positive wheeze rhonchi, with calm respiratory effort and no intercostal retractions  CV: RRR, no MRGs   Abdomen: No significant trunk obesity bowel sounds positive  Extremities: No peripheral edema or extremity lymphadenopathy  Skin: Warm well-perfused ultrasound on anterior chest wall  Psych: Appropriate affect, alert  Neuro cranials 2 through grossly intact speech intact " moves extremities    LABS:  Results from last 7 days   Lab Units 06/29/24  0525 06/28/24  1633   WBC 10*3/mm3 2.66* 3.96   HEMOGLOBIN g/dL 13.3 14.6   PLATELETS 10*3/mm3 144 146     Results from last 7 days   Lab Units 06/30/24  0520 06/29/24  0525 06/28/24  1633   SODIUM mmol/L 140 141 143   POTASSIUM mmol/L 4.4 4.1 4.0   CHLORIDE mmol/L 107 107 108*   CO2 mmol/L 23.0 26.0 25.0   BUN mg/dL 20 20 17   CREATININE mg/dL 0.65 0.58 0.81   GLUCOSE mg/dL 133* 130* 84   CALCIUM mg/dL 9.0 9.2 9.0   MAGNESIUM mg/dL 3.1* 2.3  --    Estimated Creatinine Clearance: 61.7 mL/min (by C-G formula based on SCr of 0.65 mg/dL).    Imaging: I personally visualized the images of scans/x-rays performed within last 3 days.  Imaging Results (Most Recent)       Procedure Component Value Units Date/Time    CT Chest Without Contrast Diagnostic [[id number]] Collected: 06/30/24 2206     Updated: 06/30/24 2226    Narrative:      CT CHEST WITHOUT CONTRAST     HISTORY: Hypoxic respiratory failure     TECHNIQUE: Radiation dose reduction techniques were utilized, including  automated exposure control and exposure modulation based on body size.   3 mm images were obtained through the chest without the administration  of IV contrast.     COMPARISON: None available        FINDINGS: A 1.8 cm subcutaneous nodule measuring soft tissue density in  the inferior left paramidline anterior chest wall (series 2/image 59).     Heart is normal in size. Severe coronary calcifications. Small volume  pericardial fluid. Nondilated main pulmonary artery and thoracic aorta.  No mediastinal/hilar adenopathy. Decompressed esophagus.     Trachea is patent. No bronchiectasis. Moderate bronchial wall thickening  throughout the right middle and right lower lobes with segmental and  subsegmental mucous plugging more severe in the right lower lobe. More  mild bronchial wall thickening throughout the remaining lobes. No focal  consolidation or groundglass opacity. A left lower  lobe 1 cm solid  nodule (series 3/image 71). No internal macroscopic fat. Few additional  sub-6 mm solid pulmonary nodules. Reference right upper lobe (series  3/image 64) and left upper lobe (series 3/image 40). Background advanced  biapical predominant centrilobular and paraseptal emphysema. Biapical  pleural-parenchymal scarring. Trace right pleural effusion.             Impression:      1. Moderate bronchial wall thickening throughout the right middle and  right lower lobes suggesting bronchitis with segmental and subsegmental  mucous plugging throughout much of the right middle and right lower  lobes. No focal consolidation or groundglass opacity at this time.  2. A left lower lobe 1 cm solid pulmonary nodule. Recommend pulmonology  consultation and consideration of FDG PET/CT.  3. Background advanced pulmonary emphysema.  4. Indeterminate 1.8 cm subcutaneous nodule measuring soft tissue  density in the inferior left paramidline anterior chest wall. Recommend  nonemergent outpatient follow-up ultrasound.     This report was finalized on 6/30/2024 10:22 PM by Dr. Spencer Dexetr M.D on Workstation: [id number]       XR Chest 1 View [[id number]] Collected: 06/28/24 1724     Updated: 06/28/24 1728    Narrative:      XR CHEST 1 VW-     HISTORY: Female who is 78 years-old, short of breath     TECHNIQUE: Frontal view of the chest     COMPARISON: None available     FINDINGS: The heart size is normal. Aorta is calcified. Pulmonary  vasculature is unremarkable. Minimal likely scarring or atelectasis at  the bases. No pleural effusion, or pneumothorax. No acute osseous  process.       Impression:      Minimal likely scarring or atelectasis at the bases.  Follow-up as clinical indications persist.     This report was finalized on 6/28/2024 5:25 PM by Dr. Jonathan Wolfe M.D on Workstation: [id number]               Assessment / Recommendations:  Parainfluenza viral infection  Lung nodules  Bronchospasm secondary to  above  Smoker  Anterior chest wall nodule  AHRF  Leukopenia repeat CBC to evaluate resolution    Pulmonary nodule require outpatient follow-up.  Would suggest PET scan upon follow-up given size and smoking history.    Will go ahead and perform ultrasound of subcutaneous nodule it is 2 cm in the anterior chest wall.    Will decrease dose of IV Solu-Medrol  Start Symbicort  Continue DuoNebs  Flutter valve  Incentive spirometer  Needs outpatient pulmonary function testing.    Smoking cessation counseling given patient not willing or receptive.    Wean oxygen as able.  Will need exercise oximetry prior to discharge.      Jin Blancas MD  [facility]  07/01/24  12:44 EDT

## 2024-07-01 NOTE — PLAN OF CARE
Goal Outcome Evaluation:  Plan of Care Reviewed With: patient        Progress: no change  Outcome Evaluation: VSS, cough & wheezing remains, IV Steroids per order. No c/o of pain. SR/SB on monitor. 2L O2

## 2024-07-01 NOTE — PROGRESS NOTES
" LOS: 3 days     Name: Brigid Carballo  Age: 78 y.o.  Sex: female  :  1945  MRN: [id number]         Primary Care Physician: Kaya Mckeon APRN    Subjective   Subjective  Still feels very congested.  Not able to cough anything up.  Denies much in the way of shortness of breath.  Currently on 2 L.  Does not wear oxygen at home.    Objective   Vital Signs  Temp:  [97.6 °F (36.4 °C)-98 °F (36.7 °C)] 97.6 °F (36.4 °C)  Heart Rate:  [54-76] 54  Resp:  [18-20] 20  BP: (113-149)/(58-97) 149/71  Body mass index is 25.85 kg/m².    Objective:  General Appearance:  Comfortable and in no acute distress.    Vital signs: (most recent): Blood pressure 149/71, pulse 54, temperature 97.6 °F (36.4 °C), temperature source Oral, resp. rate 20, height 157.5 cm (62\"), weight 64.1 kg (141 lb 5 oz), SpO2 97%.    Lungs:  Normal respiratory rate and increased effort.  There are decreased breath sounds, wheezes and rhonchi.    Heart: Normal rate.  Regular rhythm.    Abdomen: Abdomen is soft.  Bowel sounds are normal.   There is no abdominal tenderness.     Extremities: There is no dependent edema or local swelling.    Neurological: Patient is alert and oriented to person, place and time.    Skin:  Warm and dry.                Results Review:       I reviewed the patient's new clinical results.    Results from last 7 days   Lab Units 24  0525 24  1633   WBC 10*3/mm3 2.66* 3.96   HEMOGLOBIN g/dL 13.3 14.6   PLATELETS 10*3/mm3 144 146     Results from last 7 days   Lab Units 24  0520 24  0525 24  1633   SODIUM mmol/L 140 141 143   POTASSIUM mmol/L 4.4 4.1 4.0   CHLORIDE mmol/L 107 107 108*   CO2 mmol/L 23.0 26.0 25.0   BUN mg/dL 20 20 17   CREATININE mg/dL 0.65 0.58 0.81   CALCIUM mg/dL 9.0 9.2 9.0   GLUCOSE mg/dL 133* 130* 84                 Scheduled Meds:   ascorbic acid, 1,000 mg, Oral, Daily  aspirin, 81 mg, Oral, Daily  atorvastatin, 40 mg, Oral, Nightly  cholecalciferol, 1,000 Units, Oral, " Daily  dextromethorphan polistirex ER, 60 mg, Oral, Q12H  enoxaparin, 40 mg, Subcutaneous, Q24H  fluticasone, 2 spray, Nasal, Daily  guaiFENesin, 1,200 mg, Oral, Q12H  ipratropium-albuterol, 3 mL, Nebulization, Q4H While Awake - RT  methylPREDNISolone sodium succinate, 60 mg, Intravenous, Q8H  pantoprazole, 40 mg, Oral, Q AM  sodium chloride, 4 mL, Nebulization, BID - RT  vitamin B-12, 1,000 mcg, Oral, Daily      PRN Meds:     acetaminophen **OR** acetaminophen **OR** acetaminophen    albuterol    benzonatate    senna-docusate sodium **AND** polyethylene glycol **AND** bisacodyl **AND** bisacodyl    melatonin    ondansetron ODT **OR** ondansetron  Continuous Infusions:       Assessment & Plan   Active Hospital Problems    Diagnosis  POA    **Parainfluenza [B34.8]  Yes    Lung nodule [R91.1]  Unknown    Tobacco use [Z72.0]  Yes    Bronchospasm with bronchitis, acute [J20.9]  Unknown    Hypoxia [R09.02]  Yes    Acute bronchitis [J20.9]  Yes    Current smoker [F17.200]  Yes      Resolved Hospital Problems   No resolved problems to display.       Assessment & Plan    -CT scan of the chest last night showed mucous plugging and left lower lobe pulmonary nodule.  Will consult pulmonology  -Continue scheduled bronchodilators and IV steroids.  Add incentive spirometer and flutter valve.  Add hypertonic saline nebs  -Wean oxygen as tolerated.  Presently on 2 L.  Does not utilize oxygen at home.  -Outpatient ultrasound follow-up of 1.8 cm subcutaneous nodule in the left anterior chest wall recommended  -Lovenox and PPI for prophylaxis        Expected discharge date/ time has not been documented.     Kaiden Tamez MD  [facility]  07/01/24  10:24 EDT

## 2024-07-02 LAB
ANION GAP SERPL CALCULATED.3IONS-SCNC: 4.7 MMOL/L (ref 5–15)
BUN SERPL-MCNC: 21 MG/DL (ref 8–23)
BUN/CREAT SERPL: 26.6 (ref 7–25)
CALCIUM SPEC-SCNC: 8.7 MG/DL (ref 8.6–10.5)
CHLORIDE SERPL-SCNC: 108 MMOL/L (ref 98–107)
CO2 SERPL-SCNC: 28.3 MMOL/L (ref 22–29)
CREAT SERPL-MCNC: 0.79 MG/DL (ref 0.57–1)
DEPRECATED RDW RBC AUTO: 41.5 FL (ref 37–54)
EGFRCR SERPLBLD CKD-EPI 2021: 76.2 ML/MIN/1.73
ERYTHROCYTE [DISTWIDTH] IN BLOOD BY AUTOMATED COUNT: 11.7 % (ref 12.3–15.4)
GLUCOSE SERPL-MCNC: 138 MG/DL (ref 65–99)
HCT VFR BLD AUTO: 40.7 % (ref 34–46.6)
HGB BLD-MCNC: 13.6 G/DL (ref 12–15.9)
MCH RBC QN AUTO: 32.2 PG (ref 26.6–33)
MCHC RBC AUTO-ENTMCNC: 33.4 G/DL (ref 31.5–35.7)
MCV RBC AUTO: 96.2 FL (ref 79–97)
PLATELET # BLD AUTO: 160 10*3/MM3 (ref 140–450)
PMV BLD AUTO: 10.5 FL (ref 6–12)
POTASSIUM SERPL-SCNC: 4.5 MMOL/L (ref 3.5–5.2)
RBC # BLD AUTO: 4.23 10*6/MM3 (ref 3.77–5.28)
SODIUM SERPL-SCNC: 141 MMOL/L (ref 136–145)
WBC NRBC COR # BLD AUTO: 8.79 10*3/MM3 (ref 3.4–10.8)

## 2024-07-02 PROCEDURE — 80048 BASIC METABOLIC PNL TOTAL CA: CPT | Performed by: INTERNAL MEDICINE

## 2024-07-02 PROCEDURE — 25010000002 METHYLPREDNISOLONE PER 40 MG: Performed by: INTERNAL MEDICINE

## 2024-07-02 PROCEDURE — 85027 COMPLETE CBC AUTOMATED: CPT | Performed by: INTERNAL MEDICINE

## 2024-07-02 PROCEDURE — 94799 UNLISTED PULMONARY SVC/PX: CPT

## 2024-07-02 PROCEDURE — 25010000002 ENOXAPARIN PER 10 MG: Performed by: INTERNAL MEDICINE

## 2024-07-02 PROCEDURE — 94664 DEMO&/EVAL PT USE INHALER: CPT

## 2024-07-02 PROCEDURE — 94760 N-INVAS EAR/PLS OXIMETRY 1: CPT

## 2024-07-02 PROCEDURE — 94761 N-INVAS EAR/PLS OXIMETRY MLT: CPT

## 2024-07-02 RX ADMIN — IPRATROPIUM BROMIDE AND ALBUTEROL SULFATE 3 ML: .5; 3 SOLUTION RESPIRATORY (INHALATION) at 19:08

## 2024-07-02 RX ADMIN — Medication 4 ML: at 19:09

## 2024-07-02 RX ADMIN — METHYLPREDNISOLONE SODIUM SUCCINATE 40 MG: 40 INJECTION, POWDER, FOR SOLUTION INTRAMUSCULAR; INTRAVENOUS at 09:11

## 2024-07-02 RX ADMIN — OXYCODONE HYDROCHLORIDE AND ACETAMINOPHEN 1000 MG: 500 TABLET ORAL at 09:08

## 2024-07-02 RX ADMIN — DEXTROMETHORPHAN POLISTIREX 60 MG: 30 SUSPENSION ORAL at 20:49

## 2024-07-02 RX ADMIN — IPRATROPIUM BROMIDE AND ALBUTEROL SULFATE 3 ML: .5; 3 SOLUTION RESPIRATORY (INHALATION) at 10:42

## 2024-07-02 RX ADMIN — GUAIFENESIN 1200 MG: 600 TABLET, EXTENDED RELEASE ORAL at 20:49

## 2024-07-02 RX ADMIN — Medication 4 ML: at 07:17

## 2024-07-02 RX ADMIN — IPRATROPIUM BROMIDE AND ALBUTEROL SULFATE 3 ML: .5; 3 SOLUTION RESPIRATORY (INHALATION) at 15:53

## 2024-07-02 RX ADMIN — Medication 1000 UNITS: at 09:08

## 2024-07-02 RX ADMIN — ATORVASTATIN CALCIUM 40 MG: 20 TABLET, FILM COATED ORAL at 20:49

## 2024-07-02 RX ADMIN — BUDESONIDE AND FORMOTEROL FUMARATE DIHYDRATE 2 PUFF: 160; 4.5 AEROSOL RESPIRATORY (INHALATION) at 07:18

## 2024-07-02 RX ADMIN — GUAIFENESIN 1200 MG: 600 TABLET, EXTENDED RELEASE ORAL at 09:08

## 2024-07-02 RX ADMIN — BUDESONIDE AND FORMOTEROL FUMARATE DIHYDRATE 2 PUFF: 160; 4.5 AEROSOL RESPIRATORY (INHALATION) at 19:08

## 2024-07-02 RX ADMIN — DEXTROMETHORPHAN POLISTIREX 60 MG: 30 SUSPENSION ORAL at 09:14

## 2024-07-02 RX ADMIN — PANTOPRAZOLE SODIUM 40 MG: 40 TABLET, DELAYED RELEASE ORAL at 09:10

## 2024-07-02 RX ADMIN — METHYLPREDNISOLONE SODIUM SUCCINATE 40 MG: 40 INJECTION, POWDER, FOR SOLUTION INTRAMUSCULAR; INTRAVENOUS at 15:41

## 2024-07-02 RX ADMIN — ENOXAPARIN SODIUM 40 MG: 100 INJECTION SUBCUTANEOUS at 12:38

## 2024-07-02 RX ADMIN — IPRATROPIUM BROMIDE AND ALBUTEROL SULFATE 3 ML: .5; 3 SOLUTION RESPIRATORY (INHALATION) at 07:15

## 2024-07-02 RX ADMIN — Medication 1000 MCG: at 09:09

## 2024-07-02 RX ADMIN — FLUTICASONE PROPIONATE 2 SPRAY: 50 SPRAY, METERED NASAL at 09:07

## 2024-07-02 RX ADMIN — ASPIRIN 81 MG: 81 TABLET, COATED ORAL at 09:11

## 2024-07-02 NOTE — PLAN OF CARE
Problem: Adult Inpatient Plan of Care  Goal: Plan of Care Review  Outcome: Ongoing, Progressing  Flowsheets  Taken 7/2/2024 1619 by Johan Cruz RN  Progress: improving  Outcome Evaluation: VSS. Up Ad amadeo. Cough still productive and persistent.  Taken 7/1/2024 1807 by Tessie Figueroa RN  Plan of Care Reviewed With: patient  Goal: Patient-Specific Goal (Individualized)  Outcome: Ongoing, Progressing  Goal: Absence of Hospital-Acquired Illness or Injury  Outcome: Ongoing, Progressing  Intervention: Identify and Manage Fall Risk  Recent Flowsheet Documentation  Taken 7/2/2024 1433 by Johan Cruz RN  Safety Promotion/Fall Prevention:   assistive device/personal items within reach   clutter free environment maintained   nonskid shoes/slippers when out of bed   room organization consistent   safety round/check completed  Taken 7/2/2024 1239 by Johan Cruz RN  Safety Promotion/Fall Prevention:   assistive device/personal items within reach   clutter free environment maintained   fall prevention program maintained   nonskid shoes/slippers when out of bed   room organization consistent   safety round/check completed  Taken 7/2/2024 1020 by Johan Cruz RN  Safety Promotion/Fall Prevention:   assistive device/personal items within reach   clutter free environment maintained   nonskid shoes/slippers when out of bed   room organization consistent   safety round/check completed  Taken 7/2/2024 0917 by Johan Cruz RN  Safety Promotion/Fall Prevention:   clutter free environment maintained   fall prevention program maintained   nonskid shoes/slippers when out of bed   room organization consistent   safety round/check completed  Intervention: Prevent Skin Injury  Recent Flowsheet Documentation  Taken 7/2/2024 1433 by Johan Cruz RN  Body Position: sitting up in bed  Intervention: Prevent and Manage VTE (Venous Thromboembolism) Risk  Recent Flowsheet Documentation  Taken 7/2/2024 1433 by Johan Cruz  RN  Activity Management: up ad amadeo  VTE Prevention/Management: (lovenox) --  Taken 7/2/2024 1239 by Johan Cruz RN  Activity Management: up ad amadeo  Taken 7/2/2024 1020 by Johan Cruz RN  Activity Management: up ad amadeo  Taken 7/2/2024 0917 by Johan Cruz RN  Activity Management:   up ad amadeo   ambulated in room  Goal: Optimal Comfort and Wellbeing  Outcome: Ongoing, Progressing  Goal: Readiness for Transition of Care  Outcome: Ongoing, Progressing     Problem: Gas Exchange Impaired  Goal: Optimal Gas Exchange  Outcome: Ongoing, Progressing     Problem: Fall Injury Risk  Goal: Absence of Fall and Fall-Related Injury  Outcome: Ongoing, Progressing  Intervention: Promote Injury-Free Environment  Recent Flowsheet Documentation  Taken 7/2/2024 1433 by Johan Cruz RN  Safety Promotion/Fall Prevention:   assistive device/personal items within reach   clutter free environment maintained   nonskid shoes/slippers when out of bed   room organization consistent   safety round/check completed  Taken 7/2/2024 1239 by Johan Cruz RN  Safety Promotion/Fall Prevention:   assistive device/personal items within reach   clutter free environment maintained   fall prevention program maintained   nonskid shoes/slippers when out of bed   room organization consistent   safety round/check completed  Taken 7/2/2024 1020 by Johan Cruz RN  Safety Promotion/Fall Prevention:   assistive device/personal items within reach   clutter free environment maintained   nonskid shoes/slippers when out of bed   room organization consistent   safety round/check completed  Taken 7/2/2024 0917 by Johan Cruz RN  Safety Promotion/Fall Prevention:   clutter free environment maintained   fall prevention program maintained   nonskid shoes/slippers when out of bed   room organization consistent   safety round/check completed   Goal Outcome Evaluation:           Progress: improving  Outcome Evaluation: VSS. Up Ad amadeo. Cough still  productive and persistent.

## 2024-07-02 NOTE — DISCHARGE PLACEMENT REQUEST
"Brigid Kruger (78 y.o. Female)       Date of Birth   1945    Social Security Number       Address   [address] [address]    Home Phone   [phone]    MRN   [id number]       Muslim   [employer]    Marital Status                               Admission Date   6/28/24    Admission Type   Emergency    Admitting Provider   Brenda Calderon MD    Attending Provider   Kaiden Tamez MD    Department, Room/Bed   [address], E566/1       Discharge Date       Discharge Disposition       Discharge Destination                                 Attending Provider: Kaiden Tamez MD    Allergies: Amoxicillin-pot Clavulanate, Codeine, Latex, Penicillins    Isolation: None   Infection: None   Code Status: CPR    Ht: 157.5 cm (62\")   Wt: 66.3 kg (146 lb 2.6 oz)    Admission Cmt: None   Principal Problem: Parainfluenza [B34.8]                   Active Insurance as of 6/28/2024       Primary Coverage       Payor Plan Insurance Group Employer/Plan Group    HUMANA MEDICARE REPLACEMENT HU[employer] MED ADV GROUP [id number]       Payor Plan Address Payor Plan Phone Number Payor Plan Fax Number Effective Dates    [address] [address] [phone]  7/1/2018 - None Entered    [facility] [id number]         Subscriber Name Subscriber Birth Date Member ID       BRIGID KRUGER 1945 [id number]                     Emergency Contacts        (Rel.) Home Phone Work Phone Mobile Phone    Lee,Jason Call 1st if emergency (Son) -- -- [phone]    KELLY KRUGER (Spouse) [phone] -- --              Emergency Contact Information       Name Relation Home Work Mobile    Lee,Jason Call 1st if emergency Son   [phone]    KELLY KRUGER Spouse [phone]            "

## 2024-07-02 NOTE — PROGRESS NOTES
"  Daily Progress Note.   [address]  7/2/2024    Patient:  Name:  Brigid Carballo  MRN:  [id number]  1945  78 y.o.  female         Requesting physician: Dr Kaiden Tamez     Reason for Consultation:  soa, lung nodule,      CC: soa     History of Present Illness:  Patient is a 77 yo with a pmh with parainfluenza viral infection anxiety, gerd, melenoma, who presented to the emergency room with worsening cough associated with chest pain and lower extremity swelling.  She described chest pain not as pleuritic but more so the heaviness/chest pressure.     No prior formal diagnosis of COPD however she is an active smoker.  No daily inhalers no prior pulmonary function testing.     She is found to have parainfluenza virus was admitted on the 28th.  She was started on IV Solu-Medrol DuoNebs and supplemental oxygen.     CT scan has shown a pulmonary nodule     Patient denies chest pressure at present time.  She would like her breathing is getting better.    Interval History:    Still with coughing mucus production no hemoptysis.  Still with shortness of breath.  No lethargy no confusion  Tolerating a diet no emesis no abdominal pain      Physical Exam:  /72 (BP Location: Left arm, Patient Position: Sitting)   Pulse 62   Temp 97.5 °F (36.4 °C) (Oral)   Resp 18   Ht 157.5 cm (62\")   Wt 66.3 kg (146 lb 2.6 oz)   SpO2 91%   BMI 26.73 kg/m²   Body mass index is 26.73 kg/m².    Intake/Output Summary (Last 24 hours) at 7/2/2024 0936  Last data filed at 7/2/2024 0917  Gross per 24 hour   Intake 640 ml   Output --   Net 640 ml     General appearance: NAD, conversant   Eyes: Anicteric sclerae, moist conjunctivae; no lid lag;   HENT: Atraumatic; oropharynx clear with moist mucous membranes and no mucosal ulcerations; normal hard and soft palate  Neck: Trachea midline;supple, no thyromegaly or lymphadenopathy  Lungs: Positive wheeze rhonchi, with calm respiratory effort and no intercostal " retractions  CV: RRR, no MRGs   Abdomen: No significant trunk obesity bowel sounds positive  Extremities: No peripheral edema or extremity lymphadenopathy  Skin: Warm well-perfused ultrasound on anterior chest wall  Psych: Appropriate affect, alert  Neuro cranials 2 through grossly intact speech intact moves extremities    Data Review:  Notable Labs:  Results from last 7 days   Lab Units 07/02/24 0445 06/29/24  0525 06/28/24  1633   WBC 10*3/mm3 8.79 2.66* 3.96   HEMOGLOBIN g/dL 13.6 13.3 14.6   PLATELETS 10*3/mm3 160 144 146     Results from last 7 days   Lab Units 07/02/24 0445 06/30/24  0520 06/29/24  0525 06/28/24  1633   SODIUM mmol/L 141 140 141 143   POTASSIUM mmol/L 4.5 4.4 4.1 4.0   CHLORIDE mmol/L 108* 107 107 108*   CO2 mmol/L 28.3 23.0 26.0 25.0   BUN mg/dL 21 20 20 17   CREATININE mg/dL 0.79 0.65 0.58 0.81   GLUCOSE mg/dL 138* 133* 130* 84   CALCIUM mg/dL 8.7 9.0 9.2 9.0   MAGNESIUM mg/dL  --  3.1* 2.3  --    Estimated Creatinine Clearance: 51.6 mL/min (by C-G formula based on SCr of 0.79 mg/dL).    Results from last 7 days   Lab Units 07/02/24 0445 06/29/24  0525 06/28/24  1633   AST (SGOT) U/L  --   --  15   ALT (SGPT) U/L  --   --  10   PLATELETS 10*3/mm3 160 144 146             Imaging:  Reviewed chest images personally from past 3 days    ASSESSMENT  /  PLAN:  Parainfluenza viral infection  Lung nodules  Bronchospasm secondary to above  Smoker  Anterior chest wall nodule  AHRF  Leukopenia repeat CBC shows resolution     Pulmonary nodule require outpatient follow-up.  Would suggest PET scan upon follow-up given size and smoking history.     She says this has been there for over 10 years.  She follows with dermatologist.  Radiology recommending dermatology consultation she will bring this to the dermatologist attention.     Continue V Solu-Medrol  Continue Symbicort  Continue DuoNebs  Flutter valve  Incentive spirometer  Needs outpatient pulmonary function testing.     Smoking cessation counseling  given patient not willing or receptive.     Wean oxygen as able.  Will need exercise oximetry prior to discharge.    Electronically signed by Jin Blancas MD, 07/02/24, 10:07 AM EDT.

## 2024-07-02 NOTE — PROGRESS NOTES
" LOS: 4 days     Name: Brigid Carballo  Age: 78 y.o.  Sex: female  :  1945  MRN: [id number]         Primary Care Physician: Kaya Mckeon APRN    Subjective   Subjective  Feels as if her breathing has improved.  Still short of breath, however.  Cough is moderately productive.    Objective   Vital Signs  Temp:  [97.4 °F (36.3 °C)-97.9 °F (36.6 °C)] 97.5 °F (36.4 °C)  Heart Rate:  [59-96] 70  Resp:  [16-20] 18  BP: (111-149)/(56-81) 125/72  Body mass index is 26.73 kg/m².    Objective:  General Appearance:  Comfortable and in no acute distress.    Vital signs: (most recent): Blood pressure 125/72, pulse 70, temperature 97.5 °F (36.4 °C), temperature source Oral, resp. rate 18, height 157.5 cm (62\"), weight 66.3 kg (146 lb 2.6 oz), SpO2 93%.    Lungs:  Normal effort and normal respiratory rate.  She is not in respiratory distress.  There are decreased breath sounds.    Heart: Normal rate.  Regular rhythm.    Abdomen: Abdomen is soft.  Bowel sounds are normal.   There is no abdominal tenderness.     Extremities: There is no dependent edema or local swelling.    Neurological: Patient is alert and oriented to person, place and time.    Skin:  Warm and dry.                Results Review:       I reviewed the patient's new clinical results.    Results from last 7 days   Lab Units 24  0525 24  1633   WBC 10*3/mm3 8.79 2.66* 3.96   HEMOGLOBIN g/dL 13.6 13.3 14.6   PLATELETS 10*3/mm3 160 144 146     Results from last 7 days   Lab Units 24  0445 24  0520 24  0525 24  1633   SODIUM mmol/L 141 140 141 143   POTASSIUM mmol/L 4.5 4.4 4.1 4.0   CHLORIDE mmol/L 108* 107 107 108*   CO2 mmol/L 28.3 23.0 26.0 25.0   BUN mg/dL 21 20 20 17   CREATININE mg/dL 0.79 0.65 0.58 0.81   CALCIUM mg/dL 8.7 9.0 9.2 9.0   GLUCOSE mg/dL 138* 133* 130* 84                 Scheduled Meds:   ascorbic acid, 1,000 mg, Oral, Daily  aspirin, 81 mg, Oral, Daily  atorvastatin, 40 mg, Oral, " Nightly  budesonide-formoterol, 2 puff, Inhalation, BID - RT  cholecalciferol, 1,000 Units, Oral, Daily  dextromethorphan polistirex ER, 60 mg, Oral, Q12H  enoxaparin, 40 mg, Subcutaneous, Q24H  fluticasone, 2 spray, Nasal, Daily  guaiFENesin, 1,200 mg, Oral, Q12H  ipratropium-albuterol, 3 mL, Nebulization, Q4H While Awake - RT  methylPREDNISolone sodium succinate, 40 mg, Intravenous, Q8H  pantoprazole, 40 mg, Oral, Q AM  sodium chloride, 4 mL, Nebulization, BID - RT  vitamin B-12, 1,000 mcg, Oral, Daily      PRN Meds:     acetaminophen **OR** acetaminophen **OR** acetaminophen    albuterol    benzonatate    senna-docusate sodium **AND** polyethylene glycol **AND** bisacodyl **AND** bisacodyl    melatonin    ondansetron ODT **OR** ondansetron  Continuous Infusions:       Assessment & Plan   Active Hospital Problems    Diagnosis  POA    **Parainfluenza [B34.8]  Yes    Lung nodule [R91.1]  Unknown    Tobacco use [Z72.0]  Yes    Bronchospasm with bronchitis, acute [J20.9]  Unknown    Hypoxia [R09.02]  Yes    Acute bronchitis [J20.9]  Yes    Current smoker [F17.200]  Yes      Resolved Hospital Problems   No resolved problems to display.       Assessment & Plan    -Continues on IV steroids and aggressive bronchodilators/pulmonary hygiene regimen.  Appreciate input from pulmonology.  Wean oxygen as tolerated.  -Wean oxygen as tolerated.  Presently on 2 L.  Does not utilize oxygen at home.  -Ultrasound of left anterior chest wall noted.  Recommended to patient that she follow-up with her dermatologist to which she expresses understanding and agreement.  -Lovenox and PPI for prophylaxis      Expected Discharge Date: 7/3/2024; Expected Discharge Time:      Kaiden Tamez MD  [facility]  07/02/24  11:07 EDT

## 2024-07-02 NOTE — PLAN OF CARE
Goal Outcome Evaluation:      A&Ox4   VSS. NSR on monitor  Denies SOB, on 1L NC. Spot checked O2 on RA, sustained >90%  Ad amadeo & indep in room   States she feels better, anxious for DC, hopeful today

## 2024-07-03 ENCOUNTER — APPOINTMENT (OUTPATIENT)
Dept: GENERAL RADIOLOGY | Facility: HOSPITAL | Age: 79
End: 2024-07-03
Payer: MEDICARE

## 2024-07-03 LAB
ANION GAP SERPL CALCULATED.3IONS-SCNC: 7 MMOL/L (ref 5–15)
BUN SERPL-MCNC: 19 MG/DL (ref 8–23)
BUN/CREAT SERPL: 25.7 (ref 7–25)
CALCIUM SPEC-SCNC: 8.9 MG/DL (ref 8.6–10.5)
CHLORIDE SERPL-SCNC: 105 MMOL/L (ref 98–107)
CO2 SERPL-SCNC: 29 MMOL/L (ref 22–29)
CREAT SERPL-MCNC: 0.74 MG/DL (ref 0.57–1)
DEPRECATED RDW RBC AUTO: 40.9 FL (ref 37–54)
EGFRCR SERPLBLD CKD-EPI 2021: 82.4 ML/MIN/1.73
ERYTHROCYTE [DISTWIDTH] IN BLOOD BY AUTOMATED COUNT: 11.8 % (ref 12.3–15.4)
GLUCOSE SERPL-MCNC: 132 MG/DL (ref 65–99)
HCT VFR BLD AUTO: 43.6 % (ref 34–46.6)
HGB BLD-MCNC: 14.4 G/DL (ref 12–15.9)
MCH RBC QN AUTO: 31.8 PG (ref 26.6–33)
MCHC RBC AUTO-ENTMCNC: 33 G/DL (ref 31.5–35.7)
MCV RBC AUTO: 96.2 FL (ref 79–97)
PLATELET # BLD AUTO: 169 10*3/MM3 (ref 140–450)
PMV BLD AUTO: 10.4 FL (ref 6–12)
POTASSIUM SERPL-SCNC: 4.8 MMOL/L (ref 3.5–5.2)
RBC # BLD AUTO: 4.53 10*6/MM3 (ref 3.77–5.28)
SODIUM SERPL-SCNC: 141 MMOL/L (ref 136–145)
WBC NRBC COR # BLD AUTO: 8.8 10*3/MM3 (ref 3.4–10.8)

## 2024-07-03 PROCEDURE — 94664 DEMO&/EVAL PT USE INHALER: CPT

## 2024-07-03 PROCEDURE — 25010000002 ENOXAPARIN PER 10 MG: Performed by: INTERNAL MEDICINE

## 2024-07-03 PROCEDURE — 85027 COMPLETE CBC AUTOMATED: CPT | Performed by: INTERNAL MEDICINE

## 2024-07-03 PROCEDURE — 25010000002 FUROSEMIDE PER 20 MG: Performed by: INTERNAL MEDICINE

## 2024-07-03 PROCEDURE — 71045 X-RAY EXAM CHEST 1 VIEW: CPT

## 2024-07-03 PROCEDURE — 94761 N-INVAS EAR/PLS OXIMETRY MLT: CPT

## 2024-07-03 PROCEDURE — 80048 BASIC METABOLIC PNL TOTAL CA: CPT | Performed by: INTERNAL MEDICINE

## 2024-07-03 PROCEDURE — 94799 UNLISTED PULMONARY SVC/PX: CPT

## 2024-07-03 PROCEDURE — 63710000001 PREDNISONE PER 1 MG: Performed by: INTERNAL MEDICINE

## 2024-07-03 PROCEDURE — 25010000002 METHYLPREDNISOLONE PER 40 MG: Performed by: INTERNAL MEDICINE

## 2024-07-03 RX ORDER — PREDNISONE 20 MG/1
40 TABLET ORAL
Status: DISCONTINUED | OUTPATIENT
Start: 2024-07-03 | End: 2024-07-06 | Stop reason: HOSPADM

## 2024-07-03 RX ORDER — FUROSEMIDE 10 MG/ML
40 INJECTION INTRAMUSCULAR; INTRAVENOUS ONCE
Status: COMPLETED | OUTPATIENT
Start: 2024-07-03 | End: 2024-07-03

## 2024-07-03 RX ADMIN — IPRATROPIUM BROMIDE AND ALBUTEROL SULFATE 3 ML: .5; 3 SOLUTION RESPIRATORY (INHALATION) at 07:01

## 2024-07-03 RX ADMIN — FUROSEMIDE 40 MG: 10 INJECTION, SOLUTION INTRAMUSCULAR; INTRAVENOUS at 16:00

## 2024-07-03 RX ADMIN — GUAIFENESIN 1200 MG: 600 TABLET, EXTENDED RELEASE ORAL at 20:35

## 2024-07-03 RX ADMIN — Medication 1000 UNITS: at 08:02

## 2024-07-03 RX ADMIN — OXYCODONE HYDROCHLORIDE AND ACETAMINOPHEN 1000 MG: 500 TABLET ORAL at 08:00

## 2024-07-03 RX ADMIN — BUDESONIDE AND FORMOTEROL FUMARATE DIHYDRATE 2 PUFF: 160; 4.5 AEROSOL RESPIRATORY (INHALATION) at 07:15

## 2024-07-03 RX ADMIN — Medication 4 ML: at 07:07

## 2024-07-03 RX ADMIN — BUDESONIDE AND FORMOTEROL FUMARATE DIHYDRATE 2 PUFF: 160; 4.5 AEROSOL RESPIRATORY (INHALATION) at 20:04

## 2024-07-03 RX ADMIN — PREDNISONE 40 MG: 20 TABLET ORAL at 09:36

## 2024-07-03 RX ADMIN — IPRATROPIUM BROMIDE AND ALBUTEROL SULFATE 3 ML: .5; 3 SOLUTION RESPIRATORY (INHALATION) at 15:07

## 2024-07-03 RX ADMIN — FLUTICASONE PROPIONATE 2 SPRAY: 50 SPRAY, METERED NASAL at 08:02

## 2024-07-03 RX ADMIN — GUAIFENESIN 1200 MG: 600 TABLET, EXTENDED RELEASE ORAL at 08:01

## 2024-07-03 RX ADMIN — IPRATROPIUM BROMIDE AND ALBUTEROL SULFATE 3 ML: .5; 3 SOLUTION RESPIRATORY (INHALATION) at 20:04

## 2024-07-03 RX ADMIN — Medication 1000 MCG: at 08:01

## 2024-07-03 RX ADMIN — Medication 4 ML: at 20:04

## 2024-07-03 RX ADMIN — DEXTROMETHORPHAN POLISTIREX 60 MG: 30 SUSPENSION ORAL at 08:00

## 2024-07-03 RX ADMIN — IPRATROPIUM BROMIDE AND ALBUTEROL SULFATE 3 ML: .5; 3 SOLUTION RESPIRATORY (INHALATION) at 10:35

## 2024-07-03 RX ADMIN — METHYLPREDNISOLONE SODIUM SUCCINATE 40 MG: 40 INJECTION, POWDER, FOR SOLUTION INTRAMUSCULAR; INTRAVENOUS at 00:00

## 2024-07-03 RX ADMIN — DEXTROMETHORPHAN POLISTIREX 60 MG: 30 SUSPENSION ORAL at 20:35

## 2024-07-03 RX ADMIN — ATORVASTATIN CALCIUM 40 MG: 20 TABLET, FILM COATED ORAL at 20:35

## 2024-07-03 RX ADMIN — ASPIRIN 81 MG: 81 TABLET, COATED ORAL at 08:01

## 2024-07-03 RX ADMIN — PANTOPRAZOLE SODIUM 40 MG: 40 TABLET, DELAYED RELEASE ORAL at 06:15

## 2024-07-03 RX ADMIN — ENOXAPARIN SODIUM 40 MG: 100 INJECTION SUBCUTANEOUS at 12:52

## 2024-07-03 NOTE — PROGRESS NOTES
"  Daily Progress Note.   [address]  7/3/2024    Patient:  Name:  Brigid Carballo  MRN:  [id number]  1945  78 y.o.  female         Requesting physician: Dr Kaiden Tamez     Reason for Consultation:  soa, lung nodule,      CC: soa     History of Present Illness:  Patient is a 77 yo with a pmh with parainfluenza viral infection anxiety, gerd, melenoma, who presented to the emergency room with worsening cough associated with chest pain and lower extremity swelling.  She described chest pain not as pleuritic but more so the heaviness/chest pressure.     No prior formal diagnosis of COPD however she is an active smoker.  No daily inhalers no prior pulmonary function testing.     She is found to have parainfluenza virus was admitted on the 28th.  She was started on IV Solu-Medrol DuoNebs and supplemental oxygen.     CT scan has shown a pulmonary nodule     Patient denies chest pressure at present time.  She would like her breathing is getting better.    Interval History:  2 L nasal cannula afebrile.  Blood pressure stable.  No acute events overnight  Still with productive sputum cough  She feels like she is moving in the right direction  No lethargy no confusion  Tolerating a diet no emesis no diarrhea no abdominal pain  Complains of some soreness with coughing    Physical Exam:  /84 (BP Location: Right arm, Patient Position: Lying)   Pulse 72   Temp 97.3 °F (36.3 °C) (Oral)   Resp 20   Ht 157.5 cm (62\")   Wt 66.3 kg (146 lb 2.6 oz)   SpO2 94%   BMI 26.73 kg/m²   Body mass index is 26.73 kg/m².    Intake/Output Summary (Last 24 hours) at 7/3/2024 0729  Last data filed at 7/2/2024 2056  Gross per 24 hour   Intake 1120 ml   Output 500 ml   Net 620 ml     General appearance: Nontoxic, conversant   Eyes: Anicteric sclerae, moist conjunctivae; no lid lag;   HENT: Atraumatic; oropharynx clear with moist mucous membranes and no mucosal ulcerations; normal hard and soft palate  Neck: " Trachea midline;supple, no thyromegaly or lymphadenopathy  Lungs: Better air movement wheeze rhonchi, with calm respiratory effort and no intercostal retractions  CV: RRR, no MRGs   Abdomen: No significant trunk obesity bowel sounds positive  Extremities: No peripheral edema or extremity lymphadenopathy  Skin: Warm well-perfused ultrasound on anterior chest wall  Psych: Appropriate affect, alert  Neuro cranials 2 through grossly intact speech intact moves extremities    Data Review:  Notable Labs:  Results from last 7 days   Lab Units 07/03/24 0428 07/02/24 0445 06/29/24  0525 06/28/24  1633   WBC 10*3/mm3 8.80 8.79 2.66* 3.96   HEMOGLOBIN g/dL 14.4 13.6 13.3 14.6   PLATELETS 10*3/mm3 169 160 144 146     Results from last 7 days   Lab Units 07/03/24 0428 07/02/24 0445 06/30/24 0520 06/29/24  0525 06/28/24  1633   SODIUM mmol/L 141 141 140 141 143   POTASSIUM mmol/L 4.8 4.5 4.4 4.1 4.0   CHLORIDE mmol/L 105 108* 107 107 108*   CO2 mmol/L 29.0 28.3 23.0 26.0 25.0   BUN mg/dL 19 21 20 20 17   CREATININE mg/dL 0.74 0.79 0.65 0.58 0.81   GLUCOSE mg/dL 132* 138* 133* 130* 84   CALCIUM mg/dL 8.9 8.7 9.0 9.2 9.0   MAGNESIUM mg/dL  --   --  3.1* 2.3  --    Estimated Creatinine Clearance: 55.1 mL/min (by C-G formula based on SCr of 0.74 mg/dL).    Results from last 7 days   Lab Units 07/03/24 0428 07/02/24 0445 06/29/24  0525 06/28/24  1633   AST (SGOT) U/L  --   --   --  15   ALT (SGPT) U/L  --   --   --  10   PLATELETS 10*3/mm3 169 160 144 146             Imaging:  Reviewed chest images personally from past 3 days    ASSESSMENT  /  PLAN:  Parainfluenza viral infection  Lung nodules  Bronchospasm secondary to above  Smoker  Anterior chest wall nodule  AHRF  Leukopenia repeat CBC shows resolution     Pulmonary nodule require outpatient follow-up.  Would suggest PET scan upon follow-up given size and smoking history.     She says this has been there for over 10 years.  She follows with dermatologist.  Radiology  recommending dermatology consultation she will bring this to the dermatologist attention.     stop V Solu-Medrol  Start po pred  Continue Symbicort  Continue DuoNebs  Flutter valve  Incentive spirometer  Needs outpatient pulmonary function testing.     Smoking cessation counseling given patient not willing or receptive.  However this morning she seems a little bit more agreeable to consider this     Wean oxygen as able.  Will need exercise oximetry prior to discharge.    Summary of plan today  Repeat chest x-ray  P.o. pred stop IV Solu-Medrol  Smoking cessation counseling    Electronically signed by Jin Blancas MD, 07/03/24, 8:39 AM EDT.

## 2024-07-03 NOTE — PROGRESS NOTES
" LOS: 5 days     Name: Brigid Carballo  Age: 78 y.o.  Sex: female  :  1945  MRN: [id number]         Primary Care Physician: Kaya Mckeon APRN    Subjective   Subjective  Overall feeling better.  Currently on 2 L.    Objective   Vital Signs  Temp:  [97.3 °F (36.3 °C)-98.1 °F (36.7 °C)] 97.4 °F (36.3 °C)  Heart Rate:  [56-97] 76  Resp:  [16-20] 20  BP: (102-138)/(50-84) 113/59  Body mass index is 26.73 kg/m².    Objective:  General Appearance:  Comfortable and in no acute distress.    Vital signs: (most recent): Blood pressure 113/59, pulse 76, temperature 97.4 °F (36.3 °C), temperature source Oral, resp. rate 20, height 157.5 cm (62\"), weight 66.3 kg (146 lb 2.6 oz), SpO2 93%.    Lungs:  Normal effort and normal respiratory rate.  She is not in respiratory distress.  There are decreased breath sounds and wheezes.    Heart: Normal rate.  Regular rhythm.    Abdomen: Abdomen is soft.  Bowel sounds are normal.   There is no abdominal tenderness.     Extremities: There is no dependent edema or local swelling.    Neurological: Patient is alert and oriented to person, place and time.    Skin:  Warm and dry.                Results Review:       I reviewed the patient's new clinical results.    Results from last 7 days   Lab Units 24  1633   WBC 10*3/mm3 8.80 8.79 2.66* 3.96   HEMOGLOBIN g/dL 14.4 13.6 13.3 14.6   PLATELETS 10*3/mm3 169 160 144 146     Results from last 7 days   Lab Units 24  0524  0524  1633   SODIUM mmol/L 141 141 140 141 143   POTASSIUM mmol/L 4.8 4.5 4.4 4.1 4.0   CHLORIDE mmol/L 105 108* 107 107 108*   CO2 mmol/L 29.0 28.3 23.0 26.0 25.0   BUN mg/dL 19 21 20 20 17   CREATININE mg/dL 0.74 0.79 0.65 0.58 0.81   CALCIUM mg/dL 8.9 8.7 9.0 9.2 9.0   GLUCOSE mg/dL 132* 138* 133* 130* 84                 Scheduled Meds:   ascorbic acid, 1,000 mg, Oral, Daily  aspirin, 81 mg, Oral, Daily  atorvastatin, " 40 mg, Oral, Nightly  budesonide-formoterol, 2 puff, Inhalation, BID - RT  cholecalciferol, 1,000 Units, Oral, Daily  dextromethorphan polistirex ER, 60 mg, Oral, Q12H  enoxaparin, 40 mg, Subcutaneous, Q24H  fluticasone, 2 spray, Nasal, Daily  guaiFENesin, 1,200 mg, Oral, Q12H  ipratropium-albuterol, 3 mL, Nebulization, Q4H While Awake - RT  pantoprazole, 40 mg, Oral, Q AM  predniSONE, 40 mg, Oral, Daily With Breakfast  sodium chloride, 4 mL, Nebulization, BID - RT  vitamin B-12, 1,000 mcg, Oral, Daily      PRN Meds:     acetaminophen **OR** acetaminophen **OR** acetaminophen    albuterol    benzonatate    senna-docusate sodium **AND** polyethylene glycol **AND** bisacodyl **AND** bisacodyl    melatonin    ondansetron ODT **OR** ondansetron  Continuous Infusions:       Assessment & Plan   Active Hospital Problems    Diagnosis  POA    **Parainfluenza [B34.8]  Yes    Lung nodule [R91.1]  Unknown    Tobacco use [Z72.0]  Yes    Bronchospasm with bronchitis, acute [J20.9]  Unknown    Hypoxia [R09.02]  Yes    Acute bronchitis [J20.9]  Yes    Current smoker [F17.200]  Yes      Resolved Hospital Problems   No resolved problems to display.       Assessment & Plan    -Steroids changed to prednisone today.  Continue aggressive bronchodilators/pulmonary hygiene regimen.  Appreciate input from pulmonology.   -On room air with saturation of 91% when I visited her today.  Will see how she does today and if she can remain off of oxygen.  Not utilize oxygen at home.  -Ultrasound of left anterior chest wall noted.  Recommended to patient that she follow-up with her dermatologist to which she expresses understanding and agreement.  -Lovenox and PPI for prophylaxis  -Possible discharge in 1 to 2 days      Expected Discharge Date: 7/3/2024; Expected Discharge Time:      Kaiden Tamez MD  [facility]  07/03/24  11:12 EDT

## 2024-07-03 NOTE — CASE MANAGEMENT/SOCIAL WORK
Continued Stay Note  [facility]     Patient Name: Brigid Carballo  MRN: [id number]  Today's Date: 7/3/2024    Admit Date: 6/28/2024    Plan: Home   Discharge Plan       Row Name 07/03/24 1632       Plan    Plan Home    Patient/Family in Agreement with Plan yes    Plan Comments Met with pt in room. She confirmed her plan is to return home at discharge. Her family will transport. She denies any needs at this time. CCP following. ABDOUL Dexter RN                   Discharge Codes    No documentation.                 Expected Discharge Date and Time       Expected Discharge Date Expected Discharge Time    Jul 3, 2024               Tristan Prado RN

## 2024-07-03 NOTE — PLAN OF CARE
Problem: Adult Inpatient Plan of Care  Goal: Plan of Care Review  Outcome: Ongoing, Progressing  Flowsheets  Taken 7/3/2024 1530 by Johan Cruz RN  Outcome Evaluation: VSS. A lot of the day off O2. Pt reports less congestion. Ambulated 300ft.  Taken 7/2/2024 1619 by Johan Cruz RN  Progress: improving  Taken 7/1/2024 1807 by Tessie Figueroa RN  Plan of Care Reviewed With: patient  Goal: Patient-Specific Goal (Individualized)  Outcome: Ongoing, Progressing  Goal: Absence of Hospital-Acquired Illness or Injury  Outcome: Ongoing, Progressing  Intervention: Identify and Manage Fall Risk  Recent Flowsheet Documentation  Taken 7/3/2024 1420 by Johan Cruz RN  Safety Promotion/Fall Prevention:   assistive device/personal items within reach   clutter free environment maintained   nonskid shoes/slippers when out of bed   room organization consistent   safety round/check completed  Taken 7/3/2024 1157 by Johan Cruz RN  Safety Promotion/Fall Prevention:   assistive device/personal items within reach   clutter free environment maintained   nonskid shoes/slippers when out of bed   room organization consistent   safety round/check completed  Taken 7/3/2024 1000 by Johan Cruz RN  Safety Promotion/Fall Prevention:   assistive device/personal items within reach   clutter free environment maintained   nonskid shoes/slippers when out of bed   room organization consistent   safety round/check completed  Taken 7/3/2024 0804 by Johan Cruz RN  Safety Promotion/Fall Prevention:   assistive device/personal items within reach   clutter free environment maintained   nonskid shoes/slippers when out of bed   room organization consistent   safety round/check completed  Intervention: Prevent Skin Injury  Recent Flowsheet Documentation  Taken 7/3/2024 1420 by Johan Cruz RN  Body Position: position changed independently  Taken 7/3/2024 0804 by Johan Cruz RN  Body Position: position changed  independently  Intervention: Prevent and Manage VTE (Venous Thromboembolism) Risk  Recent Flowsheet Documentation  Taken 7/3/2024 1420 by Johan Cruz RN  Activity Management: up ad amadeo  Taken 7/3/2024 1157 by Johan Cruz RN  Activity Management: up ad amadeo  Taken 7/3/2024 1000 by Johan Cruz RN  Activity Management: up ad amadeo  Taken 7/3/2024 0804 by Johan Cruz RN  Activity Management: up ad amadeo  Goal: Optimal Comfort and Wellbeing  Outcome: Ongoing, Progressing  Goal: Readiness for Transition of Care  Outcome: Ongoing, Progressing     Problem: Gas Exchange Impaired  Goal: Optimal Gas Exchange  Outcome: Ongoing, Progressing  Intervention: Optimize Oxygenation and Ventilation  Recent Flowsheet Documentation  Taken 7/3/2024 1420 by Johan Cruz RN  Head of Bed (HOB) Positioning: HOB at 30 degrees     Problem: Fall Injury Risk  Goal: Absence of Fall and Fall-Related Injury  Outcome: Ongoing, Progressing  Intervention: Promote Injury-Free Environment  Recent Flowsheet Documentation  Taken 7/3/2024 1420 by Johan Cruz RN  Safety Promotion/Fall Prevention:   assistive device/personal items within reach   clutter free environment maintained   nonskid shoes/slippers when out of bed   room organization consistent   safety round/check completed  Taken 7/3/2024 1157 by Johan Cruz RN  Safety Promotion/Fall Prevention:   assistive device/personal items within reach   clutter free environment maintained   nonskid shoes/slippers when out of bed   room organization consistent   safety round/check completed  Taken 7/3/2024 1000 by Johan Cruz RN  Safety Promotion/Fall Prevention:   assistive device/personal items within reach   clutter free environment maintained   nonskid shoes/slippers when out of bed   room organization consistent   safety round/check completed  Taken 7/3/2024 0804 by Johan Cruz RN  Safety Promotion/Fall Prevention:   assistive device/personal items within reach    clutter free environment maintained   nonskid shoes/slippers when out of bed   room organization consistent   safety round/check completed   Goal Outcome Evaluation:           Progress: improving  Outcome Evaluation: VSS. A lot of the day off O2. Pt reports less congestion. Ambulated 300ft.

## 2024-07-03 NOTE — PLAN OF CARE
Goal Outcome Evaluation:   VSS. A&OX4. 02@2LPM. No c/o pain. Up ad amadeo. Continues with productive cough. Call light in reach.

## 2024-07-04 PROCEDURE — 94664 DEMO&/EVAL PT USE INHALER: CPT

## 2024-07-04 PROCEDURE — 94799 UNLISTED PULMONARY SVC/PX: CPT

## 2024-07-04 PROCEDURE — 94761 N-INVAS EAR/PLS OXIMETRY MLT: CPT

## 2024-07-04 PROCEDURE — 25010000002 FUROSEMIDE PER 20 MG: Performed by: INTERNAL MEDICINE

## 2024-07-04 PROCEDURE — 63710000001 PREDNISONE PER 1 MG: Performed by: INTERNAL MEDICINE

## 2024-07-04 PROCEDURE — 25010000002 ENOXAPARIN PER 10 MG: Performed by: INTERNAL MEDICINE

## 2024-07-04 RX ORDER — DEXTROMETHORPHAN POLISTIREX 30 MG/5ML
60 SUSPENSION ORAL NIGHTLY PRN
Status: DISCONTINUED | OUTPATIENT
Start: 2024-07-04 | End: 2024-07-06 | Stop reason: HOSPADM

## 2024-07-04 RX ORDER — FUROSEMIDE 10 MG/ML
40 INJECTION INTRAMUSCULAR; INTRAVENOUS ONCE
Status: COMPLETED | OUTPATIENT
Start: 2024-07-04 | End: 2024-07-04

## 2024-07-04 RX ORDER — ACETYLCYSTEINE 200 MG/ML
4 SOLUTION ORAL; RESPIRATORY (INHALATION)
Status: DISCONTINUED | OUTPATIENT
Start: 2024-07-04 | End: 2024-07-06 | Stop reason: HOSPADM

## 2024-07-04 RX ADMIN — FUROSEMIDE 40 MG: 10 INJECTION, SOLUTION INTRAMUSCULAR; INTRAVENOUS at 13:01

## 2024-07-04 RX ADMIN — BUDESONIDE AND FORMOTEROL FUMARATE DIHYDRATE 2 PUFF: 160; 4.5 AEROSOL RESPIRATORY (INHALATION) at 19:38

## 2024-07-04 RX ADMIN — IPRATROPIUM BROMIDE AND ALBUTEROL SULFATE 3 ML: .5; 3 SOLUTION RESPIRATORY (INHALATION) at 07:15

## 2024-07-04 RX ADMIN — GUAIFENESIN 1200 MG: 600 TABLET, EXTENDED RELEASE ORAL at 20:20

## 2024-07-04 RX ADMIN — OXYCODONE HYDROCHLORIDE AND ACETAMINOPHEN 1000 MG: 500 TABLET ORAL at 08:37

## 2024-07-04 RX ADMIN — IPRATROPIUM BROMIDE AND ALBUTEROL SULFATE 3 ML: .5; 3 SOLUTION RESPIRATORY (INHALATION) at 23:10

## 2024-07-04 RX ADMIN — PANTOPRAZOLE SODIUM 40 MG: 40 TABLET, DELAYED RELEASE ORAL at 06:50

## 2024-07-04 RX ADMIN — Medication 1000 UNITS: at 08:37

## 2024-07-04 RX ADMIN — IPRATROPIUM BROMIDE AND ALBUTEROL SULFATE 3 ML: .5; 3 SOLUTION RESPIRATORY (INHALATION) at 19:25

## 2024-07-04 RX ADMIN — ENOXAPARIN SODIUM 40 MG: 100 INJECTION SUBCUTANEOUS at 13:00

## 2024-07-04 RX ADMIN — Medication 1000 MCG: at 08:36

## 2024-07-04 RX ADMIN — ACETYLCYSTEINE 4 ML: 200 SOLUTION ORAL; RESPIRATORY (INHALATION) at 10:45

## 2024-07-04 RX ADMIN — ACETYLCYSTEINE 4 ML: 200 SOLUTION ORAL; RESPIRATORY (INHALATION) at 19:25

## 2024-07-04 RX ADMIN — BUDESONIDE AND FORMOTEROL FUMARATE DIHYDRATE 2 PUFF: 160; 4.5 AEROSOL RESPIRATORY (INHALATION) at 07:21

## 2024-07-04 RX ADMIN — FLUTICASONE PROPIONATE 2 SPRAY: 50 SPRAY, METERED NASAL at 08:37

## 2024-07-04 RX ADMIN — Medication 4 ML: at 07:18

## 2024-07-04 RX ADMIN — IPRATROPIUM BROMIDE AND ALBUTEROL SULFATE 3 ML: .5; 3 SOLUTION RESPIRATORY (INHALATION) at 10:42

## 2024-07-04 RX ADMIN — DEXTROMETHORPHAN POLISTIREX 60 MG: 30 SUSPENSION ORAL at 08:37

## 2024-07-04 RX ADMIN — ATORVASTATIN CALCIUM 40 MG: 20 TABLET, FILM COATED ORAL at 20:20

## 2024-07-04 RX ADMIN — PREDNISONE 40 MG: 20 TABLET ORAL at 08:36

## 2024-07-04 RX ADMIN — GUAIFENESIN 1200 MG: 600 TABLET, EXTENDED RELEASE ORAL at 08:36

## 2024-07-04 RX ADMIN — ASPIRIN 81 MG: 81 TABLET, COATED ORAL at 08:36

## 2024-07-04 RX ADMIN — IPRATROPIUM BROMIDE AND ALBUTEROL SULFATE 3 ML: .5; 3 SOLUTION RESPIRATORY (INHALATION) at 15:17

## 2024-07-04 NOTE — PROGRESS NOTES
" LOS: 6 days     Name: Brigid Carballo  Age: 78 y.o.  Sex: female  :  1945  MRN: [id number]         Primary Care Physician: Kaya Mckeon, SHE    Subjective   Subjective  Currently on 2 L after being weaned to room air yesterday.  Given a dose of Lasix yesterday afternoon.  Does not feel as well today.  Feels short of breath with wheezing.    Objective   Vital Signs  Temp:  [97.2 °F (36.2 °C)-98.3 °F (36.8 °C)] 97.2 °F (36.2 °C)  Heart Rate:  [59-96] 72  Resp:  [16-20] 16  BP: (106-126)/(52-90) 126/74  Body mass index is 26.73 kg/m².    Objective:  General Appearance:  Comfortable and in no acute distress.    Vital signs: (most recent): Blood pressure 126/74, pulse 72, temperature 97.2 °F (36.2 °C), temperature source Oral, resp. rate 16, height 157.5 cm (62\"), weight 66.3 kg (146 lb 2.6 oz), SpO2 100%.    Lungs:  Normal effort and normal respiratory rate.  She is not in respiratory distress.  There are decreased breath sounds and wheezes.    Heart: Normal rate.  Regular rhythm.    Abdomen: Abdomen is soft.  Bowel sounds are normal.   There is no abdominal tenderness.     Extremities: There is no dependent edema or local swelling.    Neurological: Patient is alert and oriented to person, place and time.    Skin:  Warm and dry.                Results Review:       I reviewed the patient's new clinical results.    Results from last 7 days   Lab Units 24  0524  1633   WBC 10*3/mm3 8.80 8.79 2.66* 3.96   HEMOGLOBIN g/dL 14.4 13.6 13.3 14.6   PLATELETS 10*3/mm3 169 160 144 146     Results from last 7 days   Lab Units 24  0520 24  0525 24  1633   SODIUM mmol/L 141 141 140 141 143   POTASSIUM mmol/L 4.8 4.5 4.4 4.1 4.0   CHLORIDE mmol/L 105 108* 107 107 108*   CO2 mmol/L 29.0 28.3 23.0 26.0 25.0   BUN mg/dL 19 21 20 20 17   CREATININE mg/dL 0.74 0.79 0.65 0.58 0.81   CALCIUM mg/dL 8.9 8.7 9.0 9.2 9.0   GLUCOSE mg/dL 132* " 138* 133* 130* 84                 Scheduled Meds:   acetylcysteine, 4 mL, Nebulization, BID - RT  ascorbic acid, 1,000 mg, Oral, Daily  aspirin, 81 mg, Oral, Daily  atorvastatin, 40 mg, Oral, Nightly  budesonide-formoterol, 2 puff, Inhalation, BID - RT  cholecalciferol, 1,000 Units, Oral, Daily  enoxaparin, 40 mg, Subcutaneous, Q24H  fluticasone, 2 spray, Nasal, Daily  furosemide, 40 mg, Intravenous, Once  guaiFENesin, 1,200 mg, Oral, Q12H  ipratropium-albuterol, 3 mL, Nebulization, Q4H While Awake - RT  pantoprazole, 40 mg, Oral, Q AM  predniSONE, 40 mg, Oral, Daily With Breakfast  vitamin B-12, 1,000 mcg, Oral, Daily      PRN Meds:     acetaminophen **OR** acetaminophen **OR** acetaminophen    albuterol    benzonatate    senna-docusate sodium **AND** polyethylene glycol **AND** bisacodyl **AND** bisacodyl    dextromethorphan polistirex ER    melatonin    ondansetron ODT **OR** ondansetron  Continuous Infusions:       Assessment & Plan   Active Hospital Problems    Diagnosis  POA    **Parainfluenza [B34.8]  Yes    Lung nodule [R91.1]  Unknown    Tobacco use [Z72.0]  Yes    Bronchospasm with bronchitis, acute [J20.9]  Unknown    Hypoxia [R09.02]  Yes    Acute bronchitis [J20.9]  Yes    Current smoker [F17.200]  Yes      Resolved Hospital Problems   No resolved problems to display.       Assessment & Plan    -Continues on prednisone along with \aggressive bronchodilators/pulmonary hygiene regimen.  Appreciate input from pulmonology.   -Weaned to room air yesterday but now back on 2 L.  She reports shortness of breath and is wheezing more today.  -Ultrasound of left anterior chest wall noted.  Recommended to patient that she follow-up with her dermatologist to which she expresses understanding and agreement.  -Lovenox and PPI for prophylaxis        Expected Discharge Date: 7/3/2024; Expected Discharge Time:      Kaiden Tamez MD  [facility]  07/04/24  11:06 EDT

## 2024-07-04 NOTE — PLAN OF CARE
Problem: Adult Inpatient Plan of Care  Goal: Plan of Care Review  Flowsheets (Taken 7/4/2024 1551)  Progress: improving  Plan of Care Reviewed With: patient  Outcome Evaluation: vss, another dose iv lasix given,  Goal: Absence of Hospital-Acquired Illness or Injury  Intervention: Identify and Manage Fall Risk  Recent Flowsheet Documentation  Taken 7/4/2024 1400 by Anamaria Potter RN  Safety Promotion/Fall Prevention:   safety round/check completed   room organization consistent   nonskid shoes/slippers when out of bed   lighting adjusted   fall prevention program maintained   clutter free environment maintained   assistive device/personal items within reach  Taken 7/4/2024 1237 by Anamaria Potter RN  Safety Promotion/Fall Prevention:   safety round/check completed   room organization consistent   nonskid shoes/slippers when out of bed   lighting adjusted   fall prevention program maintained   clutter free environment maintained   assistive device/personal items within reach  Taken 7/4/2024 0830 by Anamaria Potter RN  Safety Promotion/Fall Prevention:   safety round/check completed   room organization consistent   nonskid shoes/slippers when out of bed   lighting adjusted   fall prevention program maintained   clutter free environment maintained   assistive device/personal items within reach  Intervention: Prevent and Manage VTE (Venous Thromboembolism) Risk  Recent Flowsheet Documentation  Taken 7/4/2024 0830 by Anamaria Potter RN  VTE Prevention/Management: (pharmaceutical) other (see comments)  Goal: Optimal Comfort and Wellbeing  Intervention: Provide Person-Centered Care  Recent Flowsheet Documentation  Taken 7/4/2024 0830 by Anamaria Potter RN  Trust Relationship/Rapport:   care explained   questions answered   reassurance provided   thoughts/feelings acknowledged   Goal Outcome Evaluation:  Plan of Care Reviewed With: patient        Progress: improving  Outcome Evaluation: vss, another dose iv  lasix given,

## 2024-07-04 NOTE — PROGRESS NOTES
"  Daily Progress Note.   [address]  7/4/2024    Patient:  Name:  Brigid Carballo  MRN:  [id number]  1945  78 y.o.  female         Requesting physician: Dr Kaiden Tamez     Reason for Consultation:  soa, lung nodule,      CC: soa     History of Present Illness:  Patient is a 77 yo with a pmh with parainfluenza viral infection anxiety, gerd, melenoma, who presented to the emergency room with worsening cough associated with chest pain and lower extremity swelling.  She described chest pain not as pleuritic but more so the heaviness/chest pressure.     No prior formal diagnosis of COPD however she is an active smoker.  No daily inhalers no prior pulmonary function testing.     She is found to have parainfluenza virus was admitted on the 28th.  She was started on IV Solu-Medrol DuoNebs and supplemental oxygen.     CT scan has shown a pulmonary nodule     Patient denies chest pressure at present time.  She would like her breathing is getting better.    Interval History:  Weaned down to room air.  However placed back on 2 L overnight afebrile.  Given Lasix yesterday however unfortunately only 500 urine output is recorded yesterday.  She did say she went to the bathroom afterwards 3-4 times  Patient walked around the unit yesterday and felt like it went fairly well for    Physical Exam:  /74 (BP Location: Right arm, Patient Position: Sitting)   Pulse 79   Temp 97.2 °F (36.2 °C) (Oral)   Resp 16   Ht 157.5 cm (62\")   Wt 66.3 kg (146 lb 2.6 oz)   SpO2 91%   BMI 26.73 kg/m²   Body mass index is 26.73 kg/m².    Intake/Output Summary (Last 24 hours) at 7/4/2024 0917  Last data filed at 7/3/2024 2035  Gross per 24 hour   Intake 240 ml   Output --   Net 240 ml     General appearance: Nontoxic, conversant   Eyes: Anicteric sclerae, moist conjunctivae; no lid lag;   HENT: Atraumatic; oropharynx clear with moist mucous membranes and no mucosal ulcerations; normal hard and soft palate  Neck: " Trachea midline;supple,   Lungs: Better air movement no wheeze no rhonchi, with calm respiratory effort and no intercostal retractions  CV: RRR, no MRGs   Abdomen: No significant trunk obesity bowel sounds positive  Extremities: No peripheral edema or extremity lymphadenopathy  Skin: Warm well-perfused  Psych: Appropriate affect, alert  Neuro cranials 2 through grossly intact speech intact moves extremities    Data Review:  Notable Labs:  Results from last 7 days   Lab Units 07/03/24 0428 07/02/24 0445 06/29/24 0525 06/28/24  1633   WBC 10*3/mm3 8.80 8.79 2.66* 3.96   HEMOGLOBIN g/dL 14.4 13.6 13.3 14.6   PLATELETS 10*3/mm3 169 160 144 146     Results from last 7 days   Lab Units 07/03/24 0428 07/02/24 0445 06/30/24  0520 06/29/24  0525 06/28/24  1633   SODIUM mmol/L 141 141 140 141 143   POTASSIUM mmol/L 4.8 4.5 4.4 4.1 4.0   CHLORIDE mmol/L 105 108* 107 107 108*   CO2 mmol/L 29.0 28.3 23.0 26.0 25.0   BUN mg/dL 19 21 20 20 17   CREATININE mg/dL 0.74 0.79 0.65 0.58 0.81   GLUCOSE mg/dL 132* 138* 133* 130* 84   CALCIUM mg/dL 8.9 8.7 9.0 9.2 9.0   MAGNESIUM mg/dL  --   --  3.1* 2.3  --    Estimated Creatinine Clearance: 55.1 mL/min (by C-G formula based on SCr of 0.74 mg/dL).    Results from last 7 days   Lab Units 07/03/24 0428 07/02/24 0445 06/29/24  0525 06/28/24  1633   AST (SGOT) U/L  --   --   --  15   ALT (SGPT) U/L  --   --   --  10   PLATELETS 10*3/mm3 169 160 144 146             Imaging:  Reviewed chest images personally from past 3 days    ASSESSMENT  /  PLAN:  Parainfluenza viral infection  Lung nodules  Bronchospasm secondary to above  Smoker  Anterior chest wall nodule  AHRF  Leukopenia repeat CBC shows resolution  Pulmonary edema  Left pleural effusion       Pulmonary nodule require outpatient follow-up.  Would suggest PET scan upon follow-up given size and smoking history.     She says this has been there for over 10 years.  She follows with dermatologist.  Radiology recommending dermatology  consultation she will bring this to the dermatologist attention.     P.o. pred 40 mg  Continue Symbicort  Continue DuoNebs  Flutter valve  Incentive spirometer  Needs outpatient pulmonary function testing.  Stop hypertonic saline  Start Mucomyst see if this helps liberate sputum more without bronchospasm  Already on Mucinex     Smoking cessation counseling given  Exercise oximetry    Electronically signed by Jin Blancas MD, 07/04/24, 9:47 AM EDT.

## 2024-07-04 NOTE — PLAN OF CARE
Goal Outcome Evaluation:  VSS. No c/o pain. Good output results from IV lasix.  Continue medical management. Pulmonary hygiene encouraged. Plan of care ongoing.

## 2024-07-05 LAB
ANION GAP SERPL CALCULATED.3IONS-SCNC: 6 MMOL/L (ref 5–15)
BUN SERPL-MCNC: 22 MG/DL (ref 8–23)
BUN/CREAT SERPL: 26.8 (ref 7–25)
CALCIUM SPEC-SCNC: 8.5 MG/DL (ref 8.6–10.5)
CHLORIDE SERPL-SCNC: 100 MMOL/L (ref 98–107)
CO2 SERPL-SCNC: 35 MMOL/L (ref 22–29)
CREAT SERPL-MCNC: 0.82 MG/DL (ref 0.57–1)
EGFRCR SERPLBLD CKD-EPI 2021: 72.9 ML/MIN/1.73
GLUCOSE SERPL-MCNC: 91 MG/DL (ref 65–99)
POTASSIUM SERPL-SCNC: 5.1 MMOL/L (ref 3.5–5.2)
SODIUM SERPL-SCNC: 141 MMOL/L (ref 136–145)

## 2024-07-05 PROCEDURE — 94761 N-INVAS EAR/PLS OXIMETRY MLT: CPT

## 2024-07-05 PROCEDURE — 25010000002 ENOXAPARIN PER 10 MG: Performed by: INTERNAL MEDICINE

## 2024-07-05 PROCEDURE — 94799 UNLISTED PULMONARY SVC/PX: CPT

## 2024-07-05 PROCEDURE — 80048 BASIC METABOLIC PNL TOTAL CA: CPT | Performed by: INTERNAL MEDICINE

## 2024-07-05 PROCEDURE — 63710000001 PREDNISONE PER 1 MG: Performed by: INTERNAL MEDICINE

## 2024-07-05 RX ADMIN — FLUTICASONE PROPIONATE 2 SPRAY: 50 SPRAY, METERED NASAL at 08:56

## 2024-07-05 RX ADMIN — IPRATROPIUM BROMIDE AND ALBUTEROL SULFATE 3 ML: .5; 3 SOLUTION RESPIRATORY (INHALATION) at 22:19

## 2024-07-05 RX ADMIN — GUAIFENESIN 1200 MG: 600 TABLET, EXTENDED RELEASE ORAL at 20:37

## 2024-07-05 RX ADMIN — GUAIFENESIN 1200 MG: 600 TABLET, EXTENDED RELEASE ORAL at 08:56

## 2024-07-05 RX ADMIN — Medication 1000 MCG: at 08:56

## 2024-07-05 RX ADMIN — IPRATROPIUM BROMIDE AND ALBUTEROL SULFATE 3 ML: .5; 3 SOLUTION RESPIRATORY (INHALATION) at 11:51

## 2024-07-05 RX ADMIN — PREDNISONE 40 MG: 20 TABLET ORAL at 08:56

## 2024-07-05 RX ADMIN — ATORVASTATIN CALCIUM 40 MG: 20 TABLET, FILM COATED ORAL at 20:37

## 2024-07-05 RX ADMIN — BUDESONIDE AND FORMOTEROL FUMARATE DIHYDRATE 2 PUFF: 160; 4.5 AEROSOL RESPIRATORY (INHALATION) at 20:08

## 2024-07-05 RX ADMIN — ACETYLCYSTEINE 4 ML: 200 SOLUTION ORAL; RESPIRATORY (INHALATION) at 07:40

## 2024-07-05 RX ADMIN — IPRATROPIUM BROMIDE AND ALBUTEROL SULFATE 3 ML: .5; 3 SOLUTION RESPIRATORY (INHALATION) at 07:39

## 2024-07-05 RX ADMIN — PANTOPRAZOLE SODIUM 40 MG: 40 TABLET, DELAYED RELEASE ORAL at 06:18

## 2024-07-05 RX ADMIN — ENOXAPARIN SODIUM 40 MG: 100 INJECTION SUBCUTANEOUS at 16:49

## 2024-07-05 RX ADMIN — BUDESONIDE AND FORMOTEROL FUMARATE DIHYDRATE 2 PUFF: 160; 4.5 AEROSOL RESPIRATORY (INHALATION) at 07:40

## 2024-07-05 RX ADMIN — ACETYLCYSTEINE 4 ML: 200 SOLUTION ORAL; RESPIRATORY (INHALATION) at 20:03

## 2024-07-05 RX ADMIN — IPRATROPIUM BROMIDE AND ALBUTEROL SULFATE 3 ML: .5; 3 SOLUTION RESPIRATORY (INHALATION) at 19:58

## 2024-07-05 RX ADMIN — OXYCODONE HYDROCHLORIDE AND ACETAMINOPHEN 1000 MG: 500 TABLET ORAL at 08:56

## 2024-07-05 RX ADMIN — ASPIRIN 81 MG: 81 TABLET, COATED ORAL at 08:56

## 2024-07-05 RX ADMIN — IPRATROPIUM BROMIDE AND ALBUTEROL SULFATE 3 ML: .5; 3 SOLUTION RESPIRATORY (INHALATION) at 15:14

## 2024-07-05 RX ADMIN — Medication 1000 UNITS: at 08:56

## 2024-07-05 NOTE — PLAN OF CARE
Goal Outcome Evaluation:  Plan of Care Reviewed With: patient           Outcome Evaluation: VSS, placed on 1L nc while sleeping, SOA at imes, possible DC today, call light in reach

## 2024-07-05 NOTE — PROGRESS NOTES
Name: Brigid Carballo ADMIT: 2024   : 1945  PCP: Kaya Mckeon, SHE    MRN: [id number] LOS: 7 days   AGE/SEX: 78 y.o. female  ROOM: [facility]     Subjective   Subjective   Shortness of breath slightly improving but still winded when she ambulates.  No wheezing.  Positive cough productive of clear sputum (improved).  No chest pain.  No hemoptysis.  No PND or orthopnea.  No ankle edema.  No palpitation.    Review of Systems  GI.  No abdominal pain or nausea or vomiting.  .  No dysuria or hematuria.     Objective   Objective   Vital Signs  Temp:  [97.5 °F (36.4 °C)-97.9 °F (36.6 °C)] 97.6 °F (36.4 °C)  Heart Rate:  [63-98] 64  Resp:  [16-20] 20  BP: ()/(52-68) 110/61  SpO2:  [90 %-100 %] 96 %  on  Flow (L/min):  [1] 1;   Device (Oxygen Therapy): room air    Intake/Output Summary (Last 24 hours) at 2024 1045  Last data filed at 2024 0843  Gross per 24 hour   Intake 480 ml   Output 300 ml   Net 180 ml     Body mass index is 26.13 kg/m².      24  0635 24  0500 24  0618   Weight: 64.1 kg (141 lb 5 oz) 66.3 kg (146 lb 2.6 oz) 64.8 kg (142 lb 13.7 oz)     Physical Exam  General.  Elderly female.  She is alert and oriented x 4.  In no apparent pain/distress/diaphoresis.  Normal mood and affect.  Eyes.  Pupils equal round and reactive.  Status post bilateral cataract surgery.  Intact extraocular musculature.  No pallor or jaundice.  Oral cavity.  Moist mucous membrane.  Neck.  Supple.  No JVD.  No lymphadenopathy or thyromegaly.  Cardio vascular.  Regular rate and rhythm with grade 2 systolic murmur  Chest.  Poor bilateral air entry with scattered bilateral rhonchi and expiratory wheeze.  Abdomen.  Soft lax.  No tenderness.  No organomegaly.  No guarding or rebound  Extremities.  No clubbing/cyanosis/edema.  CNS.  No acute focal neurological deficits    Results Review:      Results from last 7 days   Lab Units 24  0604 24  0428 24  0445 24  0520  "06/29/24  0525 06/28/24  1633   SODIUM mmol/L 141 141 141 140 141 143   POTASSIUM mmol/L 5.1 4.8 4.5 4.4 4.1 4.0   CHLORIDE mmol/L 100 105 108* 107 107 108*   CO2 mmol/L 35.0* 29.0 28.3 23.0 26.0 25.0   BUN mg/dL 22 19 21 20 20 17   CREATININE mg/dL 0.82 0.74 0.79 0.65 0.58 0.81   GLUCOSE mg/dL 91 132* 138* 133* 130* 84   CALCIUM mg/dL 8.5* 8.9 8.7 9.0 9.2 9.0   AST (SGOT) U/L  --   --   --   --   --  15   ALT (SGPT) U/L  --   --   --   --   --  10     Estimated Creatinine Clearance: 49.2 mL/min (by C-G formula based on SCr of 0.82 mg/dL).          Results from last 7 days   Lab Units 06/29/24  1443 06/29/24  1251 06/28/24  1633   HSTROP T ng/L 8 9 8     Results from last 7 days   Lab Units 06/28/24  1633   PROBNP pg/mL 350.0         Results from last 7 days   Lab Units 06/30/24  0520 06/29/24  0525   MAGNESIUM mg/dL 3.1* 2.3           Invalid input(s): \"LDLCALC\"  Results from last 7 days   Lab Units 07/03/24  0428 07/02/24  0445 06/29/24  0525 06/28/24  1633   WBC 10*3/mm3 8.80 8.79 2.66* 3.96   HEMOGLOBIN g/dL 14.4 13.6 13.3 14.6   HEMATOCRIT % 43.6 40.7 40.0 43.0   PLATELETS 10*3/mm3 169 160 144 146   MCV fL 96.2 96.2 96.4 94.9   MCH pg 31.8 32.2 32.0 32.2   MCHC g/dL 33.0 33.4 33.3 34.0   RDW % 11.8* 11.7* 11.5* 11.8*   RDW-SD fl 40.9 41.5 39.8 41.4   MPV fL 10.4 10.5 10.0 9.3   NEUTROPHIL % %  --   --   --  48.4   LYMPHOCYTE % %  --   --   --  31.1   MONOCYTES % %  --   --   --  17.7*   EOSINOPHIL % %  --   --   --  1.5   BASOPHIL % %  --   --   --  1.0   IMM GRAN % %  --   --   --  0.3   NEUTROS ABS 10*3/mm3  --   --   --  1.92   LYMPHS ABS 10*3/mm3  --   --   --  1.23   MONOS ABS 10*3/mm3  --   --   --  0.70   EOS ABS 10*3/mm3  --   --   --  0.06   BASOS ABS 10*3/mm3  --   --   --  0.04   IMMATURE GRANS (ABS) 10*3/mm3  --   --   --  0.01   NRBC /100 WBC  --   --   --  0.0                             Results from last 7 days   Lab Units 06/28/24  1690   ADENOVIRUS DETECTION BY PCR  Not Detected   CORONAVIRUS " 229E  Not Detected   CORONAVIRUS HKU1  Not Detected   CORONAVIRUS NL63  Not Detected   CORONAVIRUS OC43  Not Detected   HUMAN METAPNEUMOVIRUS  Not Detected   HUMAN RHINOVIRUS/ENTEROVIRUS  Not Detected   INFLUENZA B PCR  Not Detected   PARAINFLUENZA 1  Not Detected   PARAINFLUENZA VIRUS 2  Not Detected   PARAINFLUENZA VIRUS 3  Not Detected   PARAINFLUENZA VIRUS 4  Detected*   BORDETELLA PERTUSSIS PCR  Not Detected   CHLAMYDOPHILA PNEUMONIAE PCR  Not Detected   MYCOPLAMA PNEUMO PCR  Not Detected   INFLUENZA A PCR  Not Detected   RSV, PCR  Not Detected               Imaging:  Imaging Results (Last 24 Hours)       ** No results found for the last 24 hours. **               I reviewed the patient's new clinical results / labs / tests / procedures      Assessment/Plan     Active Hospital Problems    Diagnosis  POA    **Parainfluenza [B34.8]  Yes    Lung nodule [R91.1]  Unknown    Tobacco use [Z72.0]  Yes    Bronchospasm with bronchitis, acute [J20.9]  Unknown    Hypoxia [R09.02]  Yes    Acute bronchitis [J20.9]  Yes    Current smoker [F17.200]  Yes      Resolved Hospital Problems   No resolved problems to display.           Acute hypoxemic respiratory failure secondary to lower respiratory tract infection with parainfluenza virus/COPD exacerbation/left lung nodule/history of tobacco use.  No clinical evidence of bacterial infection.  No fever or leukocytosis.  Slowly improving respiratory status.  Weaned off oxygen today.  Will continue the patient on Symbicort/prednisone/DuoNebs/I-S/OPEP/Mucinex.  Pulmonary added Mucomyst.  Will need follow-up PET scan as an outpatient.  Counseled against smoking.  Appreciate pulmonary help.  CT scan of the chest noted.  Chest wall subcutaneous nodule.  Has dermatology follow-up for the  Dyslipidemia.  Continue Lipitor.  VTE prophylaxis.  On Lovenox.      Discussed my findings and plan of treatment with the patient.  Disposition.  Home tomorrow if continues to improve.        Jolanta IRELAND  MD Hank  [facility]  07/05/24  10:45 ABDOUL STEARNS

## 2024-07-05 NOTE — PLAN OF CARE
Problem: Adult Inpatient Plan of Care  Goal: Plan of Care Review  Flowsheets (Taken 7/5/2024 1431)  Progress: improving  Plan of Care Reviewed With: patient  Outcome Evaluation: xiomy cade denies soa, plan discharge tomorrow  Goal: Absence of Hospital-Acquired Illness or Injury  Intervention: Identify and Manage Fall Risk  Recent Flowsheet Documentation  Taken 7/5/2024 1236 by Anamaria Potter RN  Safety Promotion/Fall Prevention:   safety round/check completed   room organization consistent   nonskid shoes/slippers when out of bed   lighting adjusted   fall prevention program maintained   clutter free environment maintained   assistive device/personal items within reach  Taken 7/5/2024 1030 by Anamaria Potter RN  Safety Promotion/Fall Prevention:   safety round/check completed   room organization consistent   nonskid shoes/slippers when out of bed   lighting adjusted   fall prevention program maintained   clutter free environment maintained   assistive device/personal items within reach  Taken 7/5/2024 0856 by Anamaria Potter RN  Safety Promotion/Fall Prevention:   safety round/check completed   room organization consistent   nonskid shoes/slippers when out of bed   lighting adjusted   fall prevention program maintained   clutter free environment maintained   assistive device/personal items within reach  Intervention: Prevent and Manage VTE (Venous Thromboembolism) Risk  Recent Flowsheet Documentation  Taken 7/5/2024 0856 by Anamaria Potter RN  Activity Management: sitting, edge of bed  VTE Prevention/Management: (pharmaceutical prophylaxis) other (see comments)  Goal: Optimal Comfort and Wellbeing  Intervention: Provide Person-Centered Care  Recent Flowsheet Documentation  Taken 7/5/2024 0856 by Anamaria Potter RN  Trust Relationship/Rapport:   care explained   questions answered   reassurance provided   thoughts/feelings acknowledged   Goal Outcome Evaluation:  Plan of Care Reviewed With: patient         Progress: improving  Outcome Evaluation: vss, xiomy, denies soa, plan discharge tomorrow

## 2024-07-06 ENCOUNTER — READMISSION MANAGEMENT (OUTPATIENT)
Dept: CALL CENTER | Facility: HOSPITAL | Age: 79
End: 2024-07-06
Payer: MEDICARE

## 2024-07-06 VITALS
TEMPERATURE: 98.2 F | RESPIRATION RATE: 14 BRPM | BODY MASS INDEX: 26.78 KG/M2 | HEIGHT: 62 IN | WEIGHT: 145.5 LBS | HEART RATE: 79 BPM | OXYGEN SATURATION: 96 % | DIASTOLIC BLOOD PRESSURE: 74 MMHG | SYSTOLIC BLOOD PRESSURE: 120 MMHG

## 2024-07-06 PROBLEM — R09.02 HYPOXIA: Status: RESOLVED | Noted: 2024-06-28 | Resolved: 2024-07-06

## 2024-07-06 PROBLEM — R22.2 NODULE OF CHEST WALL: Status: ACTIVE | Noted: 2024-07-06

## 2024-07-06 PROBLEM — J44.1 COPD WITH ACUTE EXACERBATION: Status: ACTIVE | Noted: 2024-07-06

## 2024-07-06 PROBLEM — E78.5 HYPERLIPIDEMIA: Status: ACTIVE | Noted: 2024-07-06

## 2024-07-06 LAB
ANION GAP SERPL CALCULATED.3IONS-SCNC: 8.8 MMOL/L (ref 5–15)
BASOPHILS # BLD AUTO: 0.09 10*3/MM3 (ref 0–0.2)
BASOPHILS NFR BLD AUTO: 0.8 % (ref 0–1.5)
BUN SERPL-MCNC: 22 MG/DL (ref 8–23)
BUN/CREAT SERPL: 34.4 (ref 7–25)
CALCIUM SPEC-SCNC: 8 MG/DL (ref 8.6–10.5)
CHLORIDE SERPL-SCNC: 102 MMOL/L (ref 98–107)
CO2 SERPL-SCNC: 29.2 MMOL/L (ref 22–29)
CREAT SERPL-MCNC: 0.64 MG/DL (ref 0.57–1)
DEPRECATED RDW RBC AUTO: 42.1 FL (ref 37–54)
EGFRCR SERPLBLD CKD-EPI 2021: 90 ML/MIN/1.73
EOSINOPHIL # BLD AUTO: 0.03 10*3/MM3 (ref 0–0.4)
EOSINOPHIL NFR BLD AUTO: 0.3 % (ref 0.3–6.2)
ERYTHROCYTE [DISTWIDTH] IN BLOOD BY AUTOMATED COUNT: 11.9 % (ref 12.3–15.4)
GLUCOSE SERPL-MCNC: 99 MG/DL (ref 65–99)
HCT VFR BLD AUTO: 41.1 % (ref 34–46.6)
HGB BLD-MCNC: 13.8 G/DL (ref 12–15.9)
IMM GRANULOCYTES # BLD AUTO: 0.49 10*3/MM3 (ref 0–0.05)
IMM GRANULOCYTES NFR BLD AUTO: 4.4 % (ref 0–0.5)
LYMPHOCYTES # BLD AUTO: 2.58 10*3/MM3 (ref 0.7–3.1)
LYMPHOCYTES NFR BLD AUTO: 23.3 % (ref 19.6–45.3)
MCH RBC QN AUTO: 32.1 PG (ref 26.6–33)
MCHC RBC AUTO-ENTMCNC: 33.6 G/DL (ref 31.5–35.7)
MCV RBC AUTO: 95.6 FL (ref 79–97)
MONOCYTES # BLD AUTO: 0.81 10*3/MM3 (ref 0.1–0.9)
MONOCYTES NFR BLD AUTO: 7.3 % (ref 5–12)
NEUTROPHILS NFR BLD AUTO: 63.9 % (ref 42.7–76)
NEUTROPHILS NFR BLD AUTO: 7.05 10*3/MM3 (ref 1.7–7)
NRBC BLD AUTO-RTO: 0 /100 WBC (ref 0–0.2)
PLATELET # BLD AUTO: 195 10*3/MM3 (ref 140–450)
PMV BLD AUTO: 9.4 FL (ref 6–12)
POTASSIUM SERPL-SCNC: 4.1 MMOL/L (ref 3.5–5.2)
RBC # BLD AUTO: 4.3 10*6/MM3 (ref 3.77–5.28)
SODIUM SERPL-SCNC: 140 MMOL/L (ref 136–145)
WBC NRBC COR # BLD AUTO: 11.05 10*3/MM3 (ref 3.4–10.8)

## 2024-07-06 PROCEDURE — 63710000001 PREDNISONE PER 1 MG: Performed by: INTERNAL MEDICINE

## 2024-07-06 PROCEDURE — 94761 N-INVAS EAR/PLS OXIMETRY MLT: CPT

## 2024-07-06 PROCEDURE — 94799 UNLISTED PULMONARY SVC/PX: CPT

## 2024-07-06 PROCEDURE — 94664 DEMO&/EVAL PT USE INHALER: CPT

## 2024-07-06 PROCEDURE — 80048 BASIC METABOLIC PNL TOTAL CA: CPT | Performed by: INTERNAL MEDICINE

## 2024-07-06 PROCEDURE — 85025 COMPLETE CBC W/AUTO DIFF WBC: CPT | Performed by: INTERNAL MEDICINE

## 2024-07-06 PROCEDURE — 94618 PULMONARY STRESS TESTING: CPT

## 2024-07-06 RX ORDER — METHYLPREDNISOLONE 4 MG/1
TABLET ORAL
Qty: 21 TABLET | Refills: 0 | Status: SHIPPED | OUTPATIENT
Start: 2024-07-06

## 2024-07-06 RX ORDER — GUAIFENESIN 600 MG/1
1200 TABLET, EXTENDED RELEASE ORAL 2 TIMES DAILY
Qty: 60 TABLET | Refills: 3 | Status: SHIPPED | OUTPATIENT
Start: 2024-07-06

## 2024-07-06 RX ORDER — METHYLPREDNISOLONE 4 MG/1
TABLET ORAL
Qty: 21 TABLET | Refills: 0 | Status: SHIPPED | OUTPATIENT
Start: 2024-07-06 | End: 2024-07-06

## 2024-07-06 RX ORDER — NICOTINE 21 MG/24HR
1 PATCH, TRANSDERMAL 24 HOURS TRANSDERMAL EVERY 24 HOURS
Qty: 28 PATCH | Refills: 0 | Status: SHIPPED | OUTPATIENT
Start: 2024-07-06

## 2024-07-06 RX ORDER — ALBUTEROL SULFATE 90 UG/1
2 AEROSOL, METERED RESPIRATORY (INHALATION) EVERY 4 HOURS PRN
Qty: 30 G | Refills: 3 | Status: SHIPPED | OUTPATIENT
Start: 2024-07-06

## 2024-07-06 RX ORDER — BUDESONIDE AND FORMOTEROL FUMARATE DIHYDRATE 160; 4.5 UG/1; UG/1
2 AEROSOL RESPIRATORY (INHALATION)
Qty: 10.2 G | Refills: 3 | Status: SHIPPED | OUTPATIENT
Start: 2024-07-06 | End: 2024-07-06 | Stop reason: HOSPADM

## 2024-07-06 RX ORDER — NICOTINE 21 MG/24HR
1 PATCH, TRANSDERMAL 24 HOURS TRANSDERMAL EVERY 24 HOURS
Qty: 28 PATCH | Refills: 0 | Status: SHIPPED | OUTPATIENT
Start: 2024-07-06 | End: 2024-07-06

## 2024-07-06 RX ORDER — TIOTROPIUM BROMIDE INHALATION SPRAY 3.12 UG/1
2 SPRAY, METERED RESPIRATORY (INHALATION)
Qty: 4 G | Refills: 3 | Status: SHIPPED | OUTPATIENT
Start: 2024-07-06 | End: 2024-07-06 | Stop reason: ALTCHOICE

## 2024-07-06 RX ORDER — GUAIFENESIN 600 MG/1
1200 TABLET, EXTENDED RELEASE ORAL 2 TIMES DAILY
Qty: 60 TABLET | Refills: 3 | Status: SHIPPED | OUTPATIENT
Start: 2024-07-06 | End: 2024-07-06

## 2024-07-06 RX ORDER — ALBUTEROL SULFATE 90 UG/1
2 AEROSOL, METERED RESPIRATORY (INHALATION) EVERY 4 HOURS PRN
Qty: 30 G | Refills: 3 | Status: SHIPPED | OUTPATIENT
Start: 2024-07-06 | End: 2024-07-06

## 2024-07-06 RX ADMIN — ASPIRIN 81 MG: 81 TABLET, COATED ORAL at 09:06

## 2024-07-06 RX ADMIN — FLUTICASONE PROPIONATE 2 SPRAY: 50 SPRAY, METERED NASAL at 09:06

## 2024-07-06 RX ADMIN — Medication 1000 UNITS: at 09:06

## 2024-07-06 RX ADMIN — PANTOPRAZOLE SODIUM 40 MG: 40 TABLET, DELAYED RELEASE ORAL at 06:05

## 2024-07-06 RX ADMIN — Medication 1000 MCG: at 09:05

## 2024-07-06 RX ADMIN — BUDESONIDE AND FORMOTEROL FUMARATE DIHYDRATE 2 PUFF: 160; 4.5 AEROSOL RESPIRATORY (INHALATION) at 08:05

## 2024-07-06 RX ADMIN — GUAIFENESIN 1200 MG: 600 TABLET, EXTENDED RELEASE ORAL at 09:05

## 2024-07-06 RX ADMIN — PREDNISONE 40 MG: 20 TABLET ORAL at 09:06

## 2024-07-06 RX ADMIN — OXYCODONE HYDROCHLORIDE AND ACETAMINOPHEN 1000 MG: 500 TABLET ORAL at 09:06

## 2024-07-06 RX ADMIN — IPRATROPIUM BROMIDE AND ALBUTEROL SULFATE 3 ML: .5; 3 SOLUTION RESPIRATORY (INHALATION) at 11:17

## 2024-07-06 RX ADMIN — ACETYLCYSTEINE 4 ML: 200 SOLUTION ORAL; RESPIRATORY (INHALATION) at 08:04

## 2024-07-06 RX ADMIN — IPRATROPIUM BROMIDE AND ALBUTEROL SULFATE 3 ML: .5; 3 SOLUTION RESPIRATORY (INHALATION) at 08:01

## 2024-07-06 NOTE — CASE MANAGEMENT/SOCIAL WORK
Case Management Discharge Note      Final Note: home         Selected Continued Care - Discharged on 7/6/2024 Admission date: 6/28/2024 - Discharge disposition: Home or Self Care      Destination    No services have been selected for the patient.                Durable Medical Equipment    No services have been selected for the patient.                Dialysis/Infusion    No services have been selected for the patient.                Home Medical Care    No services have been selected for the patient.                Therapy    No services have been selected for the patient.                Community Resources    No services have been selected for the patient.                Community & DME    No services have been selected for the patient.                    Transportation Services  Private: Car    Final Discharge Disposition Code: 01 - home or self-care

## 2024-07-06 NOTE — PROGRESS NOTES
LOS: 8 days   Patient Care Team:  Kaya Mckeon APRN as PCP - General (Family Medicine)  Deam, Pawel Hanson MD as Consulting Physician (Cardiac Electrophysiology)    Subjective     New to me today picking up pulmonary coverage from Dr. Jin Blancas have reviewed his another records.  Patient for shortness of air and lung nodule.  Patient is an active smoker not had any formal lung function testing or on any medications for this was found to have a parainfluenza virus infection CT scan showed incidental pulmonary nodule  Reports she is doing better she is coughing she is getting a little bit of mucus up says I guess I have  stopped smoking now.  Review of Systems:         Objective     Vital Signs  Vital Sign Min/Max for last 24 hours  Temp  Min: 97.6 °F (36.4 °C)  Max: 98.2 °F (36.8 °C)   BP  Min: 96/61  Max: 120/74   Pulse  Min: 65  Max: 111   Resp  Min: 14  Max: 20   SpO2  Min: 89 %  Max: 99 %   No data recorded   Weight  Min: 66 kg (145 lb 8 oz)  Max: 66 kg (145 lb 8 oz)        Ventilator/Non-Invasive Ventilation Settings (From admission, onward)      None                         Body mass index is 26.61 kg/m².  I/O last 3 completed shifts:  In: 720 [P.O.:720]  Out: -   No intake/output data recorded.        Physical Exam:  General Appearance: Older white female she is resting in bed in no apparent distress she is sitting on the side of the bed on room air oxygen saturations in the mid 90s  Eyes: Conjunctiva clear and anicteric  ENT: Mucous membranes are moist no exudates  Neck: No jugular venous tension and trachea midline  Lungs: She has a few scattered expiratory wheezes particularly end expiration and with forced expiration.  No rales no rhonchi, nonlabored holding her sats when she is talking  Cardiac: Regular rate rhythm no murmur  Abdomen: Soft no palpable hepatosplenomegaly  : Not examined  Musculoskeletal: Grossly normal  Skin: Warm and dry no jaundice no petechiae  Neuro: She is alert  and cooperative following commands grossly intact  Extremities/P Vascular: No clubbing no cyanosis no edema palpable radial and dorsalis pedis pulses  MSE: Seems to be in pretty good spirits       Labs:  Results from last 7 days   Lab Units 07/06/24 0453 07/05/24  0604 07/03/24  0428 07/02/24  0445 06/30/24  0520   GLUCOSE mg/dL 99 91 132* 138* 133*   SODIUM mmol/L 140 141 141 141 140   POTASSIUM mmol/L 4.1 5.1 4.8 4.5 4.4   MAGNESIUM mg/dL  --   --   --   --  3.1*   CO2 mmol/L 29.2* 35.0* 29.0 28.3 23.0   CHLORIDE mmol/L 102 100 105 108* 107   ANION GAP mmol/L 8.8 6.0 7.0 4.7* 10.0   CREATININE mg/dL 0.64 0.82 0.74 0.79 0.65   BUN mg/dL 22 22 19 21 20   BUN / CREAT RATIO  34.4* 26.8* 25.7* 26.6* 30.8*   CALCIUM mg/dL 8.0* 8.5* 8.9 8.7 9.0     Estimated Creatinine Clearance: 63.6 mL/min (by C-G formula based on SCr of 0.64 mg/dL).      Results from last 7 days   Lab Units 07/06/24 0453 07/03/24 0428 07/02/24  0445   WBC 10*3/mm3 11.05* 8.80 8.79   RBC 10*6/mm3 4.30 4.53 4.23   HEMOGLOBIN g/dL 13.8 14.4 13.6   HEMATOCRIT % 41.1 43.6 40.7   MCV fL 95.6 96.2 96.2   MCH pg 32.1 31.8 32.2   MCHC g/dL 33.6 33.0 33.4   RDW % 11.9* 11.8* 11.7*   RDW-SD fl 42.1 40.9 41.5   MPV fL 9.4 10.4 10.5   PLATELETS 10*3/mm3 195 169 160   NEUTROPHIL % % 63.9  --   --    LYMPHOCYTE % % 23.3  --   --    MONOCYTES % % 7.3  --   --    EOSINOPHIL % % 0.3  --   --    BASOPHIL % % 0.8  --   --    IMM GRAN % % 4.4*  --   --    NEUTROS ABS 10*3/mm3 7.05*  --   --    LYMPHS ABS 10*3/mm3 2.58  --   --    MONOS ABS 10*3/mm3 0.81  --   --    EOS ABS 10*3/mm3 0.03  --   --    BASOS ABS 10*3/mm3 0.09  --   --    IMMATURE GRANS (ABS) 10*3/mm3 0.49*  --   --    NRBC /100 WBC 0.0  --   --          Results from last 7 days   Lab Units 06/29/24  1443 06/29/24  1251   HSTROP T ng/L 8 9                     Microbiology Results (last 10 days)       Procedure Component Value - Date/Time    Respiratory Panel PCR w/COVID-19(SARS-CoV-2)  CHARLI/GLORIA/PRAFUL/PAD/COR/RAMESH In-House, NP Swab in UTM/VTM, 2 HR TAT - Swab, Nasopharynx [[id number]]  (Abnormal) Collected: 06/28/24 1628    Lab Status: Final result Specimen: Swab from Nasopharynx Updated: 06/28/24 1728     ADENOVIRUS, PCR Not Detected     Coronavirus 229E Not Detected     Coronavirus HKU1 Not Detected     Coronavirus NL63 Not Detected     Coronavirus OC43 Not Detected     COVID19 Not Detected     Human Metapneumovirus Not Detected     Human Rhinovirus/Enterovirus Not Detected     Influenza A PCR Not Detected     Influenza B PCR Not Detected     Parainfluenza Virus 1 Not Detected     Parainfluenza Virus 2 Not Detected     Parainfluenza Virus 3 Not Detected     Parainfluenza Virus 4 Detected     RSV, PCR Not Detected     Bordetella pertussis pcr Not Detected     Bordetella parapertussis PCR Not Detected     Chlamydophila pneumoniae PCR Not Detected     Mycoplasma pneumo by PCR Not Detected    Narrative:      In the setting of a positive respiratory panel with a viral infection PLUS a negative procalcitonin without other underlying concern for bacterial infection, consider observing off antibiotics or discontinuation of antibiotics and continue supportive care. If the respiratory panel is positive for atypical bacterial infection (Bordetella pertussis, Chlamydophila pneumoniae, or Mycoplasma pneumoniae), consider antibiotic de-escalation to target atypical bacterial infection.                acetylcysteine, 4 mL, Nebulization, BID - RT  ascorbic acid, 1,000 mg, Oral, Daily  aspirin, 81 mg, Oral, Daily  atorvastatin, 40 mg, Oral, Nightly  budesonide-formoterol, 2 puff, Inhalation, BID - RT  cholecalciferol, 1,000 Units, Oral, Daily  enoxaparin, 40 mg, Subcutaneous, Q24H  fluticasone, 2 spray, Nasal, Daily  guaiFENesin, 1,200 mg, Oral, Q12H  ipratropium-albuterol, 3 mL, Nebulization, Q4H While Awake - RT  pantoprazole, 40 mg, Oral, Q AM  predniSONE, 40 mg, Oral, Daily With Breakfast  vitamin B-12, 1,000 mcg,  Oral, Daily           Diagnostics:  XR Chest 1 View    Result Date: 7/3/2024  Portable chest radiograph  HISTORY: Follow-up  TECHNIQUE: Single AP portable radiograph of the chest  COMPARISON: Chest radiograph 6/20/2024      FINDINGS AND IMPRESSION: There is worsening bibasilar pulmonary opacification, left greater than right. Suggestion of a small left pleural effusion. Cardiac silhouette is mildly enlarged. Constellation findings are concerning for pulmonary edema in the appropriate clinical context with multifocal pneumonia less likely. Correlation with patient history is recommended follow-up chest CT if clinically indicated. No pneumothorax is seen.  This report was finalized on 7/3/2024 3:16 PM by Dr. Layo Simon M.D on Workstation: [employer]      US Chest    Result Date: 7/2/2024  Targeted subcutaneous chest sonogram  HISTORY: Skin nodule  COMPARISON: Chest CT 6/30/2024  TECHNIQUE:  Grayscale and color Doppler  images of the subcutaneous tissues of the chest in the area of interest as indicated by the patient were obtained.      FINDINGS AND IMPRESSION: Within the subcutaneous tissues of the anterior chest there is a heterogenous hypoechoic lesion measuring 1.8 x 2.1 x 0.9 cm. While no definite internal color Doppler flow is demonstrated, findings remain indeterminate and neoplasm/malignancy cannot be excluded. Dermatology consultation is recommended with tissue sampling if clinically indicated.   This report was finalized on 7/2/2024 1:59 AM by Dr. Layo Simon M.D on Workstation: [id number]      CT Chest Without Contrast Diagnostic    Result Date: 6/30/2024  CT CHEST WITHOUT CONTRAST  HISTORY: Hypoxic respiratory failure  TECHNIQUE: Radiation dose reduction techniques were utilized, including automated exposure control and exposure modulation based on body size. 3 mm images were obtained through the chest without the administration of IV contrast.  COMPARISON: None available   FINDINGS: A 1.8 cm  subcutaneous nodule measuring soft tissue density in the inferior left paramidline anterior chest wall (series 2/image 59).  Heart is normal in size. Severe coronary calcifications. Small volume pericardial fluid. Nondilated main pulmonary artery and thoracic aorta. No mediastinal/hilar adenopathy. Decompressed esophagus.  Trachea is patent. No bronchiectasis. Moderate bronchial wall thickening throughout the right middle and right lower lobes with segmental and subsegmental mucous plugging more severe in the right lower lobe. More mild bronchial wall thickening throughout the remaining lobes. No focal consolidation or groundglass opacity. A left lower lobe 1 cm solid nodule (series 3/image 71). No internal macroscopic fat. Few additional sub-6 mm solid pulmonary nodules. Reference right upper lobe (series 3/image 64) and left upper lobe (series 3/image 40). Background advanced biapical predominant centrilobular and paraseptal emphysema. Biapical pleural-parenchymal scarring. Trace right pleural effusion.        1. Moderate bronchial wall thickening throughout the right middle and right lower lobes suggesting bronchitis with segmental and subsegmental mucous plugging throughout much of the right middle and right lower lobes. No focal consolidation or groundglass opacity at this time. 2. A left lower lobe 1 cm solid pulmonary nodule. Recommend pulmonology consultation and consideration of FDG PET/CT. 3. Background advanced pulmonary emphysema. 4. Indeterminate 1.8 cm subcutaneous nodule measuring soft tissue density in the inferior left paramidline anterior chest wall. Recommend nonemergent outpatient follow-up ultrasound.  This report was finalized on 6/30/2024 10:22 PM by Dr. Spencer Dexter M.D on Workstation: [id number]      XR Chest 1 View    Result Date: 6/28/2024  XR CHEST 1 VW-  HISTORY: Female who is 78 years-old, short of breath  TECHNIQUE: Frontal view of the chest  COMPARISON: None available   FINDINGS: The heart size is normal. Aorta is calcified. Pulmonary vasculature is unremarkable. Minimal likely scarring or atelectasis at the bases. No pleural effusion, or pneumothorax. No acute osseous process.      Minimal likely scarring or atelectasis at the bases. Follow-up as clinical indications persist.  This report was finalized on 6/28/2024 5:25 PM by Dr. Jonathan Wolfe M.D on Workstation: [email or web address]      Results for orders placed during the hospital encounter of 11/07/22    Adult Transthoracic Echo Complete W/ Color, Spectral and Contrast if Necessary Per Protocol    Interpretation Summary    Left ventricular ejection fraction appears to be 56 - 60%.    Left ventricular diastolic function is consistent with (grade I) impaired relaxation.    Estimated right ventricular systolic pressure from tricuspid regurgitation is normal (<35 mmHg).    Mild to moderate mitral valve regurgitation is present.      CT scan of the chest    Active Hospital Problems    Diagnosis  POA    **Parainfluenza [B34.8]  Yes    COPD with acute exacerbation [J44.1]  Yes    Nodule of chest wall [R22.2]  Yes    Hyperlipidemia [E78.5]  Yes    Lung nodule [R91.1]  Yes    Tobacco use [Z72.0]  Yes    Bronchospasm with bronchitis, acute [J20.9]  Yes    Current smoker [F17.200]  Yes      Resolved Hospital Problems    Diagnosis Date Resolved POA    Hypoxia [R09.02] 07/06/2024 Yes         Assessment & Plan     Parainfluenza virus infection is purely symptomatic  COPD with acute exacerbation no formal pulmonary function diagnosis but she has significant emphysema on her CT scan and appropriate smoking history.  Bronchodilator therapy when she recovers 4 to 6 weeks or so down the road she probably should have some function testing.  Tobacco abuse needs to stop, discussed says she has.  1 cm left upper lobe lingular nodule that is going to need to be followed probably PET scan went over acute infection  1.8 cm left medial anterior chest wall  nodule also will need to be followed probably can be followed with PET scan as well    Plan for disposition: She is getting pretty close to where she could go home and complete her therapy at home.  Triple inhaler therapy ICS/LABA/LAMA with as needed albuterol neb.  I recommend a PET scan in about a month to 6 weeks to follow-up that left lower lobe nodule.  She could be seen in our office and we can arrange PET scan and lung function testing.    Oscar Lew Jr, MD  07/06/24  10:34 EDT    Time:

## 2024-07-06 NOTE — DISCHARGE SUMMARY
Patient Name: Brigid Carballo  : 1945  MRN: [id number]    Date of Admission: 2024  Date of Discharge:  2024  Primary Care Physician: Kaya Mckeon APRN      Discharge Diagnoses     Active Hospital Problems    Diagnosis  POA    **Parainfluenza [B34.8]  Yes    COPD with acute exacerbation [J44.1]  Yes    Nodule of chest wall [R22.2]  Yes    Hyperlipidemia [E78.5]  Yes    Lung nodule [R91.1]  Yes    Tobacco use [Z72.0]  Yes    Bronchospasm with bronchitis, acute [J20.9]  Yes    Current smoker [F17.200]  Yes      Resolved Hospital Problems    Diagnosis Date Resolved POA    Hypoxia [R09.02] 2024 Yes        Hospital Course     Brief admission history and physical please refer to the H&P for full details.  A pleasant 78 years old female with a past history of GERD and [place]'s esophagus/skin cancer/DDD of the C-spine/dyslipidemia/nephrolithiasis/L-spine degenerative disc disease who is a smoker and presented to the emergency department with progressive shortness of breath associated with cough and upper respiratory congestion.  Her physical examination was remarkable for an afebrile patient with stable vital signs.  Rest of the examination is remarkable for poor bilateral air entry with bilateral expiratory wheeze.  Hospital course.  Initial ER evaluation included a respiratory PCR panel that was positive for parainfluenza virus type IV.  CBC was normal.  Troponin was negative.  proBNP was normal.  CMP normal except a chloride of 108.  Magnesium was normal.  Chest x-ray with scarring or atelectasis in both lung bases.  I will summarize hospital course in a problem-oriented manner as below.  1.  Acute hypoxemic respiratory failure secondary to lower respiratory tract infection with parainfluenza virus and COPD exacerbation/left lung nodule/history of tobacco use.  There was no evidence of bacterial infection.  She did not have any temperature spikes or leukocytosis during this admission.  She was  placed on oxygen and bronchodilator and prednisone was subsequently added together with Mucinex and incentive spirometry.  A CAT scan of the chest was done and revealed COPD background changes/bronchial wall thickening and mucous plugging/left lower lobe 1 cm solid pulmonary nodule.  Pulmonary consultation was obtained and maintained the same with close outpatient follow-up on the pulmonary nodule and pulmonary function test as an outpatient.  Her respiratory status slowly improved.  She was weaned off oxygen.  A walking pulse ox was done did not indicate any need for oxygen at home.  At the time of discharge she was strongly counseled against smoking.  She is being discharged on Trelegy Ellipta and as needed albuterol/Mucinex with incentive spirometry.  She was placed on Medrol Dosepak at the time of discharge.  2.  Chest wall nodule.  She was noted to have a chest wall subcutaneous nodule and she states that this is old and she follows up with dermatology for this.  3.  Dyslipidemia.  Patient was maintained on Lipitor.    At the time of discharge patient's shortness of breath has resolved and she remains with cough productive of clear sputum.  She is to follow-up with primary MD and pulmonary.  She was hemodynamically stable at discharge.  Consultants     Consult Orders (all) (From admission, onward)       Start     Ordered    07/01/24 1023  Inpatient Pulmonology Consult  Once        Specialty:  Pulmonary Disease  Provider:  Mallory Lazar MD    07/01/24 1023    06/28/24 1732  [facility] (on-call MD unless specified) Details  Once        Specialty:  Hospitalist  Provider:  (Not yet assigned)    06/28/24 1731                  Procedures     Imaging Results (All)       Procedure Component Value Units Date/Time    XR Chest 1 View [[id number]] Collected: 07/03/24 1514     Updated: 07/03/24 1519    Narrative:      Portable chest radiograph     HISTORY: Follow-up     TECHNIQUE: Single AP portable radiograph of the chest      COMPARISON: Chest radiograph 6/20/2024       Impression:      FINDINGS AND IMPRESSION:  There is worsening bibasilar pulmonary opacification, left greater than  right. Suggestion of a small left pleural effusion. Cardiac silhouette  is mildly enlarged. Constellation findings are concerning for pulmonary  edema in the appropriate clinical context with multifocal pneumonia less  likely. Correlation with patient history is recommended follow-up chest  CT if clinically indicated. No pneumothorax is seen.     This report was finalized on 7/3/2024 3:16 PM by Dr. Layo Simon M.D  on Workstation: [id number]       US Chest [[id number]] Collected: 07/02/24 0157     Updated: 07/02/24 0202    Narrative:      Targeted subcutaneous chest sonogram     HISTORY: Skin nodule     COMPARISON: Chest CT 6/30/2024     TECHNIQUE:  Grayscale and color Doppler  images of the subcutaneous  tissues of the chest in the area of interest as indicated by the patient  were obtained.       Impression:      FINDINGS AND IMPRESSION:  Within the subcutaneous tissues of the anterior chest there is a  heterogenous hypoechoic lesion measuring 1.8 x 2.1 x 0.9 cm. While no  definite internal color Doppler flow is demonstrated, findings remain  indeterminate and neoplasm/malignancy cannot be excluded. Dermatology  consultation is recommended with tissue sampling if clinically  indicated.        This report was finalized on 7/2/2024 1:59 AM by Dr. Layo Simon M.D  on Workstation: [id number]       CT Chest Without Contrast Diagnostic [[id number]] Collected: 06/30/24 2206     Updated: 06/30/24 2226    Narrative:      CT CHEST WITHOUT CONTRAST     HISTORY: Hypoxic respiratory failure     TECHNIQUE: Radiation dose reduction techniques were utilized, including  automated exposure control and exposure modulation based on body size.   3 mm images were obtained through the chest without the administration  of IV contrast.     COMPARISON: None available         FINDINGS: A 1.8 cm subcutaneous nodule measuring soft tissue density in  the inferior left paramidline anterior chest wall (series 2/image 59).     Heart is normal in size. Severe coronary calcifications. Small volume  pericardial fluid. Nondilated main pulmonary artery and thoracic aorta.  No mediastinal/hilar adenopathy. Decompressed esophagus.     Trachea is patent. No bronchiectasis. Moderate bronchial wall thickening  throughout the right middle and right lower lobes with segmental and  subsegmental mucous plugging more severe in the right lower lobe. More  mild bronchial wall thickening throughout the remaining lobes. No focal  consolidation or groundglass opacity. A left lower lobe 1 cm solid  nodule (series 3/image 71). No internal macroscopic fat. Few additional  sub-6 mm solid pulmonary nodules. Reference right upper lobe (series  3/image 64) and left upper lobe (series 3/image 40). Background advanced  biapical predominant centrilobular and paraseptal emphysema. Biapical  pleural-parenchymal scarring. Trace right pleural effusion.             Impression:      1. Moderate bronchial wall thickening throughout the right middle and  right lower lobes suggesting bronchitis with segmental and subsegmental  mucous plugging throughout much of the right middle and right lower  lobes. No focal consolidation or groundglass opacity at this time.  2. A left lower lobe 1 cm solid pulmonary nodule. Recommend pulmonology  consultation and consideration of FDG PET/CT.  3. Background advanced pulmonary emphysema.  4. Indeterminate 1.8 cm subcutaneous nodule measuring soft tissue  density in the inferior left paramidline anterior chest wall. Recommend  nonemergent outpatient follow-up ultrasound.     This report was finalized on 6/30/2024 10:22 PM by Dr. Spencer Dexter M.D on Workstation: [id number]       XR Chest 1 View [[id number]] Collected: 06/28/24 1724     Updated: 06/28/24 1728    Narrative:      XR CHEST 1  "VW-     HISTORY: Female who is 78 years-old, short of breath     TECHNIQUE: Frontal view of the chest     COMPARISON: None available     FINDINGS: The heart size is normal. Aorta is calcified. Pulmonary  vasculature is unremarkable. Minimal likely scarring or atelectasis at  the bases. No pleural effusion, or pneumothorax. No acute osseous  process.       Impression:      Minimal likely scarring or atelectasis at the bases.  Follow-up as clinical indications persist.     This report was finalized on 6/28/2024 5:25 PM by Dr. Jonathan Wolfe M.D on Workstation: [id number]               Pertinent Labs     Results from last 7 days   Lab Units 07/06/24  0453 07/03/24  0428 07/02/24  0445   WBC 10*3/mm3 11.05* 8.80 8.79   HEMOGLOBIN g/dL 13.8 14.4 13.6   PLATELETS 10*3/mm3 195 169 160     Results from last 7 days   Lab Units 07/06/24  0453 07/05/24  0604 07/03/24  0428 07/02/24  0445   SODIUM mmol/L 140 141 141 141   POTASSIUM mmol/L 4.1 5.1 4.8 4.5   CHLORIDE mmol/L 102 100 105 108*   CO2 mmol/L 29.2* 35.0* 29.0 28.3   BUN mg/dL 22 22 19 21   CREATININE mg/dL 0.64 0.82 0.74 0.79   GLUCOSE mg/dL 99 91 132* 138*   Estimated Creatinine Clearance: 63.6 mL/min (by C-G formula based on SCr of 0.64 mg/dL).    Results from last 7 days   Lab Units 07/06/24  0453 07/05/24  0604 07/03/24  0428 07/02/24  0445 06/30/24  0520   CALCIUM mg/dL 8.0* 8.5* 8.9 8.7 9.0   MAGNESIUM mg/dL  --   --   --   --  3.1*       Results from last 7 days   Lab Units 06/29/24  1443 06/29/24  1251   HSTROP T ng/L 8 9           Invalid input(s): \"LDLCALC\"      Imaging Results (Last 24 Hours)       ** No results found for the last 24 hours. **            Test Results Pending at Discharge         Discharge Exam   Physical Exam  Vitals temperature 98.2 a pulse of 81 respiratory to 14 blood pressure 120/74 and O2 sats of 97% on room air.  General.  Elderly female.  She is alert and oriented x 4.  In no apparent pain/distress/diaphoresis.  Normal mood and " affect.  Eyes.  Pupils equal round and reactive.  Status post bilateral cataract surgery.  Intact extraocular musculature.  No pallor or jaundice.  Oral cavity.  Moist mucous membrane.  Neck.  Supple.  No JVD.  No lymphadenopathy or thyromegaly.  Cardio vascular.  Regular rate and rhythm with grade 2 systolic murmur  Chest.  Poor bilateral air entry with scattered bilateral rhonchi and expiratory wheeze (improved from yesterday)..  Abdomen.  Soft lax.  No tenderness.  No organomegaly.  No guarding or rebound  Extremities.  No clubbing/cyanosis/edema.  CNS.  No acute focal neurological deficits  Discharge Details        Discharge Medications        New Medications        Instructions Start Date   albuterol sulfate  (90 Base) MCG/ACT inhaler  Commonly known as: PROVENTIL HFA;VENTOLIN HFA;PROAIR HFA   2 puffs, Inhalation, Every 4 Hours PRN      Fluticasone-Umeclidin-Vilant 200-62.5-25 MCG/ACT inhaler  Commonly known as: TRELEGY ELLIPTA   1 puff, Inhalation, Daily - RT      guaiFENesin 600 MG 12 hr tablet  Commonly known as: Mucinex   1,200 mg, Oral, 2 Times Daily      methylPREDNISolone 4 MG dose pack  Commonly known as: MEDROL   Take as directed on package instructions.      nicotine 21 MG/24HR patch  Commonly known as: NICODERM CQ   1 patch, Transdermal, Every 24 Hours, Apply Patch to Clean, Dry, Hairless Area Daily; Remove Old Patch Before Applying New Patch; Rotate Patch Site Daily; May Remove Patch at Bedtime to Prevent Insomnia; Follow Other Instructions on Package      Spiriva Respimat 2.5 MCG/ACT aerosol solution inhaler  Generic drug: tiotropium bromide monohydrate   2 puffs, Inhalation, Daily - RT             Continue These Medications        Instructions Start Date   ascorbic acid 1000 MG tablet  Commonly known as: VITAMIN C   1,000 mg, Oral, Daily      aspirin 81 MG EC tablet   81 mg, Oral, Daily      atorvastatin 40 MG tablet  Commonly known as: LIPITOR   TAKE 1 TABLET BY MOUTH ONCE DAILY AT NIGHT       cholecalciferol 25 MCG (1000 UT) tablet  Commonly known as: VITAMIN D3   1 tablet, Oral, Daily      esomeprazole 20 MG capsule  Commonly known as: nexIUM   20 mg, Oral, Every Morning Before Breakfast      fluticasone 50 MCG/ACT nasal spray  Commonly known as: FLONASE   2 sprays, Nasal, Daily      vitamin B-12 1000 MCG tablet  Commonly known as: CYANOCOBALAMIN   1,000 mcg, Oral, Daily             Stop These Medications      fish oil 1000 MG capsule capsule              Allergies   Allergen Reactions    Amoxicillin-Pot Clavulanate Itching    Codeine Itching    Latex Unknown - Low Severity    Penicillins Unknown - High Severity         Discharge Disposition:  Condition: Stable    Diet:   Diet Order   Procedures    Diet: Cardiac; Healthy Heart (2-3 Na+); Fluid Consistency: Thin (IDDSI 0)       Activity:   Activity Instructions       Activity as Tolerated              Counseling : Abstain from smoking    CODE STATUS:    Code Status and Medical Interventions:   Ordered at: 06/28/24 1752     Code Status (Patient has no pulse and is not breathing):    CPR (Attempt to Resuscitate)     Medical Interventions (Patient has pulse or is breathing):    Full       Future Appointments   Date Time Provider Department Center   7/29/2024 10:00 AM Kaya Mckeon APRN [facility]  [facility]     Additional Instructions for the Follow-ups that You Need to Schedule       Call MD With Problems / Concerns   As directed      Instructions: Call MD or return to ER if chest pain/worsening shortness of breath/worsening wheezing/worsening cough/fever chills/hemoptysis/ankle edema/nausea or vomiting    Order Comments: Instructions: Call MD or return to ER if chest pain/worsening shortness of breath/worsening wheezing/worsening cough/fever chills/hemoptysis/ankle edema/nausea or vomiting         Discharge Follow-up with PCP   As directed       Currently Documented PCP:    Kaya Mckeon APRN    PCP Phone Number:    [phone]     Follow Up Details:  Primary MD.  1 week.  Acute hypoxemic respiratory failure/COPD exacerbation/parainfluenza virus infection/left lung nodule/tobacco abuse/chest wall nodule/dyslipidemia        Discharge Follow-up with Specified Provider: Dermatology.  As scheduled.   As directed      To: Dermatology.  As scheduled.   Follow Up Details: Chest wall nodule.        Discharge Follow-up with Specified Provider: Pulmonary; 2 Weeks   As directed      To: Pulmonary   Follow Up: 2 Weeks   Follow Up Details: Acute hypoxemic respiratory failure secondary to COPD exacerbation because of parainfluenza virus infection/left lung nodule/tobacco abuse               Follow-up Information       Kaya Mckeon, APRN .    Specialty: Family Medicine  Why: Primary MD.  1 week.  Acute hypoxemic respiratory failure/COPD exacerbation/parainfluenza virus infection/left lung nodule/tobacco abuse/chest wall nodule/dyslipidemia  Contact information:  [address]  [facility] [address] [phone]                               Time Spent on Discharge:  Greater than 30 minutes      Jolanta Mckinney MD  [facility]  07/06/24  09:29 EDT

## 2024-07-06 NOTE — OUTREACH NOTE
Prep Survey      Flowsheet Row Responses   [facility] patient discharged from? [place]   Is LACE score < 7 ? No   Eligibility [facility]   Date of Admission 06/28/24   Date of Discharge 07/06/24   Discharge Disposition Home or Self Care   Discharge diagnosis Parainfluenza   Does the patient have one of the following disease processes/diagnoses(primary or secondary)? COPD   Does the patient have Home health ordered? No   Is there a DME ordered? No   Medication alerts for this patient see AVS   Prep survey completed? Yes            Charity CARSON - Registered Nurse

## 2024-07-06 NOTE — PLAN OF CARE
Goal Outcome Evaluation:  Plan of Care Reviewed With: patient        Progress: improving  Outcome Evaluation: VSS. No new complaints overnight. Patient slept well overnight. Ambulating in the room and around the nurses station independently. Plan is for d/c today.      Problem: Adult Inpatient Plan of Care  Goal: Plan of Care Review  Outcome: Ongoing, Not Progressing  Flowsheets (Taken 7/6/2024 0446)  Progress: improving  Plan of Care Reviewed With: patient  Outcome Evaluation: VSS. No new complaints overnight. Patient slept well overnight. Ambulating in the room and around the nurses station independently. Plan is for d/c today.  Goal: Patient-Specific Goal (Individualized)  Outcome: Ongoing, Not Progressing  Goal: Absence of Hospital-Acquired Illness or Injury  Outcome: Ongoing, Not Progressing  Intervention: Identify and Manage Fall Risk  Recent Flowsheet Documentation  Taken 7/6/2024 0408 by Bessie Mercado RN  Safety Promotion/Fall Prevention:   assistive device/personal items within reach   clutter free environment maintained   fall prevention program maintained   nonskid shoes/slippers when out of bed   room organization consistent   safety round/check completed  Taken 7/6/2024 0205 by Bessie Mercado RN  Safety Promotion/Fall Prevention:   assistive device/personal items within reach   clutter free environment maintained   fall prevention program maintained   nonskid shoes/slippers when out of bed   room organization consistent   safety round/check completed  Taken 7/6/2024 0032 by Bessie Mercado RN  Safety Promotion/Fall Prevention:   assistive device/personal items within reach   clutter free environment maintained   fall prevention program maintained   nonskid shoes/slippers when out of bed   room organization consistent   safety round/check completed  Taken 7/5/2024 2219 by Bessie Mercado RN  Safety Promotion/Fall Prevention:   assistive device/personal items within reach   clutter free environment maintained    fall prevention program maintained   nonskid shoes/slippers when out of bed   safety round/check completed   room organization consistent  Taken 7/5/2024 2039 by Bessie Mercado RN  Safety Promotion/Fall Prevention:   assistive device/personal items within reach   clutter free environment maintained   fall prevention program maintained   nonskid shoes/slippers when out of bed   room organization consistent   safety round/check completed  Intervention: Prevent Skin Injury  Recent Flowsheet Documentation  Taken 7/6/2024 0408 by Bessie Mercado RN  Body Position: supine  Taken 7/6/2024 0205 by Bessie Mercado RN  Body Position: supine  Taken 7/6/2024 0032 by Bessie Mercado RN  Body Position: supine  Taken 7/5/2024 2039 by Bessie Mercado RN  Body Position: dangle, side of bed  Intervention: Prevent and Manage VTE (Venous Thromboembolism) Risk  Recent Flowsheet Documentation  Taken 7/6/2024 0032 by Bessie Mercado RN  Activity Management: up ad amadeo  Taken 7/5/2024 2219 by Bessie Mercado RN  Activity Management: sitting, edge of bed  Taken 7/5/2024 2039 by Bessie Mercado RN  Activity Management: up ad amadeo  VTE Prevention/Management: (Lovenox) other (see comments)  Goal: Optimal Comfort and Wellbeing  Outcome: Ongoing, Not Progressing  Intervention: Provide Person-Centered Care  Recent Flowsheet Documentation  Taken 7/6/2024 0032 by Bessie Mercado RN  Trust Relationship/Rapport:   care explained   reassurance provided  Taken 7/5/2024 2039 by Bessie Mercado RN  Trust Relationship/Rapport:   care explained   choices provided   empathic listening provided   questions answered   questions encouraged   thoughts/feelings acknowledged   reassurance provided  Goal: Readiness for Transition of Care  Outcome: Ongoing, Not Progressing

## 2024-07-06 NOTE — PLAN OF CARE
Problem: Adult Inpatient Plan of Care  Goal: Plan of Care Review  Outcome: Met  Goal: Patient-Specific Goal (Individualized)  Outcome: Met  Goal: Absence of Hospital-Acquired Illness or Injury  Outcome: Met  Intervention: Identify and Manage Fall Risk  Recent Flowsheet Documentation  Taken 7/6/2024 1022 by aPdmini Hartman RN  Safety Promotion/Fall Prevention: safety round/check completed  Taken 7/6/2024 0859 by Padmini Hartman RN  Safety Promotion/Fall Prevention: safety round/check completed  Intervention: Prevent and Manage VTE (Venous Thromboembolism) Risk  Recent Flowsheet Documentation  Taken 7/6/2024 1022 by Padmini Hartman RN  Activity Management:   up ad amadeo   sitting, edge of bed  Taken 7/6/2024 0859 by Padmini Hartman RN  Activity Management: up ad amadeo  Intervention: Prevent Infection  Recent Flowsheet Documentation  Taken 7/6/2024 1022 by Padmini Hartman RN  Infection Prevention: hand hygiene promoted  Taken 7/6/2024 0859 by Padmini Hartman RN  Infection Prevention: hand hygiene promoted  Goal: Optimal Comfort and Wellbeing  Outcome: Met  Intervention: Provide Person-Centered Care  Recent Flowsheet Documentation  Taken 7/6/2024 0859 by Padmini Hartman RN  Trust Relationship/Rapport:   care explained   questions answered   reassurance provided  Goal: Readiness for Transition of Care  Outcome: Met   Goal Outcome Evaluation:                           Pt stable to discharge home with family.

## 2024-07-06 NOTE — PROGRESS NOTES
Exercise Oximetry    Patient Name:Brigid Carballo   MRN: [id number]   Date: 07/06/24             ROOM AIR BASELINE   SpO2% 95%   Heart Rate 74   Blood Pressure      EXERCISE ON ROOM AIR SpO2% EXERCISE ON O2 @ LPM SpO2%   1 MINUTE 94 1 MINUTE    2 MINUTES 93 2 MINUTES    3 MINUTES 92 3 MINUTES    4 MINUTES 92 4 MINUTES    5 MINUTES 91 5 MINUTES    6 MINUTES 92 6 MINUTES               Distance Walked   Distance Walked   Dyspnea (Roel Scale)   Dyspnea (Roel Scale)   Fatigue (Roel Scale)   Fatigue (Roel Scale)   SpO2% Post Exercise  94% SpO2% Post Exercise   HR Post Exercise  85 HR Post Exercise   Time to Recovery  2 minutes Time to Recovery     Comments: At rest, room air Sat 95% and HR 74. During the whole 6 minutes walk, Sat was between 91-94% and HR 97. Pt didn't show any sign of respiratory distress during exercise. Post exercise, Sat 94%, and HR 85. It took about 2 minutes to recover from exercise. RT will not recommend oxygen for home.

## 2024-07-08 ENCOUNTER — TRANSITIONAL CARE MANAGEMENT TELEPHONE ENCOUNTER (OUTPATIENT)
Dept: CALL CENTER | Facility: HOSPITAL | Age: 79
End: 2024-07-08
Payer: MEDICARE

## 2024-07-08 NOTE — OUTREACH NOTE
Call Center TCM Note      Flowsheet Row Responses   [facility] patient discharged from? [place]   Does the patient have one of the following disease processes/diagnoses(primary or secondary)? COPD   TCM attempt successful? No   Unsuccessful attempts Attempt 1            Ondina Landrum LPN    7/8/2024, 08:19 EDT

## 2024-07-08 NOTE — OUTREACH NOTE
Call Center TCM Note      Flowsheet Row Responses   [facility] patient discharged from? [place]   Does the patient have one of the following disease processes/diagnoses(primary or secondary)? COPD   TCM attempt successful? No   Unsuccessful attempts Attempt 2            Ondina Landrum LPN    7/8/2024, 15:56 EDT

## 2024-07-09 ENCOUNTER — TRANSITIONAL CARE MANAGEMENT TELEPHONE ENCOUNTER (OUTPATIENT)
Dept: CALL CENTER | Facility: HOSPITAL | Age: 79
End: 2024-07-09
Payer: MEDICARE

## 2024-07-09 NOTE — OUTREACH NOTE
Call Center TCM Note      Flowsheet Row Responses   [facility] patient discharged from? [place]   Does the patient have one of the following disease processes/diagnoses(primary or secondary)? COPD   TCM attempt successful? Yes   Call start time 1422   Call end time 1425   Discharge diagnosis Parainfluenza   Meds reviewed with patient/caregiver? Yes   Is the patient having any side effects they believe may be caused by any medication additions or changes? No   Does the patient have all medications ordered at discharge? Yes   Prescription comments New rx's Trelegy Ellipta, Mucinex, MDP, Nicoderm CQ, albuterol sulfate HFA in place. Fish oil discontinued.   Is the patient taking all medications as directed (includes completed medication regime)? Yes   Comments Pt is fine to keep scheduled ov with PCP SHE rashid on 07/29 unless PCP feels TCM or sooner appt needed. PCP did not have any avilable times I could access for TCM APPT, it was recommended for one week follow up on AVS   Does the patient have an appointment with their PCP within 7-14 days of discharge? No appointments available   Nursing Interventions Routed TCM call to PCP office, PCP office requested to make appointment - message sent   Has home health visited the patient within 72 hours of discharge? N/A   Pulse Ox monitoring None   Psychosocial issues? No   Did the patient receive a copy of their discharge instructions? Yes   Nursing interventions Reviewed instructions with patient   What is the patient's perception of their health status since discharge? Improving   Is the patient/caregiver able to teach back the hierarchy of who to call/visit for symptoms/problems? PCP, Specialist, Home health nurse, Urgent Care, ED, 911 Yes   TCM call completed? Yes   Wrap up additional comments D/C DX: Parainfluenza,  COPD exac,  new finding of lung nodule - Pt states she is doing a little better. She is frustrated as PULABDOUL BENZ who saw her during admit adamant  about seeing her in 2 weeks but office sates no appts til September. I told pt to call back and make clear this is post hospital visit, and if no better appt, leave msg for the MD who gave her the time frame. Pt is fine to keep scheduled ov with PCP SHE rashid on 07/29 unless PCP feels TCM or sooner appt needed. PCP did not have any avilable times I could access for TCM APPT, it was recommended for one week follow up on AVS   Call end time [id number]            Lovely Head MA    7/9/2024, 14:36 EDT

## 2024-07-25 ENCOUNTER — TRANSCRIBE ORDERS (OUTPATIENT)
Dept: ADMINISTRATIVE | Facility: HOSPITAL | Age: 79
End: 2024-07-25
Payer: MEDICARE

## 2024-07-25 DIAGNOSIS — R91.1 LUNG NODULE: Primary | ICD-10-CM

## 2024-08-05 ENCOUNTER — HOSPITAL ENCOUNTER (OUTPATIENT)
Dept: PET IMAGING | Facility: HOSPITAL | Age: 79
Discharge: HOME | End: 2024-08-05
Payer: MEDICARE

## 2024-08-05 ENCOUNTER — OFFICE VISIT (OUTPATIENT)
Dept: FAMILY MEDICINE CLINIC | Facility: CLINIC | Age: 79
End: 2024-08-05
Payer: MEDICARE

## 2024-08-05 VITALS
OXYGEN SATURATION: 97 % | TEMPERATURE: 97.5 F | SYSTOLIC BLOOD PRESSURE: 120 MMHG | WEIGHT: 147 LBS | HEIGHT: 62 IN | DIASTOLIC BLOOD PRESSURE: 72 MMHG | HEART RATE: 88 BPM | BODY MASS INDEX: 27.05 KG/M2

## 2024-08-05 DIAGNOSIS — K21.9 GASTROESOPHAGEAL REFLUX DISEASE, UNSPECIFIED WHETHER ESOPHAGITIS PRESENT: ICD-10-CM

## 2024-08-05 DIAGNOSIS — J44.1 COPD WITH ACUTE EXACERBATION: ICD-10-CM

## 2024-08-05 DIAGNOSIS — Z72.0 TOBACCO USE: ICD-10-CM

## 2024-08-05 DIAGNOSIS — I65.23 BILATERAL CAROTID ARTERY STENOSIS: ICD-10-CM

## 2024-08-05 DIAGNOSIS — R91.1 LUNG NODULE: ICD-10-CM

## 2024-08-05 DIAGNOSIS — E78.2 MIXED HYPERLIPIDEMIA: Primary | ICD-10-CM

## 2024-08-05 DIAGNOSIS — D72.829 LEUKOCYTOSIS, UNSPECIFIED TYPE: ICD-10-CM

## 2024-08-05 PROBLEM — E78.5 HYPERLIPIDEMIA: Status: RESOLVED | Noted: 2024-07-06 | Resolved: 2024-08-05

## 2024-08-05 LAB — GLUCOSE BLDC GLUCOMTR-MCNC: 94 MG/DL (ref 70–130)

## 2024-08-05 PROCEDURE — A9552 F18 FDG: HCPCS | Performed by: INTERNAL MEDICINE

## 2024-08-05 PROCEDURE — G2211 COMPLEX E/M VISIT ADD ON: HCPCS | Performed by: NURSE PRACTITIONER

## 2024-08-05 PROCEDURE — 82948 REAGENT STRIP/BLOOD GLUCOSE: CPT

## 2024-08-05 PROCEDURE — 1159F MED LIST DOCD IN RCRD: CPT | Performed by: NURSE PRACTITIONER

## 2024-08-05 PROCEDURE — 0 FLUDEOXYGLUCOSE F18 SOLUTION: Performed by: INTERNAL MEDICINE

## 2024-08-05 PROCEDURE — 78815 PET IMAGE W/CT SKULL-THIGH: CPT

## 2024-08-05 PROCEDURE — 1160F RVW MEDS BY RX/DR IN RCRD: CPT | Performed by: NURSE PRACTITIONER

## 2024-08-05 PROCEDURE — 99214 OFFICE O/P EST MOD 30 MIN: CPT | Performed by: NURSE PRACTITIONER

## 2024-08-05 RX ADMIN — FLUDEOXYGLUCOSE F 18 1 DOSE: 200 INJECTION, SOLUTION INTRAVENOUS at 12:37

## 2024-08-05 NOTE — PROGRESS NOTES
Subjective   Brigid Carballo is a 78 y.o. female.     Chief Complaint   Patient presents with    Carotid Artery Disease     Stenosis    Hyperlipidemia     6 month follow up       History of Present Illness   Patient presents for follow up Hyperlipidemia: takes Atorvastatin daily; no myalgias; needs to decrease junk food in diet; no formal exercise, has physical job; non-fasting today.     F/U VANESA/Lancaster's esophagus: takes Nexium daily and works well; had EGD/colonoscopy in 2015.     Sees cardiology, Dr. Nunez; had abnormal stress Echo 9/2022 and then left heart cath 11/2022; heart cath noted 2 arteries 30% blocked; also has less than 50% blockage in bilateral carotid arteries; has been taking aspirin daily; has not seen cardiology recently; due for repeat carotid US 2/2025.    Will have swelling in right leg when on feet all day; resolved next morning; no injury in past; had artery removed from right leg in past.    Patient was admitted to [facility] from 6/28/24--7/6/24 due to parainfluenza virus and COPD exacerbation with acute hypoxemia respiratory failure; was placed on oxygen, bronchodilator, and Prednisone as well as Mucinex and incentive spirometry; CT chest noted COPD background changes/bronchial wall thickening and mucous plugging/left lower lobe 1 cm solid pulmonary nodule; pulmonary was consulted and to follow up with pulmonary for repeat CT and pulmonary function testing; pt respiratory status slowly improved and pt was weaned off oxygen; discharged home with Rx for Trelegy, Spiriva, and Medrol dosepak and as needed Albuterol and Mucinex with incentive spirometry; also noted chest wall subcutaneous nodule which is chronic and pt sees derm for this.    Finished Medrol dosepak; did not get nicotine patch; has decreased how much smoking; will put cigarette out after smoking half of cigarette, has been smoking less than half pack daily.    Had follow up with pulmonary about 1-2 weeks ago and had PFTs;  told lung function at 45%; ordered PET scan; has PET scan today; has been using Trelegy daily as needed; does not use very often; has not needed to use Albuterol inhaler or nebulizer very often.       was present during the history-taking and subsequent discussion with this patient.  Patient agrees to the presence of the individual during this visit.     The following portions of the patient's history were reviewed and updated as appropriate: allergies, current medications, past family history, past medical history, past social history, past surgical history and problem list.    Current Outpatient Medications on File Prior to Visit   Medication Sig    albuterol sulfate  (90 Base) MCG/ACT inhaler Inhale 2 puffs Every 4 (Four) Hours As Needed for Wheezing.    ascorbic acid (VITAMIN C) 1000 MG tablet Take 1 tablet by mouth Daily.    aspirin 81 MG EC tablet Take 1 tablet by mouth Daily.    atorvastatin (LIPITOR) 40 MG tablet TAKE 1 TABLET BY MOUTH ONCE DAILY AT NIGHT    cholecalciferol (VITAMIN D3) 25 MCG (1000 UT) tablet Take 1 tablet by mouth Daily.    esomeprazole (nexIUM) 20 MG capsule Take 1 capsule by mouth Every Morning Before Breakfast.    Fluticasone-Umeclidin-Vilant (TRELEGY ELLIPTA) 200-62.5-25 MCG/ACT inhaler Inhale 1 puff Daily. (Patient taking differently: Inhale 1 puff Daily As Needed.)    vitamin B-12 (CYANOCOBALAMIN) 1000 MCG tablet Take 1 tablet by mouth Daily.    fluticasone (FLONASE) 50 MCG/ACT nasal spray 2 sprays into the nostril(s) as directed by provider Daily. (Patient not taking: Reported on 8/5/2024)    [DISCONTINUED] guaiFENesin (Mucinex) 600 MG 12 hr tablet Take 2 tablets by mouth 2 (Two) Times a Day. (Patient not taking: Reported on 8/5/2024)    [DISCONTINUED] methylPREDNISolone (MEDROL) 4 MG dose pack Take as directed on package instructions.    [DISCONTINUED] nicotine (NICODERM CQ) 21 MG/24HR patch Place 1 patch on the skin as directed by provider Daily. Apply Patch to  Clean, Dry, Hairless Area Daily; Remove Old Patch Before Applying New Patch; Rotate Patch Site Daily; May Remove Patch at Bedtime to Prevent Insomnia; Follow Other Instructions on Package    [DISCONTINUED] tiotropium bromide monohydrate (Spiriva Respimat) 2.5 MCG/ACT aerosol solution inhaler Inhale 2 puffs Daily.     No current facility-administered medications on file prior to visit.        Past Medical History:   Diagnosis Date    Angina pectoris     Anxiety     Lancaster's esophagus 09/29/2015    BPV (benign positional vertigo) 11/07/2021    Bunion of left foot 06/14/2016    Cancer     skin    Carpal tunnel syndrome     Chest pain 10/05/2020    DDD (degenerative disc disease), cervical 11/04/2014    Deformity of wrist joint 05/03/2018    Hyperlipidemia     Insomnia 09/11/2017    Kidney stones     Low back pain 11/04/2014    Melanoma in situ of unspecified lower limb, including hip 06/2021    Occlusion of left vertebral artery 11/06/2021    1/10/22 CTA neck: beyond its origin, left vertebral artery is widely patent throughout its cervical and intracranial course to the vertebrobasilar junction without stenosis    Osteoarthritis of left knee 06/27/2016    Renal stone 03/02/2020    Spinal stenosis of lumbar region 12/29/2014    Tear of lateral meniscus of knee 09/30/2014    Unexplained weight loss 04/21/2021       Past Surgical History:   Procedure Laterality Date    APPENDECTOMY      BREAST BIOPSY      multiple    CARDIAC CATHETERIZATION      CARDIAC CATHETERIZATION N/A 11/29/2022    Procedure: Left Heart Cath;  Surgeon: Brennen Nguyen MD;  Location: [facility] CATH INVASIVE LOCATION;  Service: Cardiology;  Laterality: N/A;    CARDIAC CATHETERIZATION N/A 11/29/2022    Procedure: Coronary angiography;  Surgeon: Brennen Nguyen MD;  Location: [facility] CATH INVASIVE LOCATION;  Service: Cardiology;  Laterality: N/A;    CARDIAC CATHETERIZATION N/A 11/29/2022    Procedure: Left ventriculography;  Surgeon: Patrick  Brennen ZHOU MD;  Location: [facility] INVASIVE LOCATION;  Service: Cardiology;  Laterality: N/A;    CARPAL TUNNEL RELEASE Bilateral     COLONOSCOPY  05/17/2021    polyp removed; per Dr. Velasquez    CYSTOSCOPY W/ URETERAL STENT PLACEMENT  10/22/2019    HYSTERECTOMY      INGUINAL HERNIA REPAIR Right     KNEE ARTHROSCOPY Right     meniscus    TONSILLECTOMY      UPPER GASTROINTESTINAL ENDOSCOPY  05/17/2021    per Dr. Velasquez       Family History   Problem Relation Age of Onset    Emphysema Mother     Heart disease Mother     Heart disease Father     Cancer Brother     Heart disease Brother     Skin cancer Brother     Stomach cancer Maternal Aunt     Breast cancer Maternal Grandmother     Breast cancer Paternal Grandmother        Social History     Socioeconomic History    Marital status:    Tobacco Use    Smoking status: Every Day     Current packs/day: 0.50     Average packs/day: 0.5 packs/day for 65.0 years (32.5 ttl pk-yrs)     Types: Cigarettes     Passive exposure: Current    Smokeless tobacco: Never    Tobacco comments:     Quit now [place] info provided   Vaping Use    Vaping status: Never Used   Substance and Sexual Activity    Alcohol use: Yes     Comment: social--rarely    Drug use: Never    Sexual activity: Defer       Review of Systems   Constitutional:  Negative for appetite change (has tried to decrease intake of junk food), chills, fatigue, fever, unexpected weight gain and unexpected weight loss.   HENT:  Negative for ear pain, sinus pressure, sore throat and trouble swallowing.    Eyes:  Negative for blurred vision.   Respiratory:  Positive for cough (occasional productive cough--white color on occasion). Negative for chest tightness and shortness of breath.    Cardiovascular:  Negative for chest pain and palpitations.   Gastrointestinal:  Negative for abdominal pain, blood in stool, constipation and diarrhea.   Endocrine: Negative for cold intolerance, heat intolerance and polydipsia.  "  Genitourinary:  Negative for dysuria and frequency.   Musculoskeletal:  Negative for arthralgias and back pain.   Skin:  Negative for rash.   Neurological:  Negative for dizziness, syncope, light-headedness and headache.   Hematological:  Bruises/bleeds easily: has easy bruising with aspirin.       Objective   Vitals:    08/05/24 0842 08/05/24 0954   BP: 142/70 120/72   BP Location:  Left arm   Patient Position:  Sitting   Pulse: 88    Temp: 97.5 °F (36.4 °C)    TempSrc: Infrared    SpO2: 97%    Weight: 66.7 kg (147 lb)    Height: 157.5 cm (62\")      Body mass index is 26.89 kg/m².    Physical Exam  Vitals and nursing note reviewed.   Constitutional:       General: She is not in acute distress.     Appearance: She is well-developed and well-groomed. She is not diaphoretic.   HENT:      Head: Normocephalic.      Right Ear: External ear normal.      Left Ear: External ear normal.   Eyes:      Conjunctiva/sclera: Conjunctivae normal.   Neck:      Vascular: No carotid bruit.   Cardiovascular:      Rate and Rhythm: Normal rate and regular rhythm.      Pulses: Normal pulses.      Heart sounds: Normal heart sounds. No murmur heard.     Comments: Mild varicosities in right LE  Pulmonary:      Effort: Pulmonary effort is normal. No respiratory distress.      Breath sounds: Normal breath sounds.   Abdominal:      General: Bowel sounds are normal.      Palpations: Abdomen is soft. There is no hepatomegaly or splenomegaly.      Tenderness: There is no abdominal tenderness. There is no guarding.   Musculoskeletal:      Cervical back: Normal range of motion and neck supple.      Right lower leg: Right lower leg edema: trace pretibial.      Left lower leg: No edema.   Skin:     General: Skin is warm and dry.      Findings: No rash.   Neurological:      Mental Status: She is alert and oriented to person, place, and time.      Gait: Gait normal.   Psychiatric:         Mood and Affect: Mood normal.         Behavior: Behavior " normal.         Thought Content: Thought content normal.         Cognition and Memory: Cognition normal.         Judgment: Judgment normal.         Lab Results   Component Value Date    WBC 11.05 (H) 07/06/2024    RBC 4.30 07/06/2024    HGB 13.8 07/06/2024    HCT 41.1 07/06/2024    MCV 95.6 07/06/2024    MCH 32.1 07/06/2024    MCHC 33.6 07/06/2024    RDW 11.9 (L) 07/06/2024    RDWSD 42.1 07/06/2024    MPV 9.4 07/06/2024     07/06/2024    NEUTRORELPCT 63.9 07/06/2024    LYMPHORELPCT 23.3 07/06/2024    MONORELPCT 7.3 07/06/2024    EOSRELPCT 0.3 07/06/2024    BASORELPCT 0.8 07/06/2024    AUTOIGPER 4.4 (H) 07/06/2024    NEUTROABS 7.05 (H) 07/06/2024    LYMPHSABS 2.58 07/06/2024    MONOSABS 0.81 07/06/2024    EOSABS 0.03 07/06/2024    BASOSABS 0.09 07/06/2024    AUTOIGNUM 0.49 (H) 07/06/2024    NRBC 0.0 07/06/2024     Lab Results   Component Value Date    GLUCOSE 99 07/06/2024    BUN 22 07/06/2024    CREATININE 0.64 07/06/2024    EGFRIFNONA 125 11/07/2021    BCR 34.4 (H) 07/06/2024    K 4.1 07/06/2024    CO2 29.2 (H) 07/06/2024    CALCIUM 8.0 (L) 07/06/2024    PROTENTOTREF 5.9 (L) 01/29/2024    ALBUMIN 4.1 06/28/2024    LABIL2 2.3 01/29/2024    AST 15 06/28/2024    ALT 10 06/28/2024      Lab Results   Component Value Date    CHLPL 212 (H) 01/29/2024    TRIG 69 01/29/2024    HDL 70 (H) 01/29/2024    VLDL 12 01/29/2024     (H) 01/29/2024     Lab Results   Component Value Date    TSH 2.960 05/31/2022     Lab Results   Component Value Date    HGBA1C 4.96 11/07/2021     Lab Results   Component Value Date    COLORU Yellow 01/29/2024    CLARITYU Clear 01/29/2024    LEUKOCYTESUR Negative 01/29/2024    BILIRUBINUR Negative 01/29/2024      Records reviewed from [facility] from 6/28/24--7/6/24. Hospital course.  Initial ER evaluation included a respiratory PCR panel that was positive for parainfluenza virus type IV.  CBC was normal.  Troponin was negative.  proBNP was normal.  CMP normal except a chloride  of 108.  Magnesium was normal.  Chest x-ray with scarring or atelectasis in both lung bases.  I will summarize hospital course in a problem-oriented manner as below.  1.  Acute hypoxemic respiratory failure secondary to lower respiratory tract infection with parainfluenza virus and COPD exacerbation/left lung nodule/history of tobacco use.  There was no evidence of bacterial infection.  She did not have any temperature spikes or leukocytosis during this admission.  She was placed on oxygen and bronchodilator and prednisone was subsequently added together with Mucinex and incentive spirometry.  A CAT scan of the chest was done and revealed COPD background changes/bronchial wall thickening and mucous plugging/left lower lobe 1 cm solid pulmonary nodule.  Pulmonary consultation was obtained and maintained the same with close outpatient follow-up on the pulmonary nodule and pulmonary function test as an outpatient.  Her respiratory status slowly improved.  She was weaned off oxygen.  A walking pulse ox was done did not indicate any need for oxygen at home.  At the time of discharge she was strongly counseled against smoking.  She is being discharged on Trelegy Ellipta and as needed albuterol/Mucinex with incentive spirometry.  She was placed on Medrol Dosepak at the time of discharge.  2.  Chest wall nodule.  She was noted to have a chest wall subcutaneous nodule and she states that this is old and she follows up with dermatology for this.  3.  Dyslipidemia.  Patient was maintained on Lipitor.     At the time of discharge patient's shortness of breath has resolved and she remains with cough productive of clear sputum.  She is to follow-up with primary MD and pulmonary.  She was hemodynamically stable at discharge.    Assessment    Problem List Items Addressed This Visit       Hyperlipidemia - Primary    Current Assessment & Plan     Continue Atorvastatin daily.  Continue to work on healthy diet and exercise as  tolerated.         Relevant Orders    Comprehensive Metabolic Panel    Lipid Panel With LDL / HDL Ratio    Gastroesophageal reflux disease    Current Assessment & Plan     Stable.  Continue Nexium daily.         Bilateral carotid artery stenosis    Overview     1/10/22 CTA neck:  Calcified plaque mildly narrows the left common carotid origin and heavily calcified plaque moderately narrows the left common carotid bifurcation by 50%, extends into the origin of the left internal carotid artery and narrows the origin of the left internal carotid artery by 40% to 50% using the NASCET criteria. Calcified plaque mildly narrows the right common carotid bifurcation, extends into the origin and mildly narrows the origin and the proximal right internal carotid artery by 20% using the NASCET criteria. There is focal moderate-to-severe stenosis at the origin of the right external carotid artery.  1/10/23 US carotids: 16-49% stenosis bilaterally  2/12/24 US carotids: Right internal carotid artery demonstrates a less than 50% stenosis; Left internal carotid artery demonstrates a less than 50% stenosis.         Tobacco use    Current Assessment & Plan     Continue to decrease number of cigarettes smoked daily.         COPD with acute exacerbation    Current Assessment & Plan     Resolved.  Continue Trelegy daily.  Continue Albuterol inhaler/nebulizer as needed.  Follow up as scheduled with pulmonary.         Leukocytosis    Relevant Orders    CBC & Differential        Return in about 6 months (around 2/5/2025) for Medicare Wellness, Recheck.or sooner if symptoms persist or worsen.

## 2024-08-06 DIAGNOSIS — E78.2 MIXED HYPERLIPIDEMIA: ICD-10-CM

## 2024-08-06 LAB
ALBUMIN SERPL-MCNC: 3.9 G/DL (ref 3.5–5.2)
ALBUMIN/GLOB SERPL: 2.4 G/DL
ALP SERPL-CCNC: 60 U/L (ref 39–117)
ALT SERPL-CCNC: <5 U/L (ref 1–33)
AST SERPL-CCNC: 10 U/L (ref 1–32)
BASOPHILS # BLD AUTO: 0.04 10*3/MM3 (ref 0–0.2)
BASOPHILS NFR BLD AUTO: 0.8 % (ref 0–1.5)
BILIRUB SERPL-MCNC: 0.2 MG/DL (ref 0–1.2)
BUN SERPL-MCNC: 12 MG/DL (ref 8–23)
BUN/CREAT SERPL: 18.8 (ref 7–25)
CALCIUM SERPL-MCNC: 9.4 MG/DL (ref 8.6–10.5)
CHLORIDE SERPL-SCNC: 108 MMOL/L (ref 98–107)
CHOLEST SERPL-MCNC: 220 MG/DL (ref 0–200)
CO2 SERPL-SCNC: 30 MMOL/L (ref 22–29)
CREAT SERPL-MCNC: 0.64 MG/DL (ref 0.57–1)
EGFRCR SERPLBLD CKD-EPI 2021: 90 ML/MIN/1.73
EOSINOPHIL # BLD AUTO: 0.05 10*3/MM3 (ref 0–0.4)
EOSINOPHIL NFR BLD AUTO: 1 % (ref 0.3–6.2)
ERYTHROCYTE [DISTWIDTH] IN BLOOD BY AUTOMATED COUNT: 12.3 % (ref 12.3–15.4)
GLOBULIN SER CALC-MCNC: 1.6 GM/DL
GLUCOSE SERPL-MCNC: 74 MG/DL (ref 65–99)
HCT VFR BLD AUTO: 44.6 % (ref 34–46.6)
HDLC SERPL-MCNC: 73 MG/DL (ref 40–60)
HGB BLD-MCNC: 14.4 G/DL (ref 12–15.9)
IMM GRANULOCYTES # BLD AUTO: 0.03 10*3/MM3 (ref 0–0.05)
IMM GRANULOCYTES NFR BLD AUTO: 0.6 % (ref 0–0.5)
LDLC SERPL CALC-MCNC: 127 MG/DL (ref 0–100)
LDLC/HDLC SERPL: 1.71 {RATIO}
LYMPHOCYTES # BLD AUTO: 1.43 10*3/MM3 (ref 0.7–3.1)
LYMPHOCYTES NFR BLD AUTO: 27.5 % (ref 19.6–45.3)
MCH RBC QN AUTO: 31.8 PG (ref 26.6–33)
MCHC RBC AUTO-ENTMCNC: 32.3 G/DL (ref 31.5–35.7)
MCV RBC AUTO: 98.5 FL (ref 79–97)
MONOCYTES # BLD AUTO: 0.59 10*3/MM3 (ref 0.1–0.9)
MONOCYTES NFR BLD AUTO: 11.3 % (ref 5–12)
NEUTROPHILS # BLD AUTO: 3.06 10*3/MM3 (ref 1.7–7)
NEUTROPHILS NFR BLD AUTO: 58.8 % (ref 42.7–76)
NRBC BLD AUTO-RTO: 0 /100 WBC (ref 0–0.2)
PLATELET # BLD AUTO: 221 10*3/MM3 (ref 140–450)
POTASSIUM SERPL-SCNC: 5 MMOL/L (ref 3.5–5.2)
PROT SERPL-MCNC: 5.5 G/DL (ref 6–8.5)
RBC # BLD AUTO: 4.53 10*6/MM3 (ref 3.77–5.28)
SODIUM SERPL-SCNC: 142 MMOL/L (ref 136–145)
TRIGL SERPL-MCNC: 112 MG/DL (ref 0–150)
VLDLC SERPL CALC-MCNC: 20 MG/DL (ref 5–40)
WBC # BLD AUTO: 5.2 10*3/MM3 (ref 3.4–10.8)

## 2024-08-06 RX ORDER — ATORVASTATIN CALCIUM 40 MG/1
40 TABLET, FILM COATED ORAL NIGHTLY
Qty: 90 TABLET | Refills: 1 | Status: SHIPPED | OUTPATIENT
Start: 2024-08-06

## 2024-08-06 NOTE — ASSESSMENT & PLAN NOTE
Resolved.  Continue Trelegy daily.  Continue Albuterol inhaler/nebulizer as needed.  Follow up as scheduled with pulmonary.

## 2024-08-15 ENCOUNTER — APPOINTMENT (OUTPATIENT)
Dept: CT IMAGING | Facility: HOSPITAL | Age: 79
End: 2024-08-15
Payer: MEDICARE

## 2024-08-15 ENCOUNTER — APPOINTMENT (OUTPATIENT)
Dept: GENERAL RADIOLOGY | Facility: HOSPITAL | Age: 79
End: 2024-08-15
Payer: MEDICARE

## 2024-08-15 ENCOUNTER — HOSPITAL ENCOUNTER (OUTPATIENT)
Facility: HOSPITAL | Age: 79
Setting detail: OBSERVATION
Discharge: HOME | End: 2024-08-16
Attending: EMERGENCY MEDICINE | Admitting: EMERGENCY MEDICINE
Payer: MEDICARE

## 2024-08-15 DIAGNOSIS — B34.8 RHINOVIRUS INFECTION: ICD-10-CM

## 2024-08-15 DIAGNOSIS — R09.02 HYPOXIA: ICD-10-CM

## 2024-08-15 DIAGNOSIS — R91.1 LUNG NODULE: ICD-10-CM

## 2024-08-15 DIAGNOSIS — J44.1 COPD WITH ACUTE EXACERBATION: Primary | ICD-10-CM

## 2024-08-15 LAB
ALBUMIN SERPL-MCNC: 3.8 G/DL (ref 3.5–5.2)
ALBUMIN/GLOB SERPL: 1.7 G/DL
ALP SERPL-CCNC: 44 U/L (ref 39–117)
ALT SERPL W P-5'-P-CCNC: 6 U/L (ref 1–33)
ANION GAP SERPL CALCULATED.3IONS-SCNC: 9.5 MMOL/L (ref 5–15)
AST SERPL-CCNC: 12 U/L (ref 1–32)
B PARAPERT DNA SPEC QL NAA+PROBE: NOT DETECTED
B PERT DNA SPEC QL NAA+PROBE: NOT DETECTED
BASOPHILS # BLD AUTO: 0.03 10*3/MM3 (ref 0–0.2)
BASOPHILS NFR BLD AUTO: 0.4 % (ref 0–1.5)
BILIRUB SERPL-MCNC: 0.5 MG/DL (ref 0–1.2)
BUN SERPL-MCNC: 15 MG/DL (ref 8–23)
BUN/CREAT SERPL: 24.6 (ref 7–25)
C PNEUM DNA NPH QL NAA+NON-PROBE: NOT DETECTED
CALCIUM SPEC-SCNC: 8.9 MG/DL (ref 8.6–10.5)
CHLORIDE SERPL-SCNC: 107 MMOL/L (ref 98–107)
CO2 SERPL-SCNC: 25.5 MMOL/L (ref 22–29)
CREAT SERPL-MCNC: 0.61 MG/DL (ref 0.57–1)
DEPRECATED RDW RBC AUTO: 42.7 FL (ref 37–54)
EGFRCR SERPLBLD CKD-EPI 2021: 91.1 ML/MIN/1.73
EOSINOPHIL # BLD AUTO: 0.08 10*3/MM3 (ref 0–0.4)
EOSINOPHIL NFR BLD AUTO: 1.1 % (ref 0.3–6.2)
ERYTHROCYTE [DISTWIDTH] IN BLOOD BY AUTOMATED COUNT: 12.1 % (ref 12.3–15.4)
FLUAV SUBTYP SPEC NAA+PROBE: NOT DETECTED
FLUBV RNA ISLT QL NAA+PROBE: NOT DETECTED
GEN 5 2HR TROPONIN T REFLEX: 10 NG/L
GLOBULIN UR ELPH-MCNC: 2.2 GM/DL
GLUCOSE SERPL-MCNC: 95 MG/DL (ref 65–99)
HADV DNA SPEC NAA+PROBE: NOT DETECTED
HCOV 229E RNA SPEC QL NAA+PROBE: NOT DETECTED
HCOV HKU1 RNA SPEC QL NAA+PROBE: NOT DETECTED
HCOV NL63 RNA SPEC QL NAA+PROBE: NOT DETECTED
HCOV OC43 RNA SPEC QL NAA+PROBE: NOT DETECTED
HCT VFR BLD AUTO: 42.7 % (ref 34–46.6)
HGB BLD-MCNC: 14 G/DL (ref 12–15.9)
HMPV RNA NPH QL NAA+NON-PROBE: NOT DETECTED
HPIV1 RNA ISLT QL NAA+PROBE: NOT DETECTED
HPIV2 RNA SPEC QL NAA+PROBE: NOT DETECTED
HPIV3 RNA NPH QL NAA+PROBE: NOT DETECTED
HPIV4 P GENE NPH QL NAA+PROBE: NOT DETECTED
IMM GRANULOCYTES # BLD AUTO: 0.03 10*3/MM3 (ref 0–0.05)
IMM GRANULOCYTES NFR BLD AUTO: 0.4 % (ref 0–0.5)
LYMPHOCYTES # BLD AUTO: 1.74 10*3/MM3 (ref 0.7–3.1)
LYMPHOCYTES NFR BLD AUTO: 24.6 % (ref 19.6–45.3)
M PNEUMO IGG SER IA-ACNC: NOT DETECTED
MAGNESIUM SERPL-MCNC: 2.1 MG/DL (ref 1.6–2.4)
MCH RBC QN AUTO: 31.5 PG (ref 26.6–33)
MCHC RBC AUTO-ENTMCNC: 32.8 G/DL (ref 31.5–35.7)
MCV RBC AUTO: 96.2 FL (ref 79–97)
MONOCYTES # BLD AUTO: 0.69 10*3/MM3 (ref 0.1–0.9)
MONOCYTES NFR BLD AUTO: 9.8 % (ref 5–12)
NEUTROPHILS NFR BLD AUTO: 4.5 10*3/MM3 (ref 1.7–7)
NEUTROPHILS NFR BLD AUTO: 63.7 % (ref 42.7–76)
NRBC BLD AUTO-RTO: 0 /100 WBC (ref 0–0.2)
NT-PROBNP SERPL-MCNC: 165 PG/ML (ref 0–1800)
PLATELET # BLD AUTO: 194 10*3/MM3 (ref 140–450)
PMV BLD AUTO: 9.4 FL (ref 6–12)
POTASSIUM SERPL-SCNC: 4 MMOL/L (ref 3.5–5.2)
PROCALCITONIN SERPL-MCNC: 0.05 NG/ML (ref 0–0.25)
PROT SERPL-MCNC: 6 G/DL (ref 6–8.5)
RBC # BLD AUTO: 4.44 10*6/MM3 (ref 3.77–5.28)
RHINOVIRUS RNA SPEC NAA+PROBE: DETECTED
RSV RNA NPH QL NAA+NON-PROBE: NOT DETECTED
SARS-COV-2 RNA NPH QL NAA+NON-PROBE: NOT DETECTED
SODIUM SERPL-SCNC: 142 MMOL/L (ref 136–145)
TROPONIN T DELTA: -3 NG/L
TROPONIN T SERPL HS-MCNC: 13 NG/L
WBC NRBC COR # BLD AUTO: 7.07 10*3/MM3 (ref 3.4–10.8)

## 2024-08-15 PROCEDURE — 84484 ASSAY OF TROPONIN QUANT: CPT | Performed by: EMERGENCY MEDICINE

## 2024-08-15 PROCEDURE — 96374 THER/PROPH/DIAG INJ IV PUSH: CPT

## 2024-08-15 PROCEDURE — G0378 HOSPITAL OBSERVATION PER HR: HCPCS

## 2024-08-15 PROCEDURE — 0202U NFCT DS 22 TRGT SARS-COV-2: CPT | Performed by: EMERGENCY MEDICINE

## 2024-08-15 PROCEDURE — 83735 ASSAY OF MAGNESIUM: CPT | Performed by: EMERGENCY MEDICINE

## 2024-08-15 PROCEDURE — 25810000003 SODIUM CHLORIDE 0.9 % SOLUTION

## 2024-08-15 PROCEDURE — 80053 COMPREHEN METABOLIC PANEL: CPT | Performed by: EMERGENCY MEDICINE

## 2024-08-15 PROCEDURE — 84145 PROCALCITONIN (PCT): CPT | Performed by: EMERGENCY MEDICINE

## 2024-08-15 PROCEDURE — 71045 X-RAY EXAM CHEST 1 VIEW: CPT

## 2024-08-15 PROCEDURE — 83880 ASSAY OF NATRIURETIC PEPTIDE: CPT | Performed by: EMERGENCY MEDICINE

## 2024-08-15 PROCEDURE — 94799 UNLISTED PULMONARY SVC/PX: CPT

## 2024-08-15 PROCEDURE — 99285 EMERGENCY DEPT VISIT HI MDM: CPT

## 2024-08-15 PROCEDURE — 93005 ELECTROCARDIOGRAM TRACING: CPT | Performed by: EMERGENCY MEDICINE

## 2024-08-15 PROCEDURE — 36415 COLL VENOUS BLD VENIPUNCTURE: CPT

## 2024-08-15 PROCEDURE — 63710000001 PREDNISONE PER 1 MG: Performed by: NURSE PRACTITIONER

## 2024-08-15 PROCEDURE — 94664 DEMO&/EVAL PT USE INHALER: CPT

## 2024-08-15 PROCEDURE — 25510000001 IOPAMIDOL PER 1 ML: Performed by: EMERGENCY MEDICINE

## 2024-08-15 PROCEDURE — 85025 COMPLETE CBC W/AUTO DIFF WBC: CPT | Performed by: EMERGENCY MEDICINE

## 2024-08-15 PROCEDURE — 71275 CT ANGIOGRAPHY CHEST: CPT

## 2024-08-15 PROCEDURE — 93010 ELECTROCARDIOGRAM REPORT: CPT | Performed by: INTERNAL MEDICINE

## 2024-08-15 PROCEDURE — 94640 AIRWAY INHALATION TREATMENT: CPT

## 2024-08-15 PROCEDURE — 25010000002 METHYLPREDNISOLONE PER 125 MG: Performed by: EMERGENCY MEDICINE

## 2024-08-15 RX ORDER — ATORVASTATIN CALCIUM 20 MG/1
40 TABLET, FILM COATED ORAL NIGHTLY
Status: DISCONTINUED | OUTPATIENT
Start: 2024-08-15 | End: 2024-08-16 | Stop reason: HOSPADM

## 2024-08-15 RX ORDER — SODIUM CHLORIDE 9 MG/ML
40 INJECTION, SOLUTION INTRAVENOUS AS NEEDED
Status: DISCONTINUED | OUTPATIENT
Start: 2024-08-15 | End: 2024-08-16 | Stop reason: HOSPADM

## 2024-08-15 RX ORDER — IPRATROPIUM BROMIDE AND ALBUTEROL SULFATE 2.5; .5 MG/3ML; MG/3ML
3 SOLUTION RESPIRATORY (INHALATION) ONCE
Status: COMPLETED | OUTPATIENT
Start: 2024-08-15 | End: 2024-08-15

## 2024-08-15 RX ORDER — ACETAMINOPHEN 650 MG/1
650 SUPPOSITORY RECTAL EVERY 4 HOURS PRN
Status: DISCONTINUED | OUTPATIENT
Start: 2024-08-15 | End: 2024-08-16 | Stop reason: HOSPADM

## 2024-08-15 RX ORDER — BISACODYL 10 MG
10 SUPPOSITORY, RECTAL RECTAL DAILY PRN
Status: DISCONTINUED | OUTPATIENT
Start: 2024-08-15 | End: 2024-08-16 | Stop reason: HOSPADM

## 2024-08-15 RX ORDER — SODIUM CHLORIDE 0.9 % (FLUSH) 0.9 %
10 SYRINGE (ML) INJECTION EVERY 12 HOURS SCHEDULED
Status: DISCONTINUED | OUTPATIENT
Start: 2024-08-15 | End: 2024-08-16 | Stop reason: HOSPADM

## 2024-08-15 RX ORDER — PREDNISONE 20 MG/1
40 TABLET ORAL
Status: DISCONTINUED | OUTPATIENT
Start: 2024-08-15 | End: 2024-08-16 | Stop reason: HOSPADM

## 2024-08-15 RX ORDER — METHYLPREDNISOLONE SODIUM SUCCINATE 125 MG/2ML
125 INJECTION, POWDER, LYOPHILIZED, FOR SOLUTION INTRAMUSCULAR; INTRAVENOUS ONCE
Status: COMPLETED | OUTPATIENT
Start: 2024-08-15 | End: 2024-08-15

## 2024-08-15 RX ORDER — ASPIRIN 81 MG/1
81 TABLET ORAL DAILY
Status: DISCONTINUED | OUTPATIENT
Start: 2024-08-15 | End: 2024-08-16 | Stop reason: HOSPADM

## 2024-08-15 RX ORDER — PANTOPRAZOLE SODIUM 40 MG/1
40 TABLET, DELAYED RELEASE ORAL
Status: DISCONTINUED | OUTPATIENT
Start: 2024-08-15 | End: 2024-08-16 | Stop reason: HOSPADM

## 2024-08-15 RX ORDER — POLYETHYLENE GLYCOL 3350 17 G/17G
17 POWDER, FOR SOLUTION ORAL DAILY PRN
Status: DISCONTINUED | OUTPATIENT
Start: 2024-08-15 | End: 2024-08-16 | Stop reason: HOSPADM

## 2024-08-15 RX ORDER — AZITHROMYCIN 250 MG/1
500 TABLET, FILM COATED ORAL ONCE
Status: COMPLETED | OUTPATIENT
Start: 2024-08-15 | End: 2024-08-15

## 2024-08-15 RX ORDER — ACETAMINOPHEN 325 MG/1
650 TABLET ORAL EVERY 4 HOURS PRN
Status: DISCONTINUED | OUTPATIENT
Start: 2024-08-15 | End: 2024-08-16 | Stop reason: HOSPADM

## 2024-08-15 RX ORDER — NITROGLYCERIN 0.4 MG/1
0.4 TABLET SUBLINGUAL
Status: DISCONTINUED | OUTPATIENT
Start: 2024-08-15 | End: 2024-08-16 | Stop reason: HOSPADM

## 2024-08-15 RX ORDER — ACETAMINOPHEN 160 MG/5ML
650 SOLUTION ORAL EVERY 4 HOURS PRN
Status: DISCONTINUED | OUTPATIENT
Start: 2024-08-15 | End: 2024-08-16 | Stop reason: HOSPADM

## 2024-08-15 RX ORDER — AMOXICILLIN 250 MG
2 CAPSULE ORAL 2 TIMES DAILY PRN
Status: DISCONTINUED | OUTPATIENT
Start: 2024-08-15 | End: 2024-08-16 | Stop reason: HOSPADM

## 2024-08-15 RX ORDER — BISACODYL 5 MG/1
5 TABLET, DELAYED RELEASE ORAL DAILY PRN
Status: DISCONTINUED | OUTPATIENT
Start: 2024-08-15 | End: 2024-08-16 | Stop reason: HOSPADM

## 2024-08-15 RX ORDER — IPRATROPIUM BROMIDE AND ALBUTEROL SULFATE 2.5; .5 MG/3ML; MG/3ML
3 SOLUTION RESPIRATORY (INHALATION)
Status: DISCONTINUED | OUTPATIENT
Start: 2024-08-15 | End: 2024-08-16 | Stop reason: HOSPADM

## 2024-08-15 RX ORDER — SODIUM CHLORIDE 0.9 % (FLUSH) 0.9 %
10 SYRINGE (ML) INJECTION AS NEEDED
Status: DISCONTINUED | OUTPATIENT
Start: 2024-08-15 | End: 2024-08-16 | Stop reason: HOSPADM

## 2024-08-15 RX ORDER — AZITHROMYCIN 250 MG/1
250 TABLET, FILM COATED ORAL
Status: DISCONTINUED | OUTPATIENT
Start: 2024-08-16 | End: 2024-08-16 | Stop reason: HOSPADM

## 2024-08-15 RX ADMIN — ATORVASTATIN CALCIUM 40 MG: 20 TABLET, FILM COATED ORAL at 20:44

## 2024-08-15 RX ADMIN — Medication 10 ML: at 09:57

## 2024-08-15 RX ADMIN — SODIUM CHLORIDE 250 ML: 9 INJECTION, SOLUTION INTRAVENOUS at 20:45

## 2024-08-15 RX ADMIN — IPRATROPIUM BROMIDE AND ALBUTEROL SULFATE 3 ML: .5; 3 SOLUTION RESPIRATORY (INHALATION) at 07:04

## 2024-08-15 RX ADMIN — IPRATROPIUM BROMIDE AND ALBUTEROL SULFATE 3 ML: .5; 3 SOLUTION RESPIRATORY (INHALATION) at 05:56

## 2024-08-15 RX ADMIN — Medication 10 ML: at 21:19

## 2024-08-15 RX ADMIN — PREDNISONE 40 MG: 20 TABLET ORAL at 09:55

## 2024-08-15 RX ADMIN — IPRATROPIUM BROMIDE AND ALBUTEROL SULFATE 3 ML: .5; 3 SOLUTION RESPIRATORY (INHALATION) at 19:05

## 2024-08-15 RX ADMIN — PANTOPRAZOLE SODIUM 40 MG: 40 TABLET, DELAYED RELEASE ORAL at 09:55

## 2024-08-15 RX ADMIN — IPRATROPIUM BROMIDE AND ALBUTEROL SULFATE 3 ML: .5; 3 SOLUTION RESPIRATORY (INHALATION) at 11:52

## 2024-08-15 RX ADMIN — IOPAMIDOL 95 ML: 755 INJECTION, SOLUTION INTRAVENOUS at 11:20

## 2024-08-15 RX ADMIN — AZITHROMYCIN 500 MG: 250 TABLET, FILM COATED ORAL at 09:55

## 2024-08-15 RX ADMIN — METHYLPREDNISOLONE SODIUM SUCCINATE 125 MG: 125 INJECTION INTRAMUSCULAR; INTRAVENOUS at 05:54

## 2024-08-15 RX ADMIN — ASPIRIN 81 MG: 81 TABLET, COATED ORAL at 09:55

## 2024-08-15 RX ADMIN — IPRATROPIUM BROMIDE AND ALBUTEROL SULFATE 3 ML: .5; 3 SOLUTION RESPIRATORY (INHALATION) at 08:03

## 2024-08-15 RX ADMIN — IPRATROPIUM BROMIDE AND ALBUTEROL SULFATE 3 ML: .5; 3 SOLUTION RESPIRATORY (INHALATION) at 16:19

## 2024-08-15 NOTE — PLAN OF CARE
Goal Outcome Evaluation:   Pt admitted for SOA. Pt is requiring 2 L 02. Pt does not wear oxygen at home. Pt is in droplet for rhinovirus. Breathing treatments q6. Pulm consulted. Pt is alert and oriented x4. Stand by assist.

## 2024-08-15 NOTE — CONSULTS
[facility]  [phone]  Dr. Anand Neal      Subjective   LOS: 0 days     Patient 78-year-old female who unfortunately was just recently admitted in the beginning of July for parainfluenza infection that was causing COPD exacerbation.  At that time she had several nodules that were noted.  She was seen by my group Dr. Jin Blancas and Dr. Veto Lew who had recommended a follow-up PET scan to look at the pulmonary nodules and they did start her on triple bronchodilator therapy on discharge.  She did end up having the PET scan done on 8/5/2024 that showed a 1 cm nodule in the left lower lobe with low SUV of 0.8 and an incidentally found irregular nodule in the right apex that was 1 cm in largest diameter and had a high SUV of 8.9 and some associated pleural-parenchymal thickening concerning for malignancy.  She saw Dr. Blancas in the office where she was referred to thoracic surgery but has not seen them yet.  She then represented to the ER this morning with acute shortness of air that got progressively worse with an oxygen saturation of 85%.  She is reporting that she never actually fully recovered from her prior hospitalization.  While the viral infection and symptoms got better she continued to have shortness of air and chest congestion.  Then a few days ago she progressively started feeling worse.  And then this morning at around 1 to 2 AM she awoke and knew something was wrong.  She still went to work however while she was there her boss checked her oxygen saturation and noticed that it was in the 80s therefore had someone drive her here to the ER.  In talking with her she did admit to me that she ran out of the Trelegy a few weeks ago and when she went to go get a refill it was going to be $500 therefore she has not had any maintenance inhalers at that time.  She has just been using albuterol HFA and DuoNebs that she got from her boss.  She is not on any prescribed inhalers or  nebulizers.    Brigid Carballo  reports current alcohol use.,  reports that she has been smoking cigarettes. She has a 32.5 pack-year smoking history. She has been exposed to tobacco smoke. She has never used smokeless tobacco.     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  Surg Hx:  has a past surgical history that includes Carpal tunnel release (Bilateral); Colonoscopy (05/17/2021); Upper gastrointestinal endoscopy (05/17/2021); Appendectomy; Hysterectomy; Breast biopsy; Inguinal hernia repair (Right); Tonsillectomy; Knee arthroscopy (Right); Cystoscopy w/ ureteral stent placement (10/22/2019); Cardiac catheterization; Cardiac catheterization (N/A, 11/29/2022); Cardiac catheterization (N/A, 11/29/2022); and Cardiac catheterization (N/A, 11/29/2022).  FH: family history includes Breast cancer in her maternal grandmother and paternal grandmother; Cancer in her brother; Emphysema in her mother; Heart disease in her brother, father, and mother; Skin cancer in her brother; Stomach cancer in her maternal aunt.  SH:  reports that she has been smoking cigarettes. She has a 32.5 pack-year smoking history. She has been exposed to tobacco smoke. She has never used smokeless tobacco. She reports current alcohol use. She reports that she does not use  drugs.    Medications Prior to Admission   Medication Sig Dispense Refill Last Dose    albuterol sulfate  (90 Base) MCG/ACT inhaler Inhale 2 puffs Every 4 (Four) Hours As Needed for Wheezing. 30 g 3     ascorbic acid (VITAMIN C) 1000 MG tablet Take 1 tablet by mouth Daily.       aspirin 81 MG EC tablet Take 1 tablet by mouth Daily. 90 tablet 0     atorvastatin (LIPITOR) 40 MG tablet Take 1 tablet by mouth Every Night. 90 tablet 1     cholecalciferol (VITAMIN D3) 25 MCG (1000 UT) tablet Take 1 tablet by mouth Daily.       esomeprazole (nexIUM) 20 MG capsule Take 1 capsule by mouth Every Morning Before Breakfast.       Fluticasone-Umeclidin-Vilant (TRELEGY ELLIPTA) 200-62.5-25 MCG/ACT inhaler Inhale 1 puff Daily. (Patient taking differently: Inhale 1 puff Daily As Needed.) 60 each 3     vitamin B-12 (CYANOCOBALAMIN) 1000 MCG tablet Take 1 tablet by mouth Daily.        Allergies   Allergen Reactions    Amoxicillin-Pot Clavulanate Itching    Codeine Itching    Penicillins Unknown - High Severity    Adhesive Tape Rash     Skin redness       Vital Signs past 24hrs  BP range: BP: (102-135)/(66-86) 102/66  Pulse range: Heart Rate:  [65-99] 94  Resp rate range: Resp:  [18-24] 18  Temp range: Temp (24hrs), Av.8 °F (36.6 °C), Min:97.6 °F (36.4 °C), Max:97.9 °F (36.6 °C)    Oxygen range: SpO2:  [88 %-97 %] 91 %; Flow (L/min):  [1-2] 2;   Device (Oxygen Therapy): nasal cannula  66.7 kg (147 lb); Body mass index is 26.89 kg/m².  Net IO Since Admission: No IO data has been entered for this period [08/15/24 0928]      Physical Exam  Vitals reviewed.   Constitutional:       General: She is not in acute distress.     Appearance: Normal appearance.   HENT:      Head: Normocephalic and atraumatic.   Eyes:      Extraocular Movements: Extraocular movements intact.      Conjunctiva/sclera: Conjunctivae normal.      Pupils: Pupils are equal, round, and reactive to light.   Cardiovascular:      Rate and Rhythm: Normal rate and  regular rhythm.      Heart sounds: No murmur heard.     No friction rub. No gallop.   Pulmonary:      Effort: Pulmonary effort is normal. No respiratory distress.      Breath sounds: Decreased air movement present. Decreased breath sounds present. No wheezing, rhonchi or rales.   Abdominal:      General: Abdomen is flat.      Palpations: Abdomen is soft.      Tenderness: There is no abdominal tenderness.   Musculoskeletal:         General: No swelling or tenderness. Normal range of motion.      Cervical back: Normal range of motion. No rigidity or tenderness.   Skin:     General: Skin is warm and dry.      Findings: No rash.   Neurological:      General: No focal deficit present.      Mental Status: She is alert and oriented to person, place, and time.   Psychiatric:         Mood and Affect: Mood normal.         Behavior: Behavior normal.         Results Review:    I have reviewed the laboratory and imaging data from current admission. My annotations are as noted in assessment and plan.  Result Review:  I have personally reviewed the results from the time of this admission to 8/15/2024 09:28 EDT and agree with these findings:  [x]  Laboratory list / accordion  [x]  Microbiology  [x]  Radiology  [x]  EKG/Telemetry   [x]  Cardiology/Vascular   [x]  Pathology  [x]  Old records  []  Other:        Medication Review:  I have reviewed the current MAR. My annotations are as noted in assessment and plan.       Lines, Drains & Airways       Active LDAs       Name Placement date Placement time Site Days    Peripheral IV 08/15/24 0554 Anterior;Right Forearm 08/15/24  0554  Forearm  less than 1                  Diet Orders (active) (From admission, onward)       Start     Ordered    08/15/24 0746  Diet: Regular/House; Fluid Consistency: Thin (IDDSI 0)  Diet Effective Now         08/15/24 0746                  Droplet    Assessment  Acute hypoxic respiratory failure  Acute exacerbation of COPD  Rhinovirus infection  Pulmonary  nodules      Plan  -Low concern for bacterial infection.  Appears this is fortunately another viral infection that has exacerbated her COPD  - Continue weaning oxygen for goal O2 saturation greater than 88%  - Continue supportive care for the rhinovirus infection  - Continue prednisone for a planned 5 days  - Will add azithromycin for 5 days as well  - Continue scheduled and as needed bronchodilators.    -Will need to prescribe a LABA ICS and LAMA combination that is acceptable by patient's insurance.  Trelegy was too expensive.  - She has been referred to thoracic surgery due to the concerning right apical pulmonary nodule on PET.  However has not heard from them yet.  Will need to ensure that she has a referral order in.    Electronically signed by Anand Neal DO, 08/15/24, 9:28 AM EDT.      Part of this note may be an electronic transcription/translation of spoken language to printed text using the Dragon Dictation System.

## 2024-08-15 NOTE — ED PROVIDER NOTES
EMERGENCY DEPARTMENT ENCOUNTER      Room Number:  104/1  PCP: Kaya Mckeon APRN  Independent Historians: Patient  Patient Care Team:  Kaya Mckeon APRN as PCP - General (Family Medicine)  Deam, Pawel Hanson MD as Consulting Physician (Cardiac Electrophysiology)       HPI:  Chief Complaint: SOA    A complete HPI/ROS/PMH/PSH/SH/FH are unobtainable due to: None    Chronic or social conditions impacting patient care (Social Determinants of Health): None      Context: Brigid Carballo is a 78 y.o. female with a PMH significant for hyperlipidemia, GERD, COPD who presents to the ED c/o acute shortness of air.  The patient reports being admitted to the hospital 1 month ago with a viral infection and stayed in the hospital for 1 week.  She reports that typically her COPD symptoms are well-controlled and she has no medications that she takes daily and no inhalers or breathing treatments at home.  She reports that after her admission she never quite fully improved.  Her viral symptoms got better but she continue with shortness of breath and chest congestion.  She also reports that she stopped smoking in the hospital.  States that she follows Dr. Jin Blancas with pulmonology.  She presents today with persistent shortness of breath and cough and an oxygen reading of 85% at home.  No development of chest pain, fever, nausea, vomiting.  Symptoms do seem to worsen with exertion.         Upon review of prior external notes (non-ED) -and- Review of prior external test results outside of this encounter it appears the patient was evaluated in the office with family medicine for hyperlipidemia on August 5, 2024.  The patient had a normal glucose on 8/5/2024 and a normal troponin on 6/29/2024.      PAST MEDICAL HISTORY  Active Ambulatory Problems     Diagnosis Date Noted    Small vessel disease 11/06/2021    Hyperlipidemia 08/19/2014    Allergic rhinitis 03/18/2019    Arthritis 04/11/2022    Gastroesophageal reflux disease  11/04/2014    Vitamin B12 deficiency 11/04/2014    Fatigue 09/11/2017    Hiatal hernia     Polyp of colon 11/04/2014    Bilateral carotid artery stenosis 04/11/2022    Current smoker 04/11/2022    Vitamin D deficiency 04/11/2022    Memory deficit 06/02/2022    Thyromegaly 06/02/2022    Acute non-recurrent sinusitis 06/02/2022    Postmenopause 09/12/2022    Abnormal nuclear stress test 11/15/2022    Left upper quadrant abdominal pain 01/29/2024    Parainfluenza 06/28/2024    Tobacco use 06/29/2024    Bronchospasm with bronchitis, acute 06/29/2024    Lung nodule 07/01/2024    COPD with acute exacerbation 07/06/2024    Nodule of chest wall 07/06/2024    Leukocytosis 08/05/2024     Resolved Ambulatory Problems     Diagnosis Date Noted    Occlusion of left vertebral artery 11/06/2021    Ataxia 11/06/2021    Vertigo 11/06/2021    BPV (benign positional vertigo) 11/07/2021    TIA (transient ischemic attack) 11/07/2021    Anxiety 04/11/2022    Lancaster's esophagus 09/29/2015    Bunion of left foot 06/14/2016    Chest pain 10/05/2020    Low back pain 11/04/2014    DDD (degenerative disc disease), cervical 11/04/2014    Deformity of wrist joint 05/03/2018    Insomnia 09/11/2017    Melanoma in situ of unspecified lower limb, including hip 11/12/2021    Myalgia 03/02/2020    Osteoarthritis of left knee 06/27/2016    Renal stone 03/02/2020    Spinal stenosis of lumbar region 12/29/2014    Syncope and collapse 04/16/2019    Tear of lateral meniscus of knee 09/30/2014    Tear of medial meniscus of knee 09/30/2014    Unexplained weight loss 04/21/2021    Other microscopic hematuria 06/02/2022    Stenosis of carotid artery 09/12/2022    Hypoxia 06/28/2024    Hyperlipidemia 07/06/2024     Past Medical History:   Diagnosis Date    Angina pectoris     Cancer     Carpal tunnel syndrome     Kidney stones          PAST SURGICAL HISTORY  Past Surgical History:   Procedure Laterality Date    APPENDECTOMY      BREAST BIOPSY      multiple     CARDIAC CATHETERIZATION      CARDIAC CATHETERIZATION N/A 11/29/2022    Procedure: Left Heart Cath;  Surgeon: Brennen Nguyen MD;  Location:  [facility];  Service: Cardiology;  Laterality: N/A;    CARDIAC CATHETERIZATION N/A 11/29/2022    Procedure: Coronary angiography;  Surgeon: Brennen Nguyen MD;  Location:  [facility];  Service: Cardiology;  Laterality: N/A;    CARDIAC CATHETERIZATION N/A 11/29/2022    Procedure: Left ventriculography;  Surgeon: Brennen Nguyen MD;  Location:  [facility];  Service: Cardiology;  Laterality: N/A;    CARPAL TUNNEL RELEASE Bilateral     COLONOSCOPY  05/17/2021    polyp removed; per Dr. Velasquez    CYSTOSCOPY W/ URETERAL STENT PLACEMENT  10/22/2019    HYSTERECTOMY      INGUINAL HERNIA REPAIR Right     KNEE ARTHROSCOPY Right     meniscus    TONSILLECTOMY      UPPER GASTROINTESTINAL ENDOSCOPY  05/17/2021    per Dr. Velasquez         FAMILY HISTORY  Family History   Problem Relation Age of Onset    Emphysema Mother     Heart disease Mother     Heart disease Father     Cancer Brother     Heart disease Brother     Skin cancer Brother     Stomach cancer Maternal Aunt     Breast cancer Maternal Grandmother     Breast cancer Paternal Grandmother          SOCIAL HISTORY  Social History     Socioeconomic History    Marital status:    Tobacco Use    Smoking status: Every Day     Current packs/day: 0.50     Average packs/day: 0.5 packs/day for 65.0 years (32.5 ttl pk-yrs)     Types: Cigarettes     Passive exposure: Current    Smokeless tobacco: Never    Tobacco comments:     Quit now [place] info provided   Vaping Use    Vaping status: Never Used   Substance and Sexual Activity    Alcohol use: Yes     Comment: social--rarely    Drug use: Never    Sexual activity: Defer         ALLERGIES  Amoxicillin-pot clavulanate, Codeine, Penicillins, and Adhesive tape      REVIEW OF SYSTEMS  Included in HPI  All systems reviewed and  negative except for those discussed in HPI.      PHYSICAL EXAM    I have reviewed the triage vital signs and nursing notes.    ED Triage Vitals   Temp Heart Rate Resp BP SpO2   08/15/24 0535 08/15/24 0535 08/15/24 0535 08/15/24 0540 08/15/24 0535   97.6 °F (36.4 °C) 99 24 135/79 90 %      Temp src Heart Rate Source Patient Position BP Location FiO2 (%)   08/15/24 0535 08/15/24 0535 -- -- --   Tympanic Monitor          Physical Exam  Constitutional:       General: She is not in acute distress.     Appearance: She is well-developed.   HENT:      Head: Normocephalic and atraumatic.   Eyes:      General: No scleral icterus.     Conjunctiva/sclera: Conjunctivae normal.   Neck:      Trachea: No tracheal deviation.   Cardiovascular:      Rate and Rhythm: Normal rate and regular rhythm.   Pulmonary:      Effort: Pulmonary effort is normal.      Breath sounds: Wheezing present.      Comments: Mildly increased work of breathing.  Speaks in broken sentences.  Abdominal:      Palpations: Abdomen is soft.      Tenderness: There is no abdominal tenderness.   Musculoskeletal:         General: No deformity.      Cervical back: Normal range of motion.   Lymphadenopathy:      Cervical: No cervical adenopathy.   Skin:     General: Skin is warm and dry.   Neurological:      Mental Status: She is alert and oriented to person, place, and time.   Psychiatric:         Behavior: Behavior normal.         Vital signs and nursing notes reviewed.      PPE: I wore a surgical mask throughout my encounters with the pt. I performed hand hygiene on entry into the pt room and upon exit.     LAB RESULTS  Recent Results (from the past 24 hour(s))   ECG 12 Lead Chest Pain    Collection Time: 08/16/24  4:44 AM   Result Value Ref Range    QT Interval 437 ms    QTC Interval 437 ms   CBC Auto Differential    Collection Time: 08/16/24  4:50 AM    Specimen: Hand, Left; Blood   Result Value Ref Range    WBC 13.15 (H) 3.40 - 10.80 10*3/mm3    RBC 3.69 (L) 3.77  - 5.28 10*6/mm3    Hemoglobin 11.7 (L) 12.0 - 15.9 g/dL    Hematocrit 35.1 34.0 - 46.6 %    MCV 95.1 79.0 - 97.0 fL    MCH 31.7 26.6 - 33.0 pg    MCHC 33.3 31.5 - 35.7 g/dL    RDW 11.9 (L) 12.3 - 15.4 %    RDW-SD 41.4 37.0 - 54.0 fl    MPV 9.7 6.0 - 12.0 fL    Platelets 166 140 - 450 10*3/mm3    Neutrophil % 83.8 (H) 42.7 - 76.0 %    Lymphocyte % 9.4 (L) 19.6 - 45.3 %    Monocyte % 6.2 5.0 - 12.0 %    Eosinophil % 0.0 (L) 0.3 - 6.2 %    Basophil % 0.1 0.0 - 1.5 %    Immature Grans % 0.5 0.0 - 0.5 %    Neutrophils, Absolute 11.01 (H) 1.70 - 7.00 10*3/mm3    Lymphocytes, Absolute 1.24 0.70 - 3.10 10*3/mm3    Monocytes, Absolute 0.82 0.10 - 0.90 10*3/mm3    Eosinophils, Absolute 0.00 0.00 - 0.40 10*3/mm3    Basophils, Absolute 0.01 0.00 - 0.20 10*3/mm3    Immature Grans, Absolute 0.07 (H) 0.00 - 0.05 10*3/mm3    nRBC 0.0 0.0 - 0.2 /100 WBC   Basic Metabolic Panel    Collection Time: 08/16/24  4:50 AM    Specimen: Hand, Left; Blood   Result Value Ref Range    Glucose 119 (H) 65 - 99 mg/dL    BUN 15 8 - 23 mg/dL    Creatinine 0.63 0.57 - 1.00 mg/dL    Sodium 142 136 - 145 mmol/L    Potassium 4.1 3.5 - 5.2 mmol/L    Chloride 109 (H) 98 - 107 mmol/L    CO2 27.0 22.0 - 29.0 mmol/L    Calcium 8.9 8.6 - 10.5 mg/dL    BUN/Creatinine Ratio 23.8 7.0 - 25.0    Anion Gap 6.0 5.0 - 15.0 mmol/L    eGFR 90.4 >60.0 mL/min/1.73   High Sensitivity Troponin T    Collection Time: 08/16/24  4:50 AM    Specimen: Hand, Left; Blood   Result Value Ref Range    HS Troponin T 11 <14 ng/L         RADIOLOGY  No Radiology Exams Resulted Within Past 24 Hours      MEDICATIONS GIVEN IN ER  Medications   sodium chloride 0.9 % flush 10 mL (has no administration in time range)   sodium chloride 0.9 % flush 10 mL (10 mL Intravenous Given 8/16/24 0838)   sodium chloride 0.9 % flush 10 mL (has no administration in time range)   sodium chloride 0.9 % infusion 40 mL (has no administration in time range)   nitroglycerin (NITROSTAT) SL tablet 0.4 mg (has no  administration in time range)   acetaminophen (TYLENOL) tablet 650 mg (has no administration in time range)     Or   acetaminophen (TYLENOL) 160 MG/5ML oral solution 650 mg (has no administration in time range)     Or   acetaminophen (TYLENOL) suppository 650 mg (has no administration in time range)   sennosides-docusate (PERICOLACE) 8.6-50 MG per tablet 2 tablet (has no administration in time range)     And   polyethylene glycol (MIRALAX) packet 17 g (has no administration in time range)     And   bisacodyl (DULCOLAX) EC tablet 5 mg (has no administration in time range)     And   bisacodyl (DULCOLAX) suppository 10 mg (has no administration in time range)   ipratropium-albuterol (DUO-NEB) nebulizer solution 3 mL (3 mL Nebulization Given 8/16/24 1418)   predniSONE (DELTASONE) tablet 40 mg (40 mg Oral Given 8/16/24 0837)   aspirin EC tablet 81 mg (81 mg Oral Given 8/16/24 0837)   atorvastatin (LIPITOR) tablet 40 mg (40 mg Oral Given 8/15/24 2044)   pantoprazole (PROTONIX) EC tablet 40 mg (40 mg Oral Given 8/16/24 0603)   azithromycin (ZITHROMAX) tablet 500 mg (500 mg Oral Given 8/15/24 0955)     Followed by   azithromycin (ZITHROMAX) tablet 250 mg (250 mg Oral Given 8/16/24 0837)   ipratropium-albuterol (DUO-NEB) nebulizer solution 3 mL (3 mL Nebulization Given 8/15/24 0556)   methylPREDNISolone sodium succinate (SOLU-Medrol) injection 125 mg (125 mg Intravenous Given 8/15/24 0554)   ipratropium-albuterol (DUO-NEB) nebulizer solution 3 mL (3 mL Nebulization Given 8/15/24 0704)   iopamidol (ISOVUE-370) 76 % injection 100 mL (95 mL Intravenous Given by Other 8/15/24 1120)   sodium chloride 0.9 % bolus 250 mL (250 mL Intravenous New Bag 8/15/24 2045)         ORDERS PLACED DURING THIS VISIT:  Orders Placed This Encounter   Procedures    Respiratory Panel PCR w/COVID-19(SARS-CoV-2) [facility] In-House, NP Swab in UTM/VTM, 2 HR TAT - Swab, Nasopharynx    XR Chest 1 View    CT Angiogram Chest    Comprehensive  Metabolic Panel    BNP    High Sensitivity Troponin T    Procalcitonin    Magnesium    CBC Auto Differential    High Sensitivity Troponin T 2Hr    CBC Auto Differential    Basic Metabolic Panel    High Sensitivity Troponin T    Ambulatory Referral to Multi-Disciplinary Clinic    Diet: Regular/House; Fluid Consistency: Thin (IDDSI 0)    Monitor Blood Pressure    Continuous Pulse Oximetry    Vital Signs    Intake & Output    Weigh Patient    Oral Care    Maintain IV Access    Telemetry - Place Orders & Notify Provider of Results When Patient Experiences Acute Chest Pain, Dysrhythmia or Respiratory Distress    May Be Off Telemetry for Tests    Reason for COPD Admission: New Oxygen Requirements; Indicate COPD Diagnosis For Problem List: COPD exacerbation; COPD Severity: Unknown    Tobacco Cessation Education    Respiratory Treatment Education (MDI / Spacer / Nebulizer)    COPD Education    Cough / Deep Breathe    Code Status and Medical Interventions: CPR (Attempt to Resuscitate); Full Support    Inpatient Pulmonology Consult    Inpatient Thoracic Surgery Consult    Document Pulse Oximetry - On Room Air / Home O2 Level    Oscillating Positive Expiratory Pressure (OPEP)    ECG 12 Lead Dyspnea    ECG 12 Lead Chest Pain    Telemetry Scan    Telemetry Scan    Insert Peripheral IV    Insert Peripheral IV    Initiate ED Observation Status    CBC & Differential         OUTPATIENT MEDICATION MANAGEMENT:  Current Facility-Administered Medications Ordered in Epic   Medication Dose Route Frequency Provider Last Rate Last Admin    acetaminophen (TYLENOL) tablet 650 mg  650 mg Oral Q4H PRN Blevens, Anita C, APRN        Or    acetaminophen (TYLENOL) 160 MG/5ML oral solution 650 mg  650 mg Oral Q4H PRN Blevens, Anita C, APRN        Or    acetaminophen (TYLENOL) suppository 650 mg  650 mg Rectal Q4H PRN Blevens, Anita C, APRN        aspirin EC tablet 81 mg  81 mg Oral Daily Blevens, Anita C, APRN   81 mg at 08/16/24 0837    atorvastatin  (LIPITOR) tablet 40 mg  40 mg Oral Nightly Blevens, Anita C, APRN   40 mg at 08/15/24 2044    azithromycin (ZITHROMAX) tablet 250 mg  250 mg Oral Q24H Neal Anand RayDO   250 mg at 08/16/24 0837    sennosides-docusate (PERICOLACE) 8.6-50 MG per tablet 2 tablet  2 tablet Oral BID PRN Blevens, Anita C, APRN        And    polyethylene glycol (MIRALAX) packet 17 g  17 g Oral Daily PRN Blevens, Anita C, APRN        And    bisacodyl (DULCOLAX) EC tablet 5 mg  5 mg Oral Daily PRN Blevens, Anita C, APRN        And    bisacodyl (DULCOLAX) suppository 10 mg  10 mg Rectal Daily PRN Blevens, Anita C, APRN        ipratropium-albuterol (DUO-NEB) nebulizer solution 3 mL  3 mL Nebulization 4x Daily - RT Blevens, Anita C, APRN   3 mL at 08/16/24 1418    nitroglycerin (NITROSTAT) SL tablet 0.4 mg  0.4 mg Sublingual Q5 Min PRN Blevens, Anita C, APRN        pantoprazole (PROTONIX) EC tablet 40 mg  40 mg Oral Q AM Blevens, Anita C, APRN   40 mg at 08/16/24 0603    predniSONE (DELTASONE) tablet 40 mg  40 mg Oral Daily With Breakfast Blevens, Anita C, APRN   40 mg at 08/16/24 0837    sodium chloride 0.9 % flush 10 mL  10 mL Intravenous PRN Joshua Perez MD        sodium chloride 0.9 % flush 10 mL  10 mL Intravenous Q12H Blevens, Anita C, APRN   10 mL at 08/16/24 0838    sodium chloride 0.9 % flush 10 mL  10 mL Intravenous PRN Blevens, Anita C, APRN        sodium chloride 0.9 % infusion 40 mL  40 mL Intravenous PRN Blevens, Anita C, APRN         No current Epic-ordered outpatient medications on file.             PROGRESS, DATA ANALYSIS, CONSULTS, AND MEDICAL DECISION MAKING  All labs have been independently interpreted by me.  All radiology studies have been reviewed by me. All EKG's have been independently viewed and interpreted by me.  Discussion below represents my analysis of pertinent findings related to patient's condition, differential diagnosis, treatment plan and final disposition.      DIFFERENTIAL DIAGNOSIS INCLUDE BUT NOT  LIMITED TO:     Differential diagnosis includes but is not limited to:  -COVID  -CHF  -acute coronary syndrome  -pulmonary embolism  -pneumothorax  -pneumonia  -asthma/COPD  -deconditioning  -anemia  -anxiety       Clinical Scores: N/A      ED Course as of 08/16/24 1446   Thu Aug 15, 2024   0546 First look: Patient presents with cough and dyspnea which recently got worse.  Patient has history of COPD was recently in the hospital [facility].  States since discharge she never really got completely better.  States she continued to cough up white sputum.  States that earlier this morning she had sudden onset of worsening shortness of breath.  States that she checked her O2 sats at home and it was 84%.  Currently she is satting in the low 90s on room air.  States that her breathing is somewhat better than it was this morning.  Reports that she feels a tightness in her chest.  No abdominal pain no fever chills.  No tingling no numbness. [TJ]   0714 HS Troponin T: 13 [JR]   0715 Chest x-ray independently interpreted in PACS.  There is no infiltrate, no pulmonary edema. [JR]   0724 Human Rhinovirus/Enterovirus(!): Detected [DC]   0724 WBC: 7.07 [DC]   0724 Per my independent interpretation of the chest x-ray, there is no obvious pneumothorax.   [DC]   0740 Human Rhinovirus/Enterovirus(!): Detected [JR]   0743 Discussed with SHE Hardwick in the ED observation unit who agrees to admit for further management of COPD exacerbation. [JR]      ED Course User Index  [DC] Juancarlos Mesa PA  [JR] Bertin Ledesma MD  [TJ] Joshua Perez MD         AS OF 14:46 EDT VITALS:    BP - 122/77  HR - 100  TEMP - 97.3 °F (36.3 °C) (Oral)  O2 SATS - 93%    COMPLEXITY OF CARE  The patient requires admission.      DIAGNOSIS  Final diagnoses:   COPD with acute exacerbation   Hypoxia   Rhinovirus infection         DISPOSITION  ED Disposition       ED Disposition   Decision to Admit    Condition   --    Comment   --                 Please note that portions of this document were completed with a voice recognition program.    Note Disclaimer: At [facility], we believe that sharing information builds trust and better relationships. You are receiving this note because you recently visited [facility]. It is possible you will see health information before a provider has talked with you about it. This kind of information can be easy to misunderstand. To help you fully understand what it means for your health, we urge you to discuss this note with your provider.         Juancarlos Mesa PA  08/16/24 1444

## 2024-08-15 NOTE — PROGRESS NOTES
Clinical Pharmacy Services: Medication History    Brigid Carballo is a 78 y.o. female presenting to [facility] for   Chief Complaint   Patient presents with    Shortness of Breath       She  has a past medical history of Angina pectoris, Anxiety, [place]'s esophagus (09/29/2015), BPV (benign positional vertigo) (11/07/2021), Bunion of left foot (06/14/2016), Cancer, Carpal tunnel syndrome, Chest pain (10/05/2020), DDD (degenerative disc disease), cervical (11/04/2014), Deformity of wrist joint (05/03/2018), Hyperlipidemia, Insomnia (09/11/2017), Kidney stones, Low back pain (11/04/2014), Melanoma in situ of unspecified lower limb, including hip (06/2021), Occlusion of left vertebral artery (11/06/2021), Osteoarthritis of left knee (06/27/2016), Renal stone (03/02/2020), Spinal stenosis of lumbar region (12/29/2014), Tear of lateral meniscus of knee (09/30/2014), and Unexplained weight loss (04/21/2021).    Allergies as of 08/15/2024 - Reviewed 08/15/2024   Allergen Reaction Noted    Amoxicillin-pot clavulanate Itching 11/06/2021    Codeine Itching 11/06/2021    Penicillins Unknown - High Severity 04/04/2022    Adhesive tape Rash 08/05/2024       Medication information was obtained from: Pharmacy  Pharmacy and Phone Number:   [address] - [address] [place] [address] 1 - [phone] [facility] [phone] [address]  [facility] [address]  Phone: [phone] Fax: [phone]    [facility] [place] [id number]  Phone: [phone] Fax: [phone]        Prior to Admission Medications       Prescriptions Last Dose Informant Patient Reported? Taking?    albuterol sulfate  (90 Base) MCG/ACT inhaler  Self No Yes    Inhale 2 puffs Every 4 (Four) Hours As Needed for Wheezing.    ascorbic acid (VITAMIN C) 1000 MG tablet  Self Yes Yes    Take 1 tablet by mouth Daily.    aspirin 81 MG EC tablet  Self No Yes    Take  1 tablet by mouth Daily.    atorvastatin (LIPITOR) 40 MG tablet  Pharmacy No Yes    Take 1 tablet by mouth Every Night.    cholecalciferol (VITAMIN D3) 25 MCG (1000 UT) tablet  Self Yes Yes    Take 1 tablet by mouth Daily.    esomeprazole (nexIUM) 20 MG capsule  Self Yes Yes    Take 1 capsule by mouth Every Morning Before Breakfast.    Fluticasone-Umeclidin-Vilant (TRELEGY ELLIPTA) 200-62.5-25 MCG/ACT inhaler  Self No Yes    Inhale 1 puff Daily.    Patient taking differently:  Inhale 1 puff Daily As Needed.    vitamin B-12 (CYANOCOBALAMIN) 1000 MCG tablet  Self Yes Yes    Take 1 tablet by mouth Daily.              Medication notes:     This medication list is complete to the best of my knowledge as of 8/15/2024    Please call if questions.    Pablo Antonio  Medication History Technician  [phone]    8/15/2024 08:42 EDT

## 2024-08-15 NOTE — H&P
[facility]   HISTORY AND PHYSICAL    Patient Name: Brigid Carballo  : 1945  MRN: [id number]  Primary Care Physician:  Kaya Mckeon, SHE  Date of admission: 8/15/2024    Subjective   Subjective     Chief Complaint:   Chief Complaint   Patient presents with    Shortness of Breath         HPI:    Brigid Carballo is a 78 y.o. female who presented to the emergency department with complaints of acute shortness of breath.  Symptoms started around 1:00 this morning.  Also endorses chest pain.  Denies fever or chills.  Past medical history significant for but not limited to COPD, hyperlipidemia, melanoma, osteoarthritis, DDD.  She was recently found to have a pulmonary nodule.  She reports that she has supposed to have a bronchoscopy in the near future.  Chest x-ray shows no focal consolidation.  Respiratory pathogen panel was positive for rhinovirus.  She is currently maintaining oxygen saturation of 90% on 2 L nasal cannula.  She does not wear oxygen at home.  Initial troponin was 13, BNP was 165.  EKG was nonischemic.  She will be admitted to the ED observation unit with plans to consult pulmonology.    Review of Systems   All systems were reviewed and negative except for: those mentioned in HPI.    Personal History     Past Medical History:   Diagnosis Date    Angina pectoris     Anxiety     [place]'s esophagus 2015    BPV (benign positional vertigo) 2021    Bunion of left foot 2016    Cancer     skin    Carpal tunnel syndrome     Chest pain 10/05/2020    DDD (degenerative disc disease), cervical 2014    Deformity of wrist joint 2018    Hyperlipidemia     Insomnia 2017    Kidney stones     Low back pain 2014    Melanoma in situ of unspecified lower limb, including hip 2021    Occlusion of left vertebral artery 2021    1/10/22 CTA neck: beyond its origin, left vertebral artery is widely patent throughout its cervical and intracranial course to the vertebrobasilar  junction without stenosis    Osteoarthritis of left knee 06/27/2016    Renal stone 03/02/2020    Spinal stenosis of lumbar region 12/29/2014    Tear of lateral meniscus of knee 09/30/2014    Unexplained weight loss 04/21/2021       Past Surgical History:   Procedure Laterality Date    APPENDECTOMY      BREAST BIOPSY      multiple    CARDIAC CATHETERIZATION      CARDIAC CATHETERIZATION N/A 11/29/2022    Procedure: Left Heart Cath;  Surgeon: Brennen Nguyen MD;  Location:  [facility];  Service: Cardiology;  Laterality: N/A;    CARDIAC CATHETERIZATION N/A 11/29/2022    Procedure: Coronary angiography;  Surgeon: Brennen Nguyen MD;  Location:  [facility];  Service: Cardiology;  Laterality: N/A;    CARDIAC CATHETERIZATION N/A 11/29/2022    Procedure: Left ventriculography;  Surgeon: Brennen Nguyen MD;  Location:  [facility];  Service: Cardiology;  Laterality: N/A;    CARPAL TUNNEL RELEASE Bilateral     COLONOSCOPY  05/17/2021    polyp removed; per Dr. Velasquez    CYSTOSCOPY W/ URETERAL STENT PLACEMENT  10/22/2019    HYSTERECTOMY      INGUINAL HERNIA REPAIR Right     KNEE ARTHROSCOPY Right     meniscus    TONSILLECTOMY      UPPER GASTROINTESTINAL ENDOSCOPY  05/17/2021    per Dr. Velasquez       Family History: family history includes Breast cancer in her maternal grandmother and paternal grandmother; Cancer in her brother; Emphysema in her mother; Heart disease in her brother, father, and mother; Skin cancer in her brother; Stomach cancer in her maternal aunt. Otherwise pertinent FHx was reviewed and not pertinent to current issue.    Social History:  reports that she has been smoking cigarettes. She has a 32.5 pack-year smoking history. She has been exposed to tobacco smoke. She has never used smokeless tobacco. She reports current alcohol use. She reports that she does not use drugs.    Home Medications:  Fluticasone-Umeclidin-Vilant, albuterol sulfate  HFA, ascorbic acid, aspirin, atorvastatin, cholecalciferol, esomeprazole, tiotropium bromide monohydrate, and vitamin B-12    Allergies:  Allergies   Allergen Reactions    Amoxicillin-Pot Clavulanate Itching    Codeine Itching    Penicillins Unknown - High Severity    Adhesive Tape Rash     Skin redness       Objective   Objective     Vitals:   Temp:  [97.6 °F (36.4 °C)] 97.6 °F (36.4 °C)  Heart Rate:  [65-99] 88  Resp:  [18-24] 18  BP: (118-135)/(67-86) 127/86  Flow (L/min):  [1-2] 2  Physical Exam    Constitutional: Awake, alert   Eyes: PERRLA, sclerae anicteric, no conjunctival injection   HENT: NCAT, mucous membranes moist   Neck: Supple, no thyromegaly, no lymphadenopathy, trachea midline   Respiratory:+ Wheezing, rhonchi, mildly labored respirations    Cardiovascular: RRR, no murmurs, rubs, or gallops, palpable pedal pulses bilaterally   Gastrointestinal: Positive bowel sounds, soft, nontender, nondistended   Musculoskeletal: No bilateral ankle edema, no clubbing or cyanosis to extremities   Psychiatric: Appropriate affect, cooperative   Neurologic: Oriented x 3, strength symmetric in all extremities, Cranial Nerves grossly intact to confrontation, speech clear   Skin: No rashes     Result Review    Result Review:  I have personally reviewed the results from the time of this admission to 8/15/2024 08:57 EDT and agree with these findings:  [x]  Laboratory list / accordion  []  Microbiology  [x]  Radiology  [x]  EKG/Telemetry   []  Cardiology/Vascular   []  Pathology  []  Old records  []  Other:  Most notable findings include: RPP positive for rhinovirus      Assessment & Plan   Assessment / Plan     Brief Patient Summary:  Brigid Carballo is a 78 y.o. female who will be admitted to the observation unit for further management due to COPD exacerbation.  We will consult pulmonology.    Active Hospital Problems:  Active Hospital Problems    Diagnosis     **COPD exacerbation      Plan:     COPD exacerbation  Acute  respiratory failure with hypoxia  +Rhinovirus  -Troponin 13, repeat now  -ProBNP 165  -EKG SR, nonischemic  -CXR without acute infiltrate  -RPP +rhinovirus  -Continuous telemetry monitoring/pulse oximetry   -Scheduled duonebs  -Continue steroids  -Pulmonary consult  -Continue supplemental oxygen, wean as tolerated  -Check CTA chest    Pulmonary nodule  -Recently had PET scan, patient is aware  -Pulmonology to arrange for further management     Hyperlipidemia  -Continue statin    GERD  -Continue PPI    VTE Prophylaxis:  Mechanical VTE prophylaxis orders are present.    CODE STATUS:    Level Of Support Discussed With: Patient  Code Status (Patient has no pulse and is not breathing): CPR (Attempt to Resuscitate)  Medical Interventions (Patient has pulse or is breathing): Full Support    Admission Status:  I believe this patient meets observation status.    Electronically signed by SHE Cantu, 08/15/24, 8:57 AM EDT.    75 minutes has been spent by [facility] providers in the care of this patient while under observation status    I have worn appropriate PPE during this patient encounter, sanitized my hands both with entering and exiting patient's room.    I have discussed plan of care with patient including advance care plan and/or surrogate decision maker.  Patient advises that their , Joshua will be their primary surrogate decision maker

## 2024-08-16 ENCOUNTER — READMISSION MANAGEMENT (OUTPATIENT)
Dept: CALL CENTER | Facility: HOSPITAL | Age: 79
End: 2024-08-16
Payer: MEDICARE

## 2024-08-16 VITALS
DIASTOLIC BLOOD PRESSURE: 77 MMHG | BODY MASS INDEX: 25.76 KG/M2 | RESPIRATION RATE: 18 BRPM | HEART RATE: 100 BPM | WEIGHT: 140 LBS | HEIGHT: 62 IN | TEMPERATURE: 97.3 F | SYSTOLIC BLOOD PRESSURE: 122 MMHG | OXYGEN SATURATION: 93 %

## 2024-08-16 LAB
ANION GAP SERPL CALCULATED.3IONS-SCNC: 6 MMOL/L (ref 5–15)
BASOPHILS # BLD AUTO: 0.01 10*3/MM3 (ref 0–0.2)
BASOPHILS NFR BLD AUTO: 0.1 % (ref 0–1.5)
BUN SERPL-MCNC: 15 MG/DL (ref 8–23)
BUN/CREAT SERPL: 23.8 (ref 7–25)
CALCIUM SPEC-SCNC: 8.9 MG/DL (ref 8.6–10.5)
CHLORIDE SERPL-SCNC: 109 MMOL/L (ref 98–107)
CO2 SERPL-SCNC: 27 MMOL/L (ref 22–29)
CREAT SERPL-MCNC: 0.63 MG/DL (ref 0.57–1)
DEPRECATED RDW RBC AUTO: 41.4 FL (ref 37–54)
EGFRCR SERPLBLD CKD-EPI 2021: 90.4 ML/MIN/1.73
EOSINOPHIL # BLD AUTO: 0 10*3/MM3 (ref 0–0.4)
EOSINOPHIL NFR BLD AUTO: 0 % (ref 0.3–6.2)
ERYTHROCYTE [DISTWIDTH] IN BLOOD BY AUTOMATED COUNT: 11.9 % (ref 12.3–15.4)
GLUCOSE SERPL-MCNC: 119 MG/DL (ref 65–99)
HCT VFR BLD AUTO: 35.1 % (ref 34–46.6)
HGB BLD-MCNC: 11.7 G/DL (ref 12–15.9)
IMM GRANULOCYTES # BLD AUTO: 0.07 10*3/MM3 (ref 0–0.05)
IMM GRANULOCYTES NFR BLD AUTO: 0.5 % (ref 0–0.5)
LYMPHOCYTES # BLD AUTO: 1.24 10*3/MM3 (ref 0.7–3.1)
LYMPHOCYTES NFR BLD AUTO: 9.4 % (ref 19.6–45.3)
MCH RBC QN AUTO: 31.7 PG (ref 26.6–33)
MCHC RBC AUTO-ENTMCNC: 33.3 G/DL (ref 31.5–35.7)
MCV RBC AUTO: 95.1 FL (ref 79–97)
MONOCYTES # BLD AUTO: 0.82 10*3/MM3 (ref 0.1–0.9)
MONOCYTES NFR BLD AUTO: 6.2 % (ref 5–12)
NEUTROPHILS NFR BLD AUTO: 11.01 10*3/MM3 (ref 1.7–7)
NEUTROPHILS NFR BLD AUTO: 83.8 % (ref 42.7–76)
NRBC BLD AUTO-RTO: 0 /100 WBC (ref 0–0.2)
PLATELET # BLD AUTO: 166 10*3/MM3 (ref 140–450)
PMV BLD AUTO: 9.7 FL (ref 6–12)
POTASSIUM SERPL-SCNC: 4.1 MMOL/L (ref 3.5–5.2)
QT INTERVAL: 384 MS
QT INTERVAL: 437 MS
QTC INTERVAL: 426 MS
QTC INTERVAL: 437 MS
RBC # BLD AUTO: 3.69 10*6/MM3 (ref 3.77–5.28)
SODIUM SERPL-SCNC: 142 MMOL/L (ref 136–145)
TROPONIN T SERPL HS-MCNC: 11 NG/L
WBC NRBC COR # BLD AUTO: 13.15 10*3/MM3 (ref 3.4–10.8)

## 2024-08-16 PROCEDURE — 94799 UNLISTED PULMONARY SVC/PX: CPT

## 2024-08-16 PROCEDURE — G0378 HOSPITAL OBSERVATION PER HR: HCPCS

## 2024-08-16 PROCEDURE — 84484 ASSAY OF TROPONIN QUANT: CPT | Performed by: NURSE PRACTITIONER

## 2024-08-16 PROCEDURE — 93005 ELECTROCARDIOGRAM TRACING: CPT | Performed by: NURSE PRACTITIONER

## 2024-08-16 PROCEDURE — 94664 DEMO&/EVAL PT USE INHALER: CPT

## 2024-08-16 PROCEDURE — 80048 BASIC METABOLIC PNL TOTAL CA: CPT | Performed by: NURSE PRACTITIONER

## 2024-08-16 PROCEDURE — 99215 OFFICE O/P EST HI 40 MIN: CPT | Performed by: NURSE PRACTITIONER

## 2024-08-16 PROCEDURE — 63710000001 PREDNISONE PER 1 MG: Performed by: NURSE PRACTITIONER

## 2024-08-16 PROCEDURE — 85025 COMPLETE CBC W/AUTO DIFF WBC: CPT | Performed by: NURSE PRACTITIONER

## 2024-08-16 PROCEDURE — 93010 ELECTROCARDIOGRAM REPORT: CPT | Performed by: INTERNAL MEDICINE

## 2024-08-16 PROCEDURE — 94761 N-INVAS EAR/PLS OXIMETRY MLT: CPT

## 2024-08-16 RX ORDER — AZITHROMYCIN 250 MG/1
TABLET, FILM COATED ORAL
Qty: 6 TABLET | Refills: 0 | Status: SHIPPED | OUTPATIENT
Start: 2024-08-16 | End: 2024-08-26

## 2024-08-16 RX ORDER — FLUTICASONE PROPIONATE AND SALMETEROL 250; 50 UG/1; UG/1
1 POWDER RESPIRATORY (INHALATION)
Qty: 60 EACH | Refills: 0 | Status: SHIPPED | OUTPATIENT
Start: 2024-08-16

## 2024-08-16 RX ORDER — PREDNISONE 20 MG/1
20 TABLET ORAL 2 TIMES DAILY
Qty: 10 TABLET | Refills: 0 | Status: SHIPPED | OUTPATIENT
Start: 2024-08-16 | End: 2024-08-21

## 2024-08-16 RX ORDER — TIOTROPIUM BROMIDE INHALATION SPRAY 3.12 UG/1
2 SPRAY, METERED RESPIRATORY (INHALATION)
Qty: 4 G | Refills: 0 | Status: SHIPPED | OUTPATIENT
Start: 2024-08-16

## 2024-08-16 RX ADMIN — AZITHROMYCIN 250 MG: 250 TABLET, FILM COATED ORAL at 08:37

## 2024-08-16 RX ADMIN — IPRATROPIUM BROMIDE AND ALBUTEROL SULFATE 3 ML: .5; 3 SOLUTION RESPIRATORY (INHALATION) at 10:46

## 2024-08-16 RX ADMIN — ASPIRIN 81 MG: 81 TABLET, COATED ORAL at 08:37

## 2024-08-16 RX ADMIN — PREDNISONE 40 MG: 20 TABLET ORAL at 08:37

## 2024-08-16 RX ADMIN — IPRATROPIUM BROMIDE AND ALBUTEROL SULFATE 3 ML: .5; 3 SOLUTION RESPIRATORY (INHALATION) at 14:18

## 2024-08-16 RX ADMIN — PANTOPRAZOLE SODIUM 40 MG: 40 TABLET, DELAYED RELEASE ORAL at 06:03

## 2024-08-16 RX ADMIN — Medication 10 ML: at 08:38

## 2024-08-16 RX ADMIN — IPRATROPIUM BROMIDE AND ALBUTEROL SULFATE 3 ML: .5; 3 SOLUTION RESPIRATORY (INHALATION) at 06:58

## 2024-08-16 NOTE — CASE MANAGEMENT/SOCIAL WORK
Discharge Planning Assessment  [facility]     Patient Name: Brigid Carballo  MRN: [id number]  Today's Date: 8/16/2024    Admit Date: 8/15/2024    Plan: Home with spouse   Discharge Needs Assessment       Row Name 08/16/24 1109       Living Environment    People in Home spouse    Current Living Arrangements home    Potentially Unsafe Housing Conditions none    Primary Care Provided by self    Provides Primary Care For no one    Family Caregiver if Needed spouse    Quality of Family Relationships helpful;involved;supportive    Able to Return to Prior Arrangements yes       Resource/Environmental Concerns    Resource/Environmental Concerns none    Transportation Concerns none       Transition Planning    Patient/Family Anticipates Transition to home with family    Patient/Family Anticipated Services at Transition none    Transportation Anticipated family or friend will provide       Discharge Needs Assessment    Readmission Within the Last 30 Days no previous admission in last 30 days    Equipment Currently Used at Home nebulizer    Anticipated Changes Related to Illness none                   Discharge Plan       Row Name 08/16/24 1107       Plan    Plan Home with spouse    Patient/Family in Agreement with Plan yes    Provided Post Acute Provider List? N/A    Provided Post Acute Provider Quality & Resource List? N/A    Plan Comments CCP met with patient at bedside. Introduced self and explained role of CCP. Patient confirmed the information on their face sheet is accurate. Patients PCP is Kaya Mckeon. Patients' pharmacy Is [facility]. From home with spouse. Patient still works. Independent at home. Has a nebulizer and inhalers. Plan is home with spouse to transport. No needs. CCP following.                  Continued Care and Services - Admitted Since 8/15/2024    No active coordination exists for this encounter.          Demographic Summary       Row Name 08/16/24 1109       General Information    Admission Type  observation    Referral Source admission list    Reason for Consult discharge planning    Preferred Language English                   Functional Status       Row Name 08/16/24 7929       Functional Status    Usual Activity Tolerance good    Current Activity Tolerance good       Functional Status, IADL    Medications independent    Meal Preparation independent    Housekeeping independent    Laundry independent    Shopping independent       Mental Status    General Appearance WDL WDL       Mental Status Summary    Recent Changes in Mental Status/Cognitive Functioning no changes       Employment/    Employment Status employed full-time                   Psychosocial    No documentation.                  Abuse/Neglect    No documentation.                  Legal    No documentation.                  Substance Abuse    No documentation.                  Patient Forms    No documentation.

## 2024-08-16 NOTE — PLAN OF CARE
Goal Outcome Evaluation:  Plan of Care Reviewed With: patient     Patient admitted for COPD exacerbation.  (Isolation for Rhinovirus)  Patient able to sleep between care with  at bedside.

## 2024-08-16 NOTE — PROGRESS NOTES
[facility]  [phone]  Dr. Anand Neal     Subjective:  LOS: 0    Chief Complaint: Shortness of air    Patient doing much better today.  She is getting up and walking around the room.  Denied any acute concerns or complaints.  I did tell her that we were consulting thoracic surgery to come and see her regarding her right upper lobe nodule.    Objective   Vital Signs past 24hrs  Temp range: Temp (24hrs), Av.6 °F (36.4 °C), Min:97.3 °F (36.3 °C), Max:97.8 °F (36.6 °C)    BP range: BP: ()/(45-94) 127/68  Pulse range: Heart Rate:  [] 71  Resp rate range: Resp:  [18] 18  Device (Oxygen Therapy): nasal cannulaFlow (L/min):  [2] 2  Oxygen range:SpO2:  [91 %-98 %] 96 %     Physical Exam  Vitals reviewed.   Constitutional:       General: She is not in acute distress.     Appearance: Normal appearance.   HENT:      Head: Normocephalic and atraumatic.   Eyes:      Extraocular Movements: Extraocular movements intact.      Pupils: Pupils are equal, round, and reactive to light.   Cardiovascular:      Rate and Rhythm: Normal rate and regular rhythm.      Heart sounds: No murmur heard.     No friction rub. No gallop.   Pulmonary:      Effort: Pulmonary effort is normal. No respiratory distress.      Breath sounds: Decreased air movement present. Decreased breath sounds present. No wheezing, rhonchi or rales.   Skin:     General: Skin is warm and dry.      Findings: No rash.   Neurological:      General: No focal deficit present.      Mental Status: She is alert and oriented to person, place, and time.       Results Review:    I have reviewed the laboratory and imaging data since the last note by [facility] physician.  My annotations are noted in assessment and plan.      Result Review:  I have personally reviewed the results from last note by [facility] physician to 2024 10:46 EDT and agree with these findings:  [x]  Laboratory list / accordion  [x]  Microbiology  [x]  Radiology  [x]  EKG/Telemetry    [x]  Cardiology/Vascular   [x]  Pathology  [x]  Old records  []  Other:    Medication Review:  I have reviewed the current MAR.  My annotations are noted in assessment and plan.    aspirin, 81 mg, Oral, Daily  atorvastatin, 40 mg, Oral, Nightly  azithromycin, 250 mg, Oral, Q24H  ipratropium-albuterol, 3 mL, Nebulization, 4x Daily - RT  pantoprazole, 40 mg, Oral, Q AM  predniSONE, 40 mg, Oral, Daily With Breakfast  sodium chloride, 10 mL, Intravenous, Q12H           Lines, Drains & Airways       Active LDAs       Name Placement date Placement time Site Days    Peripheral IV 08/15/24 0554 Anterior;Right Forearm 08/15/24  0554  Forearm  1                  Droplet  Diet Orders (active) (From admission, onward)       Start     Ordered    08/15/24 0746  Diet: Regular/House; Fluid Consistency: Thin (IDDSI 0)  Diet Effective Now         08/15/24 0746                    Assessment  Acute hypoxic respiratory failure  Acute exacerbation of COPD  Rhinovirus infection  Pulmonary nodules        Plan  -Low concern for bacterial infection.  Appears this is fortunately another viral infection that has exacerbated her COPD  - Continue weaning oxygen for goal O2 saturation greater than 88%  - Continue supportive care for the rhinovirus infection  - Continue prednisone and azithromycin for a planned 5 days  - Continue scheduled and as needed bronchodilators.    -Will need to prescribe a LABA ICS and LAMA combination that is acceptable by patient's insurance.  Trelegy was too expensive.  - She has been referred to thoracic surgery due to the concerning right apical pulmonary nodule on PET.  However has not heard from them yet, we will consult thoracic surgery here just to ensure they are aware of her and that she has assured follow up.   -Okay for discharge from pulmonary perspective once she is seen by thoracic surgery.      Anand Neal DO   08/16/24  10:46 EDT      Part of this note may be an electronic transcription/translation  of spoken language to printed text using the Dragon Dictation System.

## 2024-08-16 NOTE — DISCHARGE INSTRUCTIONS
Take the new inhalers as prescribed.  Complete the steroids and Zithromax over the course of the next 5 days.  Follow-up with pulmonology for repeat evaluation.  Follow-up with thoracic surgery as was discussed for biopsy of nodules.  Follow-up with primary care for interim management and for all other issues.  Return to the ER with any worsening symptoms or should you have any further concerns..

## 2024-08-16 NOTE — CONSULTS
"    Inpatient Thoracic Surgery Consult  Consult performed by: Kelsey Rodriguez, CHERRI, APRN  Consult ordered by: Anand Neal DO          Patient Care Team:  Kaya Mckeon APRN as PCP - General (Family Medicine)  Deam, Pawel Hanson MD as Consulting Physician (Cardiac Electrophysiology)    Chief Complaint   Patient presents with    Shortness of Breath       Subjective     History of Present Illness    Ms. Moreira is a pleasant 78-year-old lady with history of COPD and lung nodules followed by Dr. Jin Blancas of pulmonary medicine.  Her lung nodules are currently being worked up and she had a PET scan performed on 8/5/2024, which demonstrated some hypermetabolic activity to the 1.1 cm nodule in the right apex.  She presented [facility] ER on 8/15/2024 with shortness of breath.  She was found to have rhinovirus and was admitted to observation for further care.    She is very active in her daily life and continues to work at a diner.  She reports a history of social smoking but was never \"a heavy smoker.\"  She is feeling much better since admission and eager for discharge.      Review of Systems   Constitutional: Negative.  Negative for fatigue.   HENT: Negative.     Eyes: Negative.    Respiratory:  Positive for cough and shortness of breath.    Cardiovascular:  Negative for chest pain.   Gastrointestinal: Negative.    Endocrine: Negative.    Genitourinary: Negative.    Musculoskeletal: Negative.    Allergic/Immunologic: Negative.    Neurological: Negative.    Hematological: Negative.    Psychiatric/Behavioral: Negative.          Past Medical History:   Diagnosis Date    Angina pectoris     Anxiety     Lancaster's esophagus 09/29/2015    BPV (benign positional vertigo) 11/07/2021    Bunion of left foot 06/14/2016    Cancer     skin    Carpal tunnel syndrome     Chest pain 10/05/2020    DDD (degenerative disc disease), cervical 11/04/2014    Deformity of wrist joint 05/03/2018    Hyperlipidemia     " Insomnia 09/11/2017    Kidney stones     Low back pain 11/04/2014    Melanoma in situ of unspecified lower limb, including hip 06/2021    Occlusion of left vertebral artery 11/06/2021    1/10/22 CTA neck: beyond its origin, left vertebral artery is widely patent throughout its cervical and intracranial course to the vertebrobasilar junction without stenosis    Osteoarthritis of left knee 06/27/2016    Renal stone 03/02/2020    Spinal stenosis of lumbar region 12/29/2014    Tear of lateral meniscus of knee 09/30/2014    Unexplained weight loss 04/21/2021     Past Surgical History:   Procedure Laterality Date    APPENDECTOMY      BREAST BIOPSY      multiple    CARDIAC CATHETERIZATION      CARDIAC CATHETERIZATION N/A 11/29/2022    Procedure: Left Heart Cath;  Surgeon: Brennen Nguyen MD;  Location:  [facility];  Service: Cardiology;  Laterality: N/A;    CARDIAC CATHETERIZATION N/A 11/29/2022    Procedure: Coronary angiography;  Surgeon: Brennen Nguyen MD;  Location:  [facility];  Service: Cardiology;  Laterality: N/A;    CARDIAC CATHETERIZATION N/A 11/29/2022    Procedure: Left ventriculography;  Surgeon: Brennen Nguyen MD;  Location:  [facility];  Service: Cardiology;  Laterality: N/A;    CARPAL TUNNEL RELEASE Bilateral     COLONOSCOPY  05/17/2021    polyp removed; per Dr. Velasquez    CYSTOSCOPY W/ URETERAL STENT PLACEMENT  10/22/2019    HYSTERECTOMY      INGUINAL HERNIA REPAIR Right     KNEE ARTHROSCOPY Right     meniscus    TONSILLECTOMY      UPPER GASTROINTESTINAL ENDOSCOPY  05/17/2021    per Dr. Velasquez     Family History   Problem Relation Age of Onset    Emphysema Mother     Heart disease Mother     Heart disease Father     Cancer Brother     Heart disease Brother     Skin cancer Brother     Stomach cancer Maternal Aunt     Breast cancer Maternal Grandmother     Breast cancer Paternal Grandmother      Social History     Socioeconomic History     Marital status:    Tobacco Use    Smoking status: Every Day     Current packs/day: 0.50     Average packs/day: 0.5 packs/day for 65.0 years (32.5 ttl pk-yrs)     Types: Cigarettes     Passive exposure: Current    Smokeless tobacco: Never    Tobacco comments:     Quit now [place] info provided   Vaping Use    Vaping status: Never Used   Substance and Sexual Activity    Alcohol use: Yes     Comment: social--rarely    Drug use: Never    Sexual activity: Defer     Medications Prior to Admission   Medication Sig Dispense Refill Last Dose    albuterol sulfate  (90 Base) MCG/ACT inhaler Inhale 2 puffs Every 4 (Four) Hours As Needed for Wheezing. 30 g 3     ascorbic acid (VITAMIN C) 1000 MG tablet Take 1 tablet by mouth Daily.       aspirin 81 MG EC tablet Take 1 tablet by mouth Daily. 90 tablet 0     atorvastatin (LIPITOR) 40 MG tablet Take 1 tablet by mouth Every Night. 90 tablet 1     cholecalciferol (VITAMIN D3) 25 MCG (1000 UT) tablet Take 1 tablet by mouth Daily.       esomeprazole (nexIUM) 20 MG capsule Take 1 capsule by mouth Every Morning Before Breakfast.       Fluticasone-Umeclidin-Vilant (TRELEGY ELLIPTA) 200-62.5-25 MCG/ACT inhaler Inhale 1 puff Daily. (Patient taking differently: Inhale 1 puff Daily As Needed.) 60 each 3     vitamin B-12 (CYANOCOBALAMIN) 1000 MCG tablet Take 1 tablet by mouth Daily.        Allergies   Allergen Reactions    Amoxicillin-Pot Clavulanate Itching    Codeine Itching    Penicillins Unknown - High Severity    Adhesive Tape Rash     Skin redness       Objective      Vital Signs  Temp:  [97.3 °F (36.3 °C)-97.8 °F (36.6 °C)] 97.3 °F (36.3 °C)  Heart Rate:  [] 83  Resp:  [18] 18  BP: ()/(45-77) 122/77    Intake & Output (last day)       None            Physical Exam  Constitutional:       General: She is not in acute distress.     Appearance: Normal appearance. She is not ill-appearing.   HENT:      Head: Normocephalic and atraumatic.      Nose: Nose normal.    Cardiovascular:      Rate and Rhythm: Normal rate.   Pulmonary:      Effort: No respiratory distress.   Musculoskeletal:      Cervical back: Normal range of motion and neck supple.   Skin:     General: Skin is warm and dry.   Neurological:      General: No focal deficit present.      Mental Status: She is alert and oriented to person, place, and time.   Psychiatric:         Mood and Affect: Mood normal.         Thought Content: Thought content normal.         Results Review:    I reviewed the patient's new clinical results.  I reviewed the patient's new imaging results and agree with the interpretation.  Discussed with patient, RN    Imaging Results (Last 24 Hours)       ** No results found for the last 24 hours. **            Lab Results:  Lab Results (last 24 hours)       Procedure Component Value Units Date/Time    Basic Metabolic Panel [[id number]]  (Abnormal) Collected: 08/16/24 0450    Specimen: Blood from Hand, Left Updated: 08/16/24 0535     Glucose 119 mg/dL      BUN 15 mg/dL      Creatinine 0.63 mg/dL      Sodium 142 mmol/L      Potassium 4.1 mmol/L      Chloride 109 mmol/L      CO2 27.0 mmol/L      Calcium 8.9 mg/dL      BUN/Creatinine Ratio 23.8     Anion Gap 6.0 mmol/L      eGFR 90.4 mL/min/1.73     Narrative:      GFR Normal >60  Chronic Kidney Disease <60  Kidney Failure <15    The GFR formula is only valid for adults with stable renal function between ages 18 and 70.    High Sensitivity Troponin T [[id number]]  (Normal) Collected: 08/16/24 0450    Specimen: Blood from Hand, Left Updated: 08/16/24 0535     HS Troponin T 11 ng/L     Narrative:      High Sensitive Troponin T Reference Range:  <14.0 ng/L- Negative Female for AMI  <22.0 ng/L- Negative Male for AMI  >=14 - Abnormal Female indicating possible myocardial injury.  >=22 - Abnormal Male indicating possible myocardial injury.   Clinicians would have to utilize clinical acumen, EKG, Troponin, and serial changes to determine if it is an Acute  Myocardial Infarction or myocardial injury due to an underlying chronic condition.         CBC Auto Differential [[id number]]  (Abnormal) Collected: 08/16/24 0450    Specimen: Blood from Hand, Left Updated: 08/16/24 0521     WBC 13.15 10*3/mm3      RBC 3.69 10*6/mm3      Hemoglobin 11.7 g/dL      Hematocrit 35.1 %      MCV 95.1 fL      MCH 31.7 pg      MCHC 33.3 g/dL      RDW 11.9 %      RDW-SD 41.4 fl      MPV 9.7 fL      Platelets 166 10*3/mm3      Neutrophil % 83.8 %      Lymphocyte % 9.4 %      Monocyte % 6.2 %      Eosinophil % 0.0 %      Basophil % 0.1 %      Immature Grans % 0.5 %      Neutrophils, Absolute 11.01 10*3/mm3      Lymphocytes, Absolute 1.24 10*3/mm3      Monocytes, Absolute 0.82 10*3/mm3      Eosinophils, Absolute 0.00 10*3/mm3      Basophils, Absolute 0.01 10*3/mm3      Immature Grans, Absolute 0.07 10*3/mm3      nRBC 0.0 /100 WBC                 Assessment & Plan       COPD exacerbation      Assessment & Plan    Dyspnea: Likely related to rhinovirus.  She has been initiated on prednisone, antibiotics and DuoNebs.  Continue treatment per pulmonary recommendations.    Lung nodule: PET/CT 8/5/2024 reviewed, minimal activity to the 1 cm nodule in the left lower lobe.  There is a 1 cm nodule with SUV of 8.9 in the right apex concerning for malignancy.  Will likely need biopsy for definitive diagnosis, but would prefer her to recover from her acute illness (rhinovirus) as she is at high risk for pneumothorax secondary to emphysema.  Will likely need PFTs as well. I have placed a referral to multidisciplinary clinic and will have her follow-up with one of our thoracic surgeons as outpatient to determine further plan of care.    Nothing further to add from thoracic.  Discharge per primary service.    I discussed the patients findings and our recommendations with patient, family, and nursing staff    Thank you for this consult and allowing us to participate in the care of your patient.       Kelsey  CHERRI Rodriguez, APRN  [facility]  08/16/24  12:54 EDT    I spent >65 minutes reviewing the patient's chart including medical history, notes, radiographic imaging, labs and performing an assessment and development of a plan and discussion with the patient/family at bedside.

## 2024-08-16 NOTE — PROGRESS NOTES
ED OBSERVATION PROGRESS/DISCHARGE SUMMARY    Date of Admission: 8/15/2024   LOS: 0 days   PCP: Kaya Mckeon, SHE    Final Diagnosis discharge      Subjective     Hospital Outcome:   78-year-old female admitted to the observation unit with a diagnosis of exacerbation of COPD.  Initial workup in the emergency department shows high-sensitivity troponin of 13, 10, delta -3, proBNP 165, all other blood work is at baseline for the patient.  Respiratory viral panel PCR is positive for human rhinovirus.  Chest x-ray shows left lower lobe lobe nodular opacity is best characterized on recent PET/CT.  No new focal consolidation.  No pleural effusion or pneumothorax.  Normal-sized cardiomediastinal silhouette.  No focal osseous abnormality.  CT angio chest shows linear filling defect seen in the superior segment pulmonary artery right lower lobe is most suggestive of chronic pulmonary embolism no other pulmonary embolism seen.  Severe airways disease with multifocal segmental and subsegmental airway occlusions.  Moderate emphysema.  Pulmonology was consulted and they have seen the patient.  ROS:  General: no fevers, chills  Respiratory: no cough, dyspnea  Cardiovascular: no chest pain, palpitations  Abdomen: No abdominal pain, nausea, vomiting, or diarrhea  Neurologic: No focal weakness    8/16/2024.    1:30 PM.  Patient has been cleared for discharge by pulmonology.    Patient has been seen and evaluated by cardiothoracic surgery.  She does continue with her prednisone and azithromycin for the planned 5 days.  She is to continue with home O2 with an oxygen saturation greater than 88%.  Will have the patient follow-up with pulmonology for repeat evaluation.  Number to follow-up with the primary physician for interim management.  Patient to follow-up with thoracic surgery for biopsy once through her acute illness and COPD exacerbation.  A referral has been placed by thoracic surgery to follow-up with their thoracic surgeon as  an outpatient to determine further plan of care.    Objective   Physical Exam:  I have reviewed the vital signs.  Temp:  [97.3 °F (36.3 °C)-97.7 °F (36.5 °C)] 97.3 °F (36.3 °C)  Heart Rate:  [] 100  Resp:  [18] 18  BP: ()/(45-77) 122/77  General Appearance:    Alert, cooperative, no distress  Head:    Normocephalic, atraumatic  Eyes:    Sclerae anicteric  Neck:   Supple, no mass  Lungs: Clear to auscultation bilaterally, respirations unlabored  Heart: Regular rate and rhythm, S1 and S2 normal, no murmur, rub or gallop  Abdomen:  Soft, nontender, bowel sounds active, nondistended  Extremities: No clubbing, cyanosis, or edema to lower extremities  Pulses:  2+ and symmetric in distal lower extremities  Skin: No rashes   Neurologic: Oriented x3, Normal strength to extremities    Results Review:    I have reviewed the labs, radiology results and diagnostic studies.    Results from last 7 days   Lab Units 08/16/24  0450   WBC 10*3/mm3 13.15*   HEMOGLOBIN g/dL 11.7*   HEMATOCRIT % 35.1   PLATELETS 10*3/mm3 166     Results from last 7 days   Lab Units 08/16/24  0450 08/15/24  0555   SODIUM mmol/L 142 142   POTASSIUM mmol/L 4.1 4.0   CHLORIDE mmol/L 109* 107   CO2 mmol/L 27.0 25.5   BUN mg/dL 15 15   CREATININE mg/dL 0.63 0.61   CALCIUM mg/dL 8.9 8.9   BILIRUBIN mg/dL  --  0.5   ALK PHOS U/L  --  44   ALT (SGPT) U/L  --  6   AST (SGOT) U/L  --  12   GLUCOSE mg/dL 119* 95     Imaging Results (Last 24 Hours)       ** No results found for the last 24 hours. **            I have reviewed the medications.  ---------------------------------------------------------------------------------------------  Assessment & Plan   Assessment/Problem List    COPD exacerbation      Plan:  COPD exacerbation  Acute respiratory failure with hypoxia  +Rhinovirus  -Troponin 13, repeat now  -ProBNP 165  -EKG SR, nonischemic  -CXR without acute infiltrate  -RPP +rhinovirus  -Continuous telemetry monitoring/pulse oximetry   -Scheduled  duonebs  -Continue steroids  -Continue azithromycin  -Follow-up with pulmonary care and thoracic surgery as above.  -Up with primary care for all other issues.  Pulmonary nodule  -Recently had PET scan, patient is aware  -Pulmonology to arrange for further management      Hyperlipidemia  -Continue statin     GERD  -Continue PPI    Disposition: Discharge      Follow-up after Discharge: Pulmonology, thoracic surgery, primary care    This note will serve as a progress note and discharge note    Herbie Blas III, PA 08/16/24 15:19 EDT    I have worn appropriate PPE during this patient encounter, sanitized my hands both with entering and exiting patient's room.      51 minutes has been spent by [facility] providers in the care of this patient while under observation status

## 2024-08-16 NOTE — OUTREACH NOTE
Prep Survey      Flowsheet Row Responses   [facility] patient discharged from? [facility]   Is LACE score < 7 ? Yes   Eligibility [facility]   Date of Admission 08/15/24   Date of Discharge 08/16/24   Discharge diagnosis COPD exacerbation   Does the patient have one of the following disease processes/diagnoses(primary or secondary)? COPD   Prep survey completed? Yes            Esme VAZQUEZ - Registered Nurse

## 2024-08-19 ENCOUNTER — TRANSITIONAL CARE MANAGEMENT TELEPHONE ENCOUNTER (OUTPATIENT)
Dept: CALL CENTER | Facility: HOSPITAL | Age: 79
End: 2024-08-19
Payer: MEDICARE

## 2024-08-19 NOTE — OUTREACH NOTE
Call Center TCM Note      Flowsheet Row Responses   [facility] in [place] patient discharged from? [facility]   Does the patient have one of the following disease processes/diagnoses(primary or secondary)? COPD   TCM attempt successful? Yes   Call start time 1122   Call end time 1129   Discharge diagnosis COPD exacerbation   Meds reviewed with patient/caregiver? Yes   Is the patient having any side effects they believe may be caused by any medication additions or changes? No   Does the patient have all medications ordered at discharge? Yes   Is the patient taking all medications as directed (includes completed medication regime)? Yes   Comments Hospital d/c f/u appt on 8/26/24 @9:15am   Does the patient have an appointment with their PCP within 7-14 days of discharge? Yes   Has home health visited the patient within 72 hours of discharge? N/A   Pulse Ox monitoring None   Psychosocial issues? No   Did the patient receive a copy of their discharge instructions? Yes   What is the patient's perception of their health status since discharge? Improving  [productive cough]   Nursing Interventions Nurse provided patient education   Is the patient/caregiver able to teach back the hierarchy of who to call/visit for symptoms/problems? PCP, Specialist, Home health nurse, Urgent Care, ED, 911 Yes   Patient reports what zone on this call? Green Zone   Green Zone Reports doing well, Breathing without shortness of breath   Green Zone interventions: Take daily medications, At all times avoid cigarette smoking, vaping and inhaled irritants   TCM call completed? Yes   Call end time 1129   Would this patient benefit from a Referral to Amb Social Work? No   Is the patient interested in additional calls from an ambulatory ? No            Shweta Parra RN    8/19/2024, 11:29 EDT

## 2024-08-22 ENCOUNTER — OFFICE VISIT (OUTPATIENT)
Dept: OTHER | Facility: HOSPITAL | Age: 79
End: 2024-08-22
Payer: MEDICARE

## 2024-08-22 ENCOUNTER — PREP FOR SURGERY (OUTPATIENT)
Dept: OTHER | Facility: HOSPITAL | Age: 79
End: 2024-08-22
Payer: MEDICARE

## 2024-08-22 VITALS
SYSTOLIC BLOOD PRESSURE: 145 MMHG | DIASTOLIC BLOOD PRESSURE: 90 MMHG | WEIGHT: 139.99 LBS | BODY MASS INDEX: 25.76 KG/M2 | OXYGEN SATURATION: 93 % | HEIGHT: 62 IN | HEART RATE: 80 BPM

## 2024-08-22 DIAGNOSIS — R91.1 LUNG NODULE: Primary | ICD-10-CM

## 2024-08-22 PROCEDURE — G0463 HOSPITAL OUTPT CLINIC VISIT: HCPCS | Performed by: SURGERY

## 2024-08-25 NOTE — PROGRESS NOTES
Subjective   Brigid Carballo is a 78 y.o. female.     Chief Complaint   Patient presents with    Hospital Follow Up Visit     On 8/15/2024 she went into the hospital. Since being out she is doing okay. She tries to know her limits and sits down when needed.       History of Present Illness  Patient presents for hospital discharge follow up; patient went to [facility], [place], operated by [facility] on 8/15/24 with c/o SOA; had stopped smoking with previous admission month before with viral illness; O2 sat was 85% at home; diagnosed with rhinovirus/enterovirus; also diagnosed with COPD exacerbation and admitted to observation unit for further evaluation; pt was able to wean off oxygen in first 24 hours; also has lung nodule that may be cancerous; pt recently had PET scan; discharged home on 8/16/24; to follow up with pulmonary and thoracic surgery.    Finished antibiotic (Azithromycin) and steroid; Still having some productive cough--white color, has improved; no fever; some SOA with activity; resolves with rest; uses Albuterol nebulizer on occasion; has Advair inhaler, Albuterol inhaler, and Spiriva inhaler, but have not been using very often; does not feel like needs to use; has been monitoring O2 sat and has been running 96-97%.    Has upcoming PET scan on 8/29/24 and biopsy of lung nodule on 9/6/24 per Dr. Talbot thoracic surgery.    Saw allergist and negative allergy testing.    Also, c/o skin lesion of arm; would like referral to dermatology.       Within 48 business hours after discharge our office contacted her via telephone to coordinate her care and needs.        7/6/2024     1:14 PM 8/16/2024     6:31 PM   Date of TCM Phone Call   [facility]   Date of Admission 6/28/2024 8/15/2024   Date of Discharge 7/6/2024 8/16/2024   Discharge Disposition Home or Self Care      Risk for Readmission (LACE) Score: 6 (8/16/2024  6:00 AM)      Patient was admitted to [facility]   ALL records were  obtained and reviewed.  Date of admission 8/15/24  Date of discharge 8/16/24  Diagnosis: COPD exacerbation; Acute respiratory failure with hypoxia; +Rhinovirus; Pulmonary nodule; Hyperlipidemia; GERD  Medications upon discharge: reviewed and reconciled  Condition stable    Current outpatient and discharge medications have been reconciled for the patient.    I reviewed and requested records labs and diagnostics from the hospital with the patient and family.  The patient is to follow-up with specialist as discussed and directed.    If any problems arise or further questions develop patient is to call or to contact us for any needs.     The following portions of the patient's history were reviewed and updated as appropriate: allergies, current medications, past family history, past medical history, past social history, past surgical history and problem list.    Current Outpatient Medications   Medication Sig Dispense Refill    albuterol sulfate  (90 Base) MCG/ACT inhaler Inhale 2 puffs Every 4 (Four) Hours As Needed for Wheezing. 30 g 3    ascorbic acid (VITAMIN C) 1000 MG tablet Take 1 tablet by mouth Daily.      aspirin 81 MG EC tablet Take 1 tablet by mouth Daily. 90 tablet 0    atorvastatin (LIPITOR) 40 MG tablet Take 1 tablet by mouth Every Night. 90 tablet 1    cholecalciferol (VITAMIN D3) 25 MCG (1000 UT) tablet Take 1 tablet by mouth Daily.      esomeprazole (nexIUM) 20 MG capsule Take 1 capsule by mouth Every Morning Before Breakfast.      vitamin B-12 (CYANOCOBALAMIN) 1000 MCG tablet Take 1 tablet by mouth Daily.      Fluticasone-Salmeterol (ADVAIR/WIXELA) 250-50 MCG/ACT DISKUS Inhale 1 puff 2 (Two) Times a Day. (Patient not taking: Reported on 8/26/2024) 60 each 0    tiotropium bromide monohydrate (Spiriva Respimat) 2.5 MCG/ACT aerosol solution inhaler Inhale 2 puffs Daily. (Patient not taking: Reported on 8/26/2024) 4 g 0     No current facility-administered medications for this visit.       Current  Outpatient Medications on File Prior to Visit   Medication Sig    albuterol sulfate  (90 Base) MCG/ACT inhaler Inhale 2 puffs Every 4 (Four) Hours As Needed for Wheezing.    ascorbic acid (VITAMIN C) 1000 MG tablet Take 1 tablet by mouth Daily.    aspirin 81 MG EC tablet Take 1 tablet by mouth Daily.    atorvastatin (LIPITOR) 40 MG tablet Take 1 tablet by mouth Every Night.    cholecalciferol (VITAMIN D3) 25 MCG (1000 UT) tablet Take 1 tablet by mouth Daily.    esomeprazole (nexIUM) 20 MG capsule Take 1 capsule by mouth Every Morning Before Breakfast.    vitamin B-12 (CYANOCOBALAMIN) 1000 MCG tablet Take 1 tablet by mouth Daily.    Fluticasone-Salmeterol (ADVAIR/WIXELA) 250-50 MCG/ACT DISKUS Inhale 1 puff 2 (Two) Times a Day. (Patient not taking: Reported on 8/26/2024)    tiotropium bromide monohydrate (Spiriva Respimat) 2.5 MCG/ACT aerosol solution inhaler Inhale 2 puffs Daily. (Patient not taking: Reported on 8/26/2024)     No current facility-administered medications on file prior to visit.       Past Medical History:   Diagnosis Date    Angina pectoris     Anxiety     Lancaster's esophagus 09/29/2015    BPV (benign positional vertigo) 11/07/2021    Bunion of left foot 06/14/2016    Cancer     skin    Carpal tunnel syndrome     Chest pain 10/05/2020    DDD (degenerative disc disease), cervical 11/04/2014    Deformity of wrist joint 05/03/2018    Hyperlipidemia     Insomnia 09/11/2017    Kidney stones     Low back pain 11/04/2014    Melanoma in situ of unspecified lower limb, including hip 06/2021    Occlusion of left vertebral artery 11/06/2021    1/10/22 CTA neck: beyond its origin, left vertebral artery is widely patent throughout its cervical and intracranial course to the vertebrobasilar junction without stenosis    Osteoarthritis of left knee 06/27/2016    Renal stone 03/02/2020    Spinal stenosis of lumbar region 12/29/2014    Tear of lateral meniscus of knee 09/30/2014    Unexplained weight loss  2021       Past Surgical History:   Procedure Laterality Date    APPENDECTOMY      BREAST BIOPSY      multiple    CARDIAC CATHETERIZATION      CARDIAC CATHETERIZATION N/A 2022    Procedure: Left Heart Cath;  Surgeon: Brennen Nguyen MD;  Location:  [facility];  Service: Cardiology;  Laterality: N/A;    CARDIAC CATHETERIZATION N/A 2022    Procedure: Coronary angiography;  Surgeon: Brennen Nguyen MD;  Location:  [facility];  Service: Cardiology;  Laterality: N/A;    CARDIAC CATHETERIZATION N/A 2022    Procedure: Left ventriculography;  Surgeon: Brennen Nguyen MD;  Location:  [facility];  Service: Cardiology;  Laterality: N/A;    CARPAL TUNNEL RELEASE Bilateral     COLONOSCOPY  2021    polyp removed; per Dr. Velasquez    CYSTOSCOPY W/ URETERAL STENT PLACEMENT  10/22/2019    HYSTERECTOMY      INGUINAL HERNIA REPAIR Right     KNEE ARTHROSCOPY Right     meniscus    TONSILLECTOMY      UPPER GASTROINTESTINAL ENDOSCOPY  2021    per Dr. Velasquez       Family History   Problem Relation Age of Onset    Emphysema Mother     Heart disease Mother     Heart disease Father     Cancer Brother     Heart disease Brother     Skin cancer Brother     Stomach cancer Maternal Aunt     Breast cancer Maternal Grandmother     Breast cancer Paternal Grandmother        Social History     Socioeconomic History    Marital status:    Tobacco Use    Smoking status: Former     Current packs/day: 0.00     Average packs/day: 0.5 packs/day for 67.5 years (33.7 ttl pk-yrs)     Types: Cigarettes     Start date:      Quit date: 2024     Years since quittin.1     Passive exposure: Current    Smokeless tobacco: Never    Tobacco comments:     Quit now [place] info provided   Vaping Use    Vaping status: Never Used   Substance and Sexual Activity    Alcohol use: Yes     Comment: social--rarely    Drug use: Never    Sexual activity:  "Defer       Review of Systems   Constitutional:  Negative for appetite change (has been eating more junk food recently), chills, fatigue, fever, unexpected weight gain and unexpected weight loss.   HENT:  Negative for ear pain, sore throat and trouble swallowing.    Respiratory:  Positive for cough. Negative for chest tightness. Shortness of breath: see HPI.   Cardiovascular:  Negative for chest pain, palpitations and leg swelling.   Gastrointestinal:  Negative for abdominal pain, blood in stool, constipation, diarrhea, GERD and indigestion.   Endocrine: Negative for polydipsia. Cold intolerance: will have episodes of feeling cold at times, chronic.  Genitourinary:  Negative for dysuria and frequency.   Musculoskeletal:  Negative for back pain.   Skin:  Negative for rash.   Neurological:  Negative for dizziness, syncope, light-headedness and headache.       Objective   Vitals:    08/26/24 0853   BP: 132/84   BP Location: Left arm   Patient Position: Sitting   Cuff Size: Adult   Pulse: 84   Temp: 98 °F (36.7 °C)   TempSrc: Temporal   SpO2: 96%   Weight: 67.1 kg (148 lb)   Height: 157.5 cm (62.01\")     Body mass index is 27.06 kg/m².    Physical Exam  Vitals and nursing note reviewed.   Constitutional:       General: She is not in acute distress.     Appearance: She is well-developed and well-groomed. She is not diaphoretic.   HENT:      Head: Normocephalic.      Right Ear: External ear normal. No decreased hearing noted. Right ear middle ear effusion: TM dull. Tympanic membrane is not erythematous.      Left Ear: External ear normal. No decreased hearing noted. Left ear middle ear effusion: TM dull. Tympanic membrane is not erythematous.      Nose: Nose normal.      Right Sinus: No maxillary sinus tenderness or frontal sinus tenderness.      Left Sinus: No maxillary sinus tenderness or frontal sinus tenderness.      Mouth/Throat:      Mouth: Mucous membranes are moist.      Pharynx: No oropharyngeal exudate or " posterior oropharyngeal erythema.   Eyes:      Conjunctiva/sclera: Conjunctivae normal.   Neck:      Vascular: No carotid bruit.   Cardiovascular:      Rate and Rhythm: Normal rate and regular rhythm.      Pulses: Normal pulses.      Heart sounds: Normal heart sounds. No murmur heard.  Pulmonary:      Effort: Pulmonary effort is normal. No respiratory distress.      Breath sounds: Normal breath sounds. Wheezes: mild RLL.   Abdominal:      General: Bowel sounds are normal.      Palpations: Abdomen is soft. There is no hepatomegaly or splenomegaly.      Tenderness: There is no abdominal tenderness. There is no guarding.   Musculoskeletal:      Cervical back: Normal range of motion and neck supple. No bony tenderness.      Thoracic back: No bony tenderness.      Lumbar back: No bony tenderness.      Right lower leg: No edema.      Left lower leg: No edema.   Lymphadenopathy:      Cervical: No cervical adenopathy.   Skin:     General: Skin is warm and dry.   Neurological:      Mental Status: She is alert and oriented to person, place, and time.      Gait: Gait normal.   Psychiatric:         Mood and Affect: Mood normal.         Behavior: Behavior normal.         Thought Content: Thought content normal.         Cognition and Memory: Cognition normal.         Judgment: Judgment normal.       8/16/24 BMP WNL except glucose 119, chloride 109; CBC WNL except WBC 13.15, Hgb 11.7, neutrophil 83.8%, lymph 9.4%, absolute neutrophils 11.01    Lab Results   Component Value Date    CHLPL 220 (H) 08/05/2024    TRIG 112 08/05/2024    HDL 73 (H) 08/05/2024    VLDL 20 08/05/2024     (H) 08/05/2024     Lab Results   Component Value Date    TSH 2.960 05/31/2022     Lab Results   Component Value Date    HGBA1C 4.96 11/07/2021     Lab Results   Component Value Date    COLORU Yellow 01/29/2024    CLARITYU Clear 01/29/2024    LEUKOCYTESUR Negative 01/29/2024    BILIRUBINUR Negative 01/29/2024      Records reviewed from [facility] from 8/15/24--8/16/24.    8/15/24 chest x-ray: IMPRESSION: Left lower lobe nodular opacity best characterized on recent  FDG PET/CT. No new focal consolidation. No pleural effusion or pneumothorax. Normal size cardiomediastinal silhouette. No focal osseous abnormality.    8/15/24 CTA chest: 1. Linear filling defect seen in the superior segmental pulmonary artery right lower lobe is most suggestive of chronic pulmonary embolism. No other pulmonary embolism seen. 2. Severe airways disease with multifocal segmental/subsegmental airway occlusions. 3. Moderate emphysema. 4. Subpleural solid pulmonary nodule in the apical right upper lobe is stable, with hypermetabolic activity on PET. Possible small malignancy.      Assessment & Plan .  Problem List Items Addressed This Visit       Lung nodule    Current Assessment & Plan     Follow up as scheduled with thoracic surgery.         COPD with acute exacerbation    Current Assessment & Plan     Improving. Make sure drinking adequate liquids.  Try using Spiriva and Advair consistently.  Continue Albuterol inhaler as needed.  Follow up as scheduled with pulmonary.          Relevant Orders    CBC & Differential (Completed)    Comprehensive Metabolic Panel (Completed)    Leukocytosis    Relevant Orders    CBC & Differential (Completed)    Anemia    Relevant Orders    Iron (Completed)    Ferritin (Completed)    CBC & Differential (Completed)    Skin lesion of left arm    Relevant Orders    Ambulatory Referral to Dermatology     Other Visit Diagnoses       Hospital discharge follow-up    -  Primary    Relevant Orders    Iron (Completed)    Ferritin (Completed)    CBC & Differential (Completed)    Comprehensive Metabolic Panel (Completed)            Return in about 5 months (around 1/26/2025) for Medicare Wellness, Recheck after 1/29/25.or sooner if symptoms persist or worsen.

## 2024-08-26 ENCOUNTER — OFFICE VISIT (OUTPATIENT)
Dept: FAMILY MEDICINE CLINIC | Facility: CLINIC | Age: 79
End: 2024-08-26
Payer: MEDICARE

## 2024-08-26 VITALS
HEIGHT: 62 IN | TEMPERATURE: 98 F | DIASTOLIC BLOOD PRESSURE: 84 MMHG | WEIGHT: 148 LBS | OXYGEN SATURATION: 96 % | BODY MASS INDEX: 27.23 KG/M2 | HEART RATE: 84 BPM | SYSTOLIC BLOOD PRESSURE: 132 MMHG

## 2024-08-26 DIAGNOSIS — D64.9 ANEMIA, UNSPECIFIED TYPE: ICD-10-CM

## 2024-08-26 DIAGNOSIS — D72.829 LEUKOCYTOSIS, UNSPECIFIED TYPE: ICD-10-CM

## 2024-08-26 DIAGNOSIS — J44.1 COPD WITH ACUTE EXACERBATION: ICD-10-CM

## 2024-08-26 DIAGNOSIS — R91.1 LUNG NODULE: ICD-10-CM

## 2024-08-26 DIAGNOSIS — Z09 HOSPITAL DISCHARGE FOLLOW-UP: Primary | ICD-10-CM

## 2024-08-26 DIAGNOSIS — L98.9 SKIN LESION OF LEFT ARM: ICD-10-CM

## 2024-08-26 PROCEDURE — 1126F AMNT PAIN NOTED NONE PRSNT: CPT | Performed by: NURSE PRACTITIONER

## 2024-08-26 PROCEDURE — 1111F DSCHRG MED/CURRENT MED MERGE: CPT | Performed by: NURSE PRACTITIONER

## 2024-08-26 PROCEDURE — 1160F RVW MEDS BY RX/DR IN RCRD: CPT | Performed by: NURSE PRACTITIONER

## 2024-08-26 PROCEDURE — 99495 TRANSJ CARE MGMT MOD F2F 14D: CPT | Performed by: NURSE PRACTITIONER

## 2024-08-26 PROCEDURE — 1159F MED LIST DOCD IN RCRD: CPT | Performed by: NURSE PRACTITIONER

## 2024-08-26 NOTE — PATIENT INSTRUCTIONS
Make sure drinking adequate liquids.  Try using Spiriva and Advair consistently.  Continue Albuterol inhaler as needed.  Follow up pending lab results.  Follow up in 5 months for Medicare Wellness, or sooner if symptoms persist or worsen.  Follow up as scheduled with thoracic surgery and pulmonary.

## 2024-08-27 DIAGNOSIS — E87.5 HYPERKALEMIA: Primary | ICD-10-CM

## 2024-08-27 LAB
ALBUMIN SERPL-MCNC: 3.8 G/DL (ref 3.5–5.2)
ALBUMIN/GLOB SERPL: 2.1 G/DL
ALP SERPL-CCNC: 56 U/L (ref 39–117)
ALT SERPL-CCNC: 10 U/L (ref 1–33)
AST SERPL-CCNC: 11 U/L (ref 1–32)
BASOPHILS # BLD AUTO: 0.05 10*3/MM3 (ref 0–0.2)
BASOPHILS NFR BLD AUTO: 0.5 % (ref 0–1.5)
BILIRUB SERPL-MCNC: 0.5 MG/DL (ref 0–1.2)
BUN SERPL-MCNC: 10 MG/DL (ref 8–23)
BUN/CREAT SERPL: 15.2 (ref 7–25)
CALCIUM SERPL-MCNC: 8.8 MG/DL (ref 8.6–10.5)
CHLORIDE SERPL-SCNC: 106 MMOL/L (ref 98–107)
CO2 SERPL-SCNC: 29 MMOL/L (ref 22–29)
CREAT SERPL-MCNC: 0.66 MG/DL (ref 0.57–1)
EGFRCR SERPLBLD CKD-EPI 2021: 89.4 ML/MIN/1.73
EOSINOPHIL # BLD AUTO: 0.1 10*3/MM3 (ref 0–0.4)
EOSINOPHIL NFR BLD AUTO: 1.1 % (ref 0.3–6.2)
ERYTHROCYTE [DISTWIDTH] IN BLOOD BY AUTOMATED COUNT: 12.3 % (ref 12.3–15.4)
FERRITIN SERPL-MCNC: 154 NG/ML (ref 13–150)
GLOBULIN SER CALC-MCNC: 1.8 GM/DL
GLUCOSE SERPL-MCNC: 87 MG/DL (ref 65–99)
HCT VFR BLD AUTO: 42.3 % (ref 34–46.6)
HGB BLD-MCNC: 14.4 G/DL (ref 12–15.9)
IMM GRANULOCYTES # BLD AUTO: 0.19 10*3/MM3 (ref 0–0.05)
IMM GRANULOCYTES NFR BLD AUTO: 2 % (ref 0–0.5)
IRON SERPL-MCNC: 63 MCG/DL (ref 37–145)
LYMPHOCYTES # BLD AUTO: 2.04 10*3/MM3 (ref 0.7–3.1)
LYMPHOCYTES NFR BLD AUTO: 21.6 % (ref 19.6–45.3)
MCH RBC QN AUTO: 32.4 PG (ref 26.6–33)
MCHC RBC AUTO-ENTMCNC: 34 G/DL (ref 31.5–35.7)
MCV RBC AUTO: 95.1 FL (ref 79–97)
MONOCYTES # BLD AUTO: 0.86 10*3/MM3 (ref 0.1–0.9)
MONOCYTES NFR BLD AUTO: 9.1 % (ref 5–12)
NEUTROPHILS # BLD AUTO: 6.2 10*3/MM3 (ref 1.7–7)
NEUTROPHILS NFR BLD AUTO: 65.7 % (ref 42.7–76)
NRBC BLD AUTO-RTO: 0 /100 WBC (ref 0–0.2)
PLATELET # BLD AUTO: 211 10*3/MM3 (ref 140–450)
POTASSIUM SERPL-SCNC: 5.6 MMOL/L (ref 3.5–5.2)
PROT SERPL-MCNC: 5.6 G/DL (ref 6–8.5)
RBC # BLD AUTO: 4.45 10*6/MM3 (ref 3.77–5.28)
SODIUM SERPL-SCNC: 143 MMOL/L (ref 136–145)
WBC # BLD AUTO: 9.44 10*3/MM3 (ref 3.4–10.8)

## 2024-08-28 NOTE — PROGRESS NOTES
THORACIC SURGERY CLINIC CONSULT NOTE    REASON FOR CONSULT: Left lower lobe 1 cm nodule, 1 cm right upper lobe with SUV of 8.9    Subjective   HISTORY OF PRESENTING ILLNESS:   Brigid Carballo is a 78 y.o. female who has significant medical problems as mentioned in the medical chart.     History of Present Illness  She has been experiencing lung-related issues for approximately a month, which she attributes to a viral infection. During this period, she was admitted to [facility] twice. A CT scan of her chest revealed moderate bronchial wall thickening throughout the right middle lobe and right lower lobe suggesting bronchitis.  There was a left lower lobe 1 cm solid pulm nodule.  There was a indeterminate 1.8 cm subcutaneous nodule in the left para midline anterior chest wall.  Subsequent PET/CT scan reported 1 cm nodule at the inferior aspect of the left lower lobe with SUV of 0.8.  There was incidental approximately 1 x 0.6 cm hypermetabolic irregular pulm nodule at the medial aspect of the right apex adjacent to the tortuous subclavian artery with SUV of 8.9.    She has a history of smoking, having started at age 12 and recently quit a month ago. Her smoking habit was approximately half a pack of cigarettes per day. She suspects the scar tissue on her lung may be due to bronchitis. She can walk a block without experiencing shortness of breath and continues to work in a restaurant, assisting the cook.    She has previously had walking pneumonia and regular pneumonia, but these did not require hospital admission. She underwent breast surgery to remove a cyst, which was non-cancerous. She had a skin lesion removed by a dermatologist about 3 to 4 months ago, which was diagnosed as skin cancer.    She has no history of heart attack, stroke, or blood clots in the lungs. She is not currently on any blood thinners. Her only medication is for cholesterol management.    FAMILY HISTORY  She has a family history of  cancer.    Past Medical History:   Diagnosis Date    Angina pectoris     Anxiety     Lancaster's esophagus 09/29/2015    BPV (benign positional vertigo) 11/07/2021    Bunion of left foot 06/14/2016    Cancer     skin    Carpal tunnel syndrome     Chest pain 10/05/2020    DDD (degenerative disc disease), cervical 11/04/2014    Deformity of wrist joint 05/03/2018    Hyperlipidemia     Insomnia 09/11/2017    Kidney stones     Low back pain 11/04/2014    Melanoma in situ of unspecified lower limb, including hip 06/2021    Occlusion of left vertebral artery 11/06/2021    1/10/22 CTA neck: beyond its origin, left vertebral artery is widely patent throughout its cervical and intracranial course to the vertebrobasilar junction without stenosis    Osteoarthritis of left knee 06/27/2016    Renal stone 03/02/2020    Spinal stenosis of lumbar region 12/29/2014    Tear of lateral meniscus of knee 09/30/2014    Unexplained weight loss 04/21/2021       Past Surgical History:   Procedure Laterality Date    APPENDECTOMY      BREAST BIOPSY      multiple    CARDIAC CATHETERIZATION      CARDIAC CATHETERIZATION N/A 11/29/2022    Procedure: Left Heart Cath;  Surgeon: Brennen Nguyen MD;  Location:  [facility];  Service: Cardiology;  Laterality: N/A;    CARDIAC CATHETERIZATION N/A 11/29/2022    Procedure: Coronary angiography;  Surgeon: Brennen Nguyen MD;  Location:  [facility];  Service: Cardiology;  Laterality: N/A;    CARDIAC CATHETERIZATION N/A 11/29/2022    Procedure: Left ventriculography;  Surgeon: Brennen Nguyen MD;  Location:  [facility];  Service: Cardiology;  Laterality: N/A;    CARPAL TUNNEL RELEASE Bilateral     COLONOSCOPY  05/17/2021    polyp removed; per Dr. Velasquez    CYSTOSCOPY W/ URETERAL STENT PLACEMENT  10/22/2019    HYSTERECTOMY      INGUINAL HERNIA REPAIR Right     KNEE ARTHROSCOPY Right     meniscus    TONSILLECTOMY      UPPER GASTROINTESTINAL  ENDOSCOPY  2021    per Dr. Velasquez       Family History   Problem Relation Age of Onset    Emphysema Mother     Heart disease Mother     Heart disease Father     Cancer Brother     Heart disease Brother     Skin cancer Brother     Stomach cancer Maternal Aunt     Breast cancer Maternal Grandmother     Breast cancer Paternal Grandmother        Social History     Socioeconomic History    Marital status:    Tobacco Use    Smoking status: Former     Current packs/day: 0.00     Average packs/day: 0.5 packs/day for 67.5 years (33.7 ttl pk-yrs)     Types: Cigarettes     Start date:      Quit date: 2024     Years since quittin.1     Passive exposure: Current    Smokeless tobacco: Never    Tobacco comments:     Quit now [place] info provided   Vaping Use    Vaping status: Never Used   Substance and Sexual Activity    Alcohol use: Yes     Comment: social--rarely    Drug use: Never    Sexual activity: Defer         Current Outpatient Medications:     albuterol sulfate  (90 Base) MCG/ACT inhaler, Inhale 2 puffs Every 4 (Four) Hours As Needed for Wheezing., Disp: 30 g, Rfl: 3    ascorbic acid (VITAMIN C) 1000 MG tablet, Take 1 tablet by mouth Daily., Disp: , Rfl:     aspirin 81 MG EC tablet, Take 1 tablet by mouth Daily., Disp: 90 tablet, Rfl: 0    atorvastatin (LIPITOR) 40 MG tablet, Take 1 tablet by mouth Every Night., Disp: 90 tablet, Rfl: 1    cholecalciferol (VITAMIN D3) 25 MCG (1000 UT) tablet, Take 1 tablet by mouth Daily., Disp: , Rfl:     esomeprazole (nexIUM) 20 MG capsule, Take 1 capsule by mouth Every Morning Before Breakfast., Disp: , Rfl:     Fluticasone-Salmeterol (ADVAIR/WIXELA) 250-50 MCG/ACT DISKUS, Inhale 1 puff 2 (Two) Times a Day. (Patient not taking: Reported on 2024), Disp: 60 each, Rfl: 0    tiotropium bromide monohydrate (Spiriva Respimat) 2.5 MCG/ACT aerosol solution inhaler, Inhale 2 puffs Daily. (Patient not taking: Reported on 2024), Disp: 4 g, Rfl: 0     "vitamin B-12 (CYANOCOBALAMIN) 1000 MCG tablet, Take 1 tablet by mouth Daily., Disp: , Rfl:      Allergies   Allergen Reactions    Amoxicillin-Pot Clavulanate Itching    Codeine Itching    Penicillins Unknown - High Severity    Adhesive Tape Rash     Skin redness             Objective    OBJECTIVE:     VITAL SIGNS:  /90   Pulse 80   Ht 157.5 cm (62.01\")   Wt 63.5 kg (139 lb 15.9 oz)   SpO2 93%   BMI 25.60 kg/m²     PHYSICAL EXAM:  Normal appearance.   Head is normocephalic.   Nose appears normal.   No obvious deformity of the mouth and throat.  Conjunctivae normal.   Heart rate and rhythm is normal.  Pulmonary effort is normal.   Moving all 4 extremities.  Extremities warm.  No focal deficit present.   He is alert and oriented to person, place, and time.     LAB RESULTS:  I have reviewed all the available laboratory results in the chart.    RESULTS REVIEW:  I have reviewed the patient's all relevant laboratory and imaging findings.     Results  Imaging  CT scan of the chest showed a spot on the lung. PET scan showed a nodule on the right lung and another spot on the top of the right lung that lit up.    ASSESSMENT & PLAN:  Brigid Carballo is a 78 y.o. female with significant medical conditions as mentioned above presented to my clinic.    Diagnosis:  Left lower lobe 1 cm nodule, 1 cm right upper lobe with SUV of 8.9  Assessment & Plan  Given her smoking history, there is a possibility of malignancy. A biopsy of the pulmonary nodule is recommended to determine its nature. A CT scan will be performed today to guide the biopsy procedure. The potential risks, benefits, and alternatives associated with the procedure have been discussed. Should the biopsy reveal infection or inflammation, a period of observation will be initiated. If evidence of malignancy is detected, appropriate treatment will be discussed. In the event that the biopsy does not yield conclusive results, a wait-and-watch approach may be adopted for " 1 to 2 months. If the nodule enlarges during this period, surgical intervention may be considered.    She quit smoking a month ago after smoking half a pack of cigarettes a day since she was 12. Continued smoking cessation is strongly advised.    I discussed the patients findings and my recommendations with the patient. The patient was given adequate time to ask questions and all questions were answered to patient satisfaction.    Ant Talbot MD  Thoracic Surgeon  [facility]        Dictated utilizing Dragon dictation    I spent 40 minutes caring for Brigid on this date of service. This time includes time spent by me in the following activities:preparing for the visit, reviewing tests, obtaining and/or reviewing a separately obtained history, performing a medically appropriate examination and/or evaluation , counseling and educating the patient/family/caregiver, ordering medications, tests, or procedures, referring and communicating with other health care professionals , documenting information in the medical record, independently interpreting results and communicating that information with the patient/family/caregiver, and care coordination and more than half the time was spent in direct face to face evaluation and decision making.     Patient or patient representative verbalized consent for the use of Ambient Listening during the visit with  Ant Talbot MD for chart documentation. 8/28/2024  13:00 EDT

## 2024-08-28 NOTE — H&P (VIEW-ONLY)
THORACIC SURGERY CLINIC CONSULT NOTE    REASON FOR CONSULT: Left lower lobe 1 cm nodule, 1 cm right upper lobe with SUV of 8.9    Subjective   HISTORY OF PRESENTING ILLNESS:   Brigid Carballo is a 78 y.o. female who has significant medical problems as mentioned in the medical chart.     History of Present Illness  She has been experiencing lung-related issues for approximately a month, which she attributes to a viral infection. During this period, she was admitted to [facility] twice. A CT scan of her chest revealed moderate bronchial wall thickening throughout the right middle lobe and right lower lobe suggesting bronchitis.  There was a left lower lobe 1 cm solid pulm nodule.  There was a indeterminate 1.8 cm subcutaneous nodule in the left para midline anterior chest wall.  Subsequent PET/CT scan reported 1 cm nodule at the inferior aspect of the left lower lobe with SUV of 0.8.  There was incidental approximately 1 x 0.6 cm hypermetabolic irregular pulm nodule at the medial aspect of the right apex adjacent to the tortuous subclavian artery with SUV of 8.9.    She has a history of smoking, having started at age 12 and recently quit a month ago. Her smoking habit was approximately half a pack of cigarettes per day. She suspects the scar tissue on her lung may be due to bronchitis. She can walk a block without experiencing shortness of breath and continues to work in a restaurant, assisting the cook.    She has previously had walking pneumonia and regular pneumonia, but these did not require hospital admission. She underwent breast surgery to remove a cyst, which was non-cancerous. She had a skin lesion removed by a dermatologist about 3 to 4 months ago, which was diagnosed as skin cancer.    She has no history of heart attack, stroke, or blood clots in the lungs. She is not currently on any blood thinners. Her only medication is for cholesterol management.    FAMILY HISTORY  She has a family history of  cancer.    Past Medical History:   Diagnosis Date    Angina pectoris     Anxiety     Lancaster's esophagus 09/29/2015    BPV (benign positional vertigo) 11/07/2021    Bunion of left foot 06/14/2016    Cancer     skin    Carpal tunnel syndrome     Chest pain 10/05/2020    DDD (degenerative disc disease), cervical 11/04/2014    Deformity of wrist joint 05/03/2018    Hyperlipidemia     Insomnia 09/11/2017    Kidney stones     Low back pain 11/04/2014    Melanoma in situ of unspecified lower limb, including hip 06/2021    Occlusion of left vertebral artery 11/06/2021    1/10/22 CTA neck: beyond its origin, left vertebral artery is widely patent throughout its cervical and intracranial course to the vertebrobasilar junction without stenosis    Osteoarthritis of left knee 06/27/2016    Renal stone 03/02/2020    Spinal stenosis of lumbar region 12/29/2014    Tear of lateral meniscus of knee 09/30/2014    Unexplained weight loss 04/21/2021       Past Surgical History:   Procedure Laterality Date    APPENDECTOMY      BREAST BIOPSY      multiple    CARDIAC CATHETERIZATION      CARDIAC CATHETERIZATION N/A 11/29/2022    Procedure: Left Heart Cath;  Surgeon: Brennen Nguyen MD;  Location:  [facility];  Service: Cardiology;  Laterality: N/A;    CARDIAC CATHETERIZATION N/A 11/29/2022    Procedure: Coronary angiography;  Surgeon: Brennen Nguyen MD;  Location:  [facility];  Service: Cardiology;  Laterality: N/A;    CARDIAC CATHETERIZATION N/A 11/29/2022    Procedure: Left ventriculography;  Surgeon: Brennen Nguyen MD;  Location:  [facility];  Service: Cardiology;  Laterality: N/A;    CARPAL TUNNEL RELEASE Bilateral     COLONOSCOPY  05/17/2021    polyp removed; per Dr. Velasquez    CYSTOSCOPY W/ URETERAL STENT PLACEMENT  10/22/2019    HYSTERECTOMY      INGUINAL HERNIA REPAIR Right     KNEE ARTHROSCOPY Right     meniscus    TONSILLECTOMY      UPPER GASTROINTESTINAL  ENDOSCOPY  2021    per Dr. Velasquez       Family History   Problem Relation Age of Onset    Emphysema Mother     Heart disease Mother     Heart disease Father     Cancer Brother     Heart disease Brother     Skin cancer Brother     Stomach cancer Maternal Aunt     Breast cancer Maternal Grandmother     Breast cancer Paternal Grandmother        Social History     Socioeconomic History    Marital status:    Tobacco Use    Smoking status: Former     Current packs/day: 0.00     Average packs/day: 0.5 packs/day for 67.5 years (33.7 ttl pk-yrs)     Types: Cigarettes     Start date:      Quit date: 2024     Years since quittin.1     Passive exposure: Current    Smokeless tobacco: Never    Tobacco comments:     Quit now [place] info provided   Vaping Use    Vaping status: Never Used   Substance and Sexual Activity    Alcohol use: Yes     Comment: social--rarely    Drug use: Never    Sexual activity: Defer         Current Outpatient Medications:     albuterol sulfate  (90 Base) MCG/ACT inhaler, Inhale 2 puffs Every 4 (Four) Hours As Needed for Wheezing., Disp: 30 g, Rfl: 3    ascorbic acid (VITAMIN C) 1000 MG tablet, Take 1 tablet by mouth Daily., Disp: , Rfl:     aspirin 81 MG EC tablet, Take 1 tablet by mouth Daily., Disp: 90 tablet, Rfl: 0    atorvastatin (LIPITOR) 40 MG tablet, Take 1 tablet by mouth Every Night., Disp: 90 tablet, Rfl: 1    cholecalciferol (VITAMIN D3) 25 MCG (1000 UT) tablet, Take 1 tablet by mouth Daily., Disp: , Rfl:     esomeprazole (nexIUM) 20 MG capsule, Take 1 capsule by mouth Every Morning Before Breakfast., Disp: , Rfl:     Fluticasone-Salmeterol (ADVAIR/WIXELA) 250-50 MCG/ACT DISKUS, Inhale 1 puff 2 (Two) Times a Day. (Patient not taking: Reported on 2024), Disp: 60 each, Rfl: 0    tiotropium bromide monohydrate (Spiriva Respimat) 2.5 MCG/ACT aerosol solution inhaler, Inhale 2 puffs Daily. (Patient not taking: Reported on 2024), Disp: 4 g, Rfl: 0     "vitamin B-12 (CYANOCOBALAMIN) 1000 MCG tablet, Take 1 tablet by mouth Daily., Disp: , Rfl:      Allergies   Allergen Reactions    Amoxicillin-Pot Clavulanate Itching    Codeine Itching    Penicillins Unknown - High Severity    Adhesive Tape Rash     Skin redness             Objective    OBJECTIVE:     VITAL SIGNS:  /90   Pulse 80   Ht 157.5 cm (62.01\")   Wt 63.5 kg (139 lb 15.9 oz)   SpO2 93%   BMI 25.60 kg/m²     PHYSICAL EXAM:  Normal appearance.   Head is normocephalic.   Nose appears normal.   No obvious deformity of the mouth and throat.  Conjunctivae normal.   Heart rate and rhythm is normal.  Pulmonary effort is normal.   Moving all 4 extremities.  Extremities warm.  No focal deficit present.   He is alert and oriented to person, place, and time.     LAB RESULTS:  I have reviewed all the available laboratory results in the chart.    RESULTS REVIEW:  I have reviewed the patient's all relevant laboratory and imaging findings.     Results  Imaging  CT scan of the chest showed a spot on the lung. PET scan showed a nodule on the right lung and another spot on the top of the right lung that lit up.    ASSESSMENT & PLAN:  Brigid Carballo is a 78 y.o. female with significant medical conditions as mentioned above presented to my clinic.    Diagnosis:  Left lower lobe 1 cm nodule, 1 cm right upper lobe with SUV of 8.9  Assessment & Plan  Given her smoking history, there is a possibility of malignancy. A biopsy of the pulmonary nodule is recommended to determine its nature. A CT scan will be performed today to guide the biopsy procedure. The potential risks, benefits, and alternatives associated with the procedure have been discussed. Should the biopsy reveal infection or inflammation, a period of observation will be initiated. If evidence of malignancy is detected, appropriate treatment will be discussed. In the event that the biopsy does not yield conclusive results, a wait-and-watch approach may be adopted for " 1 to 2 months. If the nodule enlarges during this period, surgical intervention may be considered.    She quit smoking a month ago after smoking half a pack of cigarettes a day since she was 12. Continued smoking cessation is strongly advised.    I discussed the patients findings and my recommendations with the patient. The patient was given adequate time to ask questions and all questions were answered to patient satisfaction.    Ant Talbot MD  Thoracic Surgeon  [facility] [place]        Dictated utilizing Dragon dictation    I spent 40 minutes caring for Brigid on this date of service. This time includes time spent by me in the following activities:preparing for the visit, reviewing tests, obtaining and/or reviewing a separately obtained history, performing a medically appropriate examination and/or evaluation , counseling and educating the patient/family/caregiver, ordering medications, tests, or procedures, referring and communicating with other health care professionals , documenting information in the medical record, independently interpreting results and communicating that information with the patient/family/caregiver, and care coordination and more than half the time was spent in direct face to face evaluation and decision making.     Patient or patient representative verbalized consent for the use of Ambient Listening during the visit with  Ant Talbot MD for chart documentation. 8/28/2024  13:00 EDT

## 2024-08-28 NOTE — ASSESSMENT & PLAN NOTE
Improving. Make sure drinking adequate liquids.  Try using Spiriva and Advair consistently.  Continue Albuterol inhaler as needed.  Follow up as scheduled with pulmonary.

## 2024-08-29 ENCOUNTER — HOSPITAL ENCOUNTER (OUTPATIENT)
Dept: PET IMAGING | Facility: HOSPITAL | Age: 79
Discharge: HOME | End: 2024-08-29
Admitting: SURGERY
Payer: MEDICARE

## 2024-08-29 DIAGNOSIS — R91.1 LUNG NODULE: ICD-10-CM

## 2024-08-29 PROCEDURE — 71250 CT THORAX DX C-: CPT

## 2024-09-06 ENCOUNTER — ANESTHESIA EVENT (OUTPATIENT)
Dept: GASTROENTEROLOGY | Facility: HOSPITAL | Age: 79
End: 2024-09-06
Payer: MEDICARE

## 2024-09-06 ENCOUNTER — APPOINTMENT (OUTPATIENT)
Dept: GENERAL RADIOLOGY | Facility: HOSPITAL | Age: 79
End: 2024-09-06
Payer: MEDICARE

## 2024-09-06 ENCOUNTER — HOSPITAL ENCOUNTER (OUTPATIENT)
Facility: HOSPITAL | Age: 79
LOS: 1 days | Discharge: HOME | End: 2024-09-08
Attending: SURGERY | Admitting: SURGERY
Payer: MEDICARE

## 2024-09-06 ENCOUNTER — ANESTHESIA (OUTPATIENT)
Dept: GASTROENTEROLOGY | Facility: HOSPITAL | Age: 79
End: 2024-09-06
Payer: MEDICARE

## 2024-09-06 DIAGNOSIS — J44.1 COPD EXACERBATION: ICD-10-CM

## 2024-09-06 DIAGNOSIS — F17.200 CURRENT SMOKER: Primary | ICD-10-CM

## 2024-09-06 DIAGNOSIS — R91.1 LUNG NODULE: ICD-10-CM

## 2024-09-06 PROBLEM — J93.9 PNEUMOTHORAX: Status: ACTIVE | Noted: 2024-09-06

## 2024-09-06 PROBLEM — J95.811 IATROGENIC PNEUMOTHORAX: Status: ACTIVE | Noted: 2024-09-06

## 2024-09-06 PROBLEM — J95.811 POSTPROCEDURAL PNEUMOTHORAX: Status: ACTIVE | Noted: 2024-09-06

## 2024-09-06 PROCEDURE — 71045 X-RAY EXAM CHEST 1 VIEW: CPT

## 2024-09-06 PROCEDURE — 87071 CULTURE AEROBIC QUANT OTHER: CPT | Performed by: SURGERY

## 2024-09-06 PROCEDURE — 94664 DEMO&/EVAL PT USE INHALER: CPT

## 2024-09-06 PROCEDURE — 25010000002 SUGAMMADEX 200 MG/2ML SOLUTION: Performed by: NURSE ANESTHETIST, CERTIFIED REGISTERED

## 2024-09-06 PROCEDURE — 25010000002 HYDROMORPHONE PER 4 MG: Performed by: SURGERY

## 2024-09-06 PROCEDURE — G0378 HOSPITAL OBSERVATION PER HR: HCPCS

## 2024-09-06 PROCEDURE — 87205 SMEAR GRAM STAIN: CPT | Performed by: SURGERY

## 2024-09-06 PROCEDURE — 94761 N-INVAS EAR/PLS OXIMETRY MLT: CPT

## 2024-09-06 PROCEDURE — 63710000001 SENNOSIDES-DOCUSATE 8.6-50 MG TABLET: Performed by: SURGERY

## 2024-09-06 PROCEDURE — 88173 CYTOPATH EVAL FNA REPORT: CPT | Performed by: SURGERY

## 2024-09-06 PROCEDURE — 25010000002 PROPOFOL 10 MG/ML EMULSION: Performed by: NURSE ANESTHETIST, CERTIFIED REGISTERED

## 2024-09-06 PROCEDURE — 88172 CYTP DX EVAL FNA 1ST EA SITE: CPT | Performed by: SURGERY

## 2024-09-06 PROCEDURE — A9270 NON-COVERED ITEM OR SERVICE: HCPCS | Performed by: SURGERY

## 2024-09-06 PROCEDURE — 87102 FUNGUS ISOLATION CULTURE: CPT | Performed by: SURGERY

## 2024-09-06 PROCEDURE — 25010000002 PHENYLEPHRINE 10 MG/ML SOLUTION: Performed by: NURSE ANESTHETIST, CERTIFIED REGISTERED

## 2024-09-06 PROCEDURE — 63710000001 BUDESONIDE-FORMOTEROL 160-4.5 MCG/ACT AEROSOL 6 G INHALER: Performed by: SURGERY

## 2024-09-06 PROCEDURE — 31624 DX BRONCHOSCOPE/LAVAGE: CPT | Performed by: SURGERY

## 2024-09-06 PROCEDURE — 31627 NAVIGATIONAL BRONCHOSCOPY: CPT | Performed by: SURGERY

## 2024-09-06 PROCEDURE — 94799 UNLISTED PULMONARY SVC/PX: CPT

## 2024-09-06 PROCEDURE — 25010000002 ENOXAPARIN PER 10 MG: Performed by: SURGERY

## 2024-09-06 PROCEDURE — 25010000002 ONDANSETRON PER 1 MG: Performed by: NURSE ANESTHETIST, CERTIFIED REGISTERED

## 2024-09-06 PROCEDURE — 88342 IMHCHEM/IMCYTCHM 1ST ANTB: CPT | Performed by: SURGERY

## 2024-09-06 PROCEDURE — 94640 AIRWAY INHALATION TREATMENT: CPT

## 2024-09-06 PROCEDURE — 88305 TISSUE EXAM BY PATHOLOGIST: CPT | Performed by: SURGERY

## 2024-09-06 PROCEDURE — 25810000003 LACTATED RINGERS PER 1000 ML: Performed by: SURGERY

## 2024-09-06 PROCEDURE — 94760 N-INVAS EAR/PLS OXIMETRY 1: CPT

## 2024-09-06 PROCEDURE — 31629 BRONCHOSCOPY/NEEDLE BX EACH: CPT | Performed by: SURGERY

## 2024-09-06 PROCEDURE — 88341 IMHCHEM/IMCYTCHM EA ADD ANTB: CPT | Performed by: SURGERY

## 2024-09-06 PROCEDURE — 76000 FLUOROSCOPY <1 HR PHYS/QHP: CPT

## 2024-09-06 PROCEDURE — 31641 BRONCHOSCOPY TREAT BLOCKAGE: CPT | Performed by: SURGERY

## 2024-09-06 RX ORDER — BUDESONIDE AND FORMOTEROL FUMARATE DIHYDRATE 160; 4.5 UG/1; UG/1
1 AEROSOL RESPIRATORY (INHALATION)
Status: DISCONTINUED | OUTPATIENT
Start: 2024-09-06 | End: 2024-09-08 | Stop reason: HOSPADM

## 2024-09-06 RX ORDER — ENOXAPARIN SODIUM 100 MG/ML
40 INJECTION SUBCUTANEOUS EVERY 24 HOURS
Status: DISCONTINUED | OUTPATIENT
Start: 2024-09-06 | End: 2024-09-08 | Stop reason: HOSPADM

## 2024-09-06 RX ORDER — POLYETHYLENE GLYCOL 3350 17 G/17G
17 POWDER, FOR SOLUTION ORAL DAILY PRN
Status: DISCONTINUED | OUTPATIENT
Start: 2024-09-06 | End: 2024-09-08 | Stop reason: HOSPADM

## 2024-09-06 RX ORDER — ROCURONIUM BROMIDE 10 MG/ML
INJECTION, SOLUTION INTRAVENOUS AS NEEDED
Status: DISCONTINUED | OUTPATIENT
Start: 2024-09-06 | End: 2024-09-06 | Stop reason: SURG

## 2024-09-06 RX ORDER — SODIUM CHLORIDE 0.9 % (FLUSH) 0.9 %
10 SYRINGE (ML) INJECTION AS NEEDED
Status: DISCONTINUED | OUTPATIENT
Start: 2024-09-06 | End: 2024-09-08 | Stop reason: HOSPADM

## 2024-09-06 RX ORDER — PROPOFOL 10 MG/ML
VIAL (ML) INTRAVENOUS AS NEEDED
Status: DISCONTINUED | OUTPATIENT
Start: 2024-09-06 | End: 2024-09-06 | Stop reason: SURG

## 2024-09-06 RX ORDER — IPRATROPIUM BROMIDE AND ALBUTEROL SULFATE 2.5; .5 MG/3ML; MG/3ML
3 SOLUTION RESPIRATORY (INHALATION)
Status: DISCONTINUED | OUTPATIENT
Start: 2024-09-06 | End: 2024-09-06

## 2024-09-06 RX ORDER — HYDROMORPHONE HYDROCHLORIDE 2 MG/ML
0.5 INJECTION, SOLUTION INTRAMUSCULAR; INTRAVENOUS; SUBCUTANEOUS
Status: DISCONTINUED | OUTPATIENT
Start: 2024-09-06 | End: 2024-09-08 | Stop reason: HOSPADM

## 2024-09-06 RX ORDER — ONDANSETRON 2 MG/ML
4 INJECTION INTRAMUSCULAR; INTRAVENOUS EVERY 6 HOURS PRN
Status: DISCONTINUED | OUTPATIENT
Start: 2024-09-06 | End: 2024-09-08 | Stop reason: HOSPADM

## 2024-09-06 RX ORDER — LIDOCAINE HYDROCHLORIDE 20 MG/ML
INJECTION, SOLUTION INFILTRATION; PERINEURAL AS NEEDED
Status: DISCONTINUED | OUTPATIENT
Start: 2024-09-06 | End: 2024-09-06 | Stop reason: SURG

## 2024-09-06 RX ORDER — PANTOPRAZOLE SODIUM 40 MG/1
40 TABLET, DELAYED RELEASE ORAL
Status: DISCONTINUED | OUTPATIENT
Start: 2024-09-07 | End: 2024-09-08 | Stop reason: HOSPADM

## 2024-09-06 RX ORDER — ONDANSETRON 2 MG/ML
INJECTION INTRAMUSCULAR; INTRAVENOUS AS NEEDED
Status: DISCONTINUED | OUTPATIENT
Start: 2024-09-06 | End: 2024-09-06 | Stop reason: SURG

## 2024-09-06 RX ORDER — ALBUTEROL SULFATE 90 UG/1
2 AEROSOL, METERED RESPIRATORY (INHALATION) EVERY 4 HOURS PRN
Status: DISCONTINUED | OUTPATIENT
Start: 2024-09-06 | End: 2024-09-06 | Stop reason: CLARIF

## 2024-09-06 RX ORDER — SODIUM CHLORIDE, SODIUM LACTATE, POTASSIUM CHLORIDE, CALCIUM CHLORIDE 600; 310; 30; 20 MG/100ML; MG/100ML; MG/100ML; MG/100ML
30 INJECTION, SOLUTION INTRAVENOUS CONTINUOUS PRN
Status: DISCONTINUED | OUTPATIENT
Start: 2024-09-06 | End: 2024-09-06

## 2024-09-06 RX ORDER — ALBUTEROL SULFATE 0.83 MG/ML
2.5 SOLUTION RESPIRATORY (INHALATION) EVERY 4 HOURS PRN
Status: DISCONTINUED | OUTPATIENT
Start: 2024-09-06 | End: 2024-09-08 | Stop reason: HOSPADM

## 2024-09-06 RX ORDER — PHENYLEPHRINE HYDROCHLORIDE 10 MG/ML
INJECTION INTRAVENOUS AS NEEDED
Status: DISCONTINUED | OUTPATIENT
Start: 2024-09-06 | End: 2024-09-06 | Stop reason: SURG

## 2024-09-06 RX ORDER — AMOXICILLIN 250 MG
2 CAPSULE ORAL 2 TIMES DAILY
Status: DISCONTINUED | OUTPATIENT
Start: 2024-09-06 | End: 2024-09-08 | Stop reason: HOSPADM

## 2024-09-06 RX ORDER — IPRATROPIUM BROMIDE AND ALBUTEROL SULFATE 2.5; .5 MG/3ML; MG/3ML
3 SOLUTION RESPIRATORY (INHALATION) ONCE
Status: COMPLETED | OUTPATIENT
Start: 2024-09-06 | End: 2024-09-06

## 2024-09-06 RX ORDER — SODIUM CHLORIDE 0.9 % (FLUSH) 0.9 %
10 SYRINGE (ML) INJECTION EVERY 12 HOURS SCHEDULED
Status: DISCONTINUED | OUTPATIENT
Start: 2024-09-06 | End: 2024-09-08 | Stop reason: HOSPADM

## 2024-09-06 RX ORDER — BISACODYL 5 MG/1
5 TABLET, DELAYED RELEASE ORAL DAILY PRN
Status: DISCONTINUED | OUTPATIENT
Start: 2024-09-06 | End: 2024-09-08 | Stop reason: HOSPADM

## 2024-09-06 RX ORDER — NITROGLYCERIN 0.4 MG/1
0.4 TABLET SUBLINGUAL
Status: DISCONTINUED | OUTPATIENT
Start: 2024-09-06 | End: 2024-09-08 | Stop reason: HOSPADM

## 2024-09-06 RX ORDER — BISACODYL 10 MG
10 SUPPOSITORY, RECTAL RECTAL DAILY PRN
Status: DISCONTINUED | OUTPATIENT
Start: 2024-09-06 | End: 2024-09-08 | Stop reason: HOSPADM

## 2024-09-06 RX ORDER — SODIUM CHLORIDE 9 MG/ML
40 INJECTION, SOLUTION INTRAVENOUS AS NEEDED
Status: DISCONTINUED | OUTPATIENT
Start: 2024-09-06 | End: 2024-09-08 | Stop reason: HOSPADM

## 2024-09-06 RX ADMIN — LIDOCAINE HYDROCHLORIDE 3 ML: 20 INJECTION, SOLUTION INFILTRATION; PERINEURAL at 07:40

## 2024-09-06 RX ADMIN — ONDANSETRON 4 MG: 2 INJECTION INTRAMUSCULAR; INTRAVENOUS at 08:20

## 2024-09-06 RX ADMIN — SUGAMMADEX 200 MG: 100 INJECTION, SOLUTION INTRAVENOUS at 08:25

## 2024-09-06 RX ADMIN — PROPOFOL 120 MCG/KG/MIN: 10 INJECTION, EMULSION INTRAVENOUS at 07:46

## 2024-09-06 RX ADMIN — SODIUM CHLORIDE, POTASSIUM CHLORIDE, SODIUM LACTATE AND CALCIUM CHLORIDE 30 ML/HR: 600; 310; 30; 20 INJECTION, SOLUTION INTRAVENOUS at 07:14

## 2024-09-06 RX ADMIN — HYDROMORPHONE HYDROCHLORIDE 0.5 MG: 2 INJECTION, SOLUTION INTRAMUSCULAR; INTRAVENOUS; SUBCUTANEOUS at 16:16

## 2024-09-06 RX ADMIN — ROCURONIUM BROMIDE 50 MG: 10 INJECTION, SOLUTION INTRAVENOUS at 07:40

## 2024-09-06 RX ADMIN — ENOXAPARIN SODIUM 40 MG: 100 INJECTION SUBCUTANEOUS at 20:38

## 2024-09-06 RX ADMIN — Medication 10 ML: at 21:00

## 2024-09-06 RX ADMIN — PHENYLEPHRINE HYDROCHLORIDE 50 MCG: 10 INJECTION INTRAVENOUS at 07:56

## 2024-09-06 RX ADMIN — PROPOFOL 140 MG: 10 INJECTION, EMULSION INTRAVENOUS at 07:40

## 2024-09-06 RX ADMIN — PHENYLEPHRINE HYDROCHLORIDE 50 MCG: 10 INJECTION INTRAVENOUS at 08:11

## 2024-09-06 RX ADMIN — HYDROMORPHONE HYDROCHLORIDE 0.5 MG: 2 INJECTION, SOLUTION INTRAMUSCULAR; INTRAVENOUS; SUBCUTANEOUS at 10:56

## 2024-09-06 RX ADMIN — PHENYLEPHRINE HYDROCHLORIDE 50 MCG: 10 INJECTION INTRAVENOUS at 07:44

## 2024-09-06 RX ADMIN — BUDESONIDE AND FORMOTEROL FUMARATE DIHYDRATE 1 PUFF: 160; 4.5 AEROSOL RESPIRATORY (INHALATION) at 20:46

## 2024-09-06 RX ADMIN — SENNOSIDES AND DOCUSATE SODIUM 2 TABLET: 50; 8.6 TABLET ORAL at 20:38

## 2024-09-06 RX ADMIN — IPRATROPIUM BROMIDE AND ALBUTEROL SULFATE 3 ML: .5; 3 SOLUTION RESPIRATORY (INHALATION) at 09:01

## 2024-09-06 NOTE — ANESTHESIA PROCEDURE NOTES
Airway  Urgency: elective    Date/Time: 9/6/2024 7:43 AM  Airway not difficult    General Information and Staff    Patient location during procedure: OR  Anesthesiologist: Amos Gonzalez MD  CRNA/CAA: George Jeronimo CRNA    Indications and Patient Condition  Indications for airway management: airway protection    Preoxygenated: yes  Mask difficulty assessment: 1 - vent by mask    Final Airway Details  Final airway type: endotracheal airway      Successful airway: ETT  Cuffed: yes   Successful intubation technique: direct laryngoscopy  Facilitating devices/methods: intubating stylet  Endotracheal tube insertion site: oral  Blade: Presley  Blade size: 2  ETT size (mm): 8.5  Cormack-Lehane Classification: grade I - full view of glottis  Placement verified by: chest auscultation and capnometry   Measured from: lips  ETT/EBT  to lips (cm): 21  Number of attempts at approach: 1  Assessment: lips, teeth, and gum same as pre-op and atraumatic intubation

## 2024-09-06 NOTE — ANESTHESIA POSTPROCEDURE EVALUATION
Patient: Brigid Carballo    Procedure Summary       Date: 09/06/24 Room / Location: [facility] ENDOSCOPY 7 / [facility] ENDOSCOPY    Anesthesia Start: 0729 Anesthesia Stop: 0847    Procedure: BRONCHOSCOPY  WITH ENDOBRONCHIAL ULTRASOUND AND ION ROBOT WITH FNA'S AND BIOPSIES, BAL (Chest) Diagnosis:       Lung nodule      (Lung nodule [R91.1])    Surgeons: Ant Talbot MD Provider: Amos Gonzalez MD    Anesthesia Type: general ASA Status: 3            Anesthesia Type: general    Vitals  Vitals Value Taken Time   /68 09/06/24 1409   Temp     Pulse 60 09/06/24 1432   Resp 18 09/06/24 1315   SpO2 99 % 09/06/24 1432   Vitals shown include unfiled device data.        Post Anesthesia Care and Evaluation    Patient location during evaluation: PHASE II  Patient participation: complete - patient participated  Level of consciousness: awake and alert  Pain management: adequate    Airway patency: patent  Anesthetic complications: No anesthetic complications  PONV Status: none  Cardiovascular status: acceptable and hemodynamically stable  Respiratory status: acceptable, nonlabored ventilation and spontaneous ventilation  Hydration status: acceptable    Comments: Patient had right pneumothorax from bronchoscopy, had right chest tube placed, VSS, sat stable, awaiting bed on 5E

## 2024-09-06 NOTE — PROCEDURES
Chest Tube Insertion Procedure Note    Indications:  Clinically significant right pneumothorax    Pre-operative Diagnosis: Pneumothorax    Post-operative Diagnosis: Pneumothorax    Procedure Details   Informed consent was obtained for the procedure, including sedation.  Risks of lung perforation, hemorrhage, arrhythmia, and adverse drug reaction were discussed.     After sterile skin prep, using standard technique, a 14 Panamanian tube was placed in the right anterior fourth rib space.    Findings:  Gush of fluid    Estimated Blood Loss:  Minimal           Specimens:  None                Complications:  None; patient tolerated the procedure well.           Disposition: PACU, hemodynamic stable           Condition: stable    Attending Attestation: I performed the procedure.

## 2024-09-06 NOTE — OP NOTE
Operative Note     Date of procedure: 9/6/2024     Patient name: Brigid Carballo  MRN: [id number]    Pre OP diagnosis:  Patient Active Problem List   Diagnosis    Small vessel disease    Hyperlipidemia    Allergic rhinitis    Arthritis    Gastroesophageal reflux disease    Vitamin B12 deficiency    Fatigue    Hiatal hernia    Polyp of colon    Bilateral carotid artery stenosis    Current smoker    Vitamin D deficiency    Memory deficit    Thyromegaly    Acute non-recurrent sinusitis    Postmenopause    Abnormal nuclear stress test    Left upper quadrant abdominal pain    Parainfluenza    Tobacco use    Bronchospasm with bronchitis, acute    Lung nodule    COPD with acute exacerbation    Nodule of chest wall    Leukocytosis    COPD exacerbation    Anemia    Skin lesion of left arm       Post OP diagnosis:  Patient Active Problem List   Diagnosis    Small vessel disease    Hyperlipidemia    Allergic rhinitis    Arthritis    Gastroesophageal reflux disease    Vitamin B12 deficiency    Fatigue    Hiatal hernia    Polyp of colon    Bilateral carotid artery stenosis    Current smoker    Vitamin D deficiency    Memory deficit    Thyromegaly    Acute non-recurrent sinusitis    Postmenopause    Abnormal nuclear stress test    Left upper quadrant abdominal pain    Parainfluenza    Tobacco use    Bronchospasm with bronchitis, acute    Lung nodule    COPD with acute exacerbation    Nodule of chest wall    Leukocytosis    COPD exacerbation    Anemia    Skin lesion of left arm       Procedure performed:   Flexible bronchoscopy.  Navigational bronchoscopy with ION robot with C-arm.  Interpretation of fluoroscopy images.  Radial endobronchial ultrasound.  Fine-needle aspiration of the right upper lobe nodule.  Cold forcep biopsy of the right upper lobe nodule.  Right upper lobe segment bronchoalveolar lavage.    ION Synoptic report:  Date of radiological diagnosis: 6/30/2024  Lesions biopsied: Single  Tumor size: 10 mm  Location: Right  upper lobe  Peripheral 1/3: Yes  Appearance: Solid  PET avidity: Yes  Bronchial sign: No  Prior biopsy: No  Radial EBUS: Eccentric  Tools utilized: 23-Gauge Needle, cold forcep biopsy, BAL  BAHMAN result: Atypical cells  EBUS performed: No    Indications:   Brigid Carballo is a 78-year-old. female who has significant medical problems as mentioned in the medical chart.      She has been experiencing lung-related issues for approximately a month, which she attributes to a viral infection. During this period, she was admitted to [facility] twice. A CT scan of her chest revealed moderate bronchial wall thickening throughout the right middle lobe and right lower lobe suggesting bronchitis.  There was a left lower lobe 1 cm solid pulm nodule.  There was a indeterminate 1.8 cm subcutaneous nodule in the left para midline anterior chest wall.  Subsequent PET/CT scan reported 1 cm nodule at the inferior aspect of the left lower lobe with SUV of 0.8.  There was incidental approximately 1 x 0.6 cm hypermetabolic irregular pulm nodule at the medial aspect of the right apex adjacent to the tortuous subclavian artery with SUV of 8.9.     She has a history of smoking, having started at age 12 and recently quit a month ago. Her smoking habit was approximately half a pack of cigarettes per day. She suspects the scar tissue on her lung may be due to bronchitis. She can walk a block without experiencing shortness of breath and continues to work in a restaurant, assisting the cook.     She has previously had walking pneumonia and regular pneumonia, but these did not require hospital admission. She underwent breast surgery to remove a cyst, which was non-cancerous. She had a skin lesion removed by a dermatologist about 3 to 4 months ago, which was diagnosed as skin cancer.     She has no history of heart attack, stroke, or blood clots in the lungs. She is not currently on any blood thinners. Her only medication is for cholesterol  management.     She has a family history of cancer.    Given her smoking history, there is a possibility of malignancy. A biopsy of the pulmonary nodule is recommended to determine its nature.       Surgeon: Ant Talbot MD     Assistants: No qualified assistant was available for this procedure.      Anesthesia: General endotracheal anesthesia    ASA Class: 3    Procedure Details   On 9/6/2024, the patient was brought to the operating room and placed in the supine position on the operating room table. Following an uneventful induction of general anesthesia, patient was intubated with a single endotracheal tube without incident.  Antibiotic for surgical prophylaxis was not indicated due to the nature of the procedure.  Prior to beginning the operation, a time-out was conducted with all members of surgical team present. The patient was identified as Brigid Carballo, the procedure and the correct site were verified.     I began by performing flexible bronchoscopy.  A flexible adult bronchoscope was advanced through the endotracheal tube.  A complete examination of the distal trachea and bilateral mainstem and lobar bronchi and all segmental bronchial orifices was performed.  The patient has normal endobronchial anatomy.  All the tracheobronchial tree had moderate mucus secretion.  There was no blood, endoluminal lesions or other abnormal findings.  The bronchoscope was removed.    Prior to the procedure, the patient CT scan was integrated into the PlanPoint interface that generated the patient's 3D airway tree.  The target nodule was identified and its anatomical borders were mapped.  A path to the target nodule was generated through the PlanPoint interface.  After the plan was finalized, the patient image and plan guide was transferred to the "[employer] robotic platform.  Using the Ion's controller, the 3.5 mm robotic catheter was advanced with the Ion's vision probe and navigated to the target along the preplanned path.  The  catheter was parked next to the target lesion.  The Ion vision probe was removed and radial EBUS was used to confirm the presence of the target lesion.  The radial EBUS was removed and under fluoroscopic guidance, a 23 gauge Ion’s Flexision needle was passed into the robotic catheter.  The needle was advanced into the target lesion and the location of the needle was confirmed with the C-arm. Multiple passes of the needle were made into the target lesion and the aspirate was sent for Rapid on Site Evaluation (BAHMAN). The needle was removed. I then performed cryo biopsy using 1.1 mm ERBECRYO®  cryoprobe through the working channel in the same location. The bronchioloalveolar lavage was performed in the segmental bronchi.  The aspirate was sent for cytology and culture.  The robotic catheter was removed and an adult flexible bronchoscope was inserted.  There was no pooling of blood into the bronchial tree.  All tracheobronchial tree were cleared from secretions.    The patient was awakened from anesthesia, was extubated without incident, and was transported to the Post Anesthesia Care Unit in stable condition.    Findings:  Using 23-gauge needle, fine-needle aspiration of the right upper lobe nodule was performed.  Cold forcep biopsy of the right upper lobe nodule.  Bronchoalveolar lavage of the right upper lobe segment was performed.  The pathologist reported atypical cells.  No evidence of significant active bleeding at the end of the procedure.    Estimated Blood Loss:  Minimal           Drains: None                 Specimens:   ID Type Source Tests Collected by Time   A (Not marked as sent) : RIGHT UPPER LOBE FNA'S Fine Needle Aspirate Lung, Right Upper Lobe FINE NEEDLE ASPIRATION Ant Tablot MD 9/6/2024 0801   B (Not marked as sent) : RIGHT UPPER LOBE BIOPSIES Tissue Lung, Right Upper Lobe TISSUE PATHOLOGY EXAM Ant Talbot MD 9/6/2024 0816   C (Not marked as sent) : RIGHT UPPER LOBE BAL Lavage Lung, Right Upper  Lobe NON-GYNECOLOGIC CYTOLOGY, FUNGAL CULTURE, BAL CULTURE, QUANTITATIVE Ant Talbot MD 9/6/2024 0820              Implants: None           Complications: None           Disposition: PACU - hemodynamically stable.           Condition: Stable     Ant Talbot MD   Thoracic Surgeon  [facility]

## 2024-09-06 NOTE — NURSING NOTE
Pt used bedpan, voided approx 250cc of clear yellow urine without difficulty. O2/2l/nc sat 100%. Denies soa. After voiding and repositioning, pt complained of pain at an 8 out of 10. PRN Dilaudid 0.5mg given per IV. Murali well. Family at bsd. Will continue to monitor.

## 2024-09-06 NOTE — DISCHARGE INSTRUCTIONS
Endoscopic ultrasound and Robotic bronchoscopy with fine needle aspiration    Samples (biopsies) of the lymph nodes are taken from inside the lungs and sent for diagnostic testing.    Final results of your biopsy take up to 5 business days to process; your endoscopic physician will contact you with your results.    EBUS and robotic bronchoscopy is recommended in the following instances:  To diagnose different types of lung disorders (such as sarcoidosis or tuberculosis)  To diagnose or 'stage' cancer   To investigate enlarged lymph  nodes in the chest    Due to effects of sedation, do not drive or operate heavy machinery for 24 hours.    Avoid heavy lifting (>10 lbs.) or strenuous activity for 48 hours.    Continue to avoid non-steroidal anti-inflammatory medications (NSAIDS) for 5-7 days after the procedure unless told otherwise by your endoscopic and primary care physicians.    Some patients have a temporary sore throat after the procedure; this is a normal finding and over-the-counter lozenges help soothe symptoms.    You may resume normal diet once you are discharged.     Avoid red-colored foods for 24 hours.     You may resume your blood thinner medications (if applicable) as directed by your endoscopic and primary care physicians.    It is normal to have a very small amount of blood-tinged sputum immediately after the procedure. This should resolve within 3-4 days.    Although complications are rare, there is a small risk of pain, collapsed lung, bleeding and infection. Contact your endoscopic physician if you have these signs or symptoms (Dr. Talbot):  Temperature greater than 102°F  Worsening pain not relieved by medications  Go to your nearest emergency room is you experience:  Shaking, chills or a temperature over 102°F.    New, sudden difficulty breathing.    New pain when taking a deep breath.    New, sudden chest pain.  Worsening cough that produces large amounts of blood.

## 2024-09-06 NOTE — NURSING NOTE
After Dr. Talbot in to discuss placing a chest tube with pt and pt's family. Consent obtained.  Pt. Currently resting comfortably. O2 continues on mask at 10L with sat between %. Has small amount of productive bloody cough.

## 2024-09-06 NOTE — PLAN OF CARE
Goal Outcome Evaluation:  Plan of Care Reviewed With: patient        Progress: no change  Outcome Evaluation: SR/SB, 2L nc, A/Ox4. Bronch completed during shift. R chest tube placed by MD, no airleak or fluctuation noted. Plans to d/c home pending medical clearance, family to transport.

## 2024-09-06 NOTE — NURSING NOTE
Received report from JACKI Trevino RN at [facility]. Pt resting comfortably in bed, family at bsd, chest tube in place, no drainage noted to dressing. Denies pain or soa. O2/2L/NC with sats 100%. Will continue to monitor.

## 2024-09-06 NOTE — ANESTHESIA PREPROCEDURE EVALUATION
Anesthesia Evaluation     Patient summary reviewed and Nursing notes reviewed   NPO Solid Status: > 8 hours  NPO Liquid Status: > 2 hours           Airway   Mallampati: II  TM distance: >3 FB  Neck ROM: full  No difficulty expected  Dental    (+) edentulous, upper dentures and lower dentures    Pulmonary    (+) a smoker Former, COPD,rhonchi, wheezes    ROS comment: Lung nodule  PE comment: Mild insp wheezes, right > left/coarse rhonchi clear with cough  Cardiovascular - normal exam    ECG reviewed  Rhythm: regular  Rate: normal    (+) angina, hyperlipidemia,  carotid artery disease    ROS comment: EF 60%, mild-mod MR by ECHO 11/22/mild nonobstructive CAD by cath 11/22    Neuro/Psych  (+) numbness, psychiatric history Anxiety  GI/Hepatic/Renal/Endo    (+) hiatal hernia, GERD, renal disease- stones, thyroid problem hypothyroidism    Musculoskeletal     (+) back pain      ROS comment: Cervical DDD/lumbar spinal stenosis  Abdominal  - normal exam   Substance History      OB/GYN          Other   arthritis,   history of cancer      Other Comment: Skin CA                Anesthesia Plan    ASA 3     general   total IV anesthesia  (GETA)  intravenous induction     Anesthetic plan, risks, benefits, and alternatives have been provided, discussed and informed consent has been obtained with: patient.    CODE STATUS:

## 2024-09-07 ENCOUNTER — APPOINTMENT (OUTPATIENT)
Dept: GENERAL RADIOLOGY | Facility: HOSPITAL | Age: 79
End: 2024-09-07
Payer: MEDICARE

## 2024-09-07 PROCEDURE — 94761 N-INVAS EAR/PLS OXIMETRY MLT: CPT

## 2024-09-07 PROCEDURE — 94799 UNLISTED PULMONARY SVC/PX: CPT

## 2024-09-07 PROCEDURE — 71045 X-RAY EXAM CHEST 1 VIEW: CPT

## 2024-09-07 PROCEDURE — 94762 N-INVAS EAR/PLS OXIMTRY CONT: CPT

## 2024-09-07 PROCEDURE — A9270 NON-COVERED ITEM OR SERVICE: HCPCS | Performed by: SURGERY

## 2024-09-07 PROCEDURE — 25010000002 ENOXAPARIN PER 10 MG: Performed by: SURGERY

## 2024-09-07 PROCEDURE — 94664 DEMO&/EVAL PT USE INHALER: CPT

## 2024-09-07 PROCEDURE — G0378 HOSPITAL OBSERVATION PER HR: HCPCS

## 2024-09-07 PROCEDURE — 63710000001 PANTOPRAZOLE 40 MG TABLET DELAYED-RELEASE: Performed by: SURGERY

## 2024-09-07 PROCEDURE — 99232 SBSQ HOSP IP/OBS MODERATE 35: CPT | Performed by: SURGERY

## 2024-09-07 PROCEDURE — 63710000001 SENNOSIDES-DOCUSATE 8.6-50 MG TABLET: Performed by: SURGERY

## 2024-09-07 PROCEDURE — 63710000001 OXYCODONE 5 MG TABLET: Performed by: SURGERY

## 2024-09-07 RX ORDER — OXYCODONE HCL 5 MG/5 ML
5 SOLUTION, ORAL ORAL EVERY 4 HOURS PRN
Status: DISCONTINUED | OUTPATIENT
Start: 2024-09-07 | End: 2024-09-07

## 2024-09-07 RX ORDER — OXYCODONE HYDROCHLORIDE 5 MG/1
5 TABLET ORAL EVERY 4 HOURS PRN
Status: DISCONTINUED | OUTPATIENT
Start: 2024-09-07 | End: 2024-09-08 | Stop reason: HOSPADM

## 2024-09-07 RX ADMIN — SENNOSIDES AND DOCUSATE SODIUM 2 TABLET: 50; 8.6 TABLET ORAL at 21:29

## 2024-09-07 RX ADMIN — OXYCODONE HYDROCHLORIDE 5 MG: 5 TABLET ORAL at 17:12

## 2024-09-07 RX ADMIN — Medication 10 ML: at 21:30

## 2024-09-07 RX ADMIN — BUDESONIDE AND FORMOTEROL FUMARATE DIHYDRATE 1 PUFF: 160; 4.5 AEROSOL RESPIRATORY (INHALATION) at 20:13

## 2024-09-07 RX ADMIN — PANTOPRAZOLE SODIUM 40 MG: 40 TABLET, DELAYED RELEASE ORAL at 06:38

## 2024-09-07 RX ADMIN — Medication 10 ML: at 08:05

## 2024-09-07 RX ADMIN — OXYCODONE HYDROCHLORIDE 5 MG: 5 TABLET ORAL at 21:29

## 2024-09-07 RX ADMIN — ENOXAPARIN SODIUM 40 MG: 100 INJECTION SUBCUTANEOUS at 21:29

## 2024-09-07 RX ADMIN — BUDESONIDE AND FORMOTEROL FUMARATE DIHYDRATE 1 PUFF: 160; 4.5 AEROSOL RESPIRATORY (INHALATION) at 08:10

## 2024-09-07 NOTE — PLAN OF CARE
Goal Outcome Evaluation:  Plan of Care Reviewed With: patient           Outcome Evaluation: a/ox4, compliant with plan of care,2L02,R.chest tube in place, no airleak no flactuation noted,plans to d/c home pending clerance.

## 2024-09-07 NOTE — PLAN OF CARE
Problem: Adult Inpatient Plan of Care  Goal: Plan of Care Review  Outcome: Ongoing, Progressing  Flowsheets (Taken 9/7/2024 1818)  Outcome Evaluation: a/0 x 4. chest tube to water seal. pain being managed. plan is for dc tomorrow.  Goal: Patient-Specific Goal (Individualized)  Outcome: Ongoing, Progressing  Goal: Absence of Hospital-Acquired Illness or Injury  Outcome: Ongoing, Progressing  Intervention: Identify and Manage Fall Risk  Recent Flowsheet Documentation  Taken 9/7/2024 1600 by Johan Cruz RN  Safety Promotion/Fall Prevention:   activity supervised   assistive device/personal items within reach   clutter free environment maintained   fall prevention program maintained   nonskid shoes/slippers when out of bed   gait belt   room organization consistent   safety round/check completed  Taken 9/7/2024 1346 by Johan Cruz RN  Safety Promotion/Fall Prevention:   activity supervised   assistive device/personal items within reach   clutter free environment maintained   fall prevention program maintained   nonskid shoes/slippers when out of bed   gait belt   room organization consistent   safety round/check completed  Taken 9/7/2024 1200 by Johan Cruz RN  Safety Promotion/Fall Prevention:   activity supervised   assistive device/personal items within reach   clutter free environment maintained   fall prevention program maintained   nonskid shoes/slippers when out of bed   gait belt   room organization consistent   safety round/check completed  Taken 9/7/2024 1000 by Johan Cruz RN  Safety Promotion/Fall Prevention:   activity supervised   assistive device/personal items within reach   clutter free environment maintained   fall prevention program maintained   nonskid shoes/slippers when out of bed   gait belt   room organization consistent   safety round/check completed  Taken 9/7/2024 0756 by Johan Cruz RN  Safety Promotion/Fall Prevention:   activity supervised   assistive  device/personal items within reach   clutter free environment maintained   fall prevention program maintained   nonskid shoes/slippers when out of bed   gait belt   room organization consistent   safety round/check completed  Intervention: Prevent Skin Injury  Recent Flowsheet Documentation  Taken 9/7/2024 1600 by Johan Cruz RN  Body Position: position changed independently  Taken 9/7/2024 1346 by Johan Cruz RN  Body Position: position changed independently  Taken 9/7/2024 1200 by Johan Cruz RN  Body Position: position changed independently  Taken 9/7/2024 1000 by Johan Cruz RN  Body Position: position changed independently  Taken 9/7/2024 0756 by Johan Cruz RN  Body Position: position changed independently  Intervention: Prevent and Manage VTE (Venous Thromboembolism) Risk  Recent Flowsheet Documentation  Taken 9/7/2024 0756 by Johan Cruz RN  Activity Management: activity encouraged  VTE Prevention/Management: (lovenox) --  Goal: Optimal Comfort and Wellbeing  Outcome: Ongoing, Progressing  Goal: Readiness for Transition of Care  Outcome: Ongoing, Progressing     Problem: Asthma Comorbidity  Goal: Maintenance of Asthma Control  Outcome: Ongoing, Progressing     Problem: Fall Injury Risk  Goal: Absence of Fall and Fall-Related Injury  Outcome: Ongoing, Progressing  Intervention: Promote Injury-Free Environment  Recent Flowsheet Documentation  Taken 9/7/2024 1600 by Johan Cruz RN  Safety Promotion/Fall Prevention:   activity supervised   assistive device/personal items within reach   clutter free environment maintained   fall prevention program maintained   nonskid shoes/slippers when out of bed   gait belt   room organization consistent   safety round/check completed  Taken 9/7/2024 1346 by Johan Cruz RN  Safety Promotion/Fall Prevention:   activity supervised   assistive device/personal items within reach   clutter free environment maintained   fall prevention program  maintained   nonskid shoes/slippers when out of bed   gait belt   room organization consistent   safety round/check completed  Taken 9/7/2024 1200 by Johan Cruz, RN  Safety Promotion/Fall Prevention:   activity supervised   assistive device/personal items within reach   clutter free environment maintained   fall prevention program maintained   nonskid shoes/slippers when out of bed   gait belt   room organization consistent   safety round/check completed  Taken 9/7/2024 1000 by Johan Cruz, RN  Safety Promotion/Fall Prevention:   activity supervised   assistive device/personal items within reach   clutter free environment maintained   fall prevention program maintained   nonskid shoes/slippers when out of bed   gait belt   room organization consistent   safety round/check completed  Taken 9/7/2024 0756 by Johan Cruz, RN  Safety Promotion/Fall Prevention:   activity supervised   assistive device/personal items within reach   clutter free environment maintained   fall prevention program maintained   nonskid shoes/slippers when out of bed   gait belt   room organization consistent   safety round/check completed   Goal Outcome Evaluation:              Outcome Evaluation: a/0 x 4. chest tube to water seal. pain being managed. plan is for dc tomorrow.

## 2024-09-08 ENCOUNTER — READMISSION MANAGEMENT (OUTPATIENT)
Dept: CALL CENTER | Facility: HOSPITAL | Age: 79
End: 2024-09-08
Payer: MEDICARE

## 2024-09-08 ENCOUNTER — APPOINTMENT (OUTPATIENT)
Dept: GENERAL RADIOLOGY | Facility: HOSPITAL | Age: 79
End: 2024-09-08
Payer: MEDICARE

## 2024-09-08 VITALS
SYSTOLIC BLOOD PRESSURE: 118 MMHG | HEIGHT: 63 IN | OXYGEN SATURATION: 90 % | RESPIRATION RATE: 18 BRPM | HEART RATE: 81 BPM | WEIGHT: 147.49 LBS | BODY MASS INDEX: 26.13 KG/M2 | TEMPERATURE: 98.1 F | DIASTOLIC BLOOD PRESSURE: 96 MMHG

## 2024-09-08 LAB
BACTERIA SPEC AEROBE CULT: NO GROWTH
GRAM STN SPEC: NORMAL
GRAM STN SPEC: NORMAL

## 2024-09-08 PROCEDURE — 94799 UNLISTED PULMONARY SVC/PX: CPT

## 2024-09-08 PROCEDURE — 71045 X-RAY EXAM CHEST 1 VIEW: CPT

## 2024-09-08 PROCEDURE — 94664 DEMO&/EVAL PT USE INHALER: CPT

## 2024-09-08 PROCEDURE — G0378 HOSPITAL OBSERVATION PER HR: HCPCS

## 2024-09-08 PROCEDURE — 63710000001 PANTOPRAZOLE 40 MG TABLET DELAYED-RELEASE: Performed by: SURGERY

## 2024-09-08 PROCEDURE — 94761 N-INVAS EAR/PLS OXIMETRY MLT: CPT

## 2024-09-08 PROCEDURE — 94760 N-INVAS EAR/PLS OXIMETRY 1: CPT

## 2024-09-08 PROCEDURE — 99238 HOSP IP/OBS DSCHRG MGMT 30/<: CPT | Performed by: SURGERY

## 2024-09-08 PROCEDURE — A9270 NON-COVERED ITEM OR SERVICE: HCPCS | Performed by: SURGERY

## 2024-09-08 RX ADMIN — BUDESONIDE AND FORMOTEROL FUMARATE DIHYDRATE 1 PUFF: 160; 4.5 AEROSOL RESPIRATORY (INHALATION) at 08:17

## 2024-09-08 RX ADMIN — PANTOPRAZOLE SODIUM 40 MG: 40 TABLET, DELAYED RELEASE ORAL at 07:53

## 2024-09-08 RX ADMIN — Medication 10 ML: at 08:02

## 2024-09-08 NOTE — PROGRESS NOTES
Progress note    Reason for visit: Iatrogenic pneumothorax    No acute issues overnight.  Pain well-controlled. Denies significant shortness of breath.    No acute distress.  Nonlabored breathing.  On supplemental oxygen.  Chest tube with serosanguineous drainage and no airleak.  Alert, oriented x 3.  Moving all 4 extremities.    Imaging reviewed.  No pneumothorax.    Patient doing well.  Pneumothorax resolved with chest tube.  Likely home tomorrow.  Chest tube to waterseal.  Repeat x-ray in the morning.  Adequate pain control.  Chemical DVT prophylaxis.  Overnight oximetry.  She will likely require supplemental oxygen for home.    Ant Talbot MD  Thoracic surgeon

## 2024-09-08 NOTE — OUTREACH NOTE
Prep Survey      Flowsheet Row Responses   [facility] patient discharged from? [place]   Is LACE score < 7 ? No   Eligibility [facility]   Date of Admission 09/06/24   Date of Discharge 09/08/24   Discharge Disposition Home or Self Care   Discharge diagnosis Lung nodule   Does the patient have one of the following disease processes/diagnoses(primary or secondary)? Other   Does the patient have Home health ordered? No   Is there a DME ordered? Yes   What DME was ordered? Oxygen Therapy--[facility]   Medication alerts for this patient see AVS   Prep survey completed? Yes            Charity CARSON - Registered Nurse

## 2024-09-08 NOTE — DISCHARGE SUMMARY
Patient Care Team:  Kaya Mckeon APRN as PCP - General (Family Medicine)  Deam, Pawel Hanson MD as Consulting Physician (Cardiac Electrophysiology)    Date of Admission: 9/6/2024   Date of Discharge:  9/8/2024    Discharge Diagnosis:  Patient Active Problem List   Diagnosis    Small vessel disease    Hyperlipidemia    Allergic rhinitis    Arthritis    Gastroesophageal reflux disease    Vitamin B12 deficiency    Fatigue    Hiatal hernia    Polyp of colon    Bilateral carotid artery stenosis    Current smoker    Vitamin D deficiency    Memory deficit    Thyromegaly    Acute non-recurrent sinusitis    Postmenopause    Abnormal nuclear stress test    Left upper quadrant abdominal pain    Parainfluenza    Tobacco use    Bronchospasm with bronchitis, acute    Lung nodule    COPD with acute exacerbation    Nodule of chest wall    Leukocytosis    COPD exacerbation    Anemia    Skin lesion of left arm    Pneumothorax    Postprocedural pneumothorax    Iatrogenic pneumothorax       Presenting Problem  Lung nodule [R91.1]  Pneumothorax [J93.9]  Postprocedural pneumothorax [J95.811]  Iatrogenic pneumothorax [J95.811]     History of Present Illness  Patient underwent ION robotic navigational bronchoscopy and developed iatrogenic pneumothorax for which she was admitted to the hospital after chest tube placement.    Hospital Course  The chest tube was removed on day 2 after complete lung expansion and resolution of air leak.  Her pain was well-controlled.  She had coughing with intermittent blood which was expected after biopsy.  Overnight oximetry showed desaturation and she was recommended to use supplemental oxygen at home.  She was discharged home in a stable condition.    Procedures Performed  Procedure(s):  BRONCHOSCOPY  WITH ENDOBRONCHIAL ULTRASOUND AND ION ROBOT WITH FNA'S AND BIOPSIES, BAL  09/06 1629 Note By: Ant Talbot MD    Consults:   Consults       Date and Time Order Name Status Description    8/16/2024   9:39 AM Inpatient Thoracic Surgery Consult Completed     8/15/2024  7:46 AM Inpatient Pulmonology Consult Completed             Pertinent Test Results:     Imaging Results (Last 24 Hours)       Procedure Component Value Units Date/Time    XR Chest 1 View [[id number]] Collected: 09/08/24 0614     Updated: 09/08/24 0619    Narrative:      XR CHEST 1 VW-     HISTORY: Female who is 78 years-old, assess for pneumothorax     TECHNIQUE: Frontal view of the chest     COMPARISON: 9/7/2024     FINDINGS: Right chest tube appears stable. The heart size is normal.  Pulmonary vasculature is congested. Aorta is calcified. Small likely  atelectasis in the lower lungs, minimal  pleural effusions. No pneumothorax. Subcutaneous emphysema is again seen  at the right neck base. No acute osseous process.       Impression:      No significant change.     This report was finalized on 9/8/2024 6:16 AM by Dr. Jonathan Wolfe M.D on Workstation: [employer]               Lab Results (last 24 hours)       ** No results found for the last 24 hours. **              Condition on Discharge:  Stable    Vital Signs  Temp:  [97.6 °F (36.4 °C)-98.5 °F (36.9 °C)] 98.1 °F (36.7 °C)  Heart Rate:  [65-81] 81  Resp:  [16-18] 18  BP: ()/(57-96) 118/96    Physical Exam:  No acute distress.  Alert, oriented x4.  Rate rhythm regular.  Non-labored breathing.  Abdomen soft, nondistended.  Moving all 4 extremities, no focal deficit.  Extremities warm.    Discharge Disposition  Home    Discharge Medications     Discharge Medications        Continue These Medications        Instructions Start Date   albuterol sulfate  (90 Base) MCG/ACT inhaler  Commonly known as: PROVENTIL HFA;VENTOLIN HFA;PROAIR HFA   2 puffs, Inhalation, Every 4 Hours PRN      ascorbic acid 1000 MG tablet  Commonly known as: VITAMIN C   1,000 mg, Oral, Daily      aspirin 81 MG EC tablet   81 mg, Oral, Daily      atorvastatin 40 MG tablet  Commonly known as: LIPITOR   40 mg, Oral,  Nightly      cholecalciferol 25 MCG (1000 UT) tablet  Commonly known as: VITAMIN D3   1 tablet, Oral, Daily      esomeprazole 20 MG capsule  Commonly known as: nexIUM   20 mg, Oral, Every Morning Before Breakfast      Spiriva Respimat 2.5 MCG/ACT aerosol solution inhaler  Generic drug: tiotropium bromide monohydrate   2 puffs, Inhalation, Daily - RT      vitamin B-12 1000 MCG tablet  Commonly known as: CYANOCOBALAMIN   1,000 mcg, Oral, Daily             ASK your doctor about these medications        Instructions Start Date   Fluticasone-Salmeterol 250-50 MCG/ACT DISKUS  Commonly known as: ADVAIR/WIXELA   1 puff, Inhalation, 2 Times Daily - RT               Discharge Instructions:  No heavy lifting, pushing, pulling greater than 10 pounds.  No driving up until 2 weeks after surgery and no longer taking narcotics.  Resume home diet as tolerated.  Continue incentive spirometer at least 4 times per day.  Remove dressing from post chest tube site after 48 hours, may shower and clean surgical sites with antibacterial soap or hydrogen peroxide, and apply gauze dressing or band-aid as needed for any drainage.  No dressing needed once no longer draining.          Follow-up Appointments  Future Appointments   Date Time Provider Department Center   9/12/2024  1:15 PM THORACIC SURGEON [facility]   9/19/2024 11:30 AM Ant Talbot MD K  [facility]   2/12/2025  4:45 PM [facility] US [facility] ARTERIAL CRT 1 BH [facility]         Test Results Pending at Discharge  Pending Labs       Order Current Status    Fine Needle Aspiration In process    Fungus Culture - Lavage, Lung, Right Upper Lobe In process    Non-gynecologic Cytology In process    Tissue Pathology Exam In process    BAL Culture, Quantitative - Lavage, Lung, Right Upper Lobe Preliminary result            For any questions regarding patient's stay, please refer to patient's chart.    Ant Talbot MD  [facility]  09/08/24  10:51 EDT

## 2024-09-08 NOTE — DISCHARGE PLACEMENT REQUEST
"Brigid Kruger (78 y.o. Female)       Date of Birth   1945    Social Security Number       Address   [address] [id number]    Home Phone   [phone]    MRN   [id number]       R[employer]arian   [employer]    Marital Status                               Admission Date   9/6/24    Admission Type   Elective    Admitting Provider   Ant Talbot MD    Attending Provider   Ant Talbot MD    Department, Room/Bed   [address], [address]/1       Discharge Date       Discharge Disposition   Home or Self Care    Discharge Destination                                 Attending Provider: Ant Talobt MD    Allergies: Amoxicillin-pot Clavulanate, Codeine, Penicillins, Adhesive Tape    Isolation: None   Infection: None   Code Status: CPR    Ht: 160 cm (62.99\")   Wt: 66.9 kg (147 lb 7.8 oz)    Admission Cmt: None   Principal Problem: Lung nodule [R91.1]                   Active Insurance as of 9/6/2024       Primary Coverage       Payor Plan Insurance Group Employer/Plan Group    HUMANA MEDICARE REPLACEMENT HU[employer] MED ADV GROUP [id number]       Payor Plan Address Payor Plan Phone Number Payor Plan Fax Number Effective Dates    [address] [address] [phone]  7/1/2018 - None Entered    [facility] [address]         Subscriber Name Subscriber Birth Date Member ID       BRIGID [id number]                     Emergency Contacts        (Rel.) Home Phone Work Phone Mobile Phone    Lee,Jason Call 1st if emergency (Son) -- -- [phone]    SLICKKELLY (Spouse) [phone] -- --                "

## 2024-09-08 NOTE — PLAN OF CARE
Goal Outcome Evaluation:  Plan of Care Reviewed With: patient           Outcome Evaluation: a/ox4, chest tube to waterseal, pt failed overnight oximetry test., pain managed with prn pain medicine per MD order. Plans to d/c today pending clearence.

## 2024-09-08 NOTE — NURSING NOTE
Pt's O2 stat is 86 while on Room Air. With 2L of O2 the Pt's O2 stat raises to 97.   There is mild ectasia of the ascending thoracic aorta to 3.6 cm,   and borderline cardiomegaly without evidence of mediastinal trauma. 7. Degenerative changes of the spine and evidence of cervical spinal   surgical intervention are evaluated on other CT studies today. 8. Loss of height of the T3 and T5 vertebrae with compression of the   upper articular endplates is of uncertain chronicity. ALERT:  THIS IS AN ABNORMAL REPORT. Note: The surgical intensive care unit was contacted telephonically by   myself to communicate findings at the time of this dictation. The   nurse indicated that a chest tube was going to be inserted right after   my phone call. CT ABDOMEN PELVIS W IV CONTRAST Additional Contrast? None   Final Result      CT Thoracic Spine WO Contrast   Final Result   Degenerative changes   Multiple compression fractures   Multiple rib fractures   Subcutaneous emphysema, please refer to the report of the CT scan of   the chest for thoracic findings. ALERT:  THIS IS AN ABNORMAL REPORT      CT Cervical Spine WO Contrast   Final Result      1. There is no evidence of acute cervical trauma. Subtle grade 1   anterolisthesis of C6 on C7 is likely a chronic finding, measured at 4   mm. There is evidence of cervical fusion from the C3-C7 levels, with   intact hardware. 2. There is a tiny apical pneumothorax on the left, and subcutaneous   emphysema is noted. 3. Fractures of the second through fourth ribs are noted at the lower   margin of the study in the left upper chest.      ALERT:  THIS IS AN ABNORMAL REPORT      Note: Contemporaneous images better depicting a pneumothorax or   reported separately, and a separate phone call was not made in   conjunction with the cervical study.       CT Lumbar Spine WO Contrast   Final Result   Findings compatible with degenerative changes   Bilateral L4 pars defect   Severe canal stenosis at L3-4 and moderate canal stenosis at L4-5      CT Head absent normal   Left knee absent absent normal   Right ankle absent absent normal   Left ankle absent absent normal   Right foot absent absent normal   Left foot absent absent normal       CONSULTS: Orthopedic surgery. Neurosurgery. PROCEDURES: L chest tube placement    INJURIES:  L rib fractures 2-11 hemopneumothorax, L clavicle fracture, compression fractures T3,5,7,8,11      Active Problems:    Trauma    Scalp laceration    Concussion with no loss of consciousness    Multiple closed fractures of ribs of left side    Traumatic hemopneumothorax, initial encounter    Bilateral renal cysts    bilateral adrenal nodules  Resolved Problems:    * No resolved hospital problems. *        Assessment/Plan:       Neuro:  GCS 15, fall from tractor, age indeterminate T compression fractures T 3,5,7,8,11, awaiting MRI, NS recs  CV: monitor HDs, tachycardia- resolved  Pulm: left rib fractures 2-11, plan for VATs with rib plating tomorrow, continue IS, PEP, on 4L NC   GI: carb control diet as tolerated, high dose ISS  Renal: LALITO- Cr 1.4, UOP adequate   ID: No acute issues  Endocrine: ISS  MSK: L clavicle fracture- ortho recs, T compression fractures as above- awaiting MRI   · Heme: No acute issues. Bowel regime: Colace  Pain control/Sedation: morphine, matthew   DVT prophylaxis: SCDs. Hep   GI: carb control diet  Glucose protocol:  ISS  Mouth/Eye care: per patient   Code status:   Full Code    Patient/Family update:   At bedside    Disposition:  Continue pulm hygiene, plan on VATs tomorrow      Electronically signed by Campbell Perez DO on 8/5/18 at 10:47 AM

## 2024-09-09 ENCOUNTER — TRANSITIONAL CARE MANAGEMENT TELEPHONE ENCOUNTER (OUTPATIENT)
Dept: CALL CENTER | Facility: HOSPITAL | Age: 79
End: 2024-09-09
Payer: MEDICARE

## 2024-09-09 NOTE — OUTREACH NOTE
Call Center TCM Note      Flowsheet Row Responses   [facility] patient discharged from? [place]   Does the patient have one of the following disease processes/diagnoses(primary or secondary)? Other   TCM attempt successful? Yes   Call start time 1320   Call end time 1326   Discharge diagnosis Lung nodule   Meds reviewed with patient/caregiver? Yes   Does the patient have all medications ordered at discharge? N/A   Is the patient taking all medications as directed (includes completed medication regime)? Yes   Does the patient have an appointment with their PCP within 7-14 days of discharge? No   Nursing Interventions Assisted patient with making appointment per protocol   Has home health visited the patient within 72 hours of discharge? N/A   What DME was ordered? Oxygen Therapy--[facility]   Has all DME been delivered? Yes   DME comments Patient got a portable tank. She is going to call [facility] today and get her home O2 set up.   Psychosocial issues? No   Did the patient receive a copy of their discharge instructions? Yes   Nursing interventions Reviewed instructions with patient   What is the patient's perception of their health status since discharge? Improving   Is the patient/caregiver able to teach back signs and symptoms related to disease process for when to call PCP? Yes   Is the patient/caregiver able to teach back signs and symptoms related to disease process for when to call 911? Yes   Is the patient/caregiver able to teach back the hierarchy of who to call/visit for symptoms/problems? PCP, Specialist, Home health nurse, Urgent Care, ED, 911 Yes   If the patient is a current smoker, are they able to teach back resources for cessation? Not a smoker   TCM call completed? Yes   Call end time 1326   Would this patient benefit from a Referral to Amb Social Work? No   Is the patient interested in additional calls from an ambulatory ? No            Ondina Landrum LPN    9/9/2024, 13:31  EDT

## 2024-09-10 ENCOUNTER — PATIENT OUTREACH (OUTPATIENT)
Dept: OTHER | Facility: HOSPITAL | Age: 79
End: 2024-09-10
Payer: MEDICARE

## 2024-09-10 DIAGNOSIS — R91.1 LUNG NODULE: Primary | ICD-10-CM

## 2024-09-10 LAB
CYTO UR: NORMAL
CYTO UR: NORMAL
DX PRELIMINARY: NORMAL
LAB AP CASE REPORT: NORMAL
LAB AP CASE REPORT: NORMAL
LAB AP DIAGNOSIS COMMENT: NORMAL
LAB AP DIAGNOSIS COMMENT: NORMAL
LAB AP NON-GYN SPECIMEN ADEQUACY: NORMAL
PATH REPORT.FINAL DX SPEC: NORMAL
PATH REPORT.FINAL DX SPEC: NORMAL
PATH REPORT.GROSS SPEC: NORMAL
PATH REPORT.GROSS SPEC: NORMAL

## 2024-09-12 ENCOUNTER — PATIENT OUTREACH (OUTPATIENT)
Dept: OTHER | Facility: HOSPITAL | Age: 79
End: 2024-09-12
Payer: MEDICARE

## 2024-09-12 ENCOUNTER — OFFICE VISIT (OUTPATIENT)
Dept: OTHER | Facility: HOSPITAL | Age: 79
End: 2024-09-12
Payer: MEDICARE

## 2024-09-12 VITALS
TEMPERATURE: 98.2 F | DIASTOLIC BLOOD PRESSURE: 60 MMHG | HEART RATE: 91 BPM | SYSTOLIC BLOOD PRESSURE: 95 MMHG | RESPIRATION RATE: 12 BRPM | OXYGEN SATURATION: 91 %

## 2024-09-12 DIAGNOSIS — R91.1 LUNG NODULE: Primary | ICD-10-CM

## 2024-09-12 PROCEDURE — 99215 OFFICE O/P EST HI 40 MIN: CPT | Performed by: SURGERY

## 2024-09-12 PROCEDURE — G0463 HOSPITAL OUTPT CLINIC VISIT: HCPCS | Performed by: SURGERY

## 2024-09-12 NOTE — PROGRESS NOTES
Referral received from Dr. Talbot. I accompanied patient to Dr. Talbot's f/u appt today in [facility] [place].  Introduced myself and explained navigational services.      The patient underwent ION bronchoscopy of her RUL lesion on 9/6/24. She developed an iatrogenic pneumothorax post-procedure and was admitted 9/6-9/8.  She required supplemental oxygen at discharge.  The patient met with Dr. Talbot today to discuss her biopsy results which revealed atypical epithelioid cells present suspicious for non-small cell carcinoma; presumed stage 1 lung cancer. Dr. Talbot discussed treatment options, although voiced concern about surgery due to her experience post biopsy as well as her lung function. The patient was referred to radiation oncology and is scheduled to meet with Dr. Godinez on 9/18.      We discussed what to expect at her radiation consult appt. We also discussed the average TAT between simulation and starting treatment. Patient verbalized understanding. The patient has a good understanding of her pathology and treatment options.  She was able to teach back the next steps in her care.    The patient is alert and oriented. She ambulates without assistive devices, denies falls and is independent with ADLs. 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  She lives with her  in [place], [place]. She is currently requiring oxygen at night.     The patient is a former 1/2 PPD smoker from 1957; she recently quit smoking. We discussed smoking cessation visit with Lynda. Provided [place] Quit Line information.     The patient has [employer] Medicare Replacement. She denies any current BHE claim issues. We discussed financial navigation, cost estimates and possible financial assistance if needed in the future.     The patient denies transportation, financial or other resource needs at this time.     The patient has been eating well and denies weight loss.    The patient does not have an ACP on file; she declined additional ACP information.    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  She reports a family medical history of colon cancer (father), breast cancer (maternal grandmother, paternal grandmother) and stomach (paternal aunt). Her brother also had cancer, although she is unclear of the type.    The patient states she is coping well with her diagnosis. She reports an adequate support system. We discussed OSW and Behavioral oncology services.  Patient expressed gratitude for my support and denied any additional needs at this time. We also discussed [facility] and [facility] [facility]. The patient will let me know if she would like referrals sent to either organization in the future..    We discussed integrative therapies and other services at the [facility].  She received a navigation folder with the following information at her appointment today: [facility], Cancer and Restorative Exercise - CARE, [facility] exercise program, Living with a Diagnosis of Lung Cancer, Lung Cancer [place] Support [facility], [facility], Message [facility], Reiki Therapy, [facility] \A Chronology of [place] [facility]\"", Cancer Nutrition, Smoking Cessation and Survivorship Clinic.      I will call in a few weeks; provided my name and number and encouraged patient to call if any needs arise.

## 2024-09-13 LAB — FUNGUS WND CULT: NORMAL

## 2024-09-16 ENCOUNTER — OFFICE VISIT (OUTPATIENT)
Dept: FAMILY MEDICINE CLINIC | Facility: CLINIC | Age: 79
End: 2024-09-16
Payer: MEDICARE

## 2024-09-16 VITALS
OXYGEN SATURATION: 95 % | SYSTOLIC BLOOD PRESSURE: 130 MMHG | HEART RATE: 63 BPM | TEMPERATURE: 97.7 F | BODY MASS INDEX: 26.29 KG/M2 | DIASTOLIC BLOOD PRESSURE: 70 MMHG | WEIGHT: 148.4 LBS | HEIGHT: 63 IN

## 2024-09-16 DIAGNOSIS — Z09 HOSPITAL DISCHARGE FOLLOW-UP: Primary | ICD-10-CM

## 2024-09-16 DIAGNOSIS — R68.89 COLD INTOLERANCE: ICD-10-CM

## 2024-09-16 DIAGNOSIS — G47.34 NOCTURNAL HYPOXIA: ICD-10-CM

## 2024-09-16 DIAGNOSIS — E87.5 HYPERKALEMIA: ICD-10-CM

## 2024-09-16 DIAGNOSIS — K21.9 GASTROESOPHAGEAL REFLUX DISEASE, UNSPECIFIED WHETHER ESOPHAGITIS PRESENT: ICD-10-CM

## 2024-09-16 DIAGNOSIS — J95.811 POSTPROCEDURAL PNEUMOTHORAX: ICD-10-CM

## 2024-09-16 DIAGNOSIS — R91.1 LUNG NODULE: ICD-10-CM

## 2024-09-16 DIAGNOSIS — J44.9 CHRONIC OBSTRUCTIVE PULMONARY DISEASE, UNSPECIFIED COPD TYPE: ICD-10-CM

## 2024-09-16 DIAGNOSIS — J20.9 ACUTE BRONCHITIS, UNSPECIFIED ORGANISM: ICD-10-CM

## 2024-09-16 RX ORDER — AZITHROMYCIN 250 MG/1
TABLET, FILM COATED ORAL
Qty: 6 TABLET | Refills: 0 | Status: SHIPPED | OUTPATIENT
Start: 2024-09-16

## 2024-09-17 ENCOUNTER — TELEPHONE (OUTPATIENT)
Dept: RADIATION ONCOLOGY | Facility: HOSPITAL | Age: 79
End: 2024-09-17
Payer: MEDICARE

## 2024-09-17 PROBLEM — J93.9 PNEUMOTHORAX: Status: RESOLVED | Noted: 2024-09-06 | Resolved: 2024-09-17

## 2024-09-17 PROBLEM — J20.9 ACUTE BRONCHITIS: Status: ACTIVE | Noted: 2024-09-17

## 2024-09-17 PROBLEM — D72.829 LEUKOCYTOSIS: Status: RESOLVED | Noted: 2024-08-05 | Resolved: 2024-09-17

## 2024-09-17 PROBLEM — F17.200 CURRENT SMOKER: Status: RESOLVED | Noted: 2022-04-11 | Resolved: 2024-09-17

## 2024-09-17 PROBLEM — B34.8 PARAINFLUENZA: Status: RESOLVED | Noted: 2024-06-28 | Resolved: 2024-09-17

## 2024-09-17 PROBLEM — R68.89 COLD INTOLERANCE: Status: ACTIVE | Noted: 2024-09-17

## 2024-09-17 PROBLEM — Z72.0 TOBACCO USE: Status: RESOLVED | Noted: 2024-06-29 | Resolved: 2024-09-17

## 2024-09-17 LAB
ALBUMIN SERPL-MCNC: 3.9 G/DL (ref 3.5–5.2)
ALBUMIN/GLOB SERPL: 2.6 G/DL
ALP SERPL-CCNC: 51 U/L (ref 39–117)
ALT SERPL-CCNC: 7 U/L (ref 1–33)
AST SERPL-CCNC: 14 U/L (ref 1–32)
BASOPHILS # BLD AUTO: 0.03 10*3/MM3 (ref 0–0.2)
BASOPHILS NFR BLD AUTO: 0.5 % (ref 0–1.5)
BILIRUB SERPL-MCNC: 0.5 MG/DL (ref 0–1.2)
BUN SERPL-MCNC: 9 MG/DL (ref 8–23)
BUN/CREAT SERPL: 14.1 (ref 7–25)
CALCIUM SERPL-MCNC: 8.7 MG/DL (ref 8.6–10.5)
CHLORIDE SERPL-SCNC: 106 MMOL/L (ref 98–107)
CO2 SERPL-SCNC: 27.5 MMOL/L (ref 22–29)
CREAT SERPL-MCNC: 0.64 MG/DL (ref 0.57–1)
CYTO UR: NORMAL
EGFRCR SERPLBLD CKD-EPI 2021: 90 ML/MIN/1.73
EOSINOPHIL # BLD AUTO: 0.12 10*3/MM3 (ref 0–0.4)
EOSINOPHIL NFR BLD AUTO: 2.1 % (ref 0.3–6.2)
ERYTHROCYTE [DISTWIDTH] IN BLOOD BY AUTOMATED COUNT: 11.9 % (ref 12.3–15.4)
GLOBULIN SER CALC-MCNC: 1.5 GM/DL
GLUCOSE SERPL-MCNC: 78 MG/DL (ref 65–99)
HCT VFR BLD AUTO: 40.8 % (ref 34–46.6)
HGB BLD-MCNC: 13.5 G/DL (ref 12–15.9)
IMM GRANULOCYTES # BLD AUTO: 0.03 10*3/MM3 (ref 0–0.05)
IMM GRANULOCYTES NFR BLD AUTO: 0.5 % (ref 0–0.5)
LAB AP CASE REPORT: NORMAL
LYMPHOCYTES # BLD AUTO: 1.23 10*3/MM3 (ref 0.7–3.1)
LYMPHOCYTES NFR BLD AUTO: 21.9 % (ref 19.6–45.3)
MCH RBC QN AUTO: 31.5 PG (ref 26.6–33)
MCHC RBC AUTO-ENTMCNC: 33.1 G/DL (ref 31.5–35.7)
MCV RBC AUTO: 95.3 FL (ref 79–97)
MONOCYTES # BLD AUTO: 0.59 10*3/MM3 (ref 0.1–0.9)
MONOCYTES NFR BLD AUTO: 10.5 % (ref 5–12)
NEUTROPHILS # BLD AUTO: 3.62 10*3/MM3 (ref 1.7–7)
NEUTROPHILS NFR BLD AUTO: 64.5 % (ref 42.7–76)
NRBC BLD AUTO-RTO: 0 /100 WBC (ref 0–0.2)
PATH REPORT.FINAL DX SPEC: NORMAL
PATH REPORT.GROSS SPEC: NORMAL
PLATELET # BLD AUTO: 201 10*3/MM3 (ref 140–450)
POTASSIUM SERPL-SCNC: 4.2 MMOL/L (ref 3.5–5.2)
PROT SERPL-MCNC: 5.4 G/DL (ref 6–8.5)
RBC # BLD AUTO: 4.28 10*6/MM3 (ref 3.77–5.28)
SODIUM SERPL-SCNC: 144 MMOL/L (ref 136–145)
TSH SERPL DL<=0.005 MIU/L-ACNC: 1.56 UIU/ML (ref 0.27–4.2)
WBC # BLD AUTO: 5.62 10*3/MM3 (ref 3.4–10.8)

## 2024-09-18 ENCOUNTER — CONSULT (OUTPATIENT)
Dept: RADIATION ONCOLOGY | Facility: HOSPITAL | Age: 79
End: 2024-09-18
Payer: MEDICARE

## 2024-09-18 VITALS
BODY MASS INDEX: 26.12 KG/M2 | OXYGEN SATURATION: 94 % | WEIGHT: 147.4 LBS | DIASTOLIC BLOOD PRESSURE: 66 MMHG | SYSTOLIC BLOOD PRESSURE: 112 MMHG | HEART RATE: 97 BPM

## 2024-09-18 DIAGNOSIS — R91.1 LUNG NODULE: Primary | ICD-10-CM

## 2024-09-18 PROBLEM — J44.9 COPD (CHRONIC OBSTRUCTIVE PULMONARY DISEASE): Status: ACTIVE | Noted: 2024-08-15

## 2024-09-18 PROBLEM — G47.34 NOCTURNAL HYPOXIA: Status: ACTIVE | Noted: 2024-06-28

## 2024-09-18 PROBLEM — J20.9 BRONCHOSPASM WITH BRONCHITIS, ACUTE: Status: RESOLVED | Noted: 2024-06-29 | Resolved: 2024-09-18

## 2024-09-18 PROCEDURE — 77263 THER RADIOLOGY TX PLNG CPLX: CPT | Performed by: RADIOLOGY

## 2024-09-18 PROCEDURE — G0463 HOSPITAL OUTPT CLINIC VISIT: HCPCS | Performed by: RADIOLOGY

## 2024-09-20 DIAGNOSIS — J20.9 ACUTE BRONCHITIS, UNSPECIFIED ORGANISM: ICD-10-CM

## 2024-09-20 LAB — FUNGUS WND CULT: NORMAL

## 2024-09-20 RX ORDER — AZITHROMYCIN 250 MG/1
TABLET, FILM COATED ORAL
Qty: 6 TABLET | Refills: 0 | OUTPATIENT
Start: 2024-09-20

## 2024-09-23 ENCOUNTER — HOSPITAL ENCOUNTER (OUTPATIENT)
Dept: RADIATION ONCOLOGY | Facility: HOSPITAL | Age: 79
Discharge: HOME | End: 2024-09-23
Payer: MEDICARE

## 2024-09-23 ENCOUNTER — HOSPITAL ENCOUNTER (OUTPATIENT)
Dept: RADIATION ONCOLOGY | Facility: HOSPITAL | Age: 79
Setting detail: RADIATION/ONCOLOGY SERIES
End: 2024-09-23
Payer: MEDICARE

## 2024-09-23 PROCEDURE — 77334 RADIATION TREATMENT AID(S): CPT | Performed by: RADIOLOGY

## 2024-09-24 ENCOUNTER — PATIENT OUTREACH (OUTPATIENT)
Dept: OTHER | Facility: HOSPITAL | Age: 79
End: 2024-09-24
Payer: MEDICARE

## 2024-09-27 LAB — FUNGUS WND CULT: NORMAL

## 2024-09-30 PROCEDURE — 77293 RESPIRATOR MOTION MGMT SIMUL: CPT | Performed by: RADIOLOGY

## 2024-09-30 PROCEDURE — 77301 RADIOTHERAPY DOSE PLAN IMRT: CPT | Performed by: RADIOLOGY

## 2024-09-30 PROCEDURE — 77338 DESIGN MLC DEVICE FOR IMRT: CPT | Performed by: RADIOLOGY

## 2024-09-30 PROCEDURE — 77300 RADIATION THERAPY DOSE PLAN: CPT | Performed by: RADIOLOGY

## 2024-10-01 ENCOUNTER — HOSPITAL ENCOUNTER (OUTPATIENT)
Dept: RADIATION ONCOLOGY | Facility: HOSPITAL | Age: 79
Setting detail: RADIATION/ONCOLOGY SERIES
End: 2024-10-01
Payer: MEDICARE

## 2024-10-01 LAB
RAD ONC ARIA COURSE ID: NORMAL
RAD ONC ARIA COURSE INTENT: NORMAL
RAD ONC ARIA COURSE LAST TREATMENT DATE: NORMAL
RAD ONC ARIA COURSE START DATE: NORMAL
RAD ONC ARIA COURSE TREATMENT ELAPSED DAYS: 0
RAD ONC ARIA FIRST TREATMENT DATE: NORMAL
RAD ONC ARIA PLAN FRACTIONS TREATED TO DATE: 1
RAD ONC ARIA PLAN ID: NORMAL
RAD ONC ARIA PLAN PRESCRIBED DOSE PER FRACTION: 10 GY
RAD ONC ARIA PLAN PRIMARY REFERENCE POINT: NORMAL
RAD ONC ARIA PLAN TOTAL FRACTIONS PRESCRIBED: 5
RAD ONC ARIA PLAN TOTAL PRESCRIBED DOSE: 5000 CGY
RAD ONC ARIA REFERENCE POINT DOSAGE GIVEN TO DATE: 10 GY
RAD ONC ARIA REFERENCE POINT ID: NORMAL
RAD ONC ARIA REFERENCE POINT SESSION DOSAGE GIVEN: 10 GY

## 2024-10-01 PROCEDURE — 77373 STRTCTC BDY RAD THER TX DLVR: CPT | Performed by: RADIOLOGY

## 2024-10-01 PROCEDURE — 77435 SBRT MANAGEMENT: CPT | Performed by: RADIOLOGY

## 2024-10-02 ENCOUNTER — HOSPITAL ENCOUNTER (OUTPATIENT)
Dept: RADIATION ONCOLOGY | Facility: HOSPITAL | Age: 79
Discharge: HOME | End: 2024-10-02

## 2024-10-02 LAB

## 2024-10-02 PROCEDURE — 77373 STRTCTC BDY RAD THER TX DLVR: CPT | Performed by: RADIOLOGY

## 2024-10-04 ENCOUNTER — HOSPITAL ENCOUNTER (OUTPATIENT)
Dept: RADIATION ONCOLOGY | Facility: HOSPITAL | Age: 79
Discharge: HOME | End: 2024-10-04

## 2024-10-04 LAB
FUNGUS WND CULT: NORMAL
RAD ONC ARIA COURSE ID: NORMAL
RAD ONC ARIA COURSE INTENT: NORMAL
RAD ONC ARIA COURSE LAST TREATMENT DATE: NORMAL
RAD ONC ARIA COURSE START DATE: NORMAL
RAD ONC ARIA COURSE TREATMENT ELAPSED DAYS: 3
RAD ONC ARIA FIRST TREATMENT DATE: NORMAL
RAD ONC ARIA PLAN FRACTIONS TREATED TO DATE: 3
RAD ONC ARIA PLAN ID: NORMAL
RAD ONC ARIA PLAN PRESCRIBED DOSE PER FRACTION: 10 GY
RAD ONC ARIA PLAN PRIMARY REFERENCE POINT: NORMAL
RAD ONC ARIA PLAN TOTAL FRACTIONS PRESCRIBED: 5
RAD ONC ARIA PLAN TOTAL PRESCRIBED DOSE: 5000 CGY
RAD ONC ARIA REFERENCE POINT DOSAGE GIVEN TO DATE: 30 GY
RAD ONC ARIA REFERENCE POINT ID: NORMAL
RAD ONC ARIA REFERENCE POINT SESSION DOSAGE GIVEN: 10 GY

## 2024-10-04 PROCEDURE — 77373 STRTCTC BDY RAD THER TX DLVR: CPT | Performed by: RADIOLOGY

## 2024-10-07 ENCOUNTER — PATIENT OUTREACH (OUTPATIENT)
Dept: OTHER | Facility: HOSPITAL | Age: 79
End: 2024-10-07
Payer: MEDICARE

## 2024-10-07 ENCOUNTER — HOSPITAL ENCOUNTER (OUTPATIENT)
Dept: RADIATION ONCOLOGY | Facility: HOSPITAL | Age: 79
Discharge: HOME | End: 2024-10-07
Payer: MEDICARE

## 2024-10-07 LAB
RAD ONC ARIA COURSE ID: NORMAL
RAD ONC ARIA COURSE INTENT: NORMAL
RAD ONC ARIA COURSE LAST TREATMENT DATE: NORMAL
RAD ONC ARIA COURSE START DATE: NORMAL
RAD ONC ARIA COURSE TREATMENT ELAPSED DAYS: 6
RAD ONC ARIA FIRST TREATMENT DATE: NORMAL
RAD ONC ARIA PLAN FRACTIONS TREATED TO DATE: 4
RAD ONC ARIA PLAN ID: NORMAL
RAD ONC ARIA PLAN PRESCRIBED DOSE PER FRACTION: 10 GY
RAD ONC ARIA PLAN PRIMARY REFERENCE POINT: NORMAL
RAD ONC ARIA PLAN TOTAL FRACTIONS PRESCRIBED: 5
RAD ONC ARIA PLAN TOTAL PRESCRIBED DOSE: 5000 CGY
RAD ONC ARIA REFERENCE POINT DOSAGE GIVEN TO DATE: 40 GY
RAD ONC ARIA REFERENCE POINT ID: NORMAL
RAD ONC ARIA REFERENCE POINT SESSION DOSAGE GIVEN: 10 GY

## 2024-10-07 PROCEDURE — 77336 RADIATION PHYSICS CONSULT: CPT | Performed by: RADIOLOGY

## 2024-10-07 PROCEDURE — 77373 STRTCTC BDY RAD THER TX DLVR: CPT | Performed by: RADIOLOGY

## 2024-10-07 NOTE — PROGRESS NOTES
Reviewed chart    Met patient and  in radiation center prior to appt with Dr. Godinez. The patient has 2 treatments left. She complains of productive cough and upper back pain. She will discuss this with Dr. Godinez today. The patient is not using her home oxygen. She will call [employer] to see if they can pick it up.    We discussed her first surveillance scan in 3 months. We also discussed common side effects to radiation and average onset, duration of these. Patient verbalized understanding.     The patient denies any questions/concerns or ongoing resource needs. I will continue to follow; encouraged patient to call as needed.

## 2024-10-09 ENCOUNTER — HOSPITAL ENCOUNTER (OUTPATIENT)
Dept: RADIATION ONCOLOGY | Facility: HOSPITAL | Age: 79
Discharge: HOME | End: 2024-10-09

## 2024-10-09 ENCOUNTER — RADIATION ONCOLOGY WEEKLY ASSESSMENT (OUTPATIENT)
Dept: RADIATION ONCOLOGY | Facility: HOSPITAL | Age: 79
End: 2024-10-09
Payer: MEDICARE

## 2024-10-09 VITALS
OXYGEN SATURATION: 95 % | HEART RATE: 102 BPM | DIASTOLIC BLOOD PRESSURE: 73 MMHG | WEIGHT: 150 LBS | SYSTOLIC BLOOD PRESSURE: 119 MMHG | BODY MASS INDEX: 26.58 KG/M2

## 2024-10-09 DIAGNOSIS — R91.1 LUNG NODULE: Primary | ICD-10-CM

## 2024-10-09 DIAGNOSIS — R22.2 NODULE OF CHEST WALL: Primary | ICD-10-CM

## 2024-10-09 LAB
RAD ONC ARIA COURSE END DATE: NORMAL
RAD ONC ARIA COURSE ID: NORMAL
RAD ONC ARIA COURSE ID: NORMAL
RAD ONC ARIA COURSE INTENT: NORMAL
RAD ONC ARIA COURSE INTENT: NORMAL
RAD ONC ARIA COURSE LAST TREATMENT DATE: NORMAL
RAD ONC ARIA COURSE LAST TREATMENT DATE: NORMAL
RAD ONC ARIA COURSE START DATE: NORMAL
RAD ONC ARIA COURSE START DATE: NORMAL
RAD ONC ARIA COURSE TREATMENT ELAPSED DAYS: 8
RAD ONC ARIA COURSE TREATMENT ELAPSED DAYS: 8
RAD ONC ARIA FIRST TREATMENT DATE: NORMAL
RAD ONC ARIA FIRST TREATMENT DATE: NORMAL
RAD ONC ARIA PLAN FRACTIONS TREATED TO DATE: 5
RAD ONC ARIA PLAN FRACTIONS TREATED TO DATE: 5
RAD ONC ARIA PLAN ID: NORMAL
RAD ONC ARIA PLAN ID: NORMAL
RAD ONC ARIA PLAN NAME: NORMAL
RAD ONC ARIA PLAN PRESCRIBED DOSE PER FRACTION: 10 GY
RAD ONC ARIA PLAN PRESCRIBED DOSE PER FRACTION: 10 GY
RAD ONC ARIA PLAN PRIMARY REFERENCE POINT: NORMAL
RAD ONC ARIA PLAN PRIMARY REFERENCE POINT: NORMAL
RAD ONC ARIA PLAN TOTAL FRACTIONS PRESCRIBED: 5
RAD ONC ARIA PLAN TOTAL FRACTIONS PRESCRIBED: 5
RAD ONC ARIA PLAN TOTAL PRESCRIBED DOSE: 5000 CGY
RAD ONC ARIA PLAN TOTAL PRESCRIBED DOSE: 5000 CGY
RAD ONC ARIA REFERENCE POINT DOSAGE GIVEN TO DATE: 50 GY
RAD ONC ARIA REFERENCE POINT DOSAGE GIVEN TO DATE: 50 GY
RAD ONC ARIA REFERENCE POINT ID: NORMAL
RAD ONC ARIA REFERENCE POINT ID: NORMAL
RAD ONC ARIA REFERENCE POINT SESSION DOSAGE GIVEN: 10 GY

## 2024-10-09 PROCEDURE — 77373 STRTCTC BDY RAD THER TX DLVR: CPT | Performed by: RADIOLOGY

## 2024-10-09 NOTE — PROGRESS NOTES
Radiation Oncology  On-Treatment Note      Patient: Brigid Carballo    MRN: [id number]    Attending Physician: Lynda Godinez MD     Diagnosis:     ICD-10-CM ICD-9-CM   1. Nodule of chest wall  R22.2 786.6       Radiation Therapy Visit:  Continue radiation therapy, Dosimetry plan remains acceptable, Films reviewed and remains acceptable, Pain assessed, Pain management planned, Radiation dose schedule reviewed and remains acceptable, Radiation technique remains acceptable, and Symptoms within expected range    Radiation Treatments       Active   Plans   RUL Lung-SBRT   Most recent treatment: Dose planned: 1,000 cGy (fraction 4 on 10/7/2024)   Total: Dose planned: 5,000 cGy (5 fractions)   Elapsed Days: 6      Reference Points   Rx: RUL Lung   Most recent treatment: Dose given: 1,000 cGy (on 10/7/2024)   Total: Dose given: 4,000 cGy   Elapsed Days: 6                      Physical Examination:  Vitals: Blood pressure 119/73, pulse 102, weight 68 kg (150 lb), SpO2 95%.  Pain Score    10/09/24 1456   PainSc:   8   PainLoc: Back  Comment: R shoulder area       Fully active, able to carry on all pre-disease performance without restriction = 0    We examined the relevant areas: yes  Findings are within the expected range for this stage of treatment: yes  -------------------------------------------------------------------------------------------------------------------    ACTION ITEMS:  Patient tolerating treatment well and as expected for this stage in their treatment      Lynda Godinez MD  Radiation Oncology

## 2024-10-09 NOTE — PROGRESS NOTES
THORACIC SURGERY CLINIC CONSULT NOTE    REASON FOR CONSULT: Right upper lobe non-small cell lung cancer    Subjective   HISTORY OF PRESENTING ILLNESS:   Brigid Carballo is a 78 y.o. female who has significant medical problems as mentioned in the medical chart.     History of Present Illness  She has been experiencing lung-related issues for approximately a month, which she attributes to a viral infection. During this period, she was admitted to [facility] twice. A CT scan of her chest revealed moderate bronchial wall thickening throughout the right middle lobe and right lower lobe suggesting bronchitis.  There was a left lower lobe 1 cm solid pulm nodule.  There was a indeterminate 1.8 cm subcutaneous nodule in the left para midline anterior chest wall.  Subsequent PET/CT scan reported 1 cm nodule at the inferior aspect of the left lower lobe with SUV of 0.8.  There was incidental approximately 1 x 0.6 cm hypermetabolic irregular pulm nodule at the medial aspect of the right apex adjacent to the tortuous subclavian artery with SUV of 8.9.     She has a history of smoking, having started at age 12 and recently quit a month ago. Her smoking habit was approximately half a pack of cigarettes per day. She suspects the scar tissue on her lung may be due to bronchitis. She can walk a block without experiencing shortness of breath and continues to work in a restaurant, assisting the cook.     She has previously had walking pneumonia and regular pneumonia, but these did not require hospital admission. She underwent breast surgery to remove a cyst, which was non-cancerous. She had a skin lesion removed by a dermatologist about 3 to 4 months ago, which was diagnosed as skin cancer.     She has no history of heart attack, stroke, or blood clots in the lungs. She is not currently on any blood thinners. Her only medication is for cholesterol management.     At the time of initial evaluation, I recommended ION robotic navigational  bronchoscopy which was performed on 9/6/2024. The biopsy reported atypical cells suspicious for non-small cell lung cancer.  She developed iatrogenic pneumothorax for which chest tube was placed.  She was discharged home after the pneumothorax resolved and chest tube was removed.    She came to clinic for follow-up visit.  She reports a persistent cough, which he describes as hacking in nature, and the production of clear sputum without any blood.  She has been using oxygen therapy at night due to a decrease in his oxygen levels during sleep.  She also mentions the presence of two blocked arteries in his neck. He is not currently undergoing chemotherapy.  She has a history of smoking, but the exact duration and frequency are not specified.  .    Past Medical History:   Diagnosis Date    Angina pectoris     Anxiety     Lancaster's esophagus 09/29/2015    BPV (benign positional vertigo) 11/07/2021    Bunion of left foot 06/14/2016    Cancer     skin    Carpal tunnel syndrome     Chest pain 10/05/2020    DDD (degenerative disc disease), cervical 11/04/2014    Deformity of wrist joint 05/03/2018    Hyperlipidemia     Insomnia 09/11/2017    Kidney stones     Low back pain 11/04/2014    Melanoma in situ of unspecified lower limb, including hip 06/2021    Occlusion of left vertebral artery 11/06/2021    1/10/22 CTA neck: beyond its origin, left vertebral artery is widely patent throughout its cervical and intracranial course to the vertebrobasilar junction without stenosis    Osteoarthritis of left knee 06/27/2016    Renal stone 03/02/2020    Spinal stenosis of lumbar region 12/29/2014    Tear of lateral meniscus of knee 09/30/2014    Tobacco use 06/29/2024    Unexplained weight loss 04/21/2021       Past Surgical History:   Procedure Laterality Date    APPENDECTOMY      BREAST BIOPSY      multiple    BRONCHOSCOPY WITH ION ROBOTIC ASSIST  9/6/2024    Procedure: BRONCHOSCOPY  WITH ENDOBRONCHIAL ULTRASOUND AND ION ROBOT WITH  FNA'S AND BIOPSIES, BAL;  Surgeon: Ant Talbot MD;  [facility];  Service: Robotics - Pulmonary;;  PRE- RUL LUNG NODULE  POST- SAME    CARDIAC CATHETERIZATION      CARDIAC CATHETERIZATION N/A 2022    Procedure: Left Heart Cath;  Surgeon: Brennen Nguyen MD;  Location:  [facility]ATH [facility]AS[facility];  Service: Cardiology;  Laterality: N/A;    CARDIAC CATHETERIZATION N/A 2022    Procedure: Coronary angiography;  Surgeon: Brennen Nguyen MD;  Location:  [facility];  Service: Cardiology;  Laterality: N/A;    CARDIAC CATHETERIZATION N/A 2022    Procedure: Left ventriculography;  Surgeon: Brennen Nguyen MD;  Location:  [facility];  Service: Cardiology;  Laterality: N/A;    CARPAL TUNNEL RELEASE Bilateral     COLONOSCOPY  2021    polyp removed; per Dr. Velasquez    CYSTOSCOPY W/ URETERAL STENT PLACEMENT  10/22/2019    HYSTERECTOMY      INGUINAL HERNIA REPAIR Right     KNEE ARTHROSCOPY Right     meniscus    TONSILLECTOMY      UPPER GASTROINTESTINAL ENDOSCOPY  2021    per Dr. Velasquez       Family History   Problem Relation Age of Onset    Emphysema Mother     Heart disease Mother     Heart disease Father     Cancer Brother     Heart disease Brother     Skin cancer Brother     Stomach cancer Maternal Aunt     Breast cancer Maternal Grandmother     Breast cancer Paternal Grandmother        Social History     Socioeconomic History    Marital status:    Tobacco Use    Smoking status: Former     Current packs/day: 0.00     Average packs/day: 0.5 packs/day for 67.5 years (33.7 ttl pk-yrs)     Types: Cigarettes     Start date:      Quit date: 2024     Years since quittin.2     Passive exposure: Current    Smokeless tobacco: Never    Tobacco comments:     Quit now [place] info provided   Vaping Use    Vaping status: Never Used   Substance and Sexual Activity    Alcohol use: Yes     Comment: social--rarely    Drug  use: Never    Sexual activity: Defer         Current Outpatient Medications:     albuterol sulfate  (90 Base) MCG/ACT inhaler, Inhale 2 puffs Every 4 (Four) Hours As Needed for Wheezing., Disp: 30 g, Rfl: 3    ascorbic acid (VITAMIN C) 1000 MG tablet, Take 1 tablet by mouth Daily., Disp: , Rfl:     aspirin 81 MG EC tablet, Take 1 tablet by mouth Daily., Disp: 90 tablet, Rfl: 0    atorvastatin (LIPITOR) 40 MG tablet, Take 1 tablet by mouth Every Night., Disp: 90 tablet, Rfl: 1    azithromycin (Zithromax Z-Wilmer) 250 MG tablet, Take 2 tablets the first day, then 1 tablet daily for 4 days., Disp: 6 tablet, Rfl: 0    cholecalciferol (VITAMIN D3) 25 MCG (1000 UT) tablet, Take 1 tablet by mouth Daily., Disp: , Rfl:     esomeprazole (nexIUM) 20 MG capsule, Take 1 capsule by mouth Every Morning Before Breakfast., Disp: , Rfl:     Fluticasone-Salmeterol (ADVAIR/WIXELA) 250-50 MCG/ACT DISKUS, Inhale 1 puff 2 (Two) Times a Day., Disp: 60 each, Rfl: 0    tiotropium bromide monohydrate (Spiriva Respimat) 2.5 MCG/ACT aerosol solution inhaler, Inhale 2 puffs Daily., Disp: 4 g, Rfl: 0    vitamin B-12 (CYANOCOBALAMIN) 1000 MCG tablet, Take 1 tablet by mouth Daily., Disp: , Rfl:      Allergies   Allergen Reactions    Amoxicillin-Pot Clavulanate Itching    Codeine Itching    Penicillins Unknown - High Severity    Adhesive Tape Rash     Skin redness             Objective    OBJECTIVE:     VITAL SIGNS:  BP 95/60   Pulse 91   Temp 98.2 °F (36.8 °C)   Resp 12   SpO2 91%     PHYSICAL EXAM:  Normal appearance.   Head is normocephalic.   Nose appears normal.   No obvious deformity of the mouth and throat.  Conjunctivae normal.   Heart rate and rhythm is normal.  Pulmonary effort is normal.   Moving all 4 extremities.  Extremities warm.  No focal deficit present.   He is alert and oriented to person, place, and time.     LAB RESULTS:  I have reviewed all the available laboratory results in the chart.    RESULTS REVIEW:  I have  reviewed the patient's all relevant laboratory and imaging findings.     Results  Imaging  PET CT scan shows two spots in the lung on the right side, one on the top and a very small one on the corner. The spot on the top shows evidence of cancer.    ASSESSMENT & PLAN:  Brigid Carballo is a 78 y.o. female with significant medical conditions as mentioned above presented to my clinic.    Diagnosis: Right upper lobe non-small cell lung cancer.    Assessment & Plan  1. Stage I Lung Cancer.  The patient's condition is consistent with stage I lung cancer, as confirmed by the PET CT scan and biopsy. Due to the patient's fragile lung tissue and comorbid conditions, surgical removal is not recommended. Radiation therapy is advised as the primary treatment option to target the lung cancer. A referral to a radiation oncologist will be made to evaluate the feasibility and safety of delivering focused radiation therapy.     2. Smoking Cessation.  The patient has been advised to quit smoking due to its impact on lung function and overall health.  She has promised to stop smoking and is encouraged to avoid environments where others are smoking to reduce secondhand smoke exposure.    I discussed the patients findings and my recommendations with the patient. The patient was given adequate time to ask questions and all questions were answered to patient satisfaction.     Ant Talbot MD  Thoracic Surgeon   and [place]        Dictated utilizing Dragon dictation    I spent 40 minutes caring for Brigid on this date of service. This time includes time spent by me in the following activities:preparing for the visit, reviewing tests, obtaining and/or reviewing a separately obtained history, performing a medically appropriate examination and/or evaluation , counseling and educating the patient/family/caregiver, ordering medications, tests, or procedures, referring and communicating with other health care professionals ,  documenting information in the medical record, independently interpreting results and communicating that information with the patient/family/caregiver, and care coordination and more than half the time was spent in direct face to face evaluation and decision making.     Patient or patient representative verbalized consent for the use of Ambient Listening during the visit with  Ant Talbot MD for chart documentation. 10/9/2024  17:52 EDT

## 2024-11-16 ENCOUNTER — HOSPITAL ENCOUNTER (EMERGENCY)
Facility: HOSPITAL | Age: 79
Discharge: HOME | End: 2024-11-16
Attending: EMERGENCY MEDICINE
Payer: MEDICARE

## 2024-11-16 ENCOUNTER — APPOINTMENT (OUTPATIENT)
Dept: GENERAL RADIOLOGY | Facility: HOSPITAL | Age: 79
End: 2024-11-16
Payer: MEDICARE

## 2024-11-16 VITALS
OXYGEN SATURATION: 91 % | TEMPERATURE: 97.5 F | DIASTOLIC BLOOD PRESSURE: 75 MMHG | HEART RATE: 77 BPM | SYSTOLIC BLOOD PRESSURE: 136 MMHG | RESPIRATION RATE: 18 BRPM

## 2024-11-16 DIAGNOSIS — J44.1 COPD EXACERBATION: Primary | ICD-10-CM

## 2024-11-16 LAB
ALBUMIN SERPL-MCNC: 4 G/DL (ref 3.5–5.2)
ALBUMIN/GLOB SERPL: 2 G/DL
ALP SERPL-CCNC: 51 U/L (ref 39–117)
ALT SERPL W P-5'-P-CCNC: 8 U/L (ref 1–33)
ANION GAP SERPL CALCULATED.3IONS-SCNC: 9 MMOL/L (ref 5–15)
AST SERPL-CCNC: 14 U/L (ref 1–32)
B PARAPERT DNA SPEC QL NAA+PROBE: NOT DETECTED
B PERT DNA SPEC QL NAA+PROBE: NOT DETECTED
BASOPHILS # BLD AUTO: 0.02 10*3/MM3 (ref 0–0.2)
BASOPHILS NFR BLD AUTO: 0.4 % (ref 0–1.5)
BILIRUB SERPL-MCNC: 0.5 MG/DL (ref 0–1.2)
BUN SERPL-MCNC: 17 MG/DL (ref 8–23)
BUN/CREAT SERPL: 23 (ref 7–25)
C PNEUM DNA NPH QL NAA+NON-PROBE: NOT DETECTED
CALCIUM SPEC-SCNC: 9.4 MG/DL (ref 8.6–10.5)
CHLORIDE SERPL-SCNC: 108 MMOL/L (ref 98–107)
CO2 SERPL-SCNC: 27 MMOL/L (ref 22–29)
CREAT SERPL-MCNC: 0.74 MG/DL (ref 0.57–1)
D-LACTATE SERPL-SCNC: 0.9 MMOL/L (ref 0.5–2)
DEPRECATED RDW RBC AUTO: 43.2 FL (ref 37–54)
EGFRCR SERPLBLD CKD-EPI 2021: 81.9 ML/MIN/1.73
EOSINOPHIL # BLD AUTO: 0.08 10*3/MM3 (ref 0–0.4)
EOSINOPHIL NFR BLD AUTO: 1.5 % (ref 0.3–6.2)
ERYTHROCYTE [DISTWIDTH] IN BLOOD BY AUTOMATED COUNT: 11.8 % (ref 12.3–15.4)
FLUAV SUBTYP SPEC NAA+PROBE: NOT DETECTED
FLUBV RNA ISLT QL NAA+PROBE: NOT DETECTED
GLOBULIN UR ELPH-MCNC: 2 GM/DL
GLUCOSE SERPL-MCNC: 82 MG/DL (ref 65–99)
HADV DNA SPEC NAA+PROBE: NOT DETECTED
HCOV 229E RNA SPEC QL NAA+PROBE: NOT DETECTED
HCOV HKU1 RNA SPEC QL NAA+PROBE: NOT DETECTED
HCOV NL63 RNA SPEC QL NAA+PROBE: NOT DETECTED
HCOV OC43 RNA SPEC QL NAA+PROBE: NOT DETECTED
HCT VFR BLD AUTO: 48 % (ref 34–46.6)
HGB BLD-MCNC: 15.5 G/DL (ref 12–15.9)
HMPV RNA NPH QL NAA+NON-PROBE: NOT DETECTED
HOLD SPECIMEN: NORMAL
HOLD SPECIMEN: NORMAL
HPIV1 RNA ISLT QL NAA+PROBE: NOT DETECTED
HPIV2 RNA SPEC QL NAA+PROBE: NOT DETECTED
HPIV3 RNA NPH QL NAA+PROBE: NOT DETECTED
HPIV4 P GENE NPH QL NAA+PROBE: NOT DETECTED
IMM GRANULOCYTES # BLD AUTO: 0.02 10*3/MM3 (ref 0–0.05)
IMM GRANULOCYTES NFR BLD AUTO: 0.4 % (ref 0–0.5)
LYMPHOCYTES # BLD AUTO: 1.24 10*3/MM3 (ref 0.7–3.1)
LYMPHOCYTES NFR BLD AUTO: 23.3 % (ref 19.6–45.3)
M PNEUMO IGG SER IA-ACNC: NOT DETECTED
MAGNESIUM SERPL-MCNC: 2.2 MG/DL (ref 1.6–2.4)
MCH RBC QN AUTO: 31.6 PG (ref 26.6–33)
MCHC RBC AUTO-ENTMCNC: 32.3 G/DL (ref 31.5–35.7)
MCV RBC AUTO: 98 FL (ref 79–97)
MONOCYTES # BLD AUTO: 0.5 10*3/MM3 (ref 0.1–0.9)
MONOCYTES NFR BLD AUTO: 9.4 % (ref 5–12)
NEUTROPHILS NFR BLD AUTO: 3.47 10*3/MM3 (ref 1.7–7)
NEUTROPHILS NFR BLD AUTO: 65 % (ref 42.7–76)
NRBC BLD AUTO-RTO: 0 /100 WBC (ref 0–0.2)
NT-PROBNP SERPL-MCNC: 276 PG/ML (ref 0–1800)
PLATELET # BLD AUTO: 167 10*3/MM3 (ref 140–450)
PMV BLD AUTO: 9.4 FL (ref 6–12)
POTASSIUM SERPL-SCNC: 4.6 MMOL/L (ref 3.5–5.2)
PROT SERPL-MCNC: 6 G/DL (ref 6–8.5)
QT INTERVAL: 382 MS
QTC INTERVAL: 430 MS
RBC # BLD AUTO: 4.9 10*6/MM3 (ref 3.77–5.28)
RHINOVIRUS RNA SPEC NAA+PROBE: NOT DETECTED
RSV RNA NPH QL NAA+NON-PROBE: NOT DETECTED
SARS-COV-2 RNA NPH QL NAA+NON-PROBE: NOT DETECTED
SODIUM SERPL-SCNC: 144 MMOL/L (ref 136–145)
TROPONIN T SERPL HS-MCNC: 12 NG/L
WBC NRBC COR # BLD AUTO: 5.33 10*3/MM3 (ref 3.4–10.8)
WHOLE BLOOD HOLD COAG: NORMAL
WHOLE BLOOD HOLD SPECIMEN: NORMAL

## 2024-11-16 PROCEDURE — 85025 COMPLETE CBC W/AUTO DIFF WBC: CPT | Performed by: EMERGENCY MEDICINE

## 2024-11-16 PROCEDURE — 94664 DEMO&/EVAL PT USE INHALER: CPT

## 2024-11-16 PROCEDURE — 96365 THER/PROPH/DIAG IV INF INIT: CPT

## 2024-11-16 PROCEDURE — 94761 N-INVAS EAR/PLS OXIMETRY MLT: CPT

## 2024-11-16 PROCEDURE — 94799 UNLISTED PULMONARY SVC/PX: CPT

## 2024-11-16 PROCEDURE — 84484 ASSAY OF TROPONIN QUANT: CPT | Performed by: EMERGENCY MEDICINE

## 2024-11-16 PROCEDURE — 83880 ASSAY OF NATRIURETIC PEPTIDE: CPT | Performed by: EMERGENCY MEDICINE

## 2024-11-16 PROCEDURE — 96375 TX/PRO/DX INJ NEW DRUG ADDON: CPT

## 2024-11-16 PROCEDURE — 94640 AIRWAY INHALATION TREATMENT: CPT

## 2024-11-16 PROCEDURE — 71045 X-RAY EXAM CHEST 1 VIEW: CPT

## 2024-11-16 PROCEDURE — 83735 ASSAY OF MAGNESIUM: CPT | Performed by: EMERGENCY MEDICINE

## 2024-11-16 PROCEDURE — 93010 ELECTROCARDIOGRAM REPORT: CPT | Performed by: INTERNAL MEDICINE

## 2024-11-16 PROCEDURE — 25010000002 METHYLPREDNISOLONE PER 125 MG: Performed by: EMERGENCY MEDICINE

## 2024-11-16 PROCEDURE — 80053 COMPREHEN METABOLIC PANEL: CPT | Performed by: EMERGENCY MEDICINE

## 2024-11-16 PROCEDURE — 25010000002 MAGNESIUM SULFATE IN D5W 1G/100ML (PREMIX) 1-5 GM/100ML-% SOLUTION: Performed by: EMERGENCY MEDICINE

## 2024-11-16 PROCEDURE — 93005 ELECTROCARDIOGRAM TRACING: CPT | Performed by: EMERGENCY MEDICINE

## 2024-11-16 PROCEDURE — 93005 ELECTROCARDIOGRAM TRACING: CPT

## 2024-11-16 PROCEDURE — 99284 EMERGENCY DEPT VISIT MOD MDM: CPT

## 2024-11-16 PROCEDURE — 0202U NFCT DS 22 TRGT SARS-COV-2: CPT | Performed by: EMERGENCY MEDICINE

## 2024-11-16 PROCEDURE — 83605 ASSAY OF LACTIC ACID: CPT | Performed by: EMERGENCY MEDICINE

## 2024-11-16 RX ORDER — MAGNESIUM SULFATE 1 G/100ML
1 INJECTION INTRAVENOUS ONCE
Status: COMPLETED | OUTPATIENT
Start: 2024-11-16 | End: 2024-11-16

## 2024-11-16 RX ORDER — SODIUM CHLORIDE 0.9 % (FLUSH) 0.9 %
10 SYRINGE (ML) INJECTION AS NEEDED
Status: DISCONTINUED | OUTPATIENT
Start: 2024-11-16 | End: 2024-11-16 | Stop reason: HOSPADM

## 2024-11-16 RX ORDER — PREDNISONE 20 MG/1
20 TABLET ORAL DAILY
Qty: 5 TABLET | Refills: 0 | Status: SHIPPED | OUTPATIENT
Start: 2024-11-16 | End: 2024-11-21

## 2024-11-16 RX ORDER — IPRATROPIUM BROMIDE AND ALBUTEROL SULFATE 2.5; .5 MG/3ML; MG/3ML
3 SOLUTION RESPIRATORY (INHALATION) ONCE
Status: COMPLETED | OUTPATIENT
Start: 2024-11-16 | End: 2024-11-16

## 2024-11-16 RX ORDER — METHYLPREDNISOLONE SODIUM SUCCINATE 125 MG/2ML
125 INJECTION, POWDER, LYOPHILIZED, FOR SOLUTION INTRAMUSCULAR; INTRAVENOUS ONCE
Status: COMPLETED | OUTPATIENT
Start: 2024-11-16 | End: 2024-11-16

## 2024-11-16 RX ADMIN — MAGNESIUM SULFATE IN DEXTROSE 1 G: 10 INJECTION, SOLUTION INTRAVENOUS at 15:22

## 2024-11-16 RX ADMIN — IPRATROPIUM BROMIDE AND ALBUTEROL SULFATE 3 ML: 2.5; .5 SOLUTION RESPIRATORY (INHALATION) at 15:27

## 2024-11-16 RX ADMIN — METHYLPREDNISOLONE SODIUM SUCCINATE 125 MG: 125 INJECTION INTRAMUSCULAR; INTRAVENOUS at 15:21

## 2024-11-16 NOTE — ED PROVIDER NOTES
EMERGENCY DEPARTMENT ENCOUNTER  Room Number:  09/09  PCP: Kaya Mckeon APRN  Independent Historians: Patient and Family  Date of encounter:  11/16/2024  Patient Care Team:  Kaya Mckeon APRN as PCP - General (Family Medicine)  Deam, Pawel Hanson MD as Consulting Physician (Cardiac Electrophysiology)  Kelly Moncada RN as Nurse Navigator  Lynda Godinez MD as Consulting Physician (Radiation Oncology)  Ant Talbot MD as Surgeon (Thoracic Surgery)     HPI:  Chief Complaint: had concerns including Cough and Shortness of Breath.     A complete HPI/ROS/PMH/PSH/SH/FH are unobtainable due to: None    Chronic or social conditions impacting patient care (Social Determinants of Health): None      Context: Brigid Carballo is a 79 y.o. female with a medical history of hyperlipidemia who presents to the ED c/o acute shortness of breath and cough.  Patient has had 3 weeks of a cough with intermittent thick white sputum production.  No associated fevers or chills.  She states that she will often have bronchitis at this time of year.  She has been treated with a Z-Wilmer by her primary care physician but states that this does not improve her symptoms.  She finished that antibiotic last week.  She also Dors is shortness of breath worse with exertion.  No nausea, vomiting, diarrhea, leg swelling.  She has a prior history of tobacco use but denies any history of COPD or asthma.  She states she previously had a cancerous pulmonary nodule that was treated with radiation therapy.  She was told during that visit that she had COPD, but she does not state that this is one of her chronic medical problems.  She has inhalers available at home but only uses these intermittently and often only once in the morning.    Review of prior external notes (non-ED) -and- Review of prior external test results outside of this encounter:  CT chest from 8/29/2024 reviewed.  Patient had stable pulmonary nodules without any other acute  findings    PAST MEDICAL HISTORY  Active Ambulatory Problems     Diagnosis Date Noted    Small vessel disease 11/06/2021    Hyperlipidemia 08/19/2014    Allergic rhinitis 03/18/2019    Arthritis 04/11/2022    Gastroesophageal reflux disease 11/04/2014    Vitamin B12 deficiency 11/04/2014    Fatigue 09/11/2017    Hiatal hernia     Polyp of colon 11/04/2014    Bilateral carotid artery stenosis 04/11/2022    Vitamin D deficiency 04/11/2022    Memory deficit 06/02/2022    Thyromegaly 06/02/2022    Acute non-recurrent sinusitis 06/02/2022    Postmenopause 09/12/2022    Abnormal nuclear stress test 11/15/2022    Left upper quadrant abdominal pain 01/29/2024    Nocturnal hypoxia 06/28/2024    Lung nodule 07/01/2024    COPD with acute exacerbation 07/06/2024    Nodule of chest wall 07/06/2024    COPD (chronic obstructive pulmonary disease) 08/15/2024    Anemia 08/26/2024    Skin lesion of left arm 08/26/2024    Postprocedural pneumothorax 09/06/2024    Iatrogenic pneumothorax 09/06/2024    Acute bronchitis 09/17/2024    Cold intolerance 09/17/2024     Resolved Ambulatory Problems     Diagnosis Date Noted    Occlusion of left vertebral artery 11/06/2021    Ataxia 11/06/2021    Vertigo 11/06/2021    BPV (benign positional vertigo) 11/07/2021    TIA (transient ischemic attack) 11/07/2021    Anxiety 04/11/2022    Lancaster's esophagus 09/29/2015    Bunion of left foot 06/14/2016    Chest pain 10/05/2020    Low back pain 11/04/2014    DDD (degenerative disc disease), cervical 11/04/2014    Deformity of wrist joint 05/03/2018    Insomnia 09/11/2017    Melanoma in situ of unspecified lower limb, including hip 11/12/2021    Myalgia 03/02/2020    Osteoarthritis of left knee 06/27/2016    Renal stone 03/02/2020    Spinal stenosis of lumbar region 12/29/2014    Syncope and collapse 04/16/2019    Tear of lateral meniscus of knee 09/30/2014    Tear of medial meniscus of knee 09/30/2014    Unexplained weight loss 04/21/2021    Current  smoker 04/11/2022    Other microscopic hematuria 06/02/2022    Stenosis of carotid artery 09/12/2022    Parainfluenza 06/28/2024    Tobacco use 06/29/2024    Bronchospasm with bronchitis, acute 06/29/2024    Hyperlipidemia 07/06/2024    Leukocytosis 08/05/2024    Pneumothorax 09/06/2024     Past Medical History:   Diagnosis Date    Angina pectoris     Cancer     Carpal tunnel syndrome     Kidney stones        PAST SURGICAL HISTORY  Past Surgical History:   Procedure Laterality Date    APPENDECTOMY      BREAST BIOPSY      multiple    BRONCHOSCOPY WITH ION ROBOTIC ASSIST  9/6/2024    Procedure: BRONCHOSCOPY  WITH ENDOBRONCHIAL ULTRASOUND AND ION ROBOT WITH FNA'S AND BIOPSIES, BAL;  Surgeon: Ant Talbot MD;  Location: [facility] ENDOSCOPY;  Service: Robotics - Pulmonary;;  PRE- RUL LUNG NODULE  POST- SAME    CARDIAC CATHETERIZATION      CARDIAC CATHETERIZATION N/A 11/29/2022    Procedure: Left Heart Cath;  Surgeon: Brennen Nguyen MD;  Location: [facility] CATH INVASIVE LOCATION;  Service: Cardiology;  Laterality: N/A;    CARDIAC CATHETERIZATION N/A 11/29/2022    Procedure: Coronary angiography;  Surgeon: Brennen Nguyen MD;  Location:  [facility] CATH INVASIVE LOCATION;  Service: Cardiology;  Laterality: N/A;    CARDIAC CATHETERIZATION N/A 11/29/2022    Procedure: Left ventriculography;  Surgeon: Brennen Nguyen MD;  Location:  [facility] CATH INVASIVE LOCATION;  Service: Cardiology;  Laterality: N/A;    CARPAL TUNNEL RELEASE Bilateral     COLONOSCOPY  05/17/2021    polyp removed; per Dr. Velasquez    CYSTOSCOPY W/ URETERAL STENT PLACEMENT  10/22/2019    HYSTERECTOMY      INGUINAL HERNIA REPAIR Right     KNEE ARTHROSCOPY Right     meniscus    TONSILLECTOMY      UPPER GASTROINTESTINAL ENDOSCOPY  05/17/2021    per Dr. Velasquez       FAMILY HISTORY  Family History   Problem Relation Age of Onset    Emphysema Mother     Heart disease Mother     Heart disease Father     Cancer Brother     Heart disease Brother     Skin cancer  Brother     Stomach cancer Maternal Aunt     Breast cancer Maternal Grandmother     Breast cancer Paternal Grandmother        SOCIAL HISTORY  Social History     Socioeconomic History    Marital status:    Tobacco Use    Smoking status: Former     Current packs/day: 0.00     Average packs/day: 0.5 packs/day for 67.5 years (33.7 ttl pk-yrs)     Types: Cigarettes     Start date:      Quit date: 2024     Years since quittin.3     Passive exposure: Current    Smokeless tobacco: Never    Tobacco comments:     Quit now [place] info provided   Vaping Use    Vaping status: Never Used   Substance and Sexual Activity    Alcohol use: Yes     Comment: social--rarely    Drug use: Never    Sexual activity: Defer       ALLERGIES  Amoxicillin-pot clavulanate, Codeine, Penicillins, and Adhesive tape    REVIEW OF SYSTEMS  Review of Systems  Included in HPI  All systems reviewed and negative except for those discussed in HPI.    PHYSICAL EXAM    I have reviewed the triage vital signs and nursing notes.    ED Triage Vitals   Temp Heart Rate Resp BP SpO2   24 1429 24 1429 24 1429 24 1437 24 1429   97.5 °F (36.4 °C) 92 18 132/75 92 %      Temp src Heart Rate Source Patient Position BP Location FiO2 (%)   24 1429 24 1429 -- -- --   Tympanic Monitor          Physical Exam  Constitutional:       General: She is not in acute distress.     Appearance: Normal appearance. She is not ill-appearing or toxic-appearing.   HENT:      Head: Normocephalic and atraumatic.      Nose: Nose normal.      Mouth/Throat:      Mouth: Mucous membranes are moist.      Pharynx: Oropharynx is clear.   Eyes:      Extraocular Movements: Extraocular movements intact.      Conjunctiva/sclera: Conjunctivae normal.      Pupils: Pupils are equal, round, and reactive to light.   Cardiovascular:      Rate and Rhythm: Normal rate and regular rhythm.      Pulses: Normal pulses.      Heart sounds: Normal heart  sounds. No murmur heard.     No friction rub. No gallop.   Pulmonary:      Effort: Pulmonary effort is normal. No respiratory distress.      Breath sounds: No stridor. Wheezing present. No rhonchi or rales.   Abdominal:      General: Abdomen is flat. There is no distension.      Palpations: Abdomen is soft.      Tenderness: There is no abdominal tenderness. There is no guarding or rebound.   Musculoskeletal:      Cervical back: Normal range of motion and neck supple.   Skin:     General: Skin is warm and dry.   Neurological:      General: No focal deficit present.      Mental Status: She is alert and oriented to person, place, and time. Mental status is at baseline.   Psychiatric:         Mood and Affect: Mood normal.         Behavior: Behavior normal.         Thought Content: Thought content normal.         Judgment: Judgment normal.         LAB RESULTS  Recent Results (from the past 24 hours)   ECG 12 Lead Dyspnea    Collection Time: 11/16/24  2:29 PM   Result Value Ref Range    QT Interval 382 ms    QTC Interval 430 ms   Comprehensive Metabolic Panel    Collection Time: 11/16/24  2:41 PM    Specimen: Blood   Result Value Ref Range    Glucose 82 65 - 99 mg/dL    BUN 17 8 - 23 mg/dL    Creatinine 0.74 0.57 - 1.00 mg/dL    Sodium 144 136 - 145 mmol/L    Potassium 4.6 3.5 - 5.2 mmol/L    Chloride 108 (H) 98 - 107 mmol/L    CO2 27.0 22.0 - 29.0 mmol/L    Calcium 9.4 8.6 - 10.5 mg/dL    Total Protein 6.0 6.0 - 8.5 g/dL    Albumin 4.0 3.5 - 5.2 g/dL    ALT (SGPT) 8 1 - 33 U/L    AST (SGOT) 14 1 - 32 U/L    Alkaline Phosphatase 51 39 - 117 U/L    Total Bilirubin 0.5 0.0 - 1.2 mg/dL    Globulin 2.0 gm/dL    A/G Ratio 2.0 g/dL    BUN/Creatinine Ratio 23.0 7.0 - 25.0    Anion Gap 9.0 5.0 - 15.0 mmol/L    eGFR 81.9 >60.0 mL/min/1.73   BNP    Collection Time: 11/16/24  2:41 PM    Specimen: Blood   Result Value Ref Range    proBNP 276.0 0.0 - 1,800.0 pg/mL   Single High Sensitivity Troponin T    Collection Time: 11/16/24  2:41  PM    Specimen: Blood   Result Value Ref Range    HS Troponin T 12 <14 ng/L   Green Top (Gel)    Collection Time: 11/16/24  2:41 PM   Result Value Ref Range    Extra Tube Hold for add-ons.    Lavender Top    Collection Time: 11/16/24  2:41 PM   Result Value Ref Range    Extra Tube hold for add-on    Gold Top - SST    Collection Time: 11/16/24  2:41 PM   Result Value Ref Range    Extra Tube Hold for add-ons.    Light Blue Top    Collection Time: 11/16/24  2:41 PM   Result Value Ref Range    Extra Tube Hold for add-ons.    CBC Auto Differential    Collection Time: 11/16/24  2:41 PM    Specimen: Blood   Result Value Ref Range    WBC 5.33 3.40 - 10.80 10*3/mm3    RBC 4.90 3.77 - 5.28 10*6/mm3    Hemoglobin 15.5 12.0 - 15.9 g/dL    Hematocrit 48.0 (H) 34.0 - 46.6 %    MCV 98.0 (H) 79.0 - 97.0 fL    MCH 31.6 26.6 - 33.0 pg    MCHC 32.3 31.5 - 35.7 g/dL    RDW 11.8 (L) 12.3 - 15.4 %    RDW-SD 43.2 37.0 - 54.0 fl    MPV 9.4 6.0 - 12.0 fL    Platelets 167 140 - 450 10*3/mm3    Neutrophil % 65.0 42.7 - 76.0 %    Lymphocyte % 23.3 19.6 - 45.3 %    Monocyte % 9.4 5.0 - 12.0 %    Eosinophil % 1.5 0.3 - 6.2 %    Basophil % 0.4 0.0 - 1.5 %    Immature Grans % 0.4 0.0 - 0.5 %    Neutrophils, Absolute 3.47 1.70 - 7.00 10*3/mm3    Lymphocytes, Absolute 1.24 0.70 - 3.10 10*3/mm3    Monocytes, Absolute 0.50 0.10 - 0.90 10*3/mm3    Eosinophils, Absolute 0.08 0.00 - 0.40 10*3/mm3    Basophils, Absolute 0.02 0.00 - 0.20 10*3/mm3    Immature Grans, Absolute 0.02 0.00 - 0.05 10*3/mm3    nRBC 0.0 0.0 - 0.2 /100 WBC   Magnesium    Collection Time: 11/16/24  2:41 PM    Specimen: Blood   Result Value Ref Range    Magnesium 2.2 1.6 - 2.4 mg/dL   Respiratory Panel PCR w/COVID-19(SARS-CoV-2) [facility] In-House, NP Swab in UTM/VTM, 2 HR TAT - Swab, Nasopharynx    Collection Time: 11/16/24  2:44 PM    Specimen: Nasopharynx; Swab   Result Value Ref Range    ADENOVIRUS, PCR Not Detected Not Detected    Coronavirus 229E Not Detected Not  Detected    Coronavirus HKU1 Not Detected Not Detected    Coronavirus NL63 Not Detected Not Detected    Coronavirus OC43 Not Detected Not Detected    COVID19 Not Detected Not Detected - Ref. Range    Human Metapneumovirus Not Detected Not Detected    Human Rhinovirus/Enterovirus Not Detected Not Detected    Influenza A PCR Not Detected Not Detected    Influenza B PCR Not Detected Not Detected    Parainfluenza Virus 1 Not Detected Not Detected    Parainfluenza Virus 2 Not Detected Not Detected    Parainfluenza Virus 3 Not Detected Not Detected    Parainfluenza Virus 4 Not Detected Not Detected    RSV, PCR Not Detected Not Detected    Bordetella pertussis pcr Not Detected Not Detected    Bordetella parapertussis PCR Not Detected Not Detected    Chlamydophila pneumoniae PCR Not Detected Not Detected    Mycoplasma pneumo by PCR Not Detected Not Detected   Lactic Acid, Plasma    Collection Time: 11/16/24  2:44 PM    Specimen: Blood   Result Value Ref Range    Lactate 0.9 0.5 - 2.0 mmol/L       RADIOLOGY  XR Chest 1 View    Result Date: 11/16/2024  XR CHEST 1 VW-11/16/2024  HISTORY: Shortness of breath.  Heart size is within normal limits. Lungs appear free of acute infiltrates. There is minimal right apical pleural thickening. There is some aortic calcification. There is mild scoliosis.      1. No acute process.   This report was finalized on 11/16/2024 3:04 PM by Dr. Beau Demarco M.D on Workstation: [id number]       MEDICATIONS GIVEN IN ER  Medications   sodium chloride 0.9 % flush 10 mL (has no administration in time range)   ipratropium-albuterol (DUO-NEB) nebulizer solution 3 mL (3 mL Nebulization Given 11/16/24 1527)   methylPREDNISolone sodium succinate (SOLU-Medrol) injection 125 mg (125 mg Intravenous Given 11/16/24 1521)   magnesium sulfate in D5W 1g/100mL (PREMIX) (1 g Intravenous New Bag 11/16/24 1522)       ORDERS PLACED DURING THIS VISIT:  Orders Placed This Encounter   Procedures    Respiratory  Panel PCR w/COVID-19(SARS-CoV-2) [facility] In-House, NP Swab in UTM/VTM, 2 HR TAT - Swab, Nasopharynx    XR Chest 1 View    [place] Draw    Comprehensive Metabolic Panel    BNP    Single High Sensitivity Troponin T    CBC Auto Differential    Lactic Acid, Plasma    Magnesium    NPO Diet NPO Type: Strict NPO    Undress & Gown    Continuous Pulse Oximetry    Vital Signs    Oxygen Therapy- Nasal Cannula; Titrate 1-6 LPM Per SpO2; 90 - 95%    ECG 12 Lead Dyspnea    Insert Peripheral IV    CBC & Differential    Green Top (Gel)    Lavender Top    Gold Top - SST    Light Blue Top       OUTPATIENT MEDICATION MANAGEMENT:  Current Facility-Administered Medications Ordered in Epic   Medication Dose Route Frequency Provider Last Rate Last Admin    sodium chloride 0.9 % flush 10 mL  10 mL Intravenous PRN Bessie El MD         Current Outpatient Medications Ordered in Epic   Medication Sig Dispense Refill    albuterol sulfate  (90 Base) MCG/ACT inhaler Inhale 2 puffs Every 4 (Four) Hours As Needed for Wheezing. 30 g 3    ascorbic acid (VITAMIN C) 1000 MG tablet Take 1 tablet by mouth Daily.      aspirin 81 MG EC tablet Take 1 tablet by mouth Daily. 90 tablet 0    atorvastatin (LIPITOR) 40 MG tablet Take 1 tablet by mouth Every Night. 90 tablet 1    azithromycin (Zithromax Z-Wilmer) 250 MG tablet Take 2 tablets the first day, then 1 tablet daily for 4 days. 6 tablet 0    cholecalciferol (VITAMIN D3) 25 MCG (1000 UT) tablet Take 1 tablet by mouth Daily.      esomeprazole (nexIUM) 20 MG capsule Take 1 capsule by mouth Every Morning Before Breakfast.      Fluticasone-Salmeterol (ADVAIR/WIXELA) 250-50 MCG/ACT DISKUS Inhale 1 puff 2 (Two) Times a Day. 60 each 0    predniSONE (DELTASONE) 20 MG tablet Take 1 tablet by mouth Daily for 5 days. 5 tablet 0    tiotropium bromide monohydrate (Spiriva Respimat) 2.5 MCG/ACT aerosol solution inhaler Inhale 2 puffs Daily. 4 g 0    vitamin B-12 (CYANOCOBALAMIN) 1000 MCG  tablet Take 1 tablet by mouth Daily.         PROCEDURES  Procedures    PROGRESS, DATA ANALYSIS, CONSULTS, AND MEDICAL DECISION MAKING  All labs have been independently interpreted by me.  All radiology studies have been reviewed by me. All EKG's have been independently viewed and interpreted by me.  Discussion below represents my analysis of pertinent findings related to patient's condition, differential diagnosis, treatment plan and final disposition.    Differential diagnosis includes but is not limited to pneumonia, COVID-19, COPD exacerbation.    Clinical Scores:                   ED Course as of 11/16/24 1632   Sat Nov 16, 2024   1500 EKG interpreted by myself  Time: 1429  Rate: 76  Rhythm: NSR  No ST elevation or depression  Normal intervals  RAD, no change from prior EKG [AB]   1511 XR Chest 1 View  My independent interpretation of the imaging study is no lobar infiltrate or pneumothorax [AB]   1629 Patient's pulmonary exam is much improved.  There is still a minor amount of wheezing but she states she feels drastically better than presentation.  I reviewed her workup (including negative viral swab, chest x-ray and remainder of lab work.  She states she does have supplies of inhaler treatments at home.  I instructed her to use her rescue inhalers more often than she is every 4 hours as needed for shortness of breath or wheezing.  She endorsed understanding.  I will prescribe her steroids to use at home for the next several days instructed her to start these tomorrow she received a dose today.  Patient agreeable with plan will follow-up with PCP. [AB]      ED Course User Index  [AB] Wenceslao Enriquez MD             AS OF 16:32 EST VITALS:    BP - 136/75  HR - 77  TEMP - 97.5 °F (36.4 °C) (Tympanic)  O2 SATS - 91%    COMPLEXITY OF CARE  Admission was considered but after careful review of the patient's presentation, physical examination, diagnostic results, and response to treatment the patient may be safely  discharged with outpatient follow-up.      DIAGNOSIS  Final diagnoses:   COPD exacerbation         DISPOSITION  ED Disposition       ED Disposition   Discharge    Condition   Stable    Comment   --                Please note that portions of this document were completed with a voice recognition program.    Note Disclaimer: At [facility], we believe that sharing information builds trust and better relationships. You are receiving this note because you recently visited [facility]. It is possible you will see health information before a provider has talked with you about it. This kind of information can be easy to misunderstand. To help you fully understand what it means for your health, we urge you to discuss this note with your provider.         Wenceslao Enriquez MD  11/16/24 [id number]

## 2024-11-22 ENCOUNTER — DOCUMENTATION (OUTPATIENT)
Dept: RADIATION ONCOLOGY | Facility: HOSPITAL | Age: 79
End: 2024-11-22
Payer: MEDICARE

## 2024-11-22 DIAGNOSIS — R91.1 LUNG NODULE: Primary | ICD-10-CM

## 2024-11-22 NOTE — PROGRESS NOTES
Radiation Treatment Summary Note      Patient Name: Brigid Carballo  : 1945    Attending Provider: Lynda Godinez MD      Diagnosis:     ICD-10-CM ICD-9-CM   1. Lung nodule  R91.1 793.11     Site: Right upper lobe lung nodule    Radiation Start Date: 10/1/24    Radiation Completion Date: 10/9/24      Prescription:     She received a dose of 50Gy in 5 fractions using stereotactic radiotherapy with VMAT 6mv photons.     Final Delivered Dose Deviated From Initially Prescribed Dose: No    Concurrent Chemotherapy: No    Patient Tolerated Treatment Without Unexpected Side Effects/Complications: Yes    ECOG: Fully active, able to carry on all pre-disease performance without restriction = 0    Pain Management Plan: None Indicated/PRN OTC    Follow-Up Plan: 3 months    Imaging Ordered for Follow-Up: Yes, describe: CT chest 3 months        Lynda Godinez MD

## 2024-11-26 ENCOUNTER — TELEMEDICINE (OUTPATIENT)
Dept: FAMILY MEDICINE CLINIC | Facility: CLINIC | Age: 79
End: 2024-11-26
Payer: MEDICARE

## 2024-11-26 DIAGNOSIS — J20.9 ACUTE BRONCHITIS, UNSPECIFIED ORGANISM: Primary | ICD-10-CM

## 2024-11-26 RX ORDER — DOXYCYCLINE 100 MG/1
100 CAPSULE ORAL 2 TIMES DAILY
Qty: 14 CAPSULE | Refills: 0 | Status: SHIPPED | OUTPATIENT
Start: 2024-11-26 | End: 2024-12-03

## 2024-11-26 NOTE — PROGRESS NOTES
Subjective   Brigid Carballo is a 79 y.o. female.     Chief Complaint   Patient presents with    Cough     You have chosen to receive care through a telehealth visit.  Do you consent to use a video/audio connection for your medical care today? Yes     This was an audio and video enabled telemedicine encounter with patient in [place] and provider in office in [place], [place].     History of Present Illness   Patient presents with c/o cough; symptoms started about 1 month ago; has had productive cough--white color, sometimes green color; no fever; no SOA; no sinus pressure; has had drainage in throat; no ear pain or sore throat; no nausea, vomiting or diarrhea; rare cigarette at this point; uses Albuterol inhaler and will increase cough; uses Advair twice daily and Spiriva daily and help; has tried Mucinex, but has not helped; was seen in ER on 11/16/24 and had chest x-ray; gave Rx Prednisone, but did not help; monitors O2 sat and has been running 93-94%; had Z-maira in 9/2024.    The following portions of the patient's history were reviewed and updated as appropriate: allergies, current medications, past family history, past medical history, past social history, past surgical history and problem list.    Current Outpatient Medications on File Prior to Visit   Medication Sig    albuterol sulfate  (90 Base) MCG/ACT inhaler Inhale 2 puffs Every 4 (Four) Hours As Needed for Wheezing.    ascorbic acid (VITAMIN C) 1000 MG tablet Take 1 tablet by mouth Daily.    aspirin 81 MG EC tablet Take 1 tablet by mouth Daily.    atorvastatin (LIPITOR) 40 MG tablet Take 1 tablet by mouth Every Night.    cholecalciferol (VITAMIN D3) 25 MCG (1000 UT) tablet Take 1 tablet by mouth Daily.    esomeprazole (nexIUM) 20 MG capsule Take 1 capsule by mouth Every Morning Before Breakfast.    Fluticasone-Salmeterol (ADVAIR/WIXELA) 250-50 MCG/ACT DISKUS Inhale 1 puff 2 (Two) Times a Day.    tiotropium bromide monohydrate (Spiriva Respimat)  2.5 MCG/ACT aerosol solution inhaler Inhale 2 puffs Daily.    vitamin B-12 (CYANOCOBALAMIN) 1000 MCG tablet Take 1 tablet by mouth Daily.     No current facility-administered medications on file prior to visit.        Past Medical History:   Diagnosis Date    Angina pectoris     Anxiety     Lancaster's esophagus 09/29/2015    BPV (benign positional vertigo) 11/07/2021    Bunion of left foot 06/14/2016    Cancer     skin    Carpal tunnel syndrome     Chest pain 10/05/2020    DDD (degenerative disc disease), cervical 11/04/2014    Deformity of wrist joint 05/03/2018    Hyperlipidemia     Insomnia 09/11/2017    Kidney stones     Low back pain 11/04/2014    Melanoma in situ of unspecified lower limb, including hip 06/2021    Occlusion of left vertebral artery 11/06/2021    1/10/22 CTA neck: beyond its origin, left vertebral artery is widely patent throughout its cervical and intracranial course to the vertebrobasilar junction without stenosis    Osteoarthritis of left knee 06/27/2016    Renal stone 03/02/2020    Spinal stenosis of lumbar region 12/29/2014    Tear of lateral meniscus of knee 09/30/2014    Tobacco use 06/29/2024    Unexplained weight loss 04/21/2021       Past Surgical History:   Procedure Laterality Date    APPENDECTOMY      BREAST BIOPSY      multiple    BRONCHOSCOPY WITH ION ROBOTIC ASSIST  9/6/2024    Procedure: BRONCHOSCOPY  WITH ENDOBRONCHIAL ULTRASOUND AND ION ROBOT WITH FNA'S AND BIOPSIES, BAL;  Surgeon: Ant Talbot MD;  Location: [facility] ENDOSCOPY;  Service: Robotics - Pulmonary;;  PRE- RUL LUNG NODULE  POST- SAME    CARDIAC CATHETERIZATION      CARDIAC CATHETERIZATION N/A 11/29/2022    Procedure: Left Heart Cath;  Surgeon: Brennen Nguyen MD;  Location: [facility] CATH INVASIVE LOCATION;  Service: Cardiology;  Laterality: N/A;    CARDIAC CATHETERIZATION N/A 11/29/2022    Procedure: Coronary angiography;  Surgeon: Brennen Nguyen MD;  Location: [facility] CATH INVASIVE LOCATION;  Service:  Cardiology;  Laterality: N/A;    CARDIAC CATHETERIZATION N/A 2022    Procedure: Left ventriculography;  Surgeon: Brennen Nguyen MD;  Location: [facility] INVASIVE LOCATION;  Service: Cardiology;  Laterality: N/A;    CARPAL TUNNEL RELEASE Bilateral     COLONOSCOPY  2021    polyp removed; per Dr. Velasquez    CYSTOSCOPY W/ URETERAL STENT PLACEMENT  10/22/2019    HYSTERECTOMY      INGUINAL HERNIA REPAIR Right     KNEE ARTHROSCOPY Right     meniscus    TONSILLECTOMY      UPPER GASTROINTESTINAL ENDOSCOPY  2021    per Dr. Velasquez       Family History   Problem Relation Age of Onset    Emphysema Mother     Heart disease Mother     Heart disease Father     Cancer Brother     Heart disease Brother     Skin cancer Brother     Stomach cancer Maternal Aunt     Breast cancer Maternal Grandmother     Breast cancer Paternal Grandmother        Social History     Socioeconomic History    Marital status:    Tobacco Use    Smoking status: Former     Current packs/day: 0.00     Average packs/day: 0.5 packs/day for 67.5 years (33.7 ttl pk-yrs)     Types: Cigarettes     Start date:      Quit date: 2024     Years since quittin.4     Passive exposure: Current    Smokeless tobacco: Never    Tobacco comments:     Quit now [place] info provided   Vaping Use    Vaping status: Never Used   Substance and Sexual Activity    Alcohol use: Yes     Comment: social--rarely    Drug use: Never    Sexual activity: Defer       Review of Systems   Constitutional:  Positive for chills (some at times). Negative for fatigue, fever and unexpected weight gain. Weight loss: has been on the go a lot recently and has had decreased appetite.  HENT:  Positive for postnasal drip. Negative for sinus pressure and trouble swallowing.    Respiratory:  Positive for cough. Negative for chest tightness, shortness of breath (some with increased walking) and wheezing.    Cardiovascular:  Negative for chest pain, palpitations and leg  swelling.   Gastrointestinal:  Negative for abdominal pain.   Skin:  Negative for rash.   Neurological:  Negative for dizziness, light-headedness and headache.       Objective   There were no vitals filed for this visit.  There is no height or weight on file to calculate BMI.    Physical Exam  Constitutional:       General: She is not in acute distress.     Appearance: She is well-developed and well-groomed. She is not diaphoretic.   HENT:      Head: Normocephalic.      Nose:      Right Sinus: No maxillary sinus tenderness or frontal sinus tenderness.      Left Sinus: No maxillary sinus tenderness or frontal sinus tenderness (per patient palpation).   Pulmonary:      Effort: Pulmonary effort is normal. No accessory muscle usage or respiratory distress.   Abdominal:      Tenderness: There is no abdominal tenderness (per patient palpation).   Musculoskeletal:      Cervical back: Normal range of motion and neck supple.   Neurological:      Mental Status: She is alert and oriented to person, place, and time.   Psychiatric:         Mood and Affect: Mood normal.         Thought Content: Thought content normal.         Cognition and Memory: Cognition normal.         Lab Results   Component Value Date    WBC 5.33 11/16/2024    RBC 4.90 11/16/2024    HGB 15.5 11/16/2024    HCT 48.0 (H) 11/16/2024    MCV 98.0 (H) 11/16/2024    MCH 31.6 11/16/2024    MCHC 32.3 11/16/2024    RDW 11.8 (L) 11/16/2024    RDWSD 43.2 11/16/2024    MPV 9.4 11/16/2024     11/16/2024    NEUTRORELPCT 65.0 11/16/2024    LYMPHORELPCT 23.3 11/16/2024    MONORELPCT 9.4 11/16/2024    EOSRELPCT 1.5 11/16/2024    BASORELPCT 0.4 11/16/2024    AUTOIGPER 0.4 11/16/2024    NEUTROABS 3.47 11/16/2024    LYMPHSABS 1.24 11/16/2024    MONOSABS 0.50 11/16/2024    EOSABS 0.08 11/16/2024    BASOSABS 0.02 11/16/2024    AUTOIGNUM 0.02 11/16/2024    NRBC 0.0 11/16/2024     Lab Results   Component Value Date    GLUCOSE 82 11/16/2024    BUN 17 11/16/2024    CREATININE  0.74 11/16/2024    EGFRIFNONA 125 11/07/2021    BCR 23.0 11/16/2024    K 4.6 11/16/2024    CO2 27.0 11/16/2024    CALCIUM 9.4 11/16/2024    PROTENTOTREF 5.4 (L) 09/16/2024    ALBUMIN 4.0 11/16/2024    LABIL2 2.6 09/16/2024    AST 14 11/16/2024    ALT 8 11/16/2024      Lab Results   Component Value Date    CHLPL 220 (H) 08/05/2024    TRIG 112 08/05/2024    HDL 73 (H) 08/05/2024    VLDL 20 08/05/2024     (H) 08/05/2024     Lab Results   Component Value Date    TSH 1.560 09/16/2024     Lab Results   Component Value Date    HGBA1C 4.96 11/07/2021     Lab Results   Component Value Date    COLORU Yellow 01/29/2024    CLARITYU Clear 01/29/2024    LEUKOCYTESUR Negative 01/29/2024    BILIRUBINUR Negative 01/29/2024      Records reviewed from [facility] on 11/16/24.  Patient presented with c/o SOA and cough; had chest x-ray; wheezing improved with DuoNeb and Solumedrol IV; discharged home with Rx for Prednisone.  11/16/24 chest x-ray: Heart size is within normal limits. Lungs appear free of acute infiltrates. There is minimal right apical pleural thickening. There is some aortic calcification. There is mild scoliosis.     Assessment    Problem List Items Addressed This Visit       Acute bronchitis - Primary    Current Assessment & Plan     Increase intake of clear liquids and rest.  Try plain Mucinex to thin secretions.  Continue Albuterol inhaler as needed for cough or shortness of breath.         Relevant Medications    doxycycline (MONODOX) 100 MG capsule        Return if symptoms worsen or fail to improve.  Patient has had persistent productive cough with change in sputum color for 1 month; pt had Z-maira in September and did not help much; will send Rx for Doxycycline and see if helps symptoms.    Time spent doing video visit, reviewing, discussing, answering questions, and educating patient was 20 minutes.

## 2024-11-26 NOTE — PATIENT INSTRUCTIONS
Increase intake of clear liquids and rest.  Try plain Mucinex to thin secretions.  Continue Albuterol inhaler as needed for cough or shortness of breath.  Follow-up if symptoms persist or worsen.

## 2024-11-28 NOTE — ASSESSMENT & PLAN NOTE
Increase intake of clear liquids and rest.  Try plain Mucinex to thin secretions.  Continue Albuterol inhaler as needed for cough or shortness of breath.

## 2024-12-17 ENCOUNTER — TELEPHONE (OUTPATIENT)
Dept: FAMILY MEDICINE CLINIC | Facility: CLINIC | Age: 79
End: 2024-12-17

## 2024-12-17 DIAGNOSIS — J20.9 ACUTE BRONCHITIS, UNSPECIFIED ORGANISM: ICD-10-CM

## 2024-12-17 RX ORDER — DOXYCYCLINE 100 MG/1
100 CAPSULE ORAL 2 TIMES DAILY
Qty: 14 CAPSULE | Refills: 0 | Status: SHIPPED | OUTPATIENT
Start: 2024-12-17 | End: 2024-12-24

## 2024-12-17 NOTE — TELEPHONE ENCOUNTER
Caller: Brigid Carballo    Relationship: Self    Best call back number: [phone]      What medication are you requesting:   doxycycline (MONODOX) 100 MG capsule [9900] (Order [id number])     What are your current symptoms: COUGHING    How long have you been experiencing symptoms: SINCE JUNE    Have you had these symptoms before:    [x] Yes  [] No    Have you been treated for these symptoms before:   [x] Yes  [] No    If a prescription is needed, what is your preferred pharmacy and phone number:    [address] - [phone]  - [phone] FX   Additional notes:    PATIENT WOULD LIKE A NEW PRESCRIPTION FOR THIS MEDICATION BECAUSE PATIENT BELIEVES IT HAS WORKED THE BEST FOR HER SYMPTOMS.

## 2024-12-18 NOTE — TELEPHONE ENCOUNTER
Spoke with patient over the phone; symptoms had resolved with Doxycycline but then have returned again; no fever; no shortness of breath; patient has had productive cough with white sputum; will send Rx for Doxycycline; instructed to follow-up as scheduled with pulmonary next month or sooner if symptoms persist or worsen.

## 2025-01-21 ENCOUNTER — PATIENT OUTREACH (OUTPATIENT)
Dept: OTHER | Facility: HOSPITAL | Age: 80
End: 2025-01-21
Payer: MEDICARE

## 2025-01-21 NOTE — PROGRESS NOTES
Reviewed chart. 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  ER visit 11/16 for COPD exacerbation. CT scan 2/10, sees Dr. Godinez 2/17    Called patient.  She states she tolerated radiation with no side effects. The patient returned to work.  We discussed her February appts. The patient is rescheduling her CT scan because her  has an appt at the same time; she already called the scheduling department and they are working on changing her appt.    The patient denies any questions/concerns or ongoing resource needs. Navigation will continue to follow; encouraged patient to call as needed.

## 2025-02-24 ENCOUNTER — HOSPITAL ENCOUNTER (OUTPATIENT)
Dept: CT IMAGING | Facility: HOSPITAL | Age: 80
Discharge: HOME | End: 2025-02-24
Admitting: RADIOLOGY
Payer: MEDICARE

## 2025-02-24 ENCOUNTER — TELEPHONE (OUTPATIENT)
Dept: RADIATION ONCOLOGY | Facility: HOSPITAL | Age: 80
End: 2025-02-24
Payer: MEDICARE

## 2025-02-24 DIAGNOSIS — R22.2 NODULE OF CHEST WALL: ICD-10-CM

## 2025-02-24 PROCEDURE — 71250 CT THORAX DX C-: CPT

## 2025-02-24 NOTE — TELEPHONE ENCOUNTER
Pt called and stated that she was at [facility] for a CT scan but she was not in there sytem. I advised her that her CT was scheduled at the [facility] location at 3:30. I gave her the address and he expressed understanding.

## 2025-02-28 ENCOUNTER — TELEPHONE (OUTPATIENT)
Dept: RADIATION ONCOLOGY | Facility: HOSPITAL | Age: 80
End: 2025-02-28
Payer: MEDICARE

## 2025-03-03 ENCOUNTER — OFFICE VISIT (OUTPATIENT)
Dept: RADIATION ONCOLOGY | Facility: HOSPITAL | Age: 80
End: 2025-03-03
Payer: MEDICARE

## 2025-03-03 ENCOUNTER — HOSPITAL ENCOUNTER (OUTPATIENT)
Dept: CARDIOLOGY | Facility: HOSPITAL | Age: 80
Discharge: HOME | End: 2025-03-03
Payer: MEDICARE

## 2025-03-03 VITALS
SYSTOLIC BLOOD PRESSURE: 125 MMHG | DIASTOLIC BLOOD PRESSURE: 74 MMHG | HEART RATE: 86 BPM | OXYGEN SATURATION: 96 % | WEIGHT: 149.6 LBS | BODY MASS INDEX: 26.51 KG/M2

## 2025-03-03 DIAGNOSIS — L98.9 SKIN LESION OF CHEST WALL: Primary | ICD-10-CM

## 2025-03-03 DIAGNOSIS — R91.1 LUNG NODULE: ICD-10-CM

## 2025-03-03 DIAGNOSIS — I65.23 BILATERAL CAROTID ARTERY STENOSIS: ICD-10-CM

## 2025-03-03 LAB
BH CV XLRA MEAS LEFT DIST CCA EDV: 23.8 CM/SEC
BH CV XLRA MEAS LEFT DIST CCA PSV: 73.3 CM/SEC
BH CV XLRA MEAS LEFT DIST ICA EDV: -24.3 CM/SEC
BH CV XLRA MEAS LEFT DIST ICA PSV: -80.5 CM/SEC
BH CV XLRA MEAS LEFT ICA/CCA RATIO: 1.42
BH CV XLRA MEAS LEFT MID ICA EDV: -36.4 CM/SEC
BH CV XLRA MEAS LEFT MID ICA PSV: -103.8 CM/SEC
BH CV XLRA MEAS LEFT PROX CCA EDV: 24.4 CM/SEC
BH CV XLRA MEAS LEFT PROX CCA PSV: 80.2 CM/SEC
BH CV XLRA MEAS LEFT PROX ECA PSV: -95.8 CM/SEC
BH CV XLRA MEAS LEFT PROX ICA EDV: -21.3 CM/SEC
BH CV XLRA MEAS LEFT PROX ICA PSV: -103.8 CM/SEC
BH CV XLRA MEAS LEFT PROX SCLA PSV: 120.2 CM/SEC
BH CV XLRA MEAS LEFT VERTEBRAL A PSV: -14.2 CM/SEC
BH CV XLRA MEAS RIGHT DIST CCA EDV: 23.3 CM/SEC
BH CV XLRA MEAS RIGHT DIST CCA PSV: 63.9 CM/SEC
BH CV XLRA MEAS RIGHT DIST ICA EDV: -30.8 CM/SEC
BH CV XLRA MEAS RIGHT DIST ICA PSV: -95.3 CM/SEC
BH CV XLRA MEAS RIGHT ICA/CCA RATIO: 1.55
BH CV XLRA MEAS RIGHT MID ICA EDV: -34.5 CM/SEC
BH CV XLRA MEAS RIGHT MID ICA PSV: -98.8 CM/SEC
BH CV XLRA MEAS RIGHT PROX CCA EDV: 15.6 CM/SEC
BH CV XLRA MEAS RIGHT PROX CCA PSV: 54.5 CM/SEC
BH CV XLRA MEAS RIGHT PROX ECA PSV: -244.2 CM/SEC
BH CV XLRA MEAS RIGHT PROX ICA EDV: -36.8 CM/SEC
BH CV XLRA MEAS RIGHT PROX ICA PSV: -98 CM/SEC
BH CV XLRA MEAS RIGHT PROX SCLA PSV: 102.3 CM/SEC
BH CV XLRA MEAS RIGHT VERTEBRAL A PSV: -33.3 CM/SEC
LEFT ARM BP: NORMAL MMHG
RIGHT ARM BP: NORMAL MMHG

## 2025-03-03 PROCEDURE — 93880 EXTRACRANIAL BILAT STUDY: CPT

## 2025-03-03 PROCEDURE — 93880 EXTRACRANIAL BILAT STUDY: CPT | Performed by: SURGERY

## 2025-03-03 PROCEDURE — G0463 HOSPITAL OUTPT CLINIC VISIT: HCPCS | Performed by: RADIOLOGY

## 2025-03-03 NOTE — PROGRESS NOTES
Cancer Staging       CC: right apical lung nodule suspicious for malignancy, cT1N0                                 I had the pleasure of seeing Brigid Carballo  [facility].  Brigid Carballo is a 78 y.o. female with cT1N0 right apical lung nodule suspicious for malignancy with biopsies . She completed SBRT to the right apical lung nodule on 10/9/24.  She received a dose of 50gy in 5 fractions.     She had a CT chest on 2/24/25 which showed the treated nodule in the anterior right apex is smaller and more linear. It now measures about 10 x 4 mm, previously 11 x 5 mm. There is a stablemicronodule in the right middle lobe.    She has been doing well since I last saw her.  She states her breathing is better and she is off inhalers.  She reports several dark new skin lesions on her chest and had a skin lesion removed from her left forearm which she states was a cancer but no further follow up.      Review of Systems   Constitutional: Negative.    HENT: Negative.     Respiratory: Negative.     Skin:  Positive for rash.   Psychiatric/Behavioral: Negative.         Past Medical History:   Diagnosis Date    Angina pectoris     Anxiety     Lancaster's esophagus 09/29/2015    BPV (benign positional vertigo) 11/07/2021    Bunion of left foot 06/14/2016    Cancer     skin    Carpal tunnel syndrome     Chest pain 10/05/2020    DDD (degenerative disc disease), cervical 11/04/2014    Deformity of wrist joint 05/03/2018    Hyperlipidemia     Insomnia 09/11/2017    Kidney stones     Low back pain 11/04/2014    Melanoma in situ of unspecified lower limb, including hip 06/2021    Occlusion of left vertebral artery 11/06/2021    1/10/22 CTA neck: beyond its origin, left vertebral artery is widely patent throughout its cervical and intracranial course to the vertebrobasilar junction without stenosis    Osteoarthritis of left knee 06/27/2016    Renal stone 03/02/2020    Spinal stenosis of lumbar region 12/29/2014    Tear of  lateral meniscus of knee 2014    Tobacco use 2024    Unexplained weight loss 2021         Past Surgical History:   Procedure Laterality Date    APPENDECTOMY      BREAST BIOPSY      multiple    BRONCHOSCOPY WITH ION ROBOTIC ASSIST  2024    Procedure: BRONCHOSCOPY  WITH ENDOBRONCHIAL ULTRASOUND AND ION ROBOT WITH FNA'S AND BIOPSIES, BAL;  Surgeon: Ant Talbot MD;  Location[facility] END[facility];  Service: Robotics - Pulmonary;;  PRE- RUL LUNG NODULE  POST- SAME    CARDIAC CATHETERIZATION      CARDIAC CATHETERIZATION N/A 2022    Procedure: Left Heart Cath;  Surgeon: Brennen Nguyen MD;  Location:  [facility] CATH INVASIVE LOCATION;  Service: Cardiology;  Laterality: N/A;    CARDIAC CATHETERIZATION N/A 2022    Procedure: Coronary angiography;  Surgeon: Brennen Nguyen MD;  Location:  [facility] CATH INVASIVE LOCATION;  Service: Cardiology;  Laterality: N/A;    CARDIAC CATHETERIZATION N/A 2022    Procedure: Left ventriculography;  Surgeon: Brennen Nguyen MD;  Location:  [facility] CATH INVASIVE LOCATION;  Service: Cardiology;  Laterality: N/A;    CARPAL TUNNEL RELEASE Bilateral     COLONOSCOPY  2021    polyp removed; per Dr. Velasquez    CYSTOSCOPY W/ URETERAL STENT PLACEMENT  10/22/2019    HYSTERECTOMY      INGUINAL HERNIA REPAIR Right     KNEE ARTHROSCOPY Right     meniscus    TONSILLECTOMY      UPPER GASTROINTESTINAL ENDOSCOPY  2021    per Dr. Velasquez         Social History     Socioeconomic History    Marital status:    Tobacco Use    Smoking status: Former     Current packs/day: 0.00     Average packs/day: 0.5 packs/day for 67.5 years (33.7 ttl pk-yrs)     Types: Cigarettes     Start date:      Quit date: 2024     Years since quittin.6     Passive exposure: Current    Smokeless tobacco: Never    Tobacco comments:     Quit now [place] info provided   Vaping Use    Vaping status: Never Used   Substance and Sexual Activity    Alcohol use: Yes      Comment: social--rarely    Drug use: Never    Sexual activity: Defer         Family History   Problem Relation Age of Onset    Emphysema Mother     Heart disease Mother     Heart disease Father     Cancer Brother     Heart disease Brother     Skin cancer Brother     Stomach cancer Maternal Aunt     Breast cancer Maternal Grandmother     Breast cancer Paternal Grandmother           Objective    Physical Exam  Constitutional:       Appearance: Normal appearance.   Eyes:      Extraocular Movements: Extraocular movements intact.   Pulmonary:      Effort: Pulmonary effort is normal. No respiratory distress.      Breath sounds: Normal breath sounds. No wheezing.   Neurological:      Mental Status: She is alert.   Psychiatric:         Mood and Affect: Mood normal.         Behavior: Behavior normal.         Current Outpatient Medications on File Prior to Visit   Medication Sig Dispense Refill    ascorbic acid (VITAMIN C) 1000 MG tablet Take 1 tablet by mouth Daily.      aspirin 81 MG EC tablet Take 1 tablet by mouth Daily. 90 tablet 0    cholecalciferol (VITAMIN D3) 25 MCG (1000 UT) tablet Take 1 tablet by mouth Daily.      esomeprazole (nexIUM) 20 MG capsule Take 1 capsule by mouth Every Morning Before Breakfast.      vitamin B-12 (CYANOCOBALAMIN) 1000 MCG tablet Take 1 tablet by mouth Daily.      albuterol sulfate  (90 Base) MCG/ACT inhaler Inhale 2 puffs Every 4 (Four) Hours As Needed for Wheezing. (Patient not taking: Reported on 3/3/2025) 30 g 3    atorvastatin (LIPITOR) 40 MG tablet Take 1 tablet by mouth Every Night. (Patient not taking: Reported on 3/3/2025) 90 tablet 1    Fluticasone-Salmeterol (ADVAIR/WIXELA) 250-50 MCG/ACT DISKUS Inhale 1 puff 2 (Two) Times a Day. (Patient not taking: Reported on 3/3/2025) 60 each 0    tiotropium bromide monohydrate (Spiriva Respimat) 2.5 MCG/ACT aerosol solution inhaler Inhale 2 puffs Daily. (Patient not taking: Reported on 3/3/2025) 4 g 0     No current  facility-administered medications on file prior to visit.       ALLERGIES:    Allergies   Allergen Reactions    Amoxicillin-Pot Clavulanate Itching    Codeine Itching    Penicillins Unknown - High Severity    Adhesive Tape Rash     Skin redness       /74   Pulse 86   Wt 67.9 kg (149 lb 9.6 oz)   SpO2 96%   BMI 26.51 kg/m²           No data to display                  Assessment & Plan     Brigid Carballo is a 78 y.o. female with cT1N0 right apical lung nodule suspicious for malignancy with biopsies .  She is now 4 months out from SBRT to the right lung nodule.  I have personally reviewed her most recent CT chest which shows a decrease in size.  I will order a repeat CT chest in 4 months and see her back one week later to review. I have also placed a referral to Dr. Saini with dermatology for skin evaluation.    I personally spent greater than 35 minutes today assessing, managing, discussing and documenting my visit with the patient. That time includes review of records, imaging and pathology reports, obtaining my own history, performing a medically appropriate evaluation, counseling and educating the patient, discussing goals, logistics, alternatives and risks of my recommendations, surveillance options and potential outcomes. It also includes the time documenting the clinical information in the EMR and communicating my recommendations to the other involved physicians.                Thank you very much for allowing me to participate in the care of this very pleasant patient.    Sincerely,      Lynda Godinez MD

## 2025-03-04 ENCOUNTER — PATIENT OUTREACH (OUTPATIENT)
Dept: OTHER | Facility: HOSPITAL | Age: 80
End: 2025-03-04
Payer: MEDICARE

## 2025-03-04 NOTE — PROGRESS NOTES
Reviewed chart. 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  Met with Dr. Godinez yesterday. CT 2/24 revealed decreased size of treated lesion. F/u CT 4 months    Called patient. Left message that I was just touching base to see how she was doing. Wanted to know if she had any questions/concerns after her appt with Dr. Godinez 3/3 or resource/supportive care needs; requested cb

## 2025-03-12 ENCOUNTER — OFFICE VISIT (OUTPATIENT)
Dept: FAMILY MEDICINE CLINIC | Facility: CLINIC | Age: 80
End: 2025-03-12
Payer: MEDICARE

## 2025-03-12 VITALS
HEART RATE: 99 BPM | BODY MASS INDEX: 26.36 KG/M2 | SYSTOLIC BLOOD PRESSURE: 100 MMHG | WEIGHT: 148.8 LBS | HEIGHT: 63 IN | OXYGEN SATURATION: 91 % | DIASTOLIC BLOOD PRESSURE: 64 MMHG | TEMPERATURE: 97.8 F

## 2025-03-12 DIAGNOSIS — J44.9 CHRONIC OBSTRUCTIVE PULMONARY DISEASE, UNSPECIFIED COPD TYPE: ICD-10-CM

## 2025-03-12 DIAGNOSIS — Z00.00 ENCOUNTER FOR MEDICARE ANNUAL WELLNESS EXAM: Primary | ICD-10-CM

## 2025-03-12 DIAGNOSIS — E55.9 VITAMIN D DEFICIENCY: ICD-10-CM

## 2025-03-12 DIAGNOSIS — Z23 ENCOUNTER FOR IMMUNIZATION: ICD-10-CM

## 2025-03-12 DIAGNOSIS — L98.9 SKIN LESION OF CHEST WALL: ICD-10-CM

## 2025-03-12 DIAGNOSIS — R91.1 LUNG NODULE: ICD-10-CM

## 2025-03-12 DIAGNOSIS — E53.8 VITAMIN B12 DEFICIENCY: ICD-10-CM

## 2025-03-12 DIAGNOSIS — E78.2 MIXED HYPERLIPIDEMIA: ICD-10-CM

## 2025-03-12 DIAGNOSIS — I25.10 CORONARY ARTERY DISEASE INVOLVING NATIVE HEART, UNSPECIFIED VESSEL OR LESION TYPE, UNSPECIFIED WHETHER ANGINA PRESENT: ICD-10-CM

## 2025-03-12 DIAGNOSIS — K21.9 GASTROESOPHAGEAL REFLUX DISEASE, UNSPECIFIED WHETHER ESOPHAGITIS PRESENT: ICD-10-CM

## 2025-03-12 DIAGNOSIS — R68.89 COLD INTOLERANCE: ICD-10-CM

## 2025-03-12 DIAGNOSIS — I65.23 BILATERAL CAROTID ARTERY STENOSIS: ICD-10-CM

## 2025-03-12 NOTE — ASSESSMENT & PLAN NOTE
Stable.  Continue Albuterol inhaler as needed.  Schedule a follow up appointment with pulmonary.    Orders:    CBC & Differential

## 2025-03-12 NOTE — PATIENT INSTRUCTIONS
Continue to work on healthy diet and exercise as tolerated.  Follow up pending lab results.  Follow up in 6 months, or sooner if problems or concerns.       Medicare Wellness  Personal Prevention Plan of Service     Date of Office Visit:    Encounter Provider:  SHE Orozco  Place of Service:  [facility]  Patient Name: Brigid Carballo  :  1945    As part of the Medicare Wellness portion of your visit today, we are providing you with this personalized preventive plan of services (PPPS). This plan is based upon recommendations of the [place] Preventive Services Task Force (USPSTF) and the Advisory Committee on Immunization Practices (ACIP).    This lists the preventive care services that should be considered, and provides dates of when you are due. Items listed as completed are up-to-date and do not require any further intervention.    Health Maintenance   Topic Date Due    TDAP/TD VACCINES (1 - Tdap) Never done    RSV Vaccine - Adults (1 - 1-dose 75+ series) Never done    BMI FOLLOWUP  2025    COVID-19 Vaccine (1) 2026 (Originally 10/30/1950)    ZOSTER VACCINE (1 of 2) 2026 (Originally 10/30/1964)    DXA SCAN  2026    ANNUAL WELLNESS VISIT  2026    LIPID PANEL  2026    INFLUENZA VACCINE  Completed    Pneumococcal Vaccine 50+  Completed    LUNG CANCER SCREENING  Discontinued       Orders Placed This Encounter   Procedures    Fluzone High-Dose 65+yrs    Comprehensive Metabolic Panel     Release to patient:   Routine Release [[id number]]     [facility] Has the patient fasted?:   No    Lipid Panel With LDL / HDL Ratio     Release to patient:   Routine Release [[id number]]     [facility] Has the patient fasted?:   No    TSH Rfx On Abnormal To Free T4     Release to patient:   Routine Release [[id number]]     [facility] Has the patient fasted?:   No    Vitamin B12 & Folate     Release to patient:   Routine Release [[id number]]     [facility] Has the  patient fasted?:   No    Vitamin D,25-Hydroxy     Release to patient:   Routine Release [[id number]]     [facility] Has the patient fasted?:   No    Ambulatory Referral to Cardiology     Referral Priority:   Routine     Referral Type:   Consultation     Referral Reason:   Specialty Services Required     Referred to Provider:   Victor Hugo Head MD     Requested Specialty:   Cardiology     Number of Visits Requested:   1    CBC & Differential     Manual Differential:   No     Release to patient:   Routine Release [[id number]]     [facility] Has the patient fasted?:   No       Return in about 6 months (around 9/12/2025) for Recheck.

## 2025-03-12 NOTE — ASSESSMENT & PLAN NOTE
Continue Atorvastatin daily.  Continue to work on healthy diet and exercise.     Orders:    Comprehensive Metabolic Panel    Lipid Panel With LDL / HDL Ratio

## 2025-03-12 NOTE — PROGRESS NOTES
Subjective   The ABCs of the Annual Wellness Visit  Medicare Wellness Visit      Briigd Carballo is a 79 y.o. patient who presents for a Medicare Wellness Visit.    The following portions of the patient's history were reviewed and   updated as appropriate: allergies, current medications, past family history, past medical history, past social history, past surgical history, and problem list.    Compared to one year ago, the patient's physical   health is the same. More energetic now.  Compared to one year ago, the patient's mental   health is the same.    Recent Hospitalizations:  This patient has had a [facility] admission record on file within the last 365 days.  Current Medical Providers:  Patient Care Team:  Kaya Mckeon APRN as PCP - General (Family Medicine)  Jesicam, Pawel Hanson MD as Consulting Physician (Cardiac Electrophysiology)  Jin Blancas MD as Consulting Physician (Pulmonary Disease)  Kelly Moncada, RN as Nurse Navigator  Lynda Godinez MD as Consulting Physician (Radiation Oncology)  Ant Talbot MD as Surgeon (Thoracic Surgery)    Outpatient Medications Prior to Visit   Medication Sig Dispense Refill    ascorbic acid (VITAMIN C) 1000 MG tablet Take 1 tablet by mouth Daily.      aspirin 81 MG EC tablet Take 1 tablet by mouth Daily. 90 tablet 0    atorvastatin (LIPITOR) 40 MG tablet Take 1 tablet by mouth Every Night. 90 tablet 1    cholecalciferol (VITAMIN D3) 25 MCG (1000 UT) tablet Take 1 tablet by mouth Daily.      esomeprazole (nexIUM) 20 MG capsule Take 1 capsule by mouth Every Morning Before Breakfast.      vitamin B-12 (CYANOCOBALAMIN) 1000 MCG tablet Take 1 tablet by mouth Daily.      albuterol sulfate  (90 Base) MCG/ACT inhaler Inhale 2 puffs Every 4 (Four) Hours As Needed for Wheezing. (Patient not taking: Reported on 3/12/2025) 30 g 3    Fluticasone-Salmeterol (ADVAIR/WIXELA) 250-50 MCG/ACT DISKUS Inhale 1 puff 2 (Two) Times a Day. (Patient not taking:  "Reported on 3/12/2025) 60 each 0    tiotropium bromide monohydrate (Spiriva Respimat) 2.5 MCG/ACT aerosol solution inhaler Inhale 2 puffs Daily. (Patient not taking: Reported on 3/12/2025) 4 g 0     No facility-administered medications prior to visit.     No opioid medication identified on active medication list. I have reviewed chart for other potential  high risk medication/s and harmful drug interactions in the elderly.      Aspirin is on active medication list. Aspirin use is indicated based on review of current medical condition/s. Pros and cons of this therapy have been discussed today. Benefits of this medication outweigh potential harm.  Patient has been encouraged to continue taking this medication.  .      Patient Active Problem List   Diagnosis    Small vessel disease    Hyperlipidemia    Allergic rhinitis    Arthritis    Gastroesophageal reflux disease    Vitamin B12 deficiency    Fatigue    Hiatal hernia    Polyp of colon    Bilateral carotid artery stenosis    Vitamin D deficiency    Memory deficit    Thyromegaly    Acute non-recurrent sinusitis    Postmenopause    Abnormal nuclear stress test    Nocturnal hypoxia    Lung nodule    COPD with acute exacerbation    Nodule of chest wall    COPD (chronic obstructive pulmonary disease)    Anemia    Skin lesion of left arm    Postprocedural pneumothorax    Iatrogenic pneumothorax    Acute bronchitis    Cold intolerance     Advance Care Planning Advance Directive is not on file.  ACP discussion was held with the patient during this visit. Patient does not have an advance directive, information provided.        Objective   Vitals:    03/12/25 1504   BP: 100/64   BP Location: Left arm   Patient Position: Sitting   Cuff Size: Adult   Pulse: 99   Temp: 97.8 °F (36.6 °C)   TempSrc: Temporal   SpO2: 91%   Weight: 67.5 kg (148 lb 12.8 oz)   Height: 160 cm (62.99\")   PainSc: 0-No pain       Estimated body mass index is 26.37 kg/m² as calculated from the following:    " "Height as of this encounter: 160 cm (62.99\").    Weight as of this encounter: 67.5 kg (148 lb 12.8 oz).    BMI is >= 25 and <30. (Overweight) The following options were offered after discussion;: nutrition counseling/recommendations           Does the patient have evidence of cognitive impairment? No as evidenced by mini-cog assessment, see results.  Lab Results   Component Value Date    CHLPL 209 (H) 03/12/2025    TRIG 60 03/12/2025    HDL 72 03/12/2025     (H) 03/12/2025    VLDL 11 03/12/2025                                                                                                Health  Risk Assessment    Smoking Status:  Social History     Tobacco Use   Smoking Status Some Days    Current packs/day: 0.10    Average packs/day: 0.5 packs/day for 68.1 years (33.8 ttl pk-yrs)    Types: Cigarettes    Start date: 1958    Last attempt to quit: 6/28/2024    Passive exposure: Current   Smokeless Tobacco Never   Tobacco Comments    Quit now [place] info provided     Alcohol Consumption:  Social History     Substance and Sexual Activity   Alcohol Use Not Currently    Comment: social--rarely       Fall Risk Screen  STEADI Fall Risk Assessment was completed, and patient is at LOW risk for falls.Assessment completed on:3/12/2025    Depression Screening   Little interest or pleasure in doing things? Not at all   Feeling down, depressed, or hopeless? Not at all   PHQ-2 Total Score 0      Health Habits and Functional and Cognitive Screening:      3/12/2025     3:05 PM   Functional & Cognitive Status   Do you have difficulty preparing food and eating? No   Do you have difficulty bathing yourself, getting dressed or grooming yourself? No   Do you have difficulty using the toilet? No   Do you have difficulty moving around from place to place? No   Do you have trouble with steps or getting out of a bed or a chair? No   Current Diet Other   Dental Exam Not up to date   Eye Exam Not up to date   Exercise (times per week) " 7 times per week   Current Exercises Include No Regular Exercise;Walking   Do you need help using the phone?  No   Are you deaf or do you have serious difficulty hearing?  No   Do you need help to go to places out of walking distance? No   Do you need help shopping? No   Do you need help preparing meals?  No   Do you need help with housework?  No   Do you need help with laundry? No   Do you need help taking your medications? No   Do you need help managing money? No   Do you ever drive or ride in a car without wearing a seat belt? No   Have you felt unusual stress, anger or loneliness in the last month? No   Who do you live with? Spouse   If you need help, do you have trouble finding someone available to you? No   Have you been bothered in the last four weeks by sexual problems? No   Do you have difficulty concentrating, remembering or making decisions? No           Age-appropriate Screening Schedule:  Refer to the list below for future screening recommendations based on patient's age, sex and/or medical conditions. Orders for these recommended tests are listed in the plan section. The patient has been provided with a written plan.    Health Maintenance List  Health Maintenance   Topic Date Due    TDAP/TD VACCINES (1 - Tdap) Never done    RSV Vaccine - Adults (1 - 1-dose 75+ series) Never done    BMI FOLLOWUP  01/29/2025    COVID-19 Vaccine (1) 03/12/2026 (Originally 10/30/1950)    ZOSTER VACCINE (1 of 2) 03/12/2026 (Originally 10/30/1964)    DXA SCAN  02/12/2026    ANNUAL WELLNESS VISIT  03/12/2026    LIPID PANEL  03/12/2026    INFLUENZA VACCINE  Completed    Pneumococcal Vaccine 50+  Completed    LUNG CANCER SCREENING  Discontinued     Will consider eye exam.  No recent dental exam, has dentures.  Will consider Tdap and RSV at pharmacy.  Declines COVID-19 and Shingrix vaccines.  Would like Flu vaccine today.                                                                                                                                              CMS Preventative Services Quick Reference  Risk Factors Identified During Encounter  Immunizations Discussed/Encouraged: Tdap, Influenza, Shingrix, COVID19, and RSV (Respiratory Syncytial Virus)    The above risks/problems have been discussed with the patient.    Discussed low risk for falls and recommended avoiding throw rugs, installing grab bars in bathroom, making sure have handrails on steps, etc.    Pertinent information has been shared with the patient in the After Visit Summary.  An After Visit Summary and PPPS were made available to the patient.    Follow Up:   Next Medicare Wellness visit to be scheduled in 1 year.         Additional E&M Note during same encounter follows:  Patient has additional, significant, and separately identifiable condition(s)/problem(s) that require work above and beyond the Medicare Wellness Visit     Chief Complaint  Medicare Wellness-subsequent    Subjective   HPI  Brigid is also being seen today for additional medical problem/s.  follow up Hyperlipidemia: takes Atorvastatin daily; no myalgias; watches saturated fats in diet; fasting today.     F/U VANESA/[place]'s esophagus: takes Nexium daily and works well; no reflux symptoms; had EGD/colonoscopy in 2015.    F/U right apical lung nodule suspicious for malignancy; completed SBRT to right apical lung nodule and smaller on repeat imaging; no chemo; sees radiation oncology and had recent follow up 3/3/25; has follow up with repeat CT chest in 4 months.    Referred to Dr. Yeny juarez due to skin lesions.    Sees pulmonary, has not been using Advair or Spiriva; has not needed to use Albuterol inhaler; needs to schedule follow up appointment with pulmonary.     Has seen cardiology, Dr. Nunez, in past; had abnormal stress Echo 9/2022 and then left heart cath 11/2022; heart cath noted 2 arteries 30% blocked; also has less than 50% blockage in bilateral carotid arteries; has been taking aspirin daily; has not  "seen cardiology recently; had repeat carotid US 3/3/25.     Will have swelling in right leg when on feet all day; resolved next morning; no injury in past; had artery removed from right leg in past.     Review of Systems   Constitutional:  Positive for fatigue (no unexplained fatigue). Negative for appetite change, chills and fever.   HENT:  Negative for ear pain, sore throat and trouble swallowing.    Eyes:  Negative for discharge and visual disturbance.   Respiratory:  Negative for cough (occasional productive cough; overall cough has improved with taking herbal supplement), chest tightness and shortness of breath.    Cardiovascular:  Negative for chest pain (occasional discomfort in chest when rushing around; resolves with rest; no episodes in the last month or so) and palpitations. Leg swelling: see HPI.  Gastrointestinal:  Negative for abdominal pain, blood in stool, constipation and diarrhea.   Endocrine: Negative for polydipsia. Cold intolerance: some at times.  Genitourinary:  Negative for dysuria and frequency.   Musculoskeletal:  Negative for arthralgias and back pain.   Skin:  Negative for rash.   Neurological:  Negative for dizziness, syncope and light-headedness.   Hematological:  Does not bruise/bleed easily.   Psychiatric/Behavioral:  Negative for dysphoric mood and sleep disturbance. The patient is not nervous/anxious.         Objective   Vital Signs:  /64 (BP Location: Left arm, Patient Position: Sitting, Cuff Size: Adult)   Pulse 99   Temp 97.8 °F (36.6 °C) (Temporal)   Ht 160 cm (62.99\")   Wt 67.5 kg (148 lb 12.8 oz)   SpO2 91%   BMI 26.37 kg/m²     Physical Exam  Vitals and nursing note reviewed.   Constitutional:       General: She is not in acute distress.     Appearance: She is well-developed and well-groomed. She is not diaphoretic.   HENT:      Head: Normocephalic and atraumatic.      Jaw: No tenderness or pain on movement.      Right Ear: External ear normal. No decreased " hearing noted. Right ear middle ear effusion: TM dull. Tympanic membrane is not erythematous.      Left Ear: External ear normal. No decreased hearing noted. Left ear middle ear effusion: mild. Tympanic membrane is not erythematous.      Nose: Nose normal.      Right Sinus: No maxillary sinus tenderness or frontal sinus tenderness.      Left Sinus: No maxillary sinus tenderness or frontal sinus tenderness.      Mouth/Throat:      Mouth: Mucous membranes are moist.      Dentition: Has dentures.      Pharynx: No oropharyngeal exudate. Posterior oropharyngeal erythema: mild.  Eyes:      Extraocular Movements: Extraocular movements intact.      Conjunctiva/sclera: Conjunctivae normal.      Pupils: Pupils are equal, round, and reactive to light.   Neck:      Thyroid: No thyromegaly.      Vascular: No carotid bruit.      Trachea: No tracheal deviation.   Cardiovascular:      Rate and Rhythm: Normal rate and regular rhythm.      Pulses: Normal pulses.      Heart sounds: Normal heart sounds. No murmur heard.  Pulmonary:      Effort: Pulmonary effort is normal. No respiratory distress.      Breath sounds: Normal breath sounds.   Abdominal:      General: Bowel sounds are normal.      Palpations: Abdomen is soft. There is no hepatomegaly or splenomegaly.      Tenderness: There is no abdominal tenderness. There is no guarding.   Musculoskeletal:         General: Normal range of motion.      Cervical back: Normal range of motion and neck supple. No bony tenderness.      Thoracic back: No bony tenderness.      Lumbar back: No bony tenderness.      Right lower leg: No edema.      Left lower leg: No edema.   Lymphadenopathy:      Cervical: No cervical adenopathy.   Skin:     General: Skin is warm and dry.      Findings: No rash.          Neurological:      Mental Status: She is alert and oriented to person, place, and time.      Cranial Nerves: No cranial nerve deficit.      Motor: Motor function is intact.      Coordination:  Coordination normal.      Gait: Gait normal.      Deep Tendon Reflexes: Reflexes are normal and symmetric.   Psychiatric:         Mood and Affect: Mood normal.         Behavior: Behavior normal.         Thought Content: Thought content normal.         Cognition and Memory: Cognition normal.         Judgment: Judgment normal.       8/5/24 lipid panel: , Trig 112, HDL 73,   9/16/24 TSH 1.560  11/16/24 CMP WNL; CBC WNL except Hct 48, MCV 98    Lab Results   Component Value Date    HGBA1C 4.96 11/07/2021     Lab Results   Component Value Date    COLORU Yellow 01/29/2024    CLARITYU Clear 01/29/2024    LEUKOCYTESUR Negative 01/29/2024    BILIRUBINUR Negative 01/29/2024           Assessment and Plan      Coronary artery disease involving native heart, unspecified vessel or lesion type, unspecified whether angina present  Continue aspirin and Atorvastatin daily.    Orders:    Ambulatory Referral to Cardiology    Encounter for Medicare annual wellness exam    Orders:    Fluzone High-Dose 65+yrs    CBC & Differential    Comprehensive Metabolic Panel    Lipid Panel With LDL / HDL Ratio    TSH Rfx On Abnormal To Free T4    Vitamin B12 & Folate    Vitamin D,25-Hydroxy    Encounter for immunization    Orders:    Fluzone High-Dose 65+yrs    Mixed hyperlipidemia  Continue Atorvastatin daily.  Continue to work on healthy diet and exercise.     Orders:    Comprehensive Metabolic Panel    Lipid Panel With LDL / HDL Ratio    Vitamin B12 deficiency    Orders:    Vitamin B12 & Folate    Gastroesophageal reflux disease, unspecified whether esophagitis present  Stable.  Continue Nexium daily.    Orders:    CBC & Differential    Vitamin D deficiency    Orders:    Vitamin D,25-Hydroxy    Chronic obstructive pulmonary disease, unspecified COPD type  Stable.  Continue Albuterol inhaler as needed.  Schedule a follow up appointment with pulmonary.    Orders:    CBC & Differential    Cold intolerance    Orders:    TSH Rfx On  Abnormal To Free T4    Bilateral carotid artery stenosis  Stable.  Continue aspirin and Atorvastatin daily.         Lung nodule  Follow up as scheduled with radiation oncology.         Skin lesion of chest wall  Follow up as scheduled with dermatology.               Follow Up   Return in about 6 months (around 9/12/2025) for Recheck.or sooner if problems or concerns.    Patient was given instructions and counseling regarding her condition or for health maintenance advice. Please see specific information pulled into the AVS if appropriate.

## 2025-03-13 PROBLEM — R10.12 LEFT UPPER QUADRANT ABDOMINAL PAIN: Status: RESOLVED | Noted: 2024-01-29 | Resolved: 2025-03-13

## 2025-03-13 LAB
25(OH)D3+25(OH)D2 SERPL-MCNC: 31.1 NG/ML (ref 30–100)
ALBUMIN SERPL-MCNC: 4.3 G/DL (ref 3.8–4.8)
ALP SERPL-CCNC: 54 IU/L (ref 44–121)
ALT SERPL-CCNC: 8 IU/L (ref 0–32)
AST SERPL-CCNC: 14 IU/L (ref 0–40)
BASOPHILS # BLD AUTO: 0.1 X10E3/UL (ref 0–0.2)
BASOPHILS NFR BLD AUTO: 1 %
BILIRUB SERPL-MCNC: 0.4 MG/DL (ref 0–1.2)
BUN SERPL-MCNC: 17 MG/DL (ref 8–27)
BUN/CREAT SERPL: 23 (ref 12–28)
CALCIUM SERPL-MCNC: 9.6 MG/DL (ref 8.7–10.3)
CHLORIDE SERPL-SCNC: 102 MMOL/L (ref 96–106)
CHOLEST SERPL-MCNC: 209 MG/DL (ref 100–199)
CO2 SERPL-SCNC: 26 MMOL/L (ref 20–29)
CREAT SERPL-MCNC: 0.74 MG/DL (ref 0.57–1)
EGFRCR SERPLBLD CKD-EPI 2021: 82 ML/MIN/1.73
EOSINOPHIL # BLD AUTO: 0.1 X10E3/UL (ref 0–0.4)
EOSINOPHIL NFR BLD AUTO: 1 %
ERYTHROCYTE [DISTWIDTH] IN BLOOD BY AUTOMATED COUNT: 12.3 % (ref 11.7–15.4)
FOLATE SERPL-MCNC: 8.6 NG/ML
GLOBULIN SER CALC-MCNC: 1.4 G/DL (ref 1.5–4.5)
GLUCOSE SERPL-MCNC: 88 MG/DL (ref 70–99)
HCT VFR BLD AUTO: 42.8 % (ref 34–46.6)
HDLC SERPL-MCNC: 72 MG/DL
HGB BLD-MCNC: 14 G/DL (ref 11.1–15.9)
IMM GRANULOCYTES # BLD AUTO: 0 X10E3/UL (ref 0–0.1)
IMM GRANULOCYTES NFR BLD AUTO: 0 %
LDLC SERPL CALC-MCNC: 126 MG/DL (ref 0–99)
LDLC/HDLC SERPL: 1.8 RATIO (ref 0–3.2)
LYMPHOCYTES # BLD AUTO: 1.9 X10E3/UL (ref 0.7–3.1)
LYMPHOCYTES NFR BLD AUTO: 34 %
MCH RBC QN AUTO: 31.5 PG (ref 26.6–33)
MCHC RBC AUTO-ENTMCNC: 32.7 G/DL (ref 31.5–35.7)
MCV RBC AUTO: 96 FL (ref 79–97)
MONOCYTES # BLD AUTO: 0.6 X10E3/UL (ref 0.1–0.9)
MONOCYTES NFR BLD AUTO: 10 %
NEUTROPHILS # BLD AUTO: 3 X10E3/UL (ref 1.4–7)
NEUTROPHILS NFR BLD AUTO: 54 %
PLATELET # BLD AUTO: 231 X10E3/UL (ref 150–450)
POTASSIUM SERPL-SCNC: 4.7 MMOL/L (ref 3.5–5.2)
PROT SERPL-MCNC: 5.7 G/DL (ref 6–8.5)
RBC # BLD AUTO: 4.44 X10E6/UL (ref 3.77–5.28)
SODIUM SERPL-SCNC: 141 MMOL/L (ref 134–144)
TRIGL SERPL-MCNC: 60 MG/DL (ref 0–149)
TSH SERPL DL<=0.005 MIU/L-ACNC: 1.95 UIU/ML (ref 0.45–4.5)
VIT B12 SERPL-MCNC: 836 PG/ML (ref 232–1245)
VLDLC SERPL CALC-MCNC: 11 MG/DL (ref 5–40)
WBC # BLD AUTO: 5.6 X10E3/UL (ref 3.4–10.8)

## 2025-03-14 DIAGNOSIS — E55.9 VITAMIN D DEFICIENCY: Primary | ICD-10-CM

## 2025-03-14 RX ORDER — CHOLECALCIFEROL (VITAMIN D3) 25 MCG
2000 TABLET ORAL DAILY
Start: 2025-03-14

## 2025-03-18 ENCOUNTER — PATIENT OUTREACH (OUTPATIENT)
Dept: OTHER | Facility: HOSPITAL | Age: 80
End: 2025-03-18
Payer: MEDICARE

## 2025-03-18 NOTE — PROGRESS NOTES
Reviewed chart.  Scheduled for CT 6/2, sees Dr. Godinez 6/9    Called patient. Left message that I was just touching base to see how she was doing. Wanted to know if she had any questions/concerns or resource/supportive care needs; requested cb

## 2025-04-01 ENCOUNTER — TELEPHONE (OUTPATIENT)
Dept: FAMILY MEDICINE CLINIC | Facility: CLINIC | Age: 80
End: 2025-04-01
Payer: MEDICARE

## 2025-04-01 NOTE — TELEPHONE ENCOUNTER
Caller: Brigid Carballo    Relationship: Self    Best call back number: [phone]     What medication are you requesting: COLD MEDICONE    What are your current symptoms: RUNNY NOSE AND COUGH    How long have you been experiencing symptoms: 2 WEEKS    Have you had these symptoms before:    [x] Yes  [] No    Have you been treated for these symptoms before:   [x] Yes  [] No    If a prescription is needed, what is your preferred pharmacy and phone number:    [address] - [phone]  - [phone] FX   Additional notes:

## 2025-04-01 NOTE — TELEPHONE ENCOUNTER
I called and spoke with the patient and let her know that we could not prescribe her any medication without seeing her first. I offered to get her scheduled but there was no availability this week that fit her schedule so she said she would continue doing what she has been. I advised her to give us a call back if she needed anything else. She verbalized understanding and will call back if needed.

## 2025-04-15 ENCOUNTER — PATIENT OUTREACH (OUTPATIENT)
Dept: OTHER | Facility: HOSPITAL | Age: 80
End: 2025-04-15
Payer: MEDICARE

## 2025-04-15 NOTE — PROGRESS NOTES
Reviewed chart. 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 CT 6/2, sees Dr. Godinez 6/9    Called patient.  We discussed her 3/3 appt with Dr. Godinez and upcoming appts in June.  The patient returned to work and denies any symptoms at this time.    The patient states she had a recent skin cancer removed from her arm.     We again discussed integrative therapies and other [facility] Patient expressed gratitude for my support and denied any additional needs at this time.    Since she is stable with no active cancer treatment and no ongoing resource needs, I will close her case to navigation although reminded her that she can access the services in the [facility] even with her navigation case being closed. Survivorship information reviewed with patient.  Encouraged patient to call in the future if the need arises. Patient verbalized understanding.    Will mail information about NCCN recommendations for all cancer survivors for 150 minutes/week moderate intensity exercise, achieve and maintain a healthy weight, plants-based whole-foods diet, avoid tobacco and second hand smoke, avoid alcohol or minimize alcohol intake - no more than 1 drink in a day for adults

## 2025-05-18 ENCOUNTER — APPOINTMENT (OUTPATIENT)
Dept: CT IMAGING | Facility: HOSPITAL | Age: 80
DRG: 194 | End: 2025-05-18
Payer: MEDICARE

## 2025-05-18 ENCOUNTER — APPOINTMENT (OUTPATIENT)
Dept: GENERAL RADIOLOGY | Facility: HOSPITAL | Age: 80
DRG: 194 | End: 2025-05-18
Payer: MEDICARE

## 2025-05-18 ENCOUNTER — HOSPITAL ENCOUNTER (INPATIENT)
Facility: HOSPITAL | Age: 80
LOS: 4 days | Discharge: HOME | DRG: 194 | End: 2025-05-22
Attending: EMERGENCY MEDICINE | Admitting: INTERNAL MEDICINE
Payer: MEDICARE

## 2025-05-18 DIAGNOSIS — C34.90 MALIGNANT NEOPLASM OF LUNG, UNSPECIFIED LATERALITY, UNSPECIFIED PART OF LUNG: ICD-10-CM

## 2025-05-18 DIAGNOSIS — J18.9 PNEUMONIA OF BOTH LUNGS DUE TO INFECTIOUS ORGANISM, UNSPECIFIED PART OF LUNG: Primary | ICD-10-CM

## 2025-05-18 DIAGNOSIS — J44.1 ACUTE EXACERBATION OF CHRONIC OBSTRUCTIVE PULMONARY DISEASE (COPD): ICD-10-CM

## 2025-05-18 DIAGNOSIS — R04.2 HEMOPTYSIS: ICD-10-CM

## 2025-05-18 PROBLEM — Z92.3 HISTORY OF RADIATION THERAPY: Status: ACTIVE | Noted: 2025-05-18

## 2025-05-18 LAB
ALBUMIN SERPL-MCNC: 3.7 G/DL (ref 3.5–5.2)
ALBUMIN/GLOB SERPL: 1.5 G/DL
ALP SERPL-CCNC: 46 U/L (ref 39–117)
ALT SERPL W P-5'-P-CCNC: 9 U/L (ref 1–33)
ANION GAP SERPL CALCULATED.3IONS-SCNC: 6.8 MMOL/L (ref 5–15)
APTT PPP: 32.1 SECONDS (ref 22.7–35.4)
AST SERPL-CCNC: 11 U/L (ref 1–32)
B PARAPERT DNA SPEC QL NAA+PROBE: NOT DETECTED
B PERT DNA SPEC QL NAA+PROBE: NOT DETECTED
BASOPHILS # BLD AUTO: 0.02 10*3/MM3 (ref 0–0.2)
BASOPHILS NFR BLD AUTO: 0.4 % (ref 0–1.5)
BILIRUB SERPL-MCNC: 0.5 MG/DL (ref 0–1.2)
BUN SERPL-MCNC: 15 MG/DL (ref 8–23)
BUN/CREAT SERPL: 20.5 (ref 7–25)
C PNEUM DNA NPH QL NAA+NON-PROBE: NOT DETECTED
CALCIUM SPEC-SCNC: 9.4 MG/DL (ref 8.6–10.5)
CHLORIDE SERPL-SCNC: 107 MMOL/L (ref 98–107)
CO2 SERPL-SCNC: 28.2 MMOL/L (ref 22–29)
CREAT SERPL-MCNC: 0.73 MG/DL (ref 0.57–1)
D-LACTATE SERPL-SCNC: 1.2 MMOL/L (ref 0.5–2)
D-LACTATE SERPL-SCNC: 2.6 MMOL/L (ref 0.5–2)
DEPRECATED RDW RBC AUTO: 42.8 FL (ref 37–54)
EGFRCR SERPLBLD CKD-EPI 2021: 83.3 ML/MIN/1.73
EOSINOPHIL # BLD AUTO: 0.04 10*3/MM3 (ref 0–0.4)
EOSINOPHIL NFR BLD AUTO: 0.7 % (ref 0.3–6.2)
ERYTHROCYTE [DISTWIDTH] IN BLOOD BY AUTOMATED COUNT: 11.8 % (ref 12.3–15.4)
FLUAV SUBTYP SPEC NAA+PROBE: NOT DETECTED
FLUBV RNA ISLT QL NAA+PROBE: NOT DETECTED
GEN 5 1HR TROPONIN T REFLEX: 8 NG/L
GLOBULIN UR ELPH-MCNC: 2.4 GM/DL
GLUCOSE SERPL-MCNC: 102 MG/DL (ref 65–99)
HADV DNA SPEC NAA+PROBE: NOT DETECTED
HCOV 229E RNA SPEC QL NAA+PROBE: NOT DETECTED
HCOV HKU1 RNA SPEC QL NAA+PROBE: NOT DETECTED
HCOV NL63 RNA SPEC QL NAA+PROBE: NOT DETECTED
HCOV OC43 RNA SPEC QL NAA+PROBE: NOT DETECTED
HCT VFR BLD AUTO: 42.3 % (ref 34–46.6)
HGB BLD-MCNC: 13.9 G/DL (ref 12–15.9)
HMPV RNA NPH QL NAA+NON-PROBE: NOT DETECTED
HPIV1 RNA ISLT QL NAA+PROBE: NOT DETECTED
HPIV2 RNA SPEC QL NAA+PROBE: NOT DETECTED
HPIV3 RNA NPH QL NAA+PROBE: NOT DETECTED
HPIV4 P GENE NPH QL NAA+PROBE: NOT DETECTED
IMM GRANULOCYTES # BLD AUTO: 0.02 10*3/MM3 (ref 0–0.05)
IMM GRANULOCYTES NFR BLD AUTO: 0.4 % (ref 0–0.5)
INR PPP: 1.1 (ref 0.9–1.1)
LYMPHOCYTES # BLD AUTO: 1.16 10*3/MM3 (ref 0.7–3.1)
LYMPHOCYTES NFR BLD AUTO: 21.5 % (ref 19.6–45.3)
M PNEUMO IGG SER IA-ACNC: NOT DETECTED
MAGNESIUM SERPL-MCNC: 2.2 MG/DL (ref 1.6–2.4)
MCH RBC QN AUTO: 32 PG (ref 26.6–33)
MCHC RBC AUTO-ENTMCNC: 32.9 G/DL (ref 31.5–35.7)
MCV RBC AUTO: 97.2 FL (ref 79–97)
MONOCYTES # BLD AUTO: 0.63 10*3/MM3 (ref 0.1–0.9)
MONOCYTES NFR BLD AUTO: 11.7 % (ref 5–12)
NEUTROPHILS NFR BLD AUTO: 3.52 10*3/MM3 (ref 1.7–7)
NEUTROPHILS NFR BLD AUTO: 65.3 % (ref 42.7–76)
NRBC BLD AUTO-RTO: 0 /100 WBC (ref 0–0.2)
NT-PROBNP SERPL-MCNC: 278 PG/ML (ref 0–1800)
PLATELET # BLD AUTO: 182 10*3/MM3 (ref 140–450)
PMV BLD AUTO: 9.4 FL (ref 6–12)
POTASSIUM SERPL-SCNC: 4.9 MMOL/L (ref 3.5–5.2)
PROCALCITONIN SERPL-MCNC: 0.11 NG/ML (ref 0–0.25)
PROT SERPL-MCNC: 6.1 G/DL (ref 6–8.5)
PROTHROMBIN TIME: 14.1 SECONDS (ref 11.7–14.2)
QT INTERVAL: 378 MS
QTC INTERVAL: 412 MS
RBC # BLD AUTO: 4.35 10*6/MM3 (ref 3.77–5.28)
RHINOVIRUS RNA SPEC NAA+PROBE: NOT DETECTED
RSV RNA NPH QL NAA+NON-PROBE: NOT DETECTED
SARS-COV-2 RNA NPH QL NAA+NON-PROBE: NOT DETECTED
SODIUM SERPL-SCNC: 142 MMOL/L (ref 136–145)
TROPONIN T NUMERIC DELTA: 0 NG/L
TROPONIN T SERPL HS-MCNC: 8 NG/L
WBC NRBC COR # BLD AUTO: 5.39 10*3/MM3 (ref 3.4–10.8)

## 2025-05-18 PROCEDURE — 85730 THROMBOPLASTIN TIME PARTIAL: CPT | Performed by: EMERGENCY MEDICINE

## 2025-05-18 PROCEDURE — 83880 ASSAY OF NATRIURETIC PEPTIDE: CPT | Performed by: EMERGENCY MEDICINE

## 2025-05-18 PROCEDURE — 94799 UNLISTED PULMONARY SVC/PX: CPT

## 2025-05-18 PROCEDURE — 25510000001 IOPAMIDOL PER 1 ML: Performed by: EMERGENCY MEDICINE

## 2025-05-18 PROCEDURE — 94640 AIRWAY INHALATION TREATMENT: CPT

## 2025-05-18 PROCEDURE — 25810000003 SODIUM CHLORIDE 0.9 % SOLUTION: Performed by: INTERNAL MEDICINE

## 2025-05-18 PROCEDURE — 0202U NFCT DS 22 TRGT SARS-COV-2: CPT | Performed by: EMERGENCY MEDICINE

## 2025-05-18 PROCEDURE — 85610 PROTHROMBIN TIME: CPT | Performed by: EMERGENCY MEDICINE

## 2025-05-18 PROCEDURE — 71045 X-RAY EXAM CHEST 1 VIEW: CPT

## 2025-05-18 PROCEDURE — 85025 COMPLETE CBC W/AUTO DIFF WBC: CPT | Performed by: EMERGENCY MEDICINE

## 2025-05-18 PROCEDURE — 36415 COLL VENOUS BLD VENIPUNCTURE: CPT | Performed by: EMERGENCY MEDICINE

## 2025-05-18 PROCEDURE — 84145 PROCALCITONIN (PCT): CPT | Performed by: EMERGENCY MEDICINE

## 2025-05-18 PROCEDURE — 83605 ASSAY OF LACTIC ACID: CPT | Performed by: EMERGENCY MEDICINE

## 2025-05-18 PROCEDURE — 25810000003 SODIUM CHLORIDE 0.9 % SOLUTION 250 ML FLEX CONT: Performed by: INTERNAL MEDICINE

## 2025-05-18 PROCEDURE — 87040 BLOOD CULTURE FOR BACTERIA: CPT | Performed by: EMERGENCY MEDICINE

## 2025-05-18 PROCEDURE — 71275 CT ANGIOGRAPHY CHEST: CPT

## 2025-05-18 PROCEDURE — 93005 ELECTROCARDIOGRAM TRACING: CPT | Performed by: EMERGENCY MEDICINE

## 2025-05-18 PROCEDURE — 80053 COMPREHEN METABOLIC PANEL: CPT | Performed by: EMERGENCY MEDICINE

## 2025-05-18 PROCEDURE — 99291 CRITICAL CARE FIRST HOUR: CPT

## 2025-05-18 PROCEDURE — 83735 ASSAY OF MAGNESIUM: CPT | Performed by: EMERGENCY MEDICINE

## 2025-05-18 PROCEDURE — 84484 ASSAY OF TROPONIN QUANT: CPT | Performed by: EMERGENCY MEDICINE

## 2025-05-18 PROCEDURE — 25010000002 MAGNESIUM SULFATE 2 GM/50ML SOLUTION: Performed by: EMERGENCY MEDICINE

## 2025-05-18 PROCEDURE — 93010 ELECTROCARDIOGRAM REPORT: CPT | Performed by: INTERNAL MEDICINE

## 2025-05-18 PROCEDURE — 94664 DEMO&/EVAL PT USE INHALER: CPT

## 2025-05-18 PROCEDURE — 94761 N-INVAS EAR/PLS OXIMETRY MLT: CPT

## 2025-05-18 PROCEDURE — 63710000001 PREDNISONE PER 1 MG: Performed by: EMERGENCY MEDICINE

## 2025-05-18 PROCEDURE — 25010000002 AZITHROMYCIN PER 500 MG: Performed by: INTERNAL MEDICINE

## 2025-05-18 PROCEDURE — 25010000002 CEFTRIAXONE PER 250 MG: Performed by: EMERGENCY MEDICINE

## 2025-05-18 RX ORDER — ACETAMINOPHEN 325 MG/1
650 TABLET ORAL EVERY 4 HOURS PRN
Status: DISCONTINUED | OUTPATIENT
Start: 2025-05-18 | End: 2025-05-22 | Stop reason: HOSPADM

## 2025-05-18 RX ORDER — IPRATROPIUM BROMIDE AND ALBUTEROL SULFATE 2.5; .5 MG/3ML; MG/3ML
3 SOLUTION RESPIRATORY (INHALATION) ONCE
Status: COMPLETED | OUTPATIENT
Start: 2025-05-18 | End: 2025-05-18

## 2025-05-18 RX ORDER — ALBUTEROL SULFATE 0.83 MG/ML
2.5 SOLUTION RESPIRATORY (INHALATION) ONCE
Status: COMPLETED | OUTPATIENT
Start: 2025-05-18 | End: 2025-05-18

## 2025-05-18 RX ORDER — IOPAMIDOL 755 MG/ML
100 INJECTION, SOLUTION INTRAVASCULAR
Status: COMPLETED | OUTPATIENT
Start: 2025-05-18 | End: 2025-05-18

## 2025-05-18 RX ORDER — MAGNESIUM SULFATE HEPTAHYDRATE 40 MG/ML
2 INJECTION, SOLUTION INTRAVENOUS ONCE
Status: COMPLETED | OUTPATIENT
Start: 2025-05-18 | End: 2025-05-18

## 2025-05-18 RX ORDER — PREDNISONE 20 MG/1
60 TABLET ORAL ONCE
Status: COMPLETED | OUTPATIENT
Start: 2025-05-18 | End: 2025-05-18

## 2025-05-18 RX ORDER — ASCORBIC ACID 500 MG
1000 TABLET ORAL DAILY
Status: DISCONTINUED | OUTPATIENT
Start: 2025-05-18 | End: 2025-05-22 | Stop reason: HOSPADM

## 2025-05-18 RX ORDER — ACETAMINOPHEN 160 MG/5ML
650 SOLUTION ORAL EVERY 4 HOURS PRN
Status: DISCONTINUED | OUTPATIENT
Start: 2025-05-18 | End: 2025-05-21

## 2025-05-18 RX ORDER — ATORVASTATIN CALCIUM 20 MG/1
40 TABLET, FILM COATED ORAL NIGHTLY
Status: DISCONTINUED | OUTPATIENT
Start: 2025-05-18 | End: 2025-05-22 | Stop reason: HOSPADM

## 2025-05-18 RX ORDER — CHOLECALCIFEROL (VITAMIN D3) 25 MCG
2000 TABLET ORAL DAILY
Status: DISCONTINUED | OUTPATIENT
Start: 2025-05-18 | End: 2025-05-22 | Stop reason: HOSPADM

## 2025-05-18 RX ORDER — NITROGLYCERIN 0.4 MG/1
0.4 TABLET SUBLINGUAL
Status: DISCONTINUED | OUTPATIENT
Start: 2025-05-18 | End: 2025-05-22 | Stop reason: HOSPADM

## 2025-05-18 RX ORDER — SODIUM CHLORIDE 0.9 % (FLUSH) 0.9 %
10 SYRINGE (ML) INJECTION EVERY 12 HOURS SCHEDULED
Status: DISCONTINUED | OUTPATIENT
Start: 2025-05-18 | End: 2025-05-21

## 2025-05-18 RX ORDER — ONDANSETRON 2 MG/ML
4 INJECTION INTRAMUSCULAR; INTRAVENOUS EVERY 6 HOURS PRN
Status: DISCONTINUED | OUTPATIENT
Start: 2025-05-18 | End: 2025-05-22 | Stop reason: HOSPADM

## 2025-05-18 RX ORDER — SODIUM CHLORIDE 0.9 % (FLUSH) 0.9 %
10 SYRINGE (ML) INJECTION AS NEEDED
Status: DISCONTINUED | OUTPATIENT
Start: 2025-05-18 | End: 2025-05-21

## 2025-05-18 RX ORDER — PANTOPRAZOLE SODIUM 40 MG/1
40 TABLET, DELAYED RELEASE ORAL
Status: DISCONTINUED | OUTPATIENT
Start: 2025-05-19 | End: 2025-05-22 | Stop reason: HOSPADM

## 2025-05-18 RX ORDER — SODIUM CHLORIDE 9 MG/ML
40 INJECTION, SOLUTION INTRAVENOUS AS NEEDED
Status: DISCONTINUED | OUTPATIENT
Start: 2025-05-18 | End: 2025-05-21

## 2025-05-18 RX ORDER — IPRATROPIUM BROMIDE AND ALBUTEROL SULFATE 2.5; .5 MG/3ML; MG/3ML
3 SOLUTION RESPIRATORY (INHALATION) EVERY 4 HOURS PRN
Status: DISCONTINUED | OUTPATIENT
Start: 2025-05-18 | End: 2025-05-22 | Stop reason: HOSPADM

## 2025-05-18 RX ORDER — SODIUM CHLORIDE 9 MG/ML
100 INJECTION, SOLUTION INTRAVENOUS CONTINUOUS
Status: ACTIVE | OUTPATIENT
Start: 2025-05-18 | End: 2025-05-19

## 2025-05-18 RX ORDER — ALBUTEROL SULFATE 0.83 MG/ML
2.5 SOLUTION RESPIRATORY (INHALATION)
Status: COMPLETED | OUTPATIENT
Start: 2025-05-18 | End: 2025-05-18

## 2025-05-18 RX ORDER — ACETAMINOPHEN 650 MG/1
650 SUPPOSITORY RECTAL EVERY 4 HOURS PRN
Status: DISCONTINUED | OUTPATIENT
Start: 2025-05-18 | End: 2025-05-21

## 2025-05-18 RX ORDER — ALBUTEROL SULFATE 0.83 MG/ML
2.5 SOLUTION RESPIRATORY (INHALATION)
Status: DISCONTINUED | OUTPATIENT
Start: 2025-05-18 | End: 2025-05-19

## 2025-05-18 RX ADMIN — IPRATROPIUM BROMIDE AND ALBUTEROL SULFATE 3 ML: .5; 3 SOLUTION RESPIRATORY (INHALATION) at 17:29

## 2025-05-18 RX ADMIN — IOPAMIDOL 95 ML: 755 INJECTION, SOLUTION INTRAVENOUS at 16:11

## 2025-05-18 RX ADMIN — ALBUTEROL SULFATE 2.5 MG: 2.5 SOLUTION RESPIRATORY (INHALATION) at 17:26

## 2025-05-18 RX ADMIN — ATORVASTATIN CALCIUM 40 MG: 20 TABLET, FILM COATED ORAL at 22:09

## 2025-05-18 RX ADMIN — Medication 2000 UNITS: at 22:09

## 2025-05-18 RX ADMIN — PREDNISONE 60 MG: 20 TABLET ORAL at 17:56

## 2025-05-18 RX ADMIN — Medication 10 ML: at 22:09

## 2025-05-18 RX ADMIN — SODIUM CHLORIDE 100 ML/HR: 9 INJECTION, SOLUTION INTRAVENOUS at 22:58

## 2025-05-18 RX ADMIN — IPRATROPIUM BROMIDE AND ALBUTEROL SULFATE 3 ML: .5; 3 SOLUTION RESPIRATORY (INHALATION) at 15:08

## 2025-05-18 RX ADMIN — ALBUTEROL SULFATE 2.5 MG: 2.5 SOLUTION RESPIRATORY (INHALATION) at 15:05

## 2025-05-18 RX ADMIN — OXYCODONE HYDROCHLORIDE AND ACETAMINOPHEN 1000 MG: 500 TABLET ORAL at 22:09

## 2025-05-18 RX ADMIN — CEFTRIAXONE SODIUM 1000 MG: 1 INJECTION, POWDER, FOR SOLUTION INTRAMUSCULAR; INTRAVENOUS at 17:58

## 2025-05-18 RX ADMIN — MAGNESIUM SULFATE IN WATER FOR 2 G: 40 INJECTION INTRAVENOUS at 15:37

## 2025-05-18 RX ADMIN — ALBUTEROL SULFATE 2.5 MG: 2.5 SOLUTION RESPIRATORY (INHALATION) at 15:03

## 2025-05-18 RX ADMIN — AZITHROMYCIN MONOHYDRATE 500 MG: 500 INJECTION, POWDER, LYOPHILIZED, FOR SOLUTION INTRAVENOUS at 22:11

## 2025-05-18 NOTE — ED PROVIDER NOTES
EMERGENCY DEPARTMENT ENCOUNTER  Room Number:  39/39  Date of encounter:  5/18/2025  PCP: Kaya Mckeon APRN  Patient Care Team:  Kaya Mckeon APRN as PCP - General (Family Medicine)  Pawel Nunez MD as Consulting Physician (Cardiac Electrophysiology)  Jin Blancas MD as Consulting Physician (Pulmonary Disease)  Lynda Godinez MD as Consulting Physician (Radiation Oncology)  Ant Talbot MD as Surgeon (Thoracic Surgery)     HPI:  Context: Brigid Carballo is a 79 y.o. female who presents to the ED c/o chief complaint of hemoptysis.  Patient reports that she has been coughing up blood since yesterday, sometimes blood is bright, soft, sometimes dark, has had blood clots.  Patient denies any purulent sputum.  Patient complains of chest wall pain that occurs with coughing, pain relieved with holding on the chest while coughing, no pain outside of coughing.  Patient reports that she does feel more short of breath.  No fever shakes chills or night sweats, no vomiting or diarrhea.  Patient reports history of lung disease, status post radiation therapy.    MEDICAL HISTORY REVIEW  Reviewed in EPIC    PAST MEDICAL HISTORY  Active Ambulatory Problems     Diagnosis Date Noted    Small vessel disease 11/06/2021    Hyperlipidemia 08/19/2014    Allergic rhinitis 03/18/2019    Arthritis 04/11/2022    Gastroesophageal reflux disease 11/04/2014    Vitamin B12 deficiency 11/04/2014    Fatigue 09/11/2017    Hiatal hernia     Polyp of colon 11/04/2014    Bilateral carotid artery stenosis 04/11/2022    Vitamin D deficiency 04/11/2022    Memory deficit 06/02/2022    Thyromegaly 06/02/2022    Acute non-recurrent sinusitis 06/02/2022    Postmenopause 09/12/2022    Abnormal nuclear stress test 11/15/2022    Nocturnal hypoxia 06/28/2024    Lung nodule 07/01/2024    COPD with acute exacerbation 07/06/2024    Nodule of chest wall 07/06/2024    COPD (chronic obstructive pulmonary disease) 08/15/2024    Anemia 08/26/2024     Skin lesion of left arm 08/26/2024    Postprocedural pneumothorax 09/06/2024    Iatrogenic pneumothorax 09/06/2024    Acute bronchitis 09/17/2024    Cold intolerance 09/17/2024     Resolved Ambulatory Problems     Diagnosis Date Noted    Occlusion of left vertebral artery 11/06/2021    Ataxia 11/06/2021    Vertigo 11/06/2021    BPV (benign positional vertigo) 11/07/2021    TIA (transient ischemic attack) 11/07/2021    Anxiety 04/11/2022    Lancaster's esophagus 09/29/2015    Bunion of left foot 06/14/2016    Chest pain 10/05/2020    Low back pain 11/04/2014    DDD (degenerative disc disease), cervical 11/04/2014    Deformity of wrist joint 05/03/2018    Insomnia 09/11/2017    Melanoma in situ of unspecified lower limb, including hip 11/12/2021    Myalgia 03/02/2020    Osteoarthritis of left knee 06/27/2016    Renal stone 03/02/2020    Spinal stenosis of lumbar region 12/29/2014    Syncope and collapse 04/16/2019    Tear of lateral meniscus of knee 09/30/2014    Tear of medial meniscus of knee 09/30/2014    Unexplained weight loss 04/21/2021    Current smoker 04/11/2022    Other microscopic hematuria 06/02/2022    Stenosis of carotid artery 09/12/2022    Left upper quadrant abdominal pain 01/29/2024    Parainfluenza 06/28/2024    Tobacco use 06/29/2024    Bronchospasm with bronchitis, acute 06/29/2024    Hyperlipidemia 07/06/2024    Leukocytosis 08/05/2024    Pneumothorax 09/06/2024     Past Medical History:   Diagnosis Date    Angina pectoris     Cancer     Carpal tunnel syndrome     Kidney stones        PAST SURGICAL HISTORY  Past Surgical History:   Procedure Laterality Date    APPENDECTOMY      BREAST BIOPSY      multiple    BRONCHOSCOPY WITH ION ROBOTIC ASSIST  9/6/2024    Procedure: BRONCHOSCOPY  WITH ENDOBRONCHIAL ULTRASOUND AND ION ROBOT WITH FNA'S AND BIOPSIES, BAL;  Surgeon: Ant Talbot MD;  Location: [facility] ENDOSCOPY;  Service: Robotics - Pulmonary;;  PRE- RUL LUNG NODULE  POST- SAME    CARDIAC  CATHETERIZATION      CARDIAC CATHETERIZATION N/A 11/29/2022    Procedure: Left Heart Cath;  Surgeon: Brennen Nguyen MD;  Location:  [facility];  Service: Cardiology;  Laterality: N/A;    CARDIAC CATHETERIZATION N/A 11/29/2022    Procedure: Coronary angiography;  Surgeon: Brennen Nguyen MD;  Location:  [facility];  Service: Cardiology;  Laterality: N/A;    CARDIAC CATHETERIZATION N/A 11/29/2022    Procedure: Left ventriculography;  Surgeon: Brennen Nguyen MD;  Location:  [facility];  Service: Cardiology;  Laterality: N/A;    CARPAL TUNNEL RELEASE Bilateral     COLONOSCOPY  05/17/2021    polyp removed; per Dr. Velasquez    CYSTOSCOPY W/ URETERAL STENT PLACEMENT  10/22/2019    HYSTERECTOMY      INGUINAL HERNIA REPAIR Right     KNEE ARTHROSCOPY Right     meniscus    TONSILLECTOMY      UPPER GASTROINTESTINAL ENDOSCOPY  05/17/2021    per Dr. Velasquez       FAMILY HISTORY  Family History   Problem Relation Age of Onset    Emphysema Mother     Heart disease Mother     Heart disease Father     Cancer Brother     Heart disease Brother     Skin cancer Brother     Stomach cancer Maternal Aunt     Breast cancer Maternal Grandmother     Breast cancer Paternal Grandmother        SOCIAL HISTORY  Social History     Socioeconomic History    Marital status:    Tobacco Use    Smoking status: Some Days     Current packs/day: 0.10     Average packs/day: 0.5 packs/day for 68.3 years (33.8 ttl pk-yrs)     Types: Cigarettes     Start date: 1957     Last attempt to quit: 6/28/2024     Passive exposure: Current    Smokeless tobacco: Never    Tobacco comments:     Quit now [place] info provided   Vaping Use    Vaping status: Never Used   Substance and Sexual Activity    Alcohol use: Not Currently     Comment: social--rarely    Drug use: Never    Sexual activity: Defer       ALLERGIES  Amoxicillin-pot clavulanate, Codeine, Penicillins, and Adhesive tape    The  patient's allergies have been reviewed    REVIEW OF SYSTEMS  All systems reviewed and negative except for those discussed in HPI.     PHYSICAL EXAM  I have reviewed the triage vital signs and nursing notes.  ED Triage Vitals   Temp Heart Rate Resp BP SpO2   05/18/25 1316 05/18/25 1316 05/18/25 1316 05/18/25 1317 05/18/25 1316   98 °F (36.7 °C) 93 16 126/73 94 %      Temp src Heart Rate Source Patient Position BP Location FiO2 (%)   -- -- 05/18/25 1317 05/18/25 1317 --     Sitting Right arm        General: No acute distress.  HENT: NCAT, PERRL, Nares patent.  Eyes: no scleral icterus.  Neck: trachea midline, no ROM limitations.  CV: regular rhythm, regular rate.  Respiratory: normal effort, diminished with expiratory wheezing.  Abdomen: soft, nondistended, NTTP, no rebound tenderness, no guarding or rigidity.  Musculoskeletal: no deformity.  Neuro: alert, moves all extremities, follows commands.  Skin: warm, dry.    LAB RESULTS  Recent Results (from the past 24 hours)   ECG 12 Lead Dyspnea    Collection Time: 05/18/25  2:05 PM   Result Value Ref Range    QT Interval 378 ms    QTC Interval 412 ms   Respiratory Panel PCR w/COVID-19(SARS-CoV-2) [facility] In-House, NP Swab in UTM/VTM, 2 HR TAT - Swab, Nasopharynx    Collection Time: 05/18/25  2:15 PM    Specimen: Nasopharynx; Swab   Result Value Ref Range    ADENOVIRUS, PCR Not Detected Not Detected    Coronavirus 229E Not Detected Not Detected    Coronavirus HKU1 Not Detected Not Detected    Coronavirus NL63 Not Detected Not Detected    Coronavirus OC43 Not Detected Not Detected    COVID19 Not Detected Not Detected - Ref. Range    Human Metapneumovirus Not Detected Not Detected    Human Rhinovirus/Enterovirus Not Detected Not Detected    Influenza A PCR Not Detected Not Detected    Influenza B PCR Not Detected Not Detected    Parainfluenza Virus 1 Not Detected Not Detected    Parainfluenza Virus 2 Not Detected Not Detected    Parainfluenza Virus 3 Not  Detected Not Detected    Parainfluenza Virus 4 Not Detected Not Detected    RSV, PCR Not Detected Not Detected    Bordetella pertussis pcr Not Detected Not Detected    Bordetella parapertussis PCR Not Detected Not Detected    Chlamydophila pneumoniae PCR Not Detected Not Detected    Mycoplasma pneumo by PCR Not Detected Not Detected   Protime-INR    Collection Time: 05/18/25  2:22 PM    Specimen: Arm, Right; Blood   Result Value Ref Range    Protime 14.1 11.7 - 14.2 Seconds    INR 1.10 0.90 - 1.10   aPTT    Collection Time: 05/18/25  2:22 PM    Specimen: Arm, Right; Blood   Result Value Ref Range    PTT 32.1 22.7 - 35.4 seconds   Comprehensive Metabolic Panel    Collection Time: 05/18/25  2:22 PM    Specimen: Arm, Right; Blood   Result Value Ref Range    Glucose 102 (H) 65 - 99 mg/dL    BUN 15 8 - 23 mg/dL    Creatinine 0.73 0.57 - 1.00 mg/dL    Sodium 142 136 - 145 mmol/L    Potassium 4.9 3.5 - 5.2 mmol/L    Chloride 107 98 - 107 mmol/L    CO2 28.2 22.0 - 29.0 mmol/L    Calcium 9.4 8.6 - 10.5 mg/dL    Total Protein 6.1 6.0 - 8.5 g/dL    Albumin 3.7 3.5 - 5.2 g/dL    ALT (SGPT) 9 1 - 33 U/L    AST (SGOT) 11 1 - 32 U/L    Alkaline Phosphatase 46 39 - 117 U/L    Total Bilirubin 0.5 0.0 - 1.2 mg/dL    Globulin 2.4 gm/dL    A/G Ratio 1.5 g/dL    BUN/Creatinine Ratio 20.5 7.0 - 25.0    Anion Gap 6.8 5.0 - 15.0 mmol/L    eGFR 83.3 >60.0 mL/min/1.73   High Sensitivity Troponin T    Collection Time: 05/18/25  2:22 PM    Specimen: Arm, Right; Blood   Result Value Ref Range    HS Troponin T 8 <14 ng/L   BNP    Collection Time: 05/18/25  2:22 PM    Specimen: Arm, Right; Blood   Result Value Ref Range    proBNP 278.0 0.0 - 1,800.0 pg/mL   Magnesium    Collection Time: 05/18/25  2:22 PM    Specimen: Arm, Right; Blood   Result Value Ref Range    Magnesium 2.2 1.6 - 2.4 mg/dL   Procalcitonin    Collection Time: 05/18/25  2:22 PM    Specimen: Arm, Right; Blood   Result Value Ref Range    Procalcitonin 0.11 0.00 - 0.25 ng/mL   CBC  Auto Differential    Collection Time: 05/18/25  2:22 PM    Specimen: Arm, Right; Blood   Result Value Ref Range    WBC 5.39 3.40 - 10.80 10*3/mm3    RBC 4.35 3.77 - 5.28 10*6/mm3    Hemoglobin 13.9 12.0 - 15.9 g/dL    Hematocrit 42.3 34.0 - 46.6 %    MCV 97.2 (H) 79.0 - 97.0 fL    MCH 32.0 26.6 - 33.0 pg    MCHC 32.9 31.5 - 35.7 g/dL    RDW 11.8 (L) 12.3 - 15.4 %    RDW-SD 42.8 37.0 - 54.0 fl    MPV 9.4 6.0 - 12.0 fL    Platelets 182 140 - 450 10*3/mm3    Neutrophil % 65.3 42.7 - 76.0 %    Lymphocyte % 21.5 19.6 - 45.3 %    Monocyte % 11.7 5.0 - 12.0 %    Eosinophil % 0.7 0.3 - 6.2 %    Basophil % 0.4 0.0 - 1.5 %    Immature Grans % 0.4 0.0 - 0.5 %    Neutrophils, Absolute 3.52 1.70 - 7.00 10*3/mm3    Lymphocytes, Absolute 1.16 0.70 - 3.10 10*3/mm3    Monocytes, Absolute 0.63 0.10 - 0.90 10*3/mm3    Eosinophils, Absolute 0.04 0.00 - 0.40 10*3/mm3    Basophils, Absolute 0.02 0.00 - 0.20 10*3/mm3    Immature Grans, Absolute 0.02 0.00 - 0.05 10*3/mm3    nRBC 0.0 0.0 - 0.2 /100 WBC   High Sensitivity Troponin T 1Hr    Collection Time: 05/18/25  3:52 PM    Specimen: Arm, Left; Blood   Result Value Ref Range    HS Troponin T 8 <14 ng/L    Troponin T Numeric Delta 0 Abnormal if >/=3 ng/L       I ordered the above labs and reviewed the results.    RADIOLOGY  CT Angiogram Chest Pulmonary Embolism  Result Date: 5/18/2025  CT ANGIOGRAM OF THE CHEST. MULTIPLE CORONAL, SAGITTAL, AND 3-D RECONSTRUCTIONS.  HISTORY: 79-year-old female with hemoptysis. Lung cancer history and advanced emphysema. Evaluate for pulmonary thromboemboli. SBRT to suspicious right apical pulmonary nodule.  TECHNIQUE: Radiation dose reduction techniques were utilized, including automated exposure control and exposure modulation based on body size. CT angiogram of the chest was performed with 2mm images following the administration of IV contrast. Multiple coronal, sagittal, and 3-D reconstruction images were obtained. Compared with noncontrasted chest CT  02/24/2025.  FINDINGS: 1. There is no convincing evidence for acute pulmonary thromboemboli.  2. There is a new dense airspace consolidation throughout a significant portion of the medial aspect of the right middle lobe and there are new ill-defined opacities at both lung bases and a ground-glass opacity at the central aspect of the right lower lobe. Acute infectious infiltrates are suspected, largest and most dense at the right middle lobe. Follow-up is recommended.  The tiny residual right apical pulmonary nodule status post SBRT is poorly seen secondary to streak artifact from the right upper extremity injection and also due to breathing artifact. A standard breath recommended for conservative surveillance of the tiny remaining right apical opacity. Stable 1 cm left lower lobe opacity, image 107.  Follow-up noncontrast chest CT is recommended in 2 months.  3. There are no pleural effusions. Stable minimal pericardial effusion. Stable mediastinal nodes.       XR Chest 1 View  Result Date: 5/18/2025  XR CHEST 1 VW-  HISTORY: Female who is 79 years-old, short of breath  TECHNIQUE: Frontal view of the chest  COMPARISON: 11/16/2024  FINDINGS: The heart size is borderline. Aorta is calcified. Pulmonary vasculature is unremarkable. No focal pulmonary consolidation, pleural effusion, or pneumothorax. No acute osseous process.      No focal pulmonary consolidation. Borderline heart size. Follow-up as clinical indications persist.  This report was finalized on 5/18/2025 2:08 PM by Dr. Jonathan Wolfe M.D on Workstation: [id number]        I ordered the above noted radiological studies. I reviewed the images and results. I agree with the radiologist interpretation.    PROCEDURES  Procedures    MEDICATIONS GIVEN IN ER  Medications   cefTRIAXone (ROCEPHIN) 1,000 mg in sodium chloride 0.9 % 100 mL MBP (has no administration in time range)   azithromycin (ZITHROMAX) 500 mg in sodium chloride 0.9 % 250 mL IVPB-VTB (has no  administration in time range)   predniSONE (DELTASONE) tablet 60 mg (has no administration in time range)   ipratropium-albuterol (DUO-NEB) nebulizer solution 3 mL (has no administration in time range)   albuterol (PROVENTIL) nebulizer solution 0.083% 2.5 mg/3mL (has no administration in time range)   magnesium sulfate 2g/50 mL (PREMIX) infusion (2 g Intravenous New Bag 5/18/25 1537)   ipratropium-albuterol (DUO-NEB) nebulizer solution 3 mL (3 mL Nebulization Given 5/18/25 1508)   albuterol (PROVENTIL) nebulizer solution 0.083% 2.5 mg/3mL (2.5 mg Nebulization Given 5/18/25 1505)   iopamidol (ISOVUE-370) 76 % injection 100 mL (95 mL Intravenous Given by Other 5/18/25 1611)       PROGRESS, DATA ANALYSIS, CONSULTS, AND MEDICAL DECISION MAKING  A complete history and physical exam have been performed.  All available laboratory and imaging results have been reviewed by myself prior to disposition.    MDM    After the initial H&P, I discussed pertinent information from history and physical exam with patient/family.  Discussed differential diagnosis.  Discussed plan for ED evaluation/workup/treatment.  All questions answered.  Patient/family is agreeable with plan.  ED Course as of 05/18/25 1703   Sun May 18, 2025   1430 My differential diagnosis for dyspnea includes but is not limited to:  Asthma, COPD, pneumonia, pulmonary embolus, acute respiratory distress syndrome, pneumothorax, pleural effusion, pulmonary fibrosis, congestive heart failure, myocardial infarction, DKA, uremia, acidosis, sepsis, anemia, drug related, hyperventilation, CNS disease     [JG]   1430 EKG independently viewed and contemporaneously interpreted by ED physician. Time: 1405.  Rate 71.  Interpretation: Normal sinus rhythm, normal axis, left atrial enlargement, normal QRS, no acute ST changes. [JG]   1440 Reviewed chest x-ray in PACS, no pulmonary infiltrates per my read. [JG]   1442 Review of prior external notes (non-ED) -and- Review of prior  external test results outside of this encounter:   I reviewed patient's discharge summary from September 2024, patient underwent robotic bronchoscopy and afterwards suffered iatrogenic pneumothorax and was admitted after chest tube placement.  I reviewed patient's CT chest imaging from February this year, decreased size and nodule of anterior right lung apex with stable 9 mm left lower lobe nodule. [JG]   1630 Repeat lung exam performed, patient continues to be diminished with expiratory wheezing on repeat lung examination, giving additional breathing treatment. [JG]   1645 I reviewed CT angiogram in PACS, no pulmonary embolism per my read. [JG]   1645 CT angiogram shows no sign of pulmonary embolism but patient does have dense consolidation in right middle lobe as well as opacities that  bilateral lung bases.  Imaging findings concerning for pneumonia.  Started on ceftriaxone and azithromycin, plan for admission for further evaluation and treatment.  Obtain blood cultures and lactic acid. [JG]   1650 Patient reassessed.  Discussed ED findings, differential diagnosis, and the need for admission for evaluation/treatment.  They are agreeable to admission and all questions were answered.     [JG]   1701 Phone call with TOMMY Burkett.  Discussed the patient, relevant history, exam, diagnostics, ED findings/progress, and concerns. They agree to admit the patient to telemetry. Care assumed by the admitting physician at this time.     [JG]      ED Course User Index  [JG] Mateus Andrade MD       AS OF 17:03 EDT VITALS:    BP - 126/66  HR - 91  TEMP - 98 °F (36.7 °C)  O2 SATS - 93%    DIAGNOSIS  Final diagnoses:   Pneumonia of both lungs due to infectious organism, unspecified part of lung   Hemoptysis   Acute exacerbation of chronic obstructive pulmonary disease (COPD)   Malignant neoplasm of lung, unspecified laterality, unspecified part of lung     Critical care:  Total critical care time of 50 minutes is exclusive of  any other billable procedures and includes time spent with direct patient care and observation, retrospective chart review, management of acute condition, and consultation with other physicians.      DISPOSITION  ADMISSION    Discussed treatment plan and reason for admission with pt/family and admitting physician.  Pt/family voiced understanding of the plan for admission for further testing/treatment as needed.        Mateus Andrade MD  05/18/25 0029

## 2025-05-18 NOTE — H&P
Internal medicine history and physical  INTERNAL MEDICINE   [facility]       Patient Identification:  Name: Brigid Carballo  Age: 79 y.o.  Sex: female  :  1945  MRN: [id number]                   Primary Care Physician: Kaya Mckeon, SHE                               Date of admission:2025    Chief Complaint: Progressive fatigue and weakness since Thursday and started coughing up blood yesterday.    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  Her symptom of fatigue and weakness started Thursday.  She felt tired and fatigued and weak despite resting and later on Saturday started coughing up blood.  She does admit to have cold spells but denies any night sweats.  She does not have good appetite.  She has discomfort in her right chest.  Workup in the emergency room included CT scan of the chest PE protocol which showed no convincing evidence of pulmonary embolism but she was noted to have new dense airspace consolidation throughout the significant portion of right lung consistent with pneumonia.  The lung nodule for which she is getting radiation treatment has shrunk significantly.  Patient has been started on Rocephin and Zithromax and is being admitted for further care.  She is also noted to have possibility of COPD exacerbation and received a dose of prednisone and nebulizer treatment with improvement in her symptoms.  Blood cultures were drawn and respiratory viral panel was noted to be negative.      Past Medical  History:  Past Medical History:   Diagnosis Date    Angina pectoris     Anxiety     Lancaster's esophagus 09/29/2015    BPV (benign positional vertigo) 11/07/2021    Bunion of left foot 06/14/2016    Cancer     skin    Carpal tunnel syndrome     Chest pain 10/05/2020    DDD (degenerative disc disease), cervical 11/04/2014    Deformity of wrist joint 05/03/2018    Hyperlipidemia     Insomnia 09/11/2017    Kidney stones     Low back pain 11/04/2014    Melanoma in situ of unspecified lower limb, including hip 06/2021    Occlusion of left vertebral artery 11/06/2021    1/10/22 CTA neck: beyond its origin, left vertebral artery is widely patent throughout its cervical and intracranial course to the vertebrobasilar junction without stenosis    Osteoarthritis of left knee 06/27/2016    Renal stone 03/02/2020    Spinal stenosis of lumbar region 12/29/2014    Tear of lateral meniscus of knee 09/30/2014    Tobacco use 06/29/2024    Unexplained weight loss 04/21/2021     Past Surgical History:  Past Surgical History:   Procedure Laterality Date    APPENDECTOMY      BREAST BIOPSY      multiple    BRONCHOSCOPY WITH ION ROBOTIC ASSIST  9/6/2024    Procedure: BRONCHOSCOPY  WITH ENDOBRONCHIAL ULTRASOUND AND ION ROBOT WITH FNA'S AND BIOPSIES, BAL;  Surgeon: Ant Talbot MD;  Location: [facility] ENDOSCOPY;  Service: Robotics - Pulmonary;;  PRE- RUL LUNG NODULE  POST- SAME    CARDIAC CATHETERIZATION      CARDIAC CATHETERIZATION N/A 11/29/2022    Procedure: Left Heart Cath;  Surgeon: Brennen Nguyen MD;  Location: [facility] CATH INVASIVE LOCATION;  Service: Cardiology;  Laterality: N/A;    CARDIAC CATHETERIZATION N/A 11/29/2022    Procedure: Coronary angiography;  Surgeon: Brennen Nguyen MD;  Location: [facility] CATH INVASIVE LOCATION;  Service: Cardiology;  Laterality: N/A;    CARDIAC CATHETERIZATION N/A 11/29/2022    Procedure: Left ventriculography;  Surgeon: Brennen Nguyen MD;  Location: [facility] CATH INVASIVE LOCATION;   Service: Cardiology;  Laterality: N/A;    CARPAL TUNNEL RELEASE Bilateral     COLONOSCOPY  05/17/2021    polyp removed; per Dr. Velasquez    CYSTOSCOPY W/ URETERAL STENT PLACEMENT  10/22/2019    HYSTERECTOMY      INGUINAL HERNIA REPAIR Right     KNEE ARTHROSCOPY Right     meniscus    TONSILLECTOMY      UPPER GASTROINTESTINAL ENDOSCOPY  05/17/2021    per Dr. Velasquez      Home Meds:  Medications Prior to Admission   Medication Sig Dispense Refill Last Dose/Taking    albuterol sulfate  (90 Base) MCG/ACT inhaler Inhale 2 puffs Every 4 (Four) Hours As Needed for Wheezing. (Patient not taking: Reported on 3/12/2025) 30 g 3     ascorbic acid (VITAMIN C) 1000 MG tablet Take 1 tablet by mouth Daily.       aspirin 81 MG EC tablet Take 1 tablet by mouth Daily. 90 tablet 0     atorvastatin (LIPITOR) 40 MG tablet Take 1 tablet by mouth Every Night. 90 tablet 1     cholecalciferol (VITAMIN D3) 25 MCG (1000 UT) tablet Take 2 tablets by mouth Daily.       esomeprazole (nexIUM) 20 MG capsule Take 1 capsule by mouth Every Morning Before Breakfast.       Fluticasone-Salmeterol (ADVAIR/WIXELA) 250-50 MCG/ACT DISKUS Inhale 1 puff 2 (Two) Times a Day. (Patient not taking: Reported on 3/12/2025) 60 each 0     tiotropium bromide monohydrate (Spiriva Respimat) 2.5 MCG/ACT aerosol solution inhaler Inhale 2 puffs Daily. (Patient not taking: Reported on 3/12/2025) 4 g 0     vitamin B-12 (CYANOCOBALAMIN) 1000 MCG tablet Take 1 tablet by mouth Daily.        Current Meds:     Current Facility-Administered Medications:     azithromycin (ZITHROMAX) 500 mg in sodium chloride 0.9 % 250 mL IVPB-VTB, 500 mg, Intravenous, Once, Mateus Andrade MD  Allergies:  Allergies   Allergen Reactions    Amoxicillin-Pot Clavulanate Itching    Codeine Itching    Penicillins Unknown - High Severity    Adhesive Tape Rash     Skin redness     Social History:   Social History     Tobacco Use    Smoking status: Some Days     Current packs/day: 0.10     Average  "packs/day: 0.5 packs/day for 68.3 years (33.8 ttl pk-yrs)     Types: Cigarettes     Start date: 1957     Last attempt to quit: 6/28/2024     Passive exposure: Current    Smokeless tobacco: Never    Tobacco comments:     Quit now [place] info provided   Substance Use Topics    Alcohol use: Not Currently     Comment: social--rarely      Family History:  Family History   Problem Relation Age of Onset    Emphysema Mother     Heart disease Mother     Heart disease Father     Cancer Brother     Heart disease Brother     Skin cancer Brother     Stomach cancer Maternal Aunt     Breast cancer Maternal Grandmother     Breast cancer Paternal Grandmother           Review of Systems  See history of present illness and past medical history.        Vitals:   /59 (BP Location: Left arm, Patient Position: Lying)   Pulse 88   Temp 97.7 °F (36.5 °C) (Oral)   Resp 18   Ht 154.9 cm (61\")   Wt 62.7 kg (138 lb 3.7 oz)   SpO2 97%   BMI 26.12 kg/m²   I/O: No intake or output data in the 24 hours ending 05/18/25 1926  Exam:  Patient is examined using the personal protective equipment as per guidelines from infection control for this particular patient as enacted.  Hand washing was performed before and after patient interaction.  General Appearance:    Alert, cooperative, no distress, appears stated age, pleasant female does not appear to be in any acute distress   Head:    Normocephalic, without obvious abnormality, atraumatic   Eyes:    PERRL, conjunctiva/corneas clear, EOM's intact, both eyes   Ears:    Normal external ear canals, both ears   Nose:   Nares normal, septum midline, mucosa normal, no drainage    or sinus tenderness   Throat:   Lips, tongue, gums normal; oral mucosa pink and moist   Neck:   Supple   Back:     Symmetric, no curvature, ROM normal, no CVA tenderness   Lungs:   Decreased breath sounds in the right lung base but no obvious use of accessory muscles of breathing noted   Chest Wall:    No tenderness or " deformity    Heart:  S1-S2 regular   Abdomen:   Soft nontender   Extremities: Trace edema   Pulses:   Pulses palpable in all extremities; symmetric all extremities   Skin:   S no rash noted   Neurologic:   CNII-XII intact, motor strength grossly intact, sensation grossly intact to light touch, no focal deficits noted       Data Review:      I reviewed the patient's new clinical results.  Results from last 7 days   Lab Units 05/18/25  1422   WBC 10*3/mm3 5.39   HEMOGLOBIN g/dL 13.9   PLATELETS 10*3/mm3 182     Results from last 7 days   Lab Units 05/18/25  1422   SODIUM mmol/L 142   POTASSIUM mmol/L 4.9   CHLORIDE mmol/L 107   CO2 mmol/L 28.2   BUN mg/dL 15   CREATININE mg/dL 0.73   CALCIUM mg/dL 9.4   GLUCOSE mg/dL 102*     XR Chest 1 View  Result Date: 5/18/2025  No focal pulmonary consolidation. Borderline heart size. Follow-up as clinical indications persist.  This report was finalized on 5/18/2025 2:08 PM by Dr. Jonathan Wolfe M.D on Workstation: [facility]      Microbiology Results (last 10 days)       Procedure Component Value - Date/Time    Respiratory Panel PCR w/COVID-19(SARS-CoV-2) CHARLI/GLORIA/PRAFUL/PAD/COR/RAMESH In-House, NP Swab in UTM/VTM, 2 HR TAT - Swab, Nasopharynx [[id number]]  (Normal) Collected: 05/18/25 1415    Lab Status: Final result Specimen: Swab from Nasopharynx Updated: 05/18/25 1542     ADENOVIRUS, PCR Not Detected     Coronavirus 229E Not Detected     Coronavirus HKU1 Not Detected     Coronavirus NL63 Not Detected     Coronavirus OC43 Not Detected     COVID19 Not Detected     Human Metapneumovirus Not Detected     Human Rhinovirus/Enterovirus Not Detected     Influenza A PCR Not Detected     Influenza B PCR Not Detected     Parainfluenza Virus 1 Not Detected     Parainfluenza Virus 2 Not Detected     Parainfluenza Virus 3 Not Detected     Parainfluenza Virus 4 Not Detected     RSV, PCR Not Detected     Bordetella pertussis pcr Not Detected     Bordetella parapertussis PCR Not Detected      Chlamydophila pneumoniae PCR Not Detected     Mycoplasma pneumo by PCR Not Detected    Narrative:      In the setting of a positive respiratory panel with a viral infection PLUS a negative procalcitonin without other underlying concern for bacterial infection, consider observing off antibiotics or discontinuation of antibiotics and continue supportive care. If the respiratory panel is positive for atypical bacterial infection (Bordetella pertussis, Chlamydophila pneumoniae, or Mycoplasma pneumoniae), consider antibiotic de-escalation to target atypical bacterial infection.              Assessment:  Active Hospital Problems    Diagnosis  POA    **Pneumonia [J18.9]  Yes    History of radiation therapy [Z92.3]  Not Applicable    Hemoptysis [R04.2]  Unknown    COPD (chronic obstructive pulmonary disease) [J44.9]  Yes    Lung nodule [R91.1]  Yes    Gastroesophageal reflux disease [K21.9]  Yes       Medical decision making/care plan: See admitting orders  Right-sided pneumonia-presenting with right-sided chest wall discomfort along with hemoptysis, generalized progressive fatigue and subjective sensation of chills-plan is to admit the patient follow-up on blood cultures and urine Legionella and pneumococcal antigen and continue with IV Rocephin and Zithromax.  Provide with oxygen supplementation as needed and provided with continuous pulse ox monitoring.  COPD-continue with nebulizer treatment continuous oxygen monitoring and as needed oxygen supplementation.  History of lung nodule with suspected malignancy status post radiation treatment with improvement  Gastroesophageal reflux disease-continue her home regimen and monitor.    Nicolasa Estrada MD   5/18/2025  19:26 EDT    Parts of this note may be an electronic transcription/translation of spoken language to printed text using the Dragon dictation system.

## 2025-05-18 NOTE — ED NOTES
Nursing report ED to floor  Brigid Carballo  79 y.o.  female    HPI :  HPI  Stated Reason for Visit: From home with coughing up blood, chest pain when coughing.  hx lung ca  History Obtained From: patient    Chief Complaint  Chief Complaint   Patient presents with    Coughing Up Blood       Admitting doctor:   Nicolasa Estrada MD    Admitting diagnosis:   The primary encounter diagnosis was Pneumonia of both lungs due to infectious organism, unspecified part of lung. Diagnoses of Hemoptysis, Acute exacerbation of chronic obstructive pulmonary disease (COPD), and Malignant neoplasm of lung, unspecified laterality, unspecified part of lung were also pertinent to this visit.    Code status:   Current Code Status       Date Active Code Status Order ID Comments User Context       Prior            Allergies:   Amoxicillin-pot clavulanate, Codeine, Penicillins, and Adhesive tape    Isolation:   No active isolations    Intake and Output  No intake or output data in the 24 hours ending 05/18/25 1808    Weight:   There were no vitals filed for this visit.    Most recent vitals:   Vitals:    05/18/25 1734 05/18/25 1753 05/18/25 1757 05/18/25 1801   BP:    113/69   BP Location:       Patient Position:       Pulse: 81 89 78 82   Resp: 18 18 18   Temp:       SpO2: 100% 91% (!) 89% 97%       Active LDAs/IV Access:   Lines, Drains & Airways       Active LDAs       Name Placement date Placement time Site Days    Peripheral IV 05/18/25 1422 20 G Right Antecubital 05/18/25  1422  Antecubital  less than 1                    Labs (abnormal labs have a star):   Labs Reviewed   COMPREHENSIVE METABOLIC PANEL - Abnormal; Notable for the following components:       Result Value    Glucose 102 (*)     All other components within normal limits    Narrative:     GFR Categories in Chronic Kidney Disease (CKD)              GFR Category          GFR (mL/min/1.73)    Interpretation  G1                    90 or greater        Normal or high (1)  G2                     60-89                Mild decrease (1)  G3a                   45-59                Mild to moderate decrease  G3b                   30-44                Moderate to severe decrease  G4                    15-29                Severe decrease  G5                    14 or less           Kidney failure    (1)In the absence of evidence of kidney disease, neither GFR category G1 or G2 fulfill the criteria for CKD.    eGFR calculation 2021 CKD-EPI creatinine equation, which does not include race as a factor   CBC WITH AUTO DIFFERENTIAL - Abnormal; Notable for the following components:    MCV 97.2 (*)     RDW 11.8 (*)     All other components within normal limits   LACTIC ACID, PLASMA - Abnormal; Notable for the following components:    Lactate 2.6 (*)     All other components within normal limits   RESPIRATORY PANEL PCR W/ COVID-19 (SARS-COV-2), NP SWAB IN UTM/VTP, 2 HR TAT - Normal    Narrative:     In the setting of a positive respiratory panel with a viral infection PLUS a negative procalcitonin without other underlying concern for bacterial infection, consider observing off antibiotics or discontinuation of antibiotics and continue supportive care. If the respiratory panel is positive for atypical bacterial infection (Bordetella pertussis, Chlamydophila pneumoniae, or Mycoplasma pneumoniae), consider antibiotic de-escalation to target atypical bacterial infection.   PROTIME-INR - Normal   APTT - Normal   TROPONIN - Normal    Narrative:     High Sensitive Troponin T Reference Range:  <14.0 ng/L- Negative Female for AMI  <22.0 ng/L- Negative Male for AMI  >=14 - Abnormal Female indicating possible myocardial injury.  >=22 - Abnormal Male indicating possible myocardial injury.   Clinicians would have to utilize clinical acumen, EKG, Troponin, and serial changes to determine if it is an Acute Myocardial Infarction or myocardial injury due to an underlying chronic condition.        BNP (IN-HOUSE) - Normal     "Narrative:     This assay is used as an aid in the diagnosis of individuals suspected of having heart failure. It can be used as an aid in the diagnosis of acute decompensated heart failure (ADHF) in patients presenting with signs and symptoms of ADHF to the emergency department (ED). In addition, NT-proBNP of <300 pg/mL indicates ADHF is not likely.    Age Range Result Interpretation  NT-proBNP Concentration (pg/mL:      <50             Positive            >450                   Gray                 300-450                    Negative             <300    50-75           Positive            >900                  Gray                300-900                  Negative            <300      >75             Positive            >1800                  Gray                300-1800                  Negative            <300   MAGNESIUM - Normal   PROCALCITONIN - Normal    Narrative:     As a Marker for Sepsis (Non-Neonates):    1. <0.5 ng/mL represents a low risk of severe sepsis and/or septic shock.  2. >2 ng/mL represents a high risk of severe sepsis and/or septic shock.    As a Marker for Lower Respiratory Tract Infections that require antibiotic therapy:    PCT on Admission    Antibiotic Therapy       6-12 Hrs later    >0.5                Strongly Recommended  >0.25 - <0.5        Recommended   0.1 - 0.25          Discouraged              Remeasure/reassess PCT  <0.1                Strongly Discouraged     Remeasure/reassess PCT    As 28 day mortality risk marker: \"Change in Procalcitonin Result\" (>80% or <=80%) if Day 0 (or Day 1) and Day 4 values are available. Refer to http://www.[email or web address]-pct-calculator.com    Change in PCT <=80%  A decrease of PCT levels below or equal to 80% defines a positive change in PCT test result representing a higher risk for 28-day all-cause mortality of patients diagnosed with severe sepsis for septic shock.    Change in PCT >80%  A decrease of PCT levels of more than 80% defines a negative " change in PCT result representing a lower risk for 28-day all-cause mortality of patients diagnosed with severe sepsis or septic shock.      HIGH SENSITIVITIY TROPONIN T 1HR - Normal    Narrative:     High Sensitive Troponin T Reference Range:  <14.0 ng/L- Negative Female for AMI  <22.0 ng/L- Negative Male for AMI  >=14 - Abnormal Female indicating possible myocardial injury.  >=22 - Abnormal Male indicating possible myocardial injury.   Clinicians would have to utilize clinical acumen, EKG, Troponin, and serial changes to determine if it is an Acute Myocardial Infarction or myocardial injury due to an underlying chronic condition.        BLOOD CULTURE   BLOOD CULTURE   LACTIC ACID, REFLEX   CBC AND DIFFERENTIAL    Narrative:     The following orders were created for panel order CBC & Differential.  Procedure                               Abnormality         Status                     ---------                               -----------         ------                     CBC Auto Differential[[id number]]        Abnormal            Final result                 Please view results for these tests on the individual orders.       EKG:   ECG 12 Lead Dyspnea   Preliminary Result   HEART RATE=71  bpm   RR Ejbwnict=265  ms   NJ Urassdpz=386  ms   P Horizontal Axis=-4  deg   P Front Axis=68  deg   QRSD Interval=85  ms   QT Ycmlkftk=787  ms   RLmW=480  ms   QRS Axis=77  deg   T Wave Axis=34  deg   - BORDERLINE ECG -   Sinus rhythm   Probable left atrial enlargement   Date and Time of Study:2025-05-18 14:05:06          Meds given in ED:   Medications   cefTRIAXone (ROCEPHIN) 1,000 mg in sodium chloride 0.9 % 100 mL MBP (1,000 mg Intravenous New Bag 5/18/25 1758)   azithromycin (ZITHROMAX) 500 mg in sodium chloride 0.9 % 250 mL IVPB-VTB (has no administration in time range)   magnesium sulfate 2g/50 mL (PREMIX) infusion (0 g Intravenous Stopped 5/18/25 1600)   ipratropium-albuterol (DUO-NEB) nebulizer solution 3 mL (3 mL  Nebulization Given 5/18/25 1508)   albuterol (PROVENTIL) nebulizer solution 0.083% 2.5 mg/3mL (2.5 mg Nebulization Given 5/18/25 1505)   iopamidol (ISOVUE-370) 76 % injection 100 mL (95 mL Intravenous Given by Other 5/18/25 1611)   predniSONE (DELTASONE) tablet 60 mg (60 mg Oral Given 5/18/25 1756)   ipratropium-albuterol (DUO-NEB) nebulizer solution 3 mL (3 mL Nebulization Given 5/18/25 1729)   albuterol (PROVENTIL) nebulizer solution 0.083% 2.5 mg/3mL (2.5 mg Nebulization Given 5/18/25 1726)       Imaging results:  XR Chest 1 View  Result Date: 5/18/2025  No focal pulmonary consolidation. Borderline heart size. Follow-up as clinical indications persist.  This report was finalized on 5/18/2025 2:08 PM by Dr. Jonathan Wolfe M.D on Workstation: [employer]        Ambulatory status:   - Independent    Social issues:   Social History     Socioeconomic History    Marital status:    Tobacco Use    Smoking status: Some Days     Current packs/day: 0.10     Average packs/day: 0.5 packs/day for 68.3 years (33.8 ttl pk-yrs)     Types: Cigarettes     Start date: 1957     Last attempt to quit: 6/28/2024     Passive exposure: Current    Smokeless tobacco: Never    Tobacco comments:     Quit now [place] info provided   Vaping Use    Vaping status: Never Used   Substance and Sexual Activity    Alcohol use: Not Currently     Comment: social--rarely    Drug use: Never    Sexual activity: Defer       Peripheral Neurovascular  Peripheral Neurovascular (Adult)  Peripheral Neurovascular WDL: WDL    Neuro Cognitive  Neuro Cognitive (Adult)  Cognitive/Neuro/Behavioral WDL: WDL    Learning  Learning Assessment  Learning Readiness and Ability: no barriers identified    Respiratory  Respiratory WDL  Respiratory WDL: .WDL except, cough  Cough Frequency: infrequent  Cough Type: productive  Breath Sounds  All Lung Fields Breath Sounds: All Fields  All Lung Fields Breath Sounds: Anterior:, wheezes, expiratory  Breath Sounds  Post-Respiratory Treatment  Breath Sounds Posttreatment All Fields: All Fields  Breath Sounds Posttreatment All Fields: aeration increased, coarse    Abdominal Pain       Pain Assessments  Pain (Adult)  (0-10) Pain Rating: Rest: 0    NIH Stroke Scale       Padmini Fulton RN  05/18/25 18:08 EDT

## 2025-05-18 NOTE — ED NOTES
"Patient states she started coughing up blood yesterday. Patient states at times it is blood tinged sputum and at other times it is chunks of blood. Patient states she is being treated for lung cancer in her right lung and she is having pain in that area today. Patient denies having fevers but states, \"I get a lot of cold spells.\"   "

## 2025-05-19 LAB
ALBUMIN SERPL-MCNC: 3.4 G/DL (ref 3.5–5.2)
ALBUMIN/GLOB SERPL: 1.4 G/DL
ALP SERPL-CCNC: 42 U/L (ref 39–117)
ALT SERPL W P-5'-P-CCNC: 9 U/L (ref 1–33)
ANION GAP SERPL CALCULATED.3IONS-SCNC: 8 MMOL/L (ref 5–15)
AST SERPL-CCNC: 12 U/L (ref 1–32)
BASOPHILS # BLD AUTO: 0.01 10*3/MM3 (ref 0–0.2)
BASOPHILS NFR BLD AUTO: 0.3 % (ref 0–1.5)
BILIRUB SERPL-MCNC: 0.3 MG/DL (ref 0–1.2)
BUN SERPL-MCNC: 12 MG/DL (ref 8–23)
BUN/CREAT SERPL: 18.8 (ref 7–25)
CALCIUM SPEC-SCNC: 9 MG/DL (ref 8.6–10.5)
CHLORIDE SERPL-SCNC: 103 MMOL/L (ref 98–107)
CO2 SERPL-SCNC: 26 MMOL/L (ref 22–29)
CREAT SERPL-MCNC: 0.64 MG/DL (ref 0.57–1)
D-LACTATE SERPL-SCNC: 0.9 MMOL/L (ref 0.5–2)
DEPRECATED RDW RBC AUTO: 41.8 FL (ref 37–54)
EGFRCR SERPLBLD CKD-EPI 2021: 89.5 ML/MIN/1.73
EOSINOPHIL # BLD AUTO: 0 10*3/MM3 (ref 0–0.4)
EOSINOPHIL NFR BLD AUTO: 0 % (ref 0.3–6.2)
ERYTHROCYTE [DISTWIDTH] IN BLOOD BY AUTOMATED COUNT: 11.8 % (ref 12.3–15.4)
GLOBULIN UR ELPH-MCNC: 2.4 GM/DL
GLUCOSE SERPL-MCNC: 118 MG/DL (ref 65–99)
HCT VFR BLD AUTO: 39 % (ref 34–46.6)
HGB BLD-MCNC: 12.7 G/DL (ref 12–15.9)
IMM GRANULOCYTES # BLD AUTO: 0.02 10*3/MM3 (ref 0–0.05)
IMM GRANULOCYTES NFR BLD AUTO: 0.5 % (ref 0–0.5)
L PNEUMO1 AG UR QL IA: NEGATIVE
LYMPHOCYTES # BLD AUTO: 0.5 10*3/MM3 (ref 0.7–3.1)
LYMPHOCYTES NFR BLD AUTO: 13.7 % (ref 19.6–45.3)
MCH RBC QN AUTO: 31.4 PG (ref 26.6–33)
MCHC RBC AUTO-ENTMCNC: 32.6 G/DL (ref 31.5–35.7)
MCV RBC AUTO: 96.3 FL (ref 79–97)
MONOCYTES # BLD AUTO: 0.11 10*3/MM3 (ref 0.1–0.9)
MONOCYTES NFR BLD AUTO: 3 % (ref 5–12)
NEUTROPHILS NFR BLD AUTO: 3.01 10*3/MM3 (ref 1.7–7)
NEUTROPHILS NFR BLD AUTO: 82.5 % (ref 42.7–76)
NRBC BLD AUTO-RTO: 0 /100 WBC (ref 0–0.2)
PLATELET # BLD AUTO: 201 10*3/MM3 (ref 140–450)
PMV BLD AUTO: 10 FL (ref 6–12)
POTASSIUM SERPL-SCNC: 4.1 MMOL/L (ref 3.5–5.2)
PROT SERPL-MCNC: 5.8 G/DL (ref 6–8.5)
RBC # BLD AUTO: 4.05 10*6/MM3 (ref 3.77–5.28)
S PNEUM AG SPEC QL LA: NEGATIVE
SODIUM SERPL-SCNC: 137 MMOL/L (ref 136–145)
WBC NRBC COR # BLD AUTO: 3.65 10*3/MM3 (ref 3.4–10.8)

## 2025-05-19 PROCEDURE — 87449 NOS EACH ORGANISM AG IA: CPT | Performed by: INTERNAL MEDICINE

## 2025-05-19 PROCEDURE — 87899 AGENT NOS ASSAY W/OPTIC: CPT | Performed by: INTERNAL MEDICINE

## 2025-05-19 PROCEDURE — 94799 UNLISTED PULMONARY SVC/PX: CPT

## 2025-05-19 PROCEDURE — 85025 COMPLETE CBC W/AUTO DIFF WBC: CPT | Performed by: INTERNAL MEDICINE

## 2025-05-19 PROCEDURE — 94760 N-INVAS EAR/PLS OXIMETRY 1: CPT

## 2025-05-19 PROCEDURE — 97161 PT EVAL LOW COMPLEX 20 MIN: CPT

## 2025-05-19 PROCEDURE — 25010000002 CEFTRIAXONE PER 250 MG: Performed by: INTERNAL MEDICINE

## 2025-05-19 PROCEDURE — 83605 ASSAY OF LACTIC ACID: CPT | Performed by: INTERNAL MEDICINE

## 2025-05-19 PROCEDURE — 25010000002 ENOXAPARIN PER 10 MG: Performed by: INTERNAL MEDICINE

## 2025-05-19 PROCEDURE — 80053 COMPREHEN METABOLIC PANEL: CPT | Performed by: INTERNAL MEDICINE

## 2025-05-19 PROCEDURE — 92610 EVALUATE SWALLOWING FUNCTION: CPT

## 2025-05-19 PROCEDURE — 94761 N-INVAS EAR/PLS OXIMETRY MLT: CPT

## 2025-05-19 PROCEDURE — 94664 DEMO&/EVAL PT USE INHALER: CPT

## 2025-05-19 RX ORDER — ENOXAPARIN SODIUM 100 MG/ML
40 INJECTION SUBCUTANEOUS EVERY 24 HOURS
Status: DISCONTINUED | OUTPATIENT
Start: 2025-05-19 | End: 2025-05-22 | Stop reason: HOSPADM

## 2025-05-19 RX ORDER — GUAIFENESIN 600 MG/1
1200 TABLET, EXTENDED RELEASE ORAL EVERY 12 HOURS SCHEDULED
Status: DISCONTINUED | OUTPATIENT
Start: 2025-05-19 | End: 2025-05-22 | Stop reason: HOSPADM

## 2025-05-19 RX ORDER — IPRATROPIUM BROMIDE AND ALBUTEROL SULFATE 2.5; .5 MG/3ML; MG/3ML
3 SOLUTION RESPIRATORY (INHALATION)
Status: DISCONTINUED | OUTPATIENT
Start: 2025-05-19 | End: 2025-05-22 | Stop reason: HOSPADM

## 2025-05-19 RX ADMIN — CEFTRIAXONE SODIUM 1000 MG: 1 INJECTION, POWDER, FOR SOLUTION INTRAMUSCULAR; INTRAVENOUS at 16:55

## 2025-05-19 RX ADMIN — IPRATROPIUM BROMIDE AND ALBUTEROL SULFATE 3 ML: .5; 3 SOLUTION RESPIRATORY (INHALATION) at 19:50

## 2025-05-19 RX ADMIN — GUAIFENESIN 1200 MG: 600 TABLET, EXTENDED RELEASE ORAL at 20:44

## 2025-05-19 RX ADMIN — OXYCODONE HYDROCHLORIDE AND ACETAMINOPHEN 1000 MG: 500 TABLET ORAL at 08:58

## 2025-05-19 RX ADMIN — IPRATROPIUM BROMIDE AND ALBUTEROL SULFATE 3 ML: .5; 3 SOLUTION RESPIRATORY (INHALATION) at 23:16

## 2025-05-19 RX ADMIN — Medication 10 ML: at 23:18

## 2025-05-19 RX ADMIN — ALBUTEROL SULFATE 2.5 MG: 2.5 SOLUTION RESPIRATORY (INHALATION) at 10:27

## 2025-05-19 RX ADMIN — Medication 10 ML: at 09:00

## 2025-05-19 RX ADMIN — ATORVASTATIN CALCIUM 40 MG: 20 TABLET, FILM COATED ORAL at 20:44

## 2025-05-19 RX ADMIN — PANTOPRAZOLE SODIUM 40 MG: 40 TABLET, DELAYED RELEASE ORAL at 06:20

## 2025-05-19 RX ADMIN — ALBUTEROL SULFATE 2.5 MG: 2.5 SOLUTION RESPIRATORY (INHALATION) at 07:09

## 2025-05-19 RX ADMIN — IPRATROPIUM BROMIDE AND ALBUTEROL SULFATE 3 ML: .5; 3 SOLUTION RESPIRATORY (INHALATION) at 15:28

## 2025-05-19 RX ADMIN — GUAIFENESIN 1200 MG: 600 TABLET, EXTENDED RELEASE ORAL at 13:21

## 2025-05-19 RX ADMIN — ENOXAPARIN SODIUM 40 MG: 100 INJECTION SUBCUTANEOUS at 13:21

## 2025-05-19 RX ADMIN — Medication 2000 UNITS: at 08:58

## 2025-05-19 NOTE — PLAN OF CARE
Goal Outcome Evaluation:  Plan of Care Reviewed With: patient           Outcome Evaluation: Pt is a 78 yo F who was admitted with PNA and has a history of lung cancer. Pt demonstrates adequate strength and balance to perform functional mobility and gait independently. Pt reports she has been getting to and from bathroom independently without difficulty. Pt states she does not have any acute care PT needs at this time. PT will sign off as pt appears to be near her baseline function.    Anticipated Discharge Disposition (PT): home

## 2025-05-19 NOTE — PROGRESS NOTES
" LOS: 1 day     Name: Brigid Carballo  Age: 79 y.o.  Sex: female  :  1945  MRN: [id number]         Primary Care Physician: Kaya Mckeon, SHE    Subjective   Subjective  No new complaints or acute overnight events.  Still feels unwell.  Has not had any further hemoptysis.    Objective   Vital Signs  Temp:  [97.7 °F (36.5 °C)-98.4 °F (36.9 °C)] 98.4 °F (36.9 °C)  Heart Rate:  [68-93] 74  Resp:  [16-18] 16  BP: ()/(53-73) 108/62  Body mass index is 26.12 kg/m².    Objective:  General Appearance:  Comfortable and in no acute distress.    Vital signs: (most recent): Blood pressure 108/62, pulse 74, temperature 98.4 °F (36.9 °C), temperature source Oral, resp. rate 16, height 154.9 cm (61\"), weight 62.7 kg (138 lb 3.7 oz), SpO2 93%.    Lungs:  Normal effort and normal respiratory rate.  She is not in respiratory distress.  There are decreased breath sounds.    Heart: Normal rate.  Regular rhythm.    Abdomen: Abdomen is soft.  Bowel sounds are normal.   There is no abdominal tenderness.     Extremities: There is no dependent edema or local swelling.    Neurological: Patient is alert and oriented to person, place and time.    Skin:  Warm and dry.                Results Review:       I reviewed the patient's new clinical results.    Results from last 7 days   Lab Units 25  0421 25  1422   WBC 10*3/mm3 3.65 5.39   HEMOGLOBIN g/dL 12.7 13.9   PLATELETS 10*3/mm3 201 182     Results from last 7 days   Lab Units 25  0422 25  1422   SODIUM mmol/L 137 142   POTASSIUM mmol/L 4.1 4.9   CHLORIDE mmol/L 103 107   CO2 mmol/L 26.0 28.2   BUN mg/dL 12 15   CREATININE mg/dL 0.64 0.73   CALCIUM mg/dL 9.0 9.4   GLUCOSE mg/dL 118* 102*     Results from last 7 days   Lab Units 25  1422   INR  1.10             Scheduled Meds:   albuterol, 2.5 mg, Nebulization, Q4H - RT  ascorbic acid, 1,000 mg, Oral, Daily  atorvastatin, 40 mg, Oral, Nightly  cefTRIAXone, 1,000 mg, Intravenous, Q24H  cholecalciferol, " 2,000 Units, Oral, Daily  pantoprazole, 40 mg, Oral, Q AM  sodium chloride, 10 mL, Intravenous, Q12H      PRN Meds:     acetaminophen **OR** acetaminophen **OR** acetaminophen    ipratropium-albuterol    nitroglycerin    ondansetron    sodium chloride    sodium chloride  Continuous Infusions:       Assessment & Plan   Active Hospital Problems    Diagnosis  POA    **Pneumonia [J18.9]  Yes    History of radiation therapy [Z92.3]  Not Applicable    Hemoptysis [R04.2]  Unknown    COPD (chronic obstructive pulmonary disease) [J44.9]  Yes    Lung nodule [R91.1]  Yes    Gastroesophageal reflux disease [K21.9]  Yes      Resolved Hospital Problems   No resolved problems to display.       Assessment & Plan    -Continue Rocephin.  Add pulmonary hygiene measures.  - Wean oxygen as tolerated  - Has some faint wheezing but not to the degree that I think we need steroids right now.  Continue bronchodilators.  - Add Lovenox for DVT prophylaxis and monitor closely for any recurrence of hemoptysis.      Expected discharge date/ time has not been documented.     Kaiden Tamez MD  [facility]  05/19/25  12:45 EDT

## 2025-05-19 NOTE — PLAN OF CARE
Goal Outcome Evaluation:              Outcome Evaluation: Clinical swallow eval completed with patient admitted with pneumonia. No overt clinical signs of aspiration and/or dysphagia across PO trials. No hx dysphagia and/or recent pneunomina.    Suggest could complete video swallow study to rule out silent aspiration, given pt living with COPD and hx possible lung Ca s/p XRT.  Can complete at this facility or as outpatient.     Diet texture recommendation: Suggest continue regular diet textures, thin liquids, pending video swallow study results/recs. Meds, per patient preference.        Anticipated Discharge Disposition (SLP): unknown          SLP Swallowing Diagnosis: R/O pharyngeal dysphagia (05/19/25 3287)

## 2025-05-19 NOTE — THERAPY EVALUATION
Acute Care - Speech Language Pathology   Swallow Initial Evaluation [facility]     Patient Name: Brigid Carballo  : 1945  MRN: [id number]  Today's Date: 2025               Admit Date: 2025    Visit Dx:     ICD-10-CM ICD-9-CM   1. Pneumonia of both lungs due to infectious organism, unspecified part of lung  J18.9 483.8   2. Hemoptysis  R04.2 786.30   3. Acute exacerbation of chronic obstructive pulmonary disease (COPD)  J44.1 491.21   4. Malignant neoplasm of lung, unspecified laterality, unspecified part of lung  C34.90 162.9     Patient Active Problem List   Diagnosis    Small vessel disease    Hyperlipidemia    Allergic rhinitis    Arthritis    Gastroesophageal reflux disease    Vitamin B12 deficiency    Fatigue    Hiatal hernia    Polyp of colon    Bilateral carotid artery stenosis    Vitamin D deficiency    Memory deficit    Thyromegaly    Acute non-recurrent sinusitis    Postmenopause    Abnormal nuclear stress test    Nocturnal hypoxia    Lung nodule    COPD with acute exacerbation    Nodule of chest wall    COPD (chronic obstructive pulmonary disease)    Anemia    Skin lesion of left arm    Postprocedural pneumothorax    Iatrogenic pneumothorax    Acute bronchitis    Cold intolerance    Pneumonia    History of radiation therapy    Hemoptysis     Past Medical History:   Diagnosis Date    Angina pectoris     Anxiety     [place]'s esophagus 2015    BPV (benign positional vertigo) 2021    Bunion of left foot 2016    Carpal tunnel syndrome     Chest pain 10/05/2020    COPD (chronic obstructive pulmonary disease)     DDD (degenerative disc disease), cervical 2014    Deformity of wrist joint 2018    Insomnia 2017    Kidney stones     Low back pain 2014    Melanoma in situ of unspecified lower limb, including hip 2021    Occlusion of left vertebral artery 2021    1/10/22 CTA neck: beyond its origin, left vertebral artery is widely patent throughout  its cervical and intracranial course to the vertebrobasilar junction without stenosis    Renal stone 03/02/2020    Spinal stenosis of lumbar region 12/29/2014    Tear of lateral meniscus of knee 09/30/2014    Tobacco use 06/29/2024    Unexplained weight loss 04/21/2021     Past Surgical History:   Procedure Laterality Date    APPENDECTOMY      BREAST BIOPSY      multiple    BRONCHOSCOPY WITH ION ROBOTIC ASSIST  9/6/2024    Procedure: BRONCHOSCOPY  WITH ENDOBRONCHIAL ULTRASOUND AND ION ROBOT WITH FNA'S AND BIOPSIES, BAL;  Surgeon: Ant Talbot MD;  [facility];  Service: Robotics - Pulmonary;;  PRE- RUL LUNG NODULE  POST- SAME    CARDIAC CATHETERIZATION      CARDIAC CATHETERIZATION N/A 11/29/2022    Procedure: Left Heart Cath;  Surgeon: Brennen Nguyen MD;  Location:  [facility];  Service: Cardiology;  Laterality: N/A;    CARDIAC CATHETERIZATION N/A 11/29/2022    Procedure: Coronary angiography;  Surgeon: Brennen Nguyen MD;  Location:  [facility];  Service: Cardiology;  Laterality: N/A;    CARDIAC CATHETERIZATION N/A 11/29/2022    Procedure: Left ventriculography;  Surgeon: Brennen Nguyen MD;  Location:  [facility];  Service: Cardiology;  Laterality: N/A;    CARPAL TUNNEL RELEASE Bilateral     COLONOSCOPY  05/17/2021    polyp removed; per Dr. Velasquez    CYSTOSCOPY W/ URETERAL STENT PLACEMENT  10/22/2019    HYSTERECTOMY      INGUINAL HERNIA REPAIR Right     KNEE ARTHROSCOPY Right     meniscus    TONSILLECTOMY      UPPER GASTROINTESTINAL ENDOSCOPY  05/17/2021    per Dr. Velasquez       SLP Recommendation and Plan  SLP Swallowing Diagnosis: R/O pharyngeal dysphagia (05/19/25 1325)  SLP Diet Recommendation: regular textures, thin liquids (05/19/25 1325)  Recommended Precautions and Strategies: general aspiration precautions (05/19/25 1325)  SLP Rec. for Method of Medication Administration: as tolerated (05/19/25 1325)     Monitor for  Signs of Aspiration: notify SLP if any concerns (05/19/25 1325)  Recommended Diagnostics: VFSS (MBS) (05/19/25 1325)  Swallow Criteria for Skilled Therapeutic Interventions Met: demonstrates skilled criteria (05/19/25 1325)  Anticipated Discharge Disposition (SLP): unknown (05/19/25 1325)  Rehab Potential/Prognosis, Swallowing: good, to achieve stated therapy goals (05/19/25 1325)  Therapy Frequency (Swallow): PRN (05/19/25 1325)  Predicted Duration Therapy Intervention (Days): until discharge (05/19/25 1325)  Oral Care Recommendations: Denture Care, Oral Care BID/PRN (05/19/25 1325)                                        Outcome Evaluation: Clinical swallow eval completed with patient admitted with pneumonia. No overt clinical signs of aspiration and/or dysphagia across PO trials. Suggest could complete video swallow study to rule out silent aspiration, given pt living with COPD and hx possible lung Ca s/p XRT. No hx dysphagia and/or recent pneunomina. Can complete at this facility or as outpatient. Suggest continue regular diet textures, thin liquids, pending video swallow study results/recs. Meds, per patient preference.      SWALLOW EVALUATION (Last 72 Hours)       SLP Adult Swallow Evaluation       Row Name 05/19/25 1325       Rehab Evaluation    Document Type evaluation  -BB    Subjective Information no complaints  -BB    Patient Observations alert;agree to therapy;cooperative  -BB    Patient Effort excellent  -BB    Symptoms Noted During/After Treatment none  -BB       General Information    Patient Profile Reviewed yes  -BB    Pertinent History Of Current Problem 78 yo admitted with R middle and lower lobe pna. Pt with suspected lung Ca s/p XRT. Pt living w COPD. SLP consult to r/o aspiration, per RN.  -BB    Current Method of Nutrition regular textures;thin liquids  -BB    Precautions/Limitations, Vision WFL;for purposes of eval  -BB    Precautions/Limitations, Hearing WFL;for purposes of eval  -BB    Prior  Level of Function-Communication WFL  -BB    Prior Level of Function-Swallowing no diet consistency restrictions  -BB    Plans/Goals Discussed with patient;agreed upon  -BB    Barriers to Rehab none identified  -BB    Patient's Goals for Discharge return home  -BB       Pain    Pretreatment Pain Rating 0/10 - no pain  -BB    Posttreatment Pain Rating 0/10 - no pain  -BB       Oral Motor Structure and Function    Dentition Assessment upper dentures/partial in place;lower dentures/partial in place  -BB    Secretion Management WNL/WFL  -BB    Mucosal Quality moist, healthy  -BB       Oral Musculature and Cranial Nerve Assessment    Oral Motor General Assessment WFL  -BB       General Eating/Swallowing Observations    Respiratory Support Currently in Use room air  -BB    Eating/Swallowing Skills self-fed  -BB    Positioning During Eating upright in bed  -BB    Utensils Used spoon;cup  -BB    Consistencies Trialed regular textures;soft to chew textures;mixed consistency;thin liquids  -BB       Clinical Swallow Eval    Clinical Swallow Evaluation Summary Clinical swallow eval completed. No family present.     Cognition: A/Ox 4. Pt able to follow basic multi-step commands.   Expressive communication: appropriate.   Voice: Vocal quality and loudness appear good.   Cough: Not elicited.   Affect: Pleasant.   Speech: Intelligible.   Bulbar mech exam: Integrity of cranial nerves V, VII, IX/X and XII appear grossly intact.   Dysphagia symptoms: pt denies.   RN reports pt tolerating diet wo observable difficulties.     Patient agreeable to PO trials.   Dysphagia signs: No overt clinical signs of aspiration and/or dysphagia across trials.   Christy swallow protocol: passed; good predictor of safe oral alimentation so long as the patient remains stable medically / neurologically.    Assessment / Plan: Clinical swallow eval completed with patient admitted with pneumonia. No overt clinical signs of aspiration and/or dysphagia across PO  trials. Suggest could complete video swallow study to rule out silent aspiration, given pt living with COPD and hx possible lung Ca s/p XRT. No hx dysphagia and/or recent pneunomina. Can complete at this facility or as outpatient. Suggest continue regular diet textures, thin liquids, pending video swallow study results/recs. Meds, per patient preference.  -BB       SLP Evaluation Clinical Impression    SLP Swallowing Diagnosis R/O pharyngeal dysphagia  -BB    Rehab Potential/Prognosis, Swallowing good, to achieve stated therapy goals  -BB    Swallow Criteria for Skilled Therapeutic Interventions Met demonstrates skilled criteria  -BB       Recommendations    Therapy Frequency (Swallow) PRN  -BB    Predicted Duration Therapy Intervention (Days) until discharge  -BB    SLP Diet Recommendation regular textures;thin liquids  -BB    Recommended Diagnostics VFSS (MBS)  -BB    Recommended Precautions and Strategies general aspiration precautions  -BB    Oral Care Recommendations Denture Care;Oral Care BID/PRN  -BB    SLP Rec. for Method of Medication Administration as tolerated  -BB    Monitor for Signs of Aspiration notify SLP if any concerns  -BB    Anticipated Discharge Disposition (SLP) unknown  -BB       Swallow Goals (SLP)    Swallow LTGs Swallow Long Term Goal (free text)  -BB       (LTG) Swallow    (LTG) Swallow Pt will complete VFSS in order to establish plan of care.  -BB    [place] (Swallow Long Term Goal) independently (over 90% accuracy)  -BB    Time Frame (Swallow Long Term Goal) by discharge  -BB    Progress/Outcomes (Swallow Long Term Goal) new goal  -BB              User Key  (r) = Recorded By, (t) = Taken By, (c) = Cosigned By      Initials Name Effective Dates    BB Bruce Enriquez SLP 02/19/23 -                     EDUCATION  The patient has been educated in the following areas:   Dysphagia (Swallowing Impairment).        SLP GOALS       Row Name 05/19/25 1325             (LTG) Swallow    (LTG)  Swallow Pt will complete VFSS in order to establish plan of care.  -BB      [place] (Swallow Long Term Goal) independently (over 90% accuracy)  -BB      Time Frame (Swallow Long Term Goal) by discharge  -BB      Progress/Outcomes (Swallow Long Term Goal) new goal  -BB                User Key  (r) = Recorded By, (t) = Taken By, (c) = Cosigned By      Initials Name Provider Type    Bruce [place] SLP Speech and Language Pathologist                         Time Calculation:    Time Calculation- SLP       Row Name 05/19/25 1335             Time Calculation- SLP    SLP Start Time 1230  -BB      SLP Stop Time 1330  -BB      SLP Time Calculation (min) 60 min  -BB      SLP Received On 05/19/25  -BB         Untimed Charges    SLP Eval/Re-eval  ST Eval Oral Pharyng Swallow - 60526  -BB      [id number] Eval Oral Pharyng Swallow Minutes 60  -BB         Total Minutes    Untimed Charges Total Minutes 60  -BB       Total Minutes 60  -BB                User Key  (r) = Recorded By, (t) = Taken By, (c) = Cosigned By      Initials Name Provider Type    Bruce Mills SLP Speech and Language Pathologist                    Therapy Charges for Today       Code Description Service Date Service Provider Modifiers Qty    [id number] HC ST EVAL ORAL PHARYNG SWALLOW 4 5/19/2025 Bruce Enriquez SLP GN 1                 SHEN Valle  5/19/2025

## 2025-05-19 NOTE — PLAN OF CARE
Goal Outcome Evaluation:  Plan of Care Reviewed With: patient        Progress: improving  Outcome Evaluation: Pt is A/O X4, on 2L oxygen, up ad amadeo, been able to ambulate to bathroom,chest pain with coughing improving, no bloody emesis, mid whizzing noted on the anterior chest wall, no shortnes of breath noted. Antibiotics continued as prescribed. No acute chages over night.

## 2025-05-19 NOTE — THERAPY EVALUATION
Patient Name: Brigid Carballo  : 1945    MRN: [id number]                              Today's Date: 2025       Admit Date: 2025    Visit Dx:     ICD-10-CM ICD-9-CM   1. Pneumonia of both lungs due to infectious organism, unspecified part of lung  J18.9 483.8   2. Hemoptysis  R04.2 786.30   3. Acute exacerbation of chronic obstructive pulmonary disease (COPD)  J44.1 491.21   4. Malignant neoplasm of lung, unspecified laterality, unspecified part of lung  C34.90 162.9     Patient Active Problem List   Diagnosis    Small vessel disease    Hyperlipidemia    Allergic rhinitis    Arthritis    Gastroesophageal reflux disease    Vitamin B12 deficiency    Fatigue    Hiatal hernia    Polyp of colon    Bilateral carotid artery stenosis    Vitamin D deficiency    Memory deficit    Thyromegaly    Acute non-recurrent sinusitis    Postmenopause    Abnormal nuclear stress test    Nocturnal hypoxia    Lung nodule    COPD with acute exacerbation    Nodule of chest wall    COPD (chronic obstructive pulmonary disease)    Anemia    Skin lesion of left arm    Postprocedural pneumothorax    Iatrogenic pneumothorax    Acute bronchitis    Cold intolerance    Pneumonia    History of radiation therapy    Hemoptysis     Past Medical History:   Diagnosis Date    Angina pectoris     Anxiety     [place]'s esophagus 2015    BPV (benign positional vertigo) 2021    Bunion of left foot 2016    Carpal tunnel syndrome     Chest pain 10/05/2020    COPD (chronic obstructive pulmonary disease)     DDD (degenerative disc disease), cervical 2014    Deformity of wrist joint 2018    Insomnia 2017    Kidney stones     Low back pain 2014    Melanoma in situ of unspecified lower limb, including hip 2021    Occlusion of left vertebral artery 2021    1/10/22 CTA neck: beyond its origin, left vertebral artery is widely patent throughout its cervical and intracranial course to the vertebrobasilar  junction without stenosis    Renal stone 03/02/2020    Spinal stenosis of lumbar region 12/29/2014    Tear of lateral meniscus of knee 09/30/2014    Tobacco use 06/29/2024    Unexplained weight loss 04/21/2021     Past Surgical History:   Procedure Laterality Date    APPENDECTOMY      BREAST BIOPSY      multiple    BRONCHOSCOPY WITH ION ROBOTIC ASSIST  9/6/2024    Procedure: BRONCHOSCOPY  WITH ENDOBRONCHIAL ULTRASOUND AND ION ROBOT WITH FNA'S AND BIOPSIES, BAL;  Surgeon: Ant Talbot MD;  Location[facility];  Service: Robotics - Pulmonary;;  PRE- RUL LUNG NODULE  POST- SAME    CARDIAC CATHETERIZATION      CARDIAC CATHETERIZATION N/A 11/29/2022    Procedure: Left Heart Cath;  Surgeon: Brennen Nguyen MD;  Location:  [facility];  Service: Cardiology;  Laterality: N/A;    CARDIAC CATHETERIZATION N/A 11/29/2022    Procedure: Coronary angiography;  Surgeon: Brennen Nguyen MD;  Location:  [facility];  Service: Cardiology;  Laterality: N/A;    CARDIAC CATHETERIZATION N/A 11/29/2022    Procedure: Left ventriculography;  Surgeon: Brennen Nguyen MD;  Location:  [facility];  Service: Cardiology;  Laterality: N/A;    CARPAL TUNNEL RELEASE Bilateral     COLONOSCOPY  05/17/2021    polyp removed; per Dr. Velasquez    CYSTOSCOPY W/ URETERAL STENT PLACEMENT  10/22/2019    HYSTERECTOMY      INGUINAL HERNIA REPAIR Right     KNEE ARTHROSCOPY Right     meniscus    TONSILLECTOMY      UPPER GASTROINTESTINAL ENDOSCOPY  05/17/2021    per Dr. 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[employer] [facility]      Existing Precautions/Restrictions no known precautions/restrictions  -     Barriers to Rehab none identified  -       Row Name 05/19/25 1015          Living Environment    Current Living Arrangements home  -     People in Home spouse  -       Row Name 05/19/25 1015          Cognition    Orientation Status (Cognition) oriented x 4  -               User Key  (r) = Recorded By, (t) = Taken By, (c) = Cosigned By      Initials Name Provider Type     Mariela Rivera, EDSON Physical Therapist                   Mobility       Row Name 05/19/25 1015          Bed Mobility    Bed Mobility supine-sit;sit-supine  -     Supine-Sit [place] (Bed Mobility) independent  -     Sit-Supine [place] (Bed Mobility) independent  -       Row Name 05/19/25 1015          Sit-Stand Transfer    Sit-Stand [place] (Transfers) supervision  -       Row Name 05/19/25 1015          Gait/Stairs (Locomotion)    [place] Level (Gait) supervision  -     Distance in Feet (Gait) 30  -     Comment, (Gait/Stairs) no LOB or impaired gait noted with ambulation  -               User Key  (r) = Recorded By, (t) = Taken By, (c) = Cosigned By      Initials Name Provider Type     Mariela Rivera, PT Physical Therapist                   Obj/Interventions       Row Name 05/19/25 1016          Range of Motion Comprehensive    General Range of Motion no range of motion deficits identified  -[facility] Name 05/19/25 1016          Strength Comprehensive (MMT)    General Manual Muscle Testing (MMT) Assessment no strength deficits identified  -[facility] Name 05/19/25 1016          Balance    Balance Assessment standing static balance;standing dynamic balance  -     Static Standing Balance supervision  -     Dynamic Standing Balance supervision  -               User Key  (r) = Recorded By, (t) = Taken By, (c) = Cosigned By      Initials Name Provider Type    Mariela Rojas, PT Physical Therapist                    Goals/Plan    No documentation.                  Clinical Impression       Row Name 05/19/25 1016          Pain    Pretreatment Pain Rating 0/10 - no pain  -     Posttreatment Pain Rating 0/10 - no pain  -       Row Name 05/19/25 1016          Plan of Care Review    Plan of Care Reviewed With patient  -     Outcome Evaluation Pt is a 80 yo F who was admitted with PNA and has a history of lung cancer. Pt demonstrates adequate strength and balance to perform functional mobility and gait independently. Pt reports she has been getting to and from bathroom independently without difficulty. Pt states she does not have any acute care PT needs at this time. PT will sign off as pt appears to be near her baseline function.  -       Row Name 05/19/25 1016          Therapy Assessment/Plan (PT)    Criteria for Skilled Interventions Met (PT) no problems identified which require skilled intervention  -     Therapy Frequency (PT) evaluation only  -       Row Name 05/19/25 1016          Positioning and Restraints    Pre-Treatment Position in bed  -CH     Post Treatment Position bed  -     In Bed supine;call light within reach;encouraged to call for assist  -               User Key  (r) = Recorded By, (t) = Taken By, (c) = Cosigned By      Initials Name Provider Type    CH Mariela Rivera, PT Physical Therapist                   Outcome Measures       Row Name 05/19/25 1018 05/19/25 0904       How much help from another person do you currently need...    Turning from your back to your side while in flat bed without using bedrails? 4  -CH 3  -MM    Moving from lying on back to sitting on the side of a flat bed without bedrails? 4  -CH 3  -MM    Moving to and from a bed to a chair (including a wheelchair)? 4  -CH 3  -MM    Standing up from a chair using your arms (e.g., wheelchair, bedside chair)? 4  -CH 3  -MM    Climbing 3-5 steps with a railing? 4  -CH 2  -MM    To walk in hospital room? 4  -CH 3  -MM     -[facility] 6 Clicks Score (PT) 24  -CH 17  -MM    Highest Level of Mobility Goal Walk 250 Feet or More - 8  -CH Stand (1 or More Minutes)-5  -MM      Row Name 05/19/25 0212 05/19/25 0018       How much help from another person do you currently need...    Turning from your back to your side while in flat bed without using bedrails? 3  -BB 3  -BB    Moving from lying on back to sitting on the side of a flat bed without bedrails? 3  -BB 3  -BB    Moving to and from a bed to a chair (including a wheelchair)? 3  -BB 3  -BB      Row Name 05/18/25 2305          How much help from another person do you currently need...    Turning from your back to your side while in flat bed without using bedrails? 3  -BB     Moving from lying on back to sitting on the side of a flat bed without bedrails? 3  -BB     Moving to and from a bed to a chair (including a wheelchair)? 3  -BB     Standing up from a chair using your arms (e.g., wheelchair, bedside chair)? 3  -BB     Climbing 3-5 steps with a railing? 3  -BB     To walk in hospital room? 3  -BB     -[facility] 6 Clicks Score (PT) 18  -BB     Highest Level of Mobility Goal Walk 10 Steps or More-6  -BB       Row Name 05/19/25 1018          Functional Assessment    Outcome Measure Options [facility] 6 Clicks Basic Mobility (PT)  -               User Key  (r) = Recorded By, (t) = Taken By, (c) = Cosigned By      Initials Name Provider Type     Mariela Rivera, PT Physical Therapist    Jenaro Adams, RN Registered Nurse    BB Dana Mercado, RN Registered Nurse                                 Physical Therapy Education       Title: PT OT SLP Therapies (In Progress)       Topic: Physical Therapy (In Progress)       Point: Mobility training (Done)       Learning Progress Summary            Patient Acceptance, E,TB,D, VU,DU by  at 5/19/2025 1019                      Point: Home exercise program (Not Started)       Learner Progress:  Not documented in this visit.              Point: Body  mechanics (Done)       Learning Progress Summary            Patient Acceptance, E,TB,D, VU,DU by  at 5/19/2025 1019                      Point: Precautions (Done)       Learning Progress Summary            Patient Acceptance, E,TB,D, VU,DU by  at 5/19/2025 1019                                      User Key       Initials Effective Dates Name Provider Type [facility] 06/16/21 -  Mariela Rivera PT Physical Therapist PT                  PT Recommendation and Plan     Outcome Evaluation: Pt is a 80 yo F who was admitted with PNA and has a history of lung cancer. Pt demonstrates adequate strength and balance to perform functional mobility and gait independently. Pt reports she has been getting to and from bathroom independently without difficulty. Pt states she does not have any acute care PT needs at this time. PT will sign off as pt appears to be near her baseline function.     Time Calculation:         PT Charges       Row Name 05/19/25 1019             Time Calculation    Start Time 0940  -      Stop Time 0950  -      Time Calculation (min) 10 min  -      PT Received On 05/19/25  -                User Key  (r) = Recorded By, (t) = Taken By, (c) = Cosigned By      Initials Name Provider Type     Mariela Rivera, PT Physical Therapist                  Therapy Charges for Today       Code Description Service Date Service Provider Modifiers Qty    [id number] HC PT EVAL LOW COMPLEXITY 2 5/19/2025 Mariela Rivera PT GP 1            PT G-Codes  Outcome Measure Options: AM-PAC 6 Clicks Basic Mobility (PT)  AM-PAC 6 Clicks Score (PT): 24  PT Discharge Summary  Anticipated Discharge Disposition (PT): home    Mariela Rivera PT  5/19/2025

## 2025-05-19 NOTE — PLAN OF CARE
Problem: Adult Inpatient Plan of Care  Goal: Absence of Hospital-Acquired Illness or Injury  Intervention: Identify and Manage Fall Risk  Recent Flowsheet Documentation  Taken 5/19/2025 1411 by Jenaro Mcmillan RN  Safety Promotion/Fall Prevention:   safety round/check completed   room organization consistent   activity supervised   assistive device/personal items within reach  Taken 5/19/2025 1018 by Jenaro Mcmillan RN  Safety Promotion/Fall Prevention:   safety round/check completed   room organization consistent   assistive device/personal items within reach   activity supervised  Taken 5/19/2025 0904 by Jenaro Mcmillan RN  Safety Promotion/Fall Prevention:   safety round/check completed   room organization consistent   assistive device/personal items within reach   activity supervised   clutter free environment maintained     Problem: Adult Inpatient Plan of Care  Goal: Absence of Hospital-Acquired Illness or Injury  Intervention: Prevent Skin Injury  Recent Flowsheet Documentation  Taken 5/19/2025 1411 by Jenaro Mcmillan RN  Body Position: position changed independently  Taken 5/19/2025 1018 by Jenaro Mcmillan RN  Body Position: position changed independently  Taken 5/19/2025 0904 by Jenaro Mcmillan RN  Body Position: position changed independently   Goal Outcome Evaluation:           Progress: no change  Outcome Evaluation: Patient female AOx 4 VSS, able to ambulate independently to the toilet, good appetite, Dx pneumonia treated with Ceftriaxone 1g every 24 hrs, educated using spirometer luis angel to 1000 ml x5-6 will ontinue monitor.

## 2025-05-20 ENCOUNTER — APPOINTMENT (OUTPATIENT)
Dept: GENERAL RADIOLOGY | Facility: HOSPITAL | Age: 80
DRG: 194 | End: 2025-05-20
Payer: MEDICARE

## 2025-05-20 LAB
ANION GAP SERPL CALCULATED.3IONS-SCNC: 10.5 MMOL/L (ref 5–15)
BUN SERPL-MCNC: 12 MG/DL (ref 8–23)
BUN/CREAT SERPL: 17.6 (ref 7–25)
CALCIUM SPEC-SCNC: 8.6 MG/DL (ref 8.6–10.5)
CHLORIDE SERPL-SCNC: 107 MMOL/L (ref 98–107)
CO2 SERPL-SCNC: 26.5 MMOL/L (ref 22–29)
CREAT SERPL-MCNC: 0.68 MG/DL (ref 0.57–1)
DEPRECATED RDW RBC AUTO: 40.8 FL (ref 37–54)
EGFRCR SERPLBLD CKD-EPI 2021: 88.2 ML/MIN/1.73
ERYTHROCYTE [DISTWIDTH] IN BLOOD BY AUTOMATED COUNT: 11.7 % (ref 12.3–15.4)
GLUCOSE SERPL-MCNC: 83 MG/DL (ref 65–99)
HCT VFR BLD AUTO: 37.5 % (ref 34–46.6)
HGB BLD-MCNC: 12.7 G/DL (ref 12–15.9)
MCH RBC QN AUTO: 32.6 PG (ref 26.6–33)
MCHC RBC AUTO-ENTMCNC: 33.9 G/DL (ref 31.5–35.7)
MCV RBC AUTO: 96.2 FL (ref 79–97)
PLATELET # BLD AUTO: 185 10*3/MM3 (ref 140–450)
PMV BLD AUTO: 9.7 FL (ref 6–12)
POTASSIUM SERPL-SCNC: 3.9 MMOL/L (ref 3.5–5.2)
RBC # BLD AUTO: 3.9 10*6/MM3 (ref 3.77–5.28)
SODIUM SERPL-SCNC: 144 MMOL/L (ref 136–145)
WBC NRBC COR # BLD AUTO: 6.33 10*3/MM3 (ref 3.4–10.8)

## 2025-05-20 PROCEDURE — 25010000002 METHYLPREDNISOLONE PER 125 MG: Performed by: INTERNAL MEDICINE

## 2025-05-20 PROCEDURE — 25010000002 CEFTRIAXONE PER 250 MG: Performed by: INTERNAL MEDICINE

## 2025-05-20 PROCEDURE — 25010000002 METHYLPREDNISOLONE PER 40 MG: Performed by: INTERNAL MEDICINE

## 2025-05-20 PROCEDURE — 92611 MOTION FLUOROSCOPY/SWALLOW: CPT

## 2025-05-20 PROCEDURE — 94799 UNLISTED PULMONARY SVC/PX: CPT

## 2025-05-20 PROCEDURE — 94664 DEMO&/EVAL PT USE INHALER: CPT

## 2025-05-20 PROCEDURE — 25010000002 ENOXAPARIN PER 10 MG: Performed by: INTERNAL MEDICINE

## 2025-05-20 PROCEDURE — 80048 BASIC METABOLIC PNL TOTAL CA: CPT | Performed by: INTERNAL MEDICINE

## 2025-05-20 PROCEDURE — 74230 X-RAY XM SWLNG FUNCJ C+: CPT

## 2025-05-20 PROCEDURE — 85027 COMPLETE CBC AUTOMATED: CPT | Performed by: INTERNAL MEDICINE

## 2025-05-20 RX ORDER — METHYLPREDNISOLONE SODIUM SUCCINATE 125 MG/2ML
125 INJECTION, POWDER, LYOPHILIZED, FOR SOLUTION INTRAMUSCULAR; INTRAVENOUS ONCE
Status: COMPLETED | OUTPATIENT
Start: 2025-05-20 | End: 2025-05-20

## 2025-05-20 RX ORDER — METHYLPREDNISOLONE SODIUM SUCCINATE 40 MG/ML
40 INJECTION, POWDER, LYOPHILIZED, FOR SOLUTION INTRAMUSCULAR; INTRAVENOUS EVERY 12 HOURS
Status: DISCONTINUED | OUTPATIENT
Start: 2025-05-21 | End: 2025-05-21

## 2025-05-20 RX ADMIN — METHYLPREDNISOLONE SODIUM SUCCINATE 40 MG: 40 INJECTION, POWDER, FOR SOLUTION INTRAMUSCULAR; INTRAVENOUS at 23:19

## 2025-05-20 RX ADMIN — GUAIFENESIN 1200 MG: 600 TABLET, EXTENDED RELEASE ORAL at 09:17

## 2025-05-20 RX ADMIN — BARIUM SULFATE 55 ML: 0.81 POWDER, FOR SUSPENSION ORAL at 11:23

## 2025-05-20 RX ADMIN — IPRATROPIUM BROMIDE AND ALBUTEROL SULFATE 3 ML: .5; 3 SOLUTION RESPIRATORY (INHALATION) at 08:22

## 2025-05-20 RX ADMIN — ENOXAPARIN SODIUM 40 MG: 100 INJECTION SUBCUTANEOUS at 13:00

## 2025-05-20 RX ADMIN — BARIUM SULFATE 135 ML: 980 POWDER, FOR SUSPENSION ORAL at 11:23

## 2025-05-20 RX ADMIN — OXYCODONE HYDROCHLORIDE AND ACETAMINOPHEN 1000 MG: 500 TABLET ORAL at 09:17

## 2025-05-20 RX ADMIN — ATORVASTATIN CALCIUM 40 MG: 20 TABLET, FILM COATED ORAL at 21:42

## 2025-05-20 RX ADMIN — IPRATROPIUM BROMIDE AND ALBUTEROL SULFATE 3 ML: .5; 3 SOLUTION RESPIRATORY (INHALATION) at 15:19

## 2025-05-20 RX ADMIN — BARIUM SULFATE 1 TEASPOON(S): 0.6 CREAM ORAL at 11:23

## 2025-05-20 RX ADMIN — CEFTRIAXONE SODIUM 1000 MG: 1 INJECTION, POWDER, FOR SOLUTION INTRAMUSCULAR; INTRAVENOUS at 17:05

## 2025-05-20 RX ADMIN — Medication 10 ML: at 09:17

## 2025-05-20 RX ADMIN — IPRATROPIUM BROMIDE AND ALBUTEROL SULFATE 3 ML: .5; 3 SOLUTION RESPIRATORY (INHALATION) at 12:02

## 2025-05-20 RX ADMIN — Medication 2000 UNITS: at 09:17

## 2025-05-20 RX ADMIN — PANTOPRAZOLE SODIUM 40 MG: 40 TABLET, DELAYED RELEASE ORAL at 06:30

## 2025-05-20 RX ADMIN — METHYLPREDNISOLONE SODIUM SUCCINATE 125 MG: 125 INJECTION, POWDER, FOR SOLUTION INTRAMUSCULAR; INTRAVENOUS at 13:00

## 2025-05-20 RX ADMIN — GUAIFENESIN 1200 MG: 600 TABLET, EXTENDED RELEASE ORAL at 21:41

## 2025-05-20 RX ADMIN — IPRATROPIUM BROMIDE AND ALBUTEROL SULFATE 3 ML: .5; 3 SOLUTION RESPIRATORY (INHALATION) at 19:29

## 2025-05-20 RX ADMIN — BARIUM SULFATE 50 ML: 400 SUSPENSION ORAL at 11:23

## 2025-05-20 RX ADMIN — IPRATROPIUM BROMIDE AND ALBUTEROL SULFATE 3 ML: .5; 3 SOLUTION RESPIRATORY (INHALATION) at 23:49

## 2025-05-20 RX ADMIN — Medication 10 ML: at 21:41

## 2025-05-20 NOTE — PLAN OF CARE
Problem: Adult Inpatient Plan of Care  Goal: Absence of Hospital-Acquired Illness or Injury  Intervention: Identify and Manage Fall Risk  Recent Flowsheet Documentation  Taken 5/20/2025 1436 by Jenaro Mcmillan RN  Safety Promotion/Fall Prevention:   room organization consistent   safety round/check completed   activity supervised   assistive device/personal items within reach  Taken 5/20/2025 1000 by Jenaro Mcmillan RN  Safety Promotion/Fall Prevention:   safety round/check completed   room organization consistent  Taken 5/20/2025 0921 by Jenaro Mcmillan RN  Safety Promotion/Fall Prevention:   safety round/check completed   room organization consistent   assistive device/personal items within reach   activity supervised     Problem: Adult Inpatient Plan of Care  Goal: Absence of Hospital-Acquired Illness or Injury  Intervention: Prevent Skin Injury  Recent Flowsheet Documentation  Taken 5/20/2025 1436 by Jenaro Mcmillan RN  Body Position: lower extremity elevated  Skin Protection: incontinence pads utilized  Taken 5/20/2025 1000 by Jenaro Mcmillan RN  Body Position:   turned   position changed independently   lower extremity elevated   tilted  Taken 5/20/2025 0921 by Jenaro Mcmillan RN  Body Position: dangle, side of bed     Problem: Adult Inpatient Plan of Care  Goal: Absence of Hospital-Acquired Illness or Injury  Intervention: Prevent Infection  Recent Flowsheet Documentation  Taken 5/20/2025 1436 by Jenaro Mcmillan RN  Infection Prevention: single patient room provided  Taken 5/20/2025 1000 by Jenaro Mcmillan RN  Infection Prevention: single patient room provided  Taken 5/20/2025 0921 by Jenaro Mcmillan RN  Infection Prevention: single patient room provided   Goal Outcome Evaluation:           Progress: no change  Outcome Evaluation: AO x 4 VSS afebril with Oxygen 2 lt/min, able ambulated independent ADL,s Start today with Solumedrol IV Lovenos and continue with ABT with new IV side, increase appetite in dinner time.

## 2025-05-20 NOTE — MBS/VFSS/FEES
Acute Care - Speech Language Pathology   Swallow Initial Evaluation [facility]     Patient Name: Brigid Carballo  : 1945  MRN: [id number]  Today's Date: 2025               Admit Date: 2025    Visit Dx:     ICD-10-CM ICD-9-CM   1. Pneumonia of both lungs due to infectious organism, unspecified part of lung  J18.9 483.8   2. Hemoptysis  R04.2 786.30   3. Acute exacerbation of chronic obstructive pulmonary disease (COPD)  J44.1 491.21   4. Malignant neoplasm of lung, unspecified laterality, unspecified part of lung  C34.90 162.9     Patient Active Problem List   Diagnosis    Small vessel disease    Hyperlipidemia    Allergic rhinitis    Arthritis    Gastroesophageal reflux disease    Vitamin B12 deficiency    Fatigue    Hiatal hernia    Polyp of colon    Bilateral carotid artery stenosis    Vitamin D deficiency    Memory deficit    Thyromegaly    Acute non-recurrent sinusitis    Postmenopause    Abnormal nuclear stress test    Nocturnal hypoxia    Lung nodule    COPD with acute exacerbation    Nodule of chest wall    COPD (chronic obstructive pulmonary disease)    Anemia    Skin lesion of left arm    Postprocedural pneumothorax    Iatrogenic pneumothorax    Acute bronchitis    Cold intolerance    Pneumonia    History of radiation therapy    Hemoptysis     Past Medical History:   Diagnosis Date    Angina pectoris     Anxiety     [place]'s esophagus 2015    BPV (benign positional vertigo) 2021    Bunion of left foot 2016    Carpal tunnel syndrome     Chest pain 10/05/2020    COPD (chronic obstructive pulmonary disease)     DDD (degenerative disc disease), cervical 2014    Deformity of wrist joint 2018    Insomnia 2017    Kidney stones     Low back pain 2014    Melanoma in situ of unspecified lower limb, including hip 2021    Occlusion of left vertebral artery 2021    1/10/22 CTA neck: beyond its origin, left vertebral artery is widely patent throughout  its cervical and intracranial course to the vertebrobasilar junction without stenosis    Renal stone 03/02/2020    Spinal stenosis of lumbar region 12/29/2014    Tear of lateral meniscus of knee 09/30/2014    Tobacco use 06/29/2024    Unexplained weight loss 04/21/2021     Past Surgical History:   Procedure Laterality Date    APPENDECTOMY      BREAST BIOPSY      multiple    BRONCHOSCOPY WITH ION ROBOTIC ASSIST  9/6/2024    Procedure: BRONCHOSCOPY  WITH ENDOBRONCHIAL ULTRASOUND AND ION ROBOT WITH FNA'S AND BIOPSIES, BAL;  Surgeon: Ant Talbot MD;  Location: [facility];  Service: Robotics - Pulmonary;;  PRE- RUL LUNG NODULE  POST- SAME    CARDIAC CATHETERIZATION      CARDIAC CATHETERIZATION N/A 11/29/2022    Procedure: Left Heart Cath;  Surgeon: Brennen Nguyen MD;  Location:  [facility];  Service: Cardiology;  Laterality: N/A;    CARDIAC CATHETERIZATION N/A 11/29/2022    Procedure: Coronary angiography;  Surgeon: Brennen Nguyen MD;  Location:  [facility];  Service: Cardiology;  Laterality: N/A;    CARDIAC CATHETERIZATION N/A 11/29/2022    Procedure: Left ventriculography;  Surgeon: Brennen Nguyen MD;  Location:  [facility];  Service: Cardiology;  Laterality: N/A;    CARPAL TUNNEL RELEASE Bilateral     COLONOSCOPY  05/17/2021    polyp removed; per Dr. Velasquez    CYSTOSCOPY W/ URETERAL STENT PLACEMENT  10/22/2019    HYSTERECTOMY      INGUINAL HERNIA REPAIR Right     KNEE ARTHROSCOPY Right     meniscus    TONSILLECTOMY      UPPER GASTROINTESTINAL ENDOSCOPY  05/17/2021    per Dr. Velasquez       SLP Recommendation and Plan     SLP Diet Recommendation: regular textures, thin liquids (05/20/25 1100)  Recommended Precautions and Strategies: general aspiration precautions (05/20/25 1100)  SLP Rec. for Method of Medication Administration: as tolerated (05/20/25 1100)                          Oral Care Recommendations: Denture Care, Oral Care BID/PRN  (05/20/25 1100)                                               SWALLOW EVALUATION (Last 72 Hours)       SLP Adult Swallow Evaluation       Row Name 05/20/25 1100 05/19/25 4919       Rehab Evaluation    Document Type evaluation  -SH (r) KS (t) SH (c) evaluation  -BB    Subjective Information no complaints  -SH (r) KS (t) SH (c) no complaints  -BB    Patient Observations alert;cooperative  -SH (r) KS (t) SH (c) alert;agree to therapy;cooperative  -BB    Patient Effort excellent  -SH (r) KS (t) SH (c) excellent  -BB    Symptoms Noted During/After Treatment -- none  -BB       General Information    Patient Profile Reviewed yes  -SH (r) KS (t) SH (c) yes  -BB    Pertinent History Of Current Problem R middle and lower lobe pna  -SH (r) KS (t) SH (c) 80 yo admitted with R middle and lower lobe pna. Pt with suspected lung Ca s/p XRT. Pt living w COPD. SLP consult to r/o aspiration, per RN.  -BB    Current Method of Nutrition regular textures;thin liquids  -SH (r) KS (t) SH (c) regular textures;thin liquids  -BB    Precautions/Limitations, Vision WFL  -SH (r) KS (t) SH (c) WFL;for purposes of eval  -BB    Precautions/Limitations, Hearing WFL  -SH (r) KS (t) SH (c) WFL;for purposes of eval  -BB    Prior Level of Function-Communication WFL  -SH (r) KS (t) SH (c) WFL  -BB    Prior Level of Function-Swallowing no diet consistency restrictions  -SH (r) KS (t) SH (c) no diet consistency restrictions  -BB    Plans/Goals Discussed with -- patient;agreed upon  -BB    Barriers to Rehab -- none identified  -BB    Patient's Goals for Discharge -- return home  -BB       Pain    Pretreatment Pain Rating 0/10 - no pain  -SH (r) KS (t) SH (c) 0/10 - no pain  -BB    Posttreatment Pain Rating 0/10 - no pain  -SH (r) KS (t) SH (c) 0/10 - no pain  -BB       Oral Motor Structure and Function    Dentition Assessment -- upper dentures/partial in place;lower dentures/partial in place  -BB    Secretion Management -- WNL/WFL  -BB    Mucosal Quality --  moist, healthy  -BB       Oral Musculature and Cranial Nerve Assessment    Oral Motor General Assessment -- WFL  -BB       General Eating/Swallowing Observations    Respiratory Support Currently in Use -- room air  -BB    Eating/Swallowing Skills -- self-fed  -BB    Positioning During Eating -- upright in bed  -BB    Utensils Used -- spoon;cup  -BB    Consistencies Trialed -- regular textures;soft to chew textures;mixed consistency;thin liquids  -BB       Clinical Swallow Eval    Clinical Swallow Evaluation Summary -- Clinical swallow eval completed. No family present.     Cognition: A/Ox 4. Pt able to follow basic multi-step commands.   Expressive communication: appropriate.   Voice: Vocal quality and loudness appear good.   Cough: Not elicited.   Affect: Pleasant.   Speech: Intelligible.   Bulbar mech exam: Integrity of cranial nerves V, VII, IX/X and XII appear grossly intact.   Dysphagia symptoms: pt denies.   RN reports pt tolerating diet wo observable difficulties.     Patient agreeable to PO trials.   Dysphagia signs: No overt clinical signs of aspiration and/or dysphagia across trials.   Christy swallow protocol: passed; good predictor of safe oral alimentation so long as the patient remains stable medically / neurologically.    Assessment / Plan: Clinical swallow eval completed with patient admitted with pneumonia. No overt clinical signs of aspiration and/or dysphagia across PO trials. Suggest could complete video swallow study to rule out silent aspiration, given pt living with COPD and hx possible lung Ca s/p XRT. No hx dysphagia and/or recent pneunomina. Can complete at this facility or as outpatient. Suggest continue regular diet textures, thin liquids, pending video swallow study results/recs. Meds, per patient preference.  -BB       MBS/VFSS    Utensils Used spoon;cup;straw  -SH (r) KS (t) SH (c) --    Consistencies Trialed regular textures;mixed consistency;pureed;thin liquids;nectar/syrup-thick  liquids  -SH (r) KS (t) SH (c) --       MBS/VFSS Interpretation    Oral Prep Phase WFL  -SH (r) KS (t) SH (c) --    Oral Transit Phase WFL  -SH (r) KS (t) SH (c) --    Oral Residue WFL  -SH (r) KS (t) SH (c) --    Oral Phase, Comment Patient presents with a functional oropharyngeal swallow. Normal swallow initiation with nectar via cup and straw without penetration. Transient, flash penetration intermittently with thins via cup and straw. Adequate swallow with puree. Pt cleared mild oral residue with a spontaneous second swallow. Rotary mastication with soft solids and regular. Patient swiftly triggered a swallow with thin portion of mixed. Pt able to clear regular solids quickly from pharynx, but did require an additional spontaneous swallow to clear oropharyngeal residue.  -SH --       Initiation of Pharyngeal Swallow    Initiation of Pharyngeal Swallow WFL  -SH (r) KS (t) SH (c) --    Pharyngeal Phase functional pharyngeal phase of swallowing  -SH (r) KS (t) SH (c) --       Esophageal Phase    Esophageal Phase no impairments  -SH (r) KS (t) SH (c) --       SLP Communication to Radiology    Severity Level of Dysphagia WFL  -SH (r) KS (t) SH (c) --    Summary Statement Melissa Rendon, Radiology APRN, present. No aspiration visualized. Functional oropharyngeal swallow observed across all consistencies tested.  -SH --       SLP Evaluation Clinical Impression    SLP Swallowing Diagnosis swallow WFL/no suspected pharyngeal impairment  -SH (r) KS (t) SH (c) R/O pharyngeal dysphagia  -BB    Rehab Potential/Prognosis, Swallowing -- good, to achieve stated therapy goals  -BB    Swallow Criteria for Skilled Therapeutic Interventions Met -- demonstrates skilled criteria  -BB       Recommendations    Therapy Frequency (Swallow) evaluation only  -SH (r) KS (t) SH (c) PRN  -BB    Predicted Duration Therapy Intervention (Days) -- until discharge  -BB    SLP Diet Recommendation regular textures;thin liquids  - regular  textures;thin liquids  -BB    Recommended Diagnostics -- [place]FSS ([facility]  -BB    Recommended Precautions and Strategies general aspiration precautions  - general aspiration precautions  -BB    Oral Care Recommendations Denture Care;Oral Care BID/PRN  - Denture Care;Oral Care BID/PRN  -BB    SLP Rec. for Method of Medication Administration as tolerated  - as tolerated  -BB    Monitor for Signs of Aspiration -- notify SLP if any concerns  -BB    Anticipated Discharge Disposition (SLP) -- unknown  -BB       Swallow Goals (SLP)    Swallow LTGs Swallow Long Term Goal (free text)  - Swallow Long Term Goal (free text)  -BB       (LTG) Swallow    (LTG) Swallow Pt will complete VFSS in order to establish plan of care.  - Pt will complete VFSS in order to establish plan of care.  -BB    [place] (Swallow Long Term Goal) independently (over 90% accuracy)  - independently (over 90% accuracy)  -BB    Time Frame (Swallow Long Term Goal) by discharge  - by discharge  -BB    Progress/Outcomes (Swallow Long Term Goal) goal met  - new goal  -BB              User Key  (r) = Recorded By, (t) = Taken By, (c) = Cosigned By      Initials Name Effective Dates     Lulu Escoto, SLP 01/05/24 -     BB Bruce Enriquez, SLP 02/19/23 -     Neptali Mason, Speech Therapy Student 05/13/25 -                     EDUCATION  The patient has been educated in the following areas:   Dysphagia (Swallowing Impairment).        SLP GOALS       Row Name 05/20/25 1100 05/19/25 1325          (LTG) Swallow    (LTG) Swallow Pt will complete VFSS in order to establish plan of care.  - Pt will complete VFSS in order to establish plan of care.  -BB     [place] (Swallow Long Term Goal) independently (over 90% accuracy)  - independently (over 90% accuracy)  -BB     Time Frame (Swallow Long Term Goal) by discharge  - by discharge  -BB     Progress/Outcomes (Swallow Long Term Goal) goal met  - new goal  -BB               User Key  (r) =  Recorded By, (t) = Taken By, (c) = Cosigned By      Initials Name Provider Type     Lulu Escoto SLP Speech and Language Pathologist    Bruce Mills, SHEN Speech and Language Pathologist                         Time Calculation:    Time Calculation- SLP       Row Name 05/20/25 1158             Time Calculation- SLP    SLP Start Time 1040  -      SLP Received On 05/20/25  -                User Key  (r) = Recorded By, (t) = Taken By, (c) = Cosigned By      Initials Name Provider Type     Lulu Escoto SLP Speech and Language Pathologist                    Therapy Charges for Today       Code Description Service Date Service Provider Modifiers Qty    70733132673 HC ST MOTION FLUORO EVAL SWALLOW 5 5/20/2025 Lulu Escoto SLP GN 1                 SHEN Arzola  5/20/2025

## 2025-05-20 NOTE — PROGRESS NOTES
" LOS: 2 days     Name: Brigid Carballo  Age: 79 y.o.  Sex: female  :  1945  MRN: [id number]         Primary Care Physician: Kaya Mckeon APRN    Subjective   Subjective  No with a lot of coughing.  Wheezing more heavily today.  Currently on 2 L.  Short of breath particularly with coughing episodes.    Objective   Vital Signs  Temp:  [97.3 °F (36.3 °C)-98.1 °F (36.7 °C)] 98.1 °F (36.7 °C)  Heart Rate:  [60-87] 60  Resp:  [16-18] 18  BP: (101-135)/(65-83) 135/83  Body mass index is 26.12 kg/m².    Objective:  General Appearance:  Comfortable and in no acute distress.    Vital signs: (most recent): Blood pressure 135/83, pulse 60, temperature 98.1 °F (36.7 °C), temperature source Oral, resp. rate 18, height 154.9 cm (61\"), weight 62.7 kg (138 lb 3.7 oz), SpO2 96%.    Lungs:  Normal effort and normal respiratory rate.  She is not in respiratory distress.  There are decreased breath sounds, wheezes and rhonchi.    Heart: Normal rate.  Regular rhythm.    Abdomen: Abdomen is soft.  Bowel sounds are normal.   There is no abdominal tenderness.     Extremities: There is no dependent edema or local swelling.    Neurological: Patient is alert and oriented to person, place and time.    Skin:  Warm and dry.                Results Review:       I reviewed the patient's new clinical results.    Results from last 7 days   Lab Units 25  0437 25  04225  1422   WBC 10*3/mm3 6.33 3.65 5.39   HEMOGLOBIN g/dL 12.7 12.7 13.9   PLATELETS 10*3/mm3 185 201 182     Results from last 7 days   Lab Units 25  0437 25  04225  1422   SODIUM mmol/L 144 137 142   POTASSIUM mmol/L 3.9 4.1 4.9   CHLORIDE mmol/L 107 103 107   CO2 mmol/L 26.5 26.0 28.2   BUN mg/dL 12 12 15   CREATININE mg/dL 0.68 0.64 0.73   CALCIUM mg/dL 8.6 9.0 9.4   GLUCOSE mg/dL 83 118* 102*     Results from last 7 days   Lab Units 25  1422   INR  1.10             Scheduled Meds:   ascorbic acid, 1,000 mg, Oral, " Daily  atorvastatin, 40 mg, Oral, Nightly  cefTRIAXone, 1,000 mg, Intravenous, Q24H  cholecalciferol, 2,000 Units, Oral, Daily  enoxaparin sodium, 40 mg, Subcutaneous, Q24H  guaiFENesin, 1,200 mg, Oral, Q12H  ipratropium-albuterol, 3 mL, Nebulization, Q4H - RT  methylPREDNISolone sodium succinate, 125 mg, Intravenous, Once   Followed by  [START ON 5/21/2025] methylPREDNISolone sodium succinate, 40 mg, Intravenous, Q12H  pantoprazole, 40 mg, Oral, Q AM  sodium chloride, 10 mL, Intravenous, Q12H      PRN Meds:     acetaminophen **OR** acetaminophen **OR** acetaminophen    ipratropium-albuterol    nitroglycerin    ondansetron    sodium chloride    sodium chloride  Continuous Infusions:       Assessment & Plan   Active Hospital Problems    Diagnosis  POA    **Pneumonia [J18.9]  Yes    History of radiation therapy [Z92.3]  Not Applicable    Hemoptysis [R04.2]  Unknown    Chronic obstructive pulmonary disease with acute exacerbation [J44.1]  Yes    Lung nodule [R91.1]  Yes    Gastroesophageal reflux disease [K21.9]  Yes      Resolved Hospital Problems   No resolved problems to display.       Assessment & Plan    -Continue Rocephin and continue aggressive pulmonary hygiene measures  - Wean oxygen as tolerated  - She is wheezing more heavily today.  I am going to add Solu-Medrol and continue bronchodilators  - Speech therapy evaluation did not reveal any evidence of dysphagia or aspiration.  Continue regular diet    Anticipate she will need 1-2 more days      Expected Discharge Date: 5/21/2025; Expected Discharge Time:      Kaiden Tamez MD  [facility]  05/20/25  12:14 EDT

## 2025-05-20 NOTE — CASE MANAGEMENT/SOCIAL WORK
Discharge Planning Assessment  [facility]     Patient Name: Brigid Carballo  MRN: [id number]  Today's Date: 5/20/2025    Admit Date: 5/18/2025    Plan: Home via self   Discharge Needs Assessment       Row Name 05/20/25 0900       Living Environment    People in Home spouse    Current Living Arrangements home    Family Caregiver if Needed other (see comments);spouse    Quality of Family Relationships helpful;involved;supportive    Able to Return to Prior Arrangements yes       Transition Planning    Patient/Family Anticipates Transition to home with family    Patient/Family Anticipated Services at Transition     Transportation Anticipated family or friend will provide;car, drives self       Discharge Needs Assessment    Readmission Within the Last 30 Days no previous admission in last 30 days    Equipment Currently Used at Home none    Concerns to be Addressed discharge planning    Equipment Needed After Discharge none                   Discharge Plan       Row Name 05/20/25 0901       Plan    Plan Home via self    Patient/Family in Agreement with Plan yes    Plan Comments CCP met with pt at the bedside, introduced self and role of CCP. Face sheet information and pharmacy verified. Pt lives with her spouse in a single-story patio home. Pt still drives, IADL's, works and denies DME at home. Pt has a living will. Pt is enrolled in meds to beds and denies trouble affording or managing her medications. Pt denies HH or SNF history. DC plan is to return home, pt to drive self-home. Guevara RN/CCP                    Expected Discharge Date and Time       Expected Discharge Date Expected Discharge Time    May 21, 2025            Demographic Summary       Row Name 05/20/25 0900       General Information    Admission Type inpatient    Arrived From home    Required Notices Provided Important Message from Medicare    Referral Source admission list;case finding    Reason for Consult discharge planning    Preferred  Language English       Contact Information    Permission Granted to Share Info With                    Functional Status       Row Name 05/20/25 0900       Functional Status    Usual Activity Tolerance excellent    Current Activity Tolerance good       Assessment of Health Literacy    How often do you have someone help you read hospital materials? Never    How often do you have problems learning about your medical condition because of difficulty understanding written information? Never    How often do you have a problem understanding what is told to you about your medical condition? Never    How confident are you filling out medical forms by yourself? Extremely    Health Literacy Excellent       Functional Status, IADL    Medications independent    Meal Preparation independent    Housekeeping independent    Laundry independent    Shopping independent                               Tereza Cordova RN

## 2025-05-20 NOTE — PLAN OF CARE
Goal Outcome Evaluation:  Plan of Care Reviewed With: patient        Progress: improving  Outcome Evaluation: Pt is A/O x4, NSR, O2 2L, stand-by assist, VSS. Pt tolerated the breathing teatment and scheduled medications well. Pt c/o of SOB few times but sitting up and changing position resloved the issue. No any acute chagnes over night.

## 2025-05-20 NOTE — PLAN OF CARE
Goal Outcome Evaluation:  Plan of Care Reviewed With: patient           Outcome Evaluation: Patient presents with a functional oropharyngeal swallow. Rec regular and thins. Will sign off.               SLP Swallowing Diagnosis: swallow WFL/no suspected pharyngeal impairment (05/20/25 1100)              I have reviewed the notes, assessments, and/or procedures performed by Lashon Kearns NP, I concur with her/his documentation of Shruthi Nelson.

## 2025-05-21 PROCEDURE — 94799 UNLISTED PULMONARY SVC/PX: CPT

## 2025-05-21 PROCEDURE — 63710000001 PREDNISONE PER 1 MG: Performed by: HOSPITALIST

## 2025-05-21 PROCEDURE — 25010000002 CEFTRIAXONE PER 250 MG: Performed by: INTERNAL MEDICINE

## 2025-05-21 PROCEDURE — 25010000002 ENOXAPARIN PER 10 MG: Performed by: INTERNAL MEDICINE

## 2025-05-21 PROCEDURE — 94664 DEMO&/EVAL PT USE INHALER: CPT

## 2025-05-21 RX ORDER — PREDNISONE 20 MG/1
40 TABLET ORAL
Status: DISCONTINUED | OUTPATIENT
Start: 2025-05-21 | End: 2025-05-22 | Stop reason: HOSPADM

## 2025-05-21 RX ADMIN — IPRATROPIUM BROMIDE AND ALBUTEROL SULFATE 3 ML: .5; 3 SOLUTION RESPIRATORY (INHALATION) at 14:53

## 2025-05-21 RX ADMIN — ATORVASTATIN CALCIUM 40 MG: 20 TABLET, FILM COATED ORAL at 20:00

## 2025-05-21 RX ADMIN — Medication 10 ML: at 09:36

## 2025-05-21 RX ADMIN — GUAIFENESIN 1200 MG: 600 TABLET, EXTENDED RELEASE ORAL at 09:36

## 2025-05-21 RX ADMIN — ENOXAPARIN SODIUM 40 MG: 100 INJECTION SUBCUTANEOUS at 15:00

## 2025-05-21 RX ADMIN — OXYCODONE HYDROCHLORIDE AND ACETAMINOPHEN 1000 MG: 500 TABLET ORAL at 09:35

## 2025-05-21 RX ADMIN — GUAIFENESIN 1200 MG: 600 TABLET, EXTENDED RELEASE ORAL at 20:00

## 2025-05-21 RX ADMIN — IPRATROPIUM BROMIDE AND ALBUTEROL SULFATE 3 ML: .5; 3 SOLUTION RESPIRATORY (INHALATION) at 07:50

## 2025-05-21 RX ADMIN — Medication 2000 UNITS: at 09:36

## 2025-05-21 RX ADMIN — PREDNISONE 40 MG: 20 TABLET ORAL at 15:01

## 2025-05-21 RX ADMIN — CEFTRIAXONE SODIUM 1000 MG: 1 INJECTION, POWDER, FOR SOLUTION INTRAMUSCULAR; INTRAVENOUS at 17:41

## 2025-05-21 RX ADMIN — PANTOPRAZOLE SODIUM 40 MG: 40 TABLET, DELAYED RELEASE ORAL at 09:35

## 2025-05-21 RX ADMIN — IPRATROPIUM BROMIDE AND ALBUTEROL SULFATE 3 ML: .5; 3 SOLUTION RESPIRATORY (INHALATION) at 10:51

## 2025-05-21 RX ADMIN — IPRATROPIUM BROMIDE AND ALBUTEROL SULFATE 3 ML: .5; 3 SOLUTION RESPIRATORY (INHALATION) at 20:33

## 2025-05-21 NOTE — PROGRESS NOTES
Name: Brigid Carballo ADMIT: 2025   : 1945  PCP: Kaya Mckeon SHE    MRN: [id number] LOS: 3 days   AGE/SEX: 79 y.o. female  ROOM: [facility]     Subjective   Subjective   Feels sore due to severe cough.  Otherwise feels okay.       Objective   Objective   Temp:  [97.3 °F (36.3 °C)-97.9 °F (36.6 °C)] 97.3 °F (36.3 °C)  Heart Rate:  [57-96] 91  Resp:  [16-18] 18  BP: (111-137)/(58-89) 120/58  SpO2:  [93 %-100 %] 94 %  on  Flow (L/min) (Oxygen Therapy):  [2] 2;   Device (Oxygen Therapy): nasal cannula  Body mass index is 26.12 kg/m².    Physical Exam  Vitals and nursing note reviewed.   Constitutional:       General: She is not in acute distress.  Cardiovascular:      Rate and Rhythm: Normal rate and regular rhythm.   Pulmonary:      Effort: Pulmonary effort is normal.      Breath sounds: Normal breath sounds.   Abdominal:      General: Bowel sounds are normal.      Palpations: Abdomen is soft.      Tenderness: There is no abdominal tenderness.   Musculoskeletal:         General: No swelling.   Skin:     General: Skin is warm and dry.   Neurological:      Mental Status: She is alert. Mental status is at baseline.         Results Review      I reviewed the patient's new clinical results.  Results from last 7 days   Lab Units 25  0437 25  0421 25  1422   WBC 10*3/mm3 6.33 3.65 5.39   HEMOGLOBIN g/dL 12.7 12.7 13.9   PLATELETS 10*3/mm3 185 201 182     Results from last 7 days   Lab Units 25  0437 25  0422 25  1422   SODIUM mmol/L 144 137 142   POTASSIUM mmol/L 3.9 4.1 4.9   CHLORIDE mmol/L 107 103 107   CO2 mmol/L 26.5 26.0 28.2   BUN mg/dL 12 12 15   CREATININE mg/dL 0.68 0.64 0.73   GLUCOSE mg/dL 83 118* 102*   EGFR mL/min/1.73 88.2 89.5 83.3     Results from last 7 days   Lab Units 25  0437 25  0422 25  1422   CALCIUM mg/dL 8.6 9.0 9.4   ALBUMIN g/dL  --  3.4* 3.7   MAGNESIUM mg/dL  --   --  2.2     Results from last 7 days   Lab Units 25  0429  05/18/25 2002 05/18/25  1707 05/18/25  1422   PROCALCITONIN ng/mL  --   --   --  0.11   LACTATE mmol/L 0.9 1.2 2.6*  --      Lab Results   Component Value Date    STREPPNEUAG Negative 05/19/2025    LEGANTIGENUR Negative 05/19/2025     Results from last 7 days   Lab Units 05/18/25  1717   BLOODCX  No growth at 2 days       I have personally reviewed all medications:  Scheduled Medications  ascorbic acid, 1,000 mg, Oral, Daily  atorvastatin, 40 mg, Oral, Nightly  cefTRIAXone, 1,000 mg, Intravenous, Q24H  cholecalciferol, 2,000 Units, Oral, Daily  enoxaparin sodium, 40 mg, Subcutaneous, Q24H  guaiFENesin, 1,200 mg, Oral, Q12H  ipratropium-albuterol, 3 mL, Nebulization, Q4H - RT  pantoprazole, 40 mg, Oral, Q AM  predniSONE, 40 mg, Oral, Daily With Breakfast    Infusions   Diet  Diet: Regular/House; Fluid Consistency: Thin (IDDSI 0)    I have personally reviewed:  [x]  Laboratory   [x]  Microbiology   [x]  Radiology   [x]  EKG/Telemetry  [x]  Cardiology/Vascular   []  Pathology    []  Records         Assessment/Plan     Active Hospital Problems    Diagnosis  POA    **Pneumonia [J18.9]  Yes    History of radiation therapy [Z92.3]  Not Applicable    Hemoptysis [R04.2]  Unknown    Chronic obstructive pulmonary disease with acute exacerbation [J44.1]  Yes    Lung nodule [R91.1]  Yes    Gastroesophageal reflux disease [K21.9]  Yes      Resolved Hospital Problems   No resolved problems to display.       79 y.o. female with Pneumonia.    AFVSS  93% on room air  No labs today  Cultures remain negative      Symptoms seem to be improving.  She was reportedly wheezing yesterday and received IV Solu-Medrol.  Do not appreciate any wheezing on exam today we will transition her off the Solu-Medrol to prednisone for 5 days.  Continue breathing treatments.  Continue Rocephin.  Wean oxygen as tolerated.  If symptoms stable anticipate discharge home in the morning.        Lovenox 40 mg SC daily for DVT prophylaxis.  Full code.  Discussed  with patient and care team on multidisciplinary rounds.  Anticipate discharge home tomorrow.  Expected Discharge Date: 5/22/2025; Expected Discharge Time:       Tomas Reynoso MD  [facility]  05/21/25  11:23 EDT

## 2025-05-22 ENCOUNTER — READMISSION MANAGEMENT (OUTPATIENT)
Dept: CALL CENTER | Facility: HOSPITAL | Age: 80
End: 2025-05-22
Payer: MEDICARE

## 2025-05-22 VITALS
DIASTOLIC BLOOD PRESSURE: 63 MMHG | SYSTOLIC BLOOD PRESSURE: 136 MMHG | TEMPERATURE: 98.1 F | BODY MASS INDEX: 26.1 KG/M2 | OXYGEN SATURATION: 99 % | WEIGHT: 138.23 LBS | HEART RATE: 61 BPM | RESPIRATION RATE: 18 BRPM | HEIGHT: 61 IN

## 2025-05-22 PROCEDURE — 94799 UNLISTED PULMONARY SVC/PX: CPT

## 2025-05-22 PROCEDURE — 25010000002 CEFTRIAXONE PER 250 MG: Performed by: INTERNAL MEDICINE

## 2025-05-22 PROCEDURE — 63710000001 PREDNISONE PER 1 MG: Performed by: HOSPITALIST

## 2025-05-22 RX ORDER — PREDNISONE 20 MG/1
40 TABLET ORAL
Qty: 6 TABLET | Refills: 0 | Status: SHIPPED | OUTPATIENT
Start: 2025-05-23 | End: 2025-05-26

## 2025-05-22 RX ADMIN — GUAIFENESIN 1200 MG: 600 TABLET, EXTENDED RELEASE ORAL at 09:01

## 2025-05-22 RX ADMIN — PREDNISONE 40 MG: 20 TABLET ORAL at 09:01

## 2025-05-22 RX ADMIN — IPRATROPIUM BROMIDE AND ALBUTEROL SULFATE 3 ML: .5; 3 SOLUTION RESPIRATORY (INHALATION) at 00:19

## 2025-05-22 RX ADMIN — IPRATROPIUM BROMIDE AND ALBUTEROL SULFATE 3 ML: .5; 3 SOLUTION RESPIRATORY (INHALATION) at 10:31

## 2025-05-22 RX ADMIN — PANTOPRAZOLE SODIUM 40 MG: 40 TABLET, DELAYED RELEASE ORAL at 06:20

## 2025-05-22 RX ADMIN — OXYCODONE HYDROCHLORIDE AND ACETAMINOPHEN 1000 MG: 500 TABLET ORAL at 09:01

## 2025-05-22 RX ADMIN — IPRATROPIUM BROMIDE AND ALBUTEROL SULFATE 3 ML: .5; 3 SOLUTION RESPIRATORY (INHALATION) at 03:56

## 2025-05-22 RX ADMIN — Medication 2000 UNITS: at 09:01

## 2025-05-22 RX ADMIN — CEFTRIAXONE SODIUM 1000 MG: 1 INJECTION, POWDER, FOR SOLUTION INTRAMUSCULAR; INTRAVENOUS at 11:41

## 2025-05-22 NOTE — DISCHARGE SUMMARY
Patient Name: Brigid Carballo  : 1945  MRN: [id number]    Date of Admission: 2025  Date of Discharge:  2025  Primary Care Physician: Kaya Mckeon APRN      Chief Complaint:   Coughing Up Blood      Discharge Diagnoses     Active Hospital Problems    Diagnosis  POA    **Pneumonia [J18.9]  Yes    History of radiation therapy [Z92.3]  Not Applicable    Hemoptysis [R04.2]  Unknown    Chronic obstructive pulmonary disease with acute exacerbation [J44.1]  Yes    Lung nodule [R91.1]  Yes    Gastroesophageal reflux disease [K21.9]  Yes      Resolved Hospital Problems   No resolved problems to display.        Hospital Course     Ms. Carballo is a 79 y.o. female with a history of COPD who presented to [facility] initially complaining of coughing up blood.  Please see the admitting history and physical for further details.  She was found to have acute exacerbation of COPD and pneumonia and was admitted to the hospital for further evaluation and treatment.  She started on antibiotics for bacterial pneumonia as well as scheduled breathing treatments and eventually Solu-Medrol as she developed some wheezing during her stay.  Cultures of all been negative and clinically she is much improved.  She has been weaned off oxygen and at this point seems stable for discharge to continue short course of antibiotics and steroids as outpatient.  She is instructed to follow-up with her primary care provider and was told she will need repeat x-ray to document resolution of pneumonia.       Day of Discharge     Subjective:  See today's progress note    Physical Exam:  Temp:  [97.5 °F (36.4 °C)-98.1 °F (36.7 °C)] 98.1 °F (36.7 °C)  Heart Rate:  [59-86] 61  Resp:  [18] 18  BP: ()/(51-68) 136/63  Body mass index is 26.12 kg/m².  Physical Exam    Consultants     Consult Orders (all) (From admission, onward)       Start     Ordered    25 1655  LHWINNIE (on-call MD unless specified) Details  Once,   Status:   Canceled        Specialty:  Hospitalist  Provider:  Nicolasa Estrada MD    05/18/25 1650                  Procedures     * Surgery not found *    Imaging Results (All)       Procedure Component Value Units Date/Time    FL Video Swallow Single Contrast [[id number]] Collected: 05/20/25 1211     Updated: 05/20/25 1215    Narrative:      VIDEO SWALLOWING EXAMINATION BY SPEECH PATHOLOGY     Clinical: Dysphasia     Video swallowing examination performed under the direction of speech  pathology. Imaging reviewed by radiologist who concurs with the  findings.     Speech pathology summary:Melissa Rendon, Radiology APRN, present. No  aspiration visualized. Functional oropharyngeal swallow observed across  all consistencies tested.      FT:1min 31 seconds   Dose:(Ka,r)5.74 mGy      This report was finalized on 5/20/2025 12:12 PM by Dr. Reynold Keller M.D on Workstation: [id number]       CT Angiogram Chest Pulmonary Embolism [[id number]] Collected: 05/18/25 1634     Updated: 05/19/25 1109    Narrative:      CT ANGIOGRAM OF THE CHEST. MULTIPLE CORONAL, SAGITTAL, AND 3-D  RECONSTRUCTIONS.     HISTORY: 79-year-old female with hemoptysis. Lung cancer history and  advanced emphysema. Evaluate for pulmonary thromboemboli. SBRT to  suspicious right apical pulmonary nodule.     TECHNIQUE: Radiation dose reduction techniques were utilized, including  automated exposure control and exposure modulation based on body size.   CT angiogram of the chest was performed with 2mm images following the  administration of IV contrast. Multiple coronal, sagittal, and 3-D  reconstruction images were obtained. Compared with noncontrasted chest  CT 02/24/2025.     FINDINGS:  1. There is no convincing evidence for acute pulmonary thromboemboli.     2. There is a new dense airspace consolidation throughout a significant  portion of the medial aspect of the right middle lobe and there are new  ill-defined opacities at both lung bases and a ground-glass  opacity at  the central aspect of the right lower lobe. Acute infectious infiltrates  are suspected, largest and most dense at the right middle lobe.  Follow-up is recommended.     The tiny residual right apical pulmonary nodule status post SBRT is  poorly seen secondary to streak artifact from the right upper extremity  injection and also due to breathing artifact. A standard chest CT  recommended for conservative surveillance of the tiny remaining right  apical opacity. Stable 1 cm left lower lobe opacity, image 107.  Follow-up noncontrast chest CT is recommended in 2 months.     3. There are no pleural effusions. Stable minimal pericardial effusion.  Stable mediastinal nodes.        This report was finalized on 5/19/2025 11:06 AM by Dr. Angely Arana M.D  on Workstation: [id number]       XR Chest 1 View [[id number]] Collected: 05/18/25 1357     Updated: 05/18/25 1411    Narrative:      XR CHEST 1 VW-     HISTORY: Female who is 79 years-old, short of breath     TECHNIQUE: Frontal view of the chest     COMPARISON: 11/16/2024     FINDINGS: The heart size is borderline. Aorta is calcified. Pulmonary  vasculature is unremarkable. No focal pulmonary consolidation, pleural  effusion, or pneumothorax. No acute osseous process.       Impression:      No focal pulmonary consolidation. Borderline heart size.  Follow-up as clinical indications persist.     This report was finalized on 5/18/2025 2:08 PM by Dr. Jonathan Wolfe M.D on Workstation: [id number]          Pertinent Labs     Results from last 7 days   Lab Units 05/20/25  0437 05/19/25  0421 05/18/25  1422   WBC 10*3/mm3 6.33 3.65 5.39   HEMOGLOBIN g/dL 12.7 12.7 13.9   PLATELETS 10*3/mm3 185 201 182     Results from last 7 days   Lab Units 05/20/25  0437 05/19/25  0422 05/18/25  1422   SODIUM mmol/L 144 137 142   POTASSIUM mmol/L 3.9 4.1 4.9   CHLORIDE mmol/L 107 103 107   CO2 mmol/L 26.5 26.0 28.2   BUN mg/dL 12 12 15   CREATININE mg/dL 0.68 0.64 0.73   GLUCOSE  "mg/dL 83 118* 102*   EGFR mL/min/1.73 88.2 89.5 83.3     Results from last 7 days   Lab Units 05/19/25  0422 05/18/25  1422   ALBUMIN g/dL 3.4* 3.7   BILIRUBIN mg/dL 0.3 0.5   ALK PHOS U/L 42 46   AST (SGOT) U/L 12 11   ALT (SGPT) U/L 9 9     Results from last 7 days   Lab Units 05/20/25  0437 05/19/25  0422 05/18/25  1422   CALCIUM mg/dL 8.6 9.0 9.4   ALBUMIN g/dL  --  3.4* 3.7   MAGNESIUM mg/dL  --   --  2.2       Results from last 7 days   Lab Units 05/18/25  1552 05/18/25  1422   HSTROP T ng/L 8 8   PROBNP pg/mL  --  278.0           Invalid input(s): \"LDLCALC\"  Results from last 7 days   Lab Units 05/18/25  1717 05/18/25  1707   BLOODCX  No growth at 3 days No growth at 3 days     Results from last 7 days   Lab Units 05/18/25  1415   COVID19  Not Detected       Test Results Pending at Discharge     Pending Results       None              Discharge Details        Discharge Medications        New Medications        Instructions Start Date   predniSONE 20 MG tablet  Commonly known as: DELTASONE   40 mg, Oral, Daily With Breakfast   Start Date: May 23, 2025            Continue These Medications        Instructions Start Date   albuterol sulfate  (90 Base) MCG/ACT inhaler  Commonly known as: PROVENTIL HFA;VENTOLIN HFA;PROAIR HFA   2 puffs, Inhalation, Every 4 Hours PRN      ascorbic acid 1000 MG tablet  Commonly known as: VITAMIN C   1,000 mg, Daily      aspirin 81 MG EC tablet   81 mg, Oral, Daily      atorvastatin 40 MG tablet  Commonly known as: LIPITOR   40 mg, Oral, Nightly      cholecalciferol 25 MCG (1000 UT) tablet  Commonly known as: VITAMIN D3   2,000 Units, Oral, Daily      esomeprazole 20 MG capsule  Commonly known as: nexIUM   20 mg, Every Morning Before Breakfast      Fluticasone-Salmeterol 250-50 MCG/ACT DISKUS  Commonly known as: ADVAIR/WIXELA   1 puff, Inhalation, 2 Times Daily - RT      Spiriva Respimat 2.5 MCG/ACT aerosol solution inhaler  Generic drug: tiotropium bromide monohydrate   2 " puffs, Inhalation, Daily - RT      vitamin B-12 1000 MCG tablet  Commonly known as: CYANOCOBALAMIN   1,000 mcg, Daily               Allergies   Allergen Reactions    Amoxicillin-Pot Clavulanate Itching    Codeine Itching    Penicillins Unknown - High Severity    Adhesive Tape Rash     Skin redness       Discharge Disposition:  Home or Self Care      Discharge Diet:  Diet Order   Procedures    Diet: Regular/House; Fluid Consistency: Thin (IDDSI 0)       Discharge Activity:   Activity Instructions       Activity as Tolerated              CODE STATUS:    Code Status and Medical Interventions: CPR (Attempt to Resuscitate); Full Support   Ordered at: 05/18/25 1928     Code Status (Patient has no pulse and is not breathing):    CPR (Attempt to Resuscitate)     Medical Interventions (Patient has pulse or is breathing):    Full Support       Future Appointments   Date Time Provider Department Center   6/2/2025  9:45 AM [facility] PET CT  [facility]   6/9/2025  9:00 AM Lynda Godinez MD [facility] None     Additional Instructions for the Follow-ups that You Need to Schedule       Discharge Follow-up with PCP   As directed       Currently Documented PCP:    Kaya Mckeon APRN    PCP Phone Number:    [phone]     Follow Up Details: 1 to 2 weeks (or sooner if problems)               Follow-up Information       Kaya Mckeon APRN .    Specialty: Family Medicine  Why: 1 to 2 weeks (or sooner if problems)  Contact information:  [address] [place] [id number] [phone]                             Additional Instructions for the Follow-ups that You Need to Schedule       Discharge Follow-up with PCP   As directed       Currently Documented PCP:    Kaya Mckeon APRN    PCP Phone Number:    [phone]     Follow Up Details: 1 to 2 weeks (or sooner if problems)            Time Spent on Discharge:  Greater than 30 minutes      Tomas Reynoso MD  [facility]  05/22/25  11:04 EDT

## 2025-05-22 NOTE — OUTREACH NOTE
Prep Survey      Flowsheet Row Responses   [facility] [place] patient discharged from? [place]   Is LACE score < 7 ? No   Eligibility [facility]   Date of Admission 05/18/25   Date of Discharge 05/22/25   Discharge Disposition Home or Self Care   Discharge diagnosis PNA, COPD exacerbation   Does the patient have one of the following disease processes/diagnoses(primary or secondary)? Pneumonia   Does the patient have Home health ordered? No   Is there a DME ordered? No   Prep survey completed? Yes            Vanessa COLE - Registered Nurse

## 2025-05-22 NOTE — CASE MANAGEMENT/SOCIAL WORK
Case Management Discharge Note      Final Note: Home, no additional CCP needs.         Selected Continued Care - Admitted Since 5/18/2025       Destination    No services have been selected for the patient.                Durable Medical Equipment    No services have been selected for the patient.                Dialysis/Infusion    No services have been selected for the patient.                Home Medical Care    No services have been selected for the patient.                Therapy    No services have been selected for the patient.                Community Resources    No services have been selected for the patient.                Community & DME    No services have been selected for the patient.                    Transportation Services  Private: Car    Final Discharge Disposition Code: 01 - home or self-care

## 2025-05-22 NOTE — PROGRESS NOTES
Name: Brigid Carballo ADMIT: 2025   : 1945  PCP: Cielo Mckeonwoo VAZQUEZ APRSHARMAINE    MRN: [id number] LOS: 4 days   AGE/SEX: 79 y.o. female  ROOM: [facility]     Subjective   Subjective   Feeling better today.  Breathing comfortably off room air.     Objective   Objective   Temp:  [97.5 °F (36.4 °C)-98.1 °F (36.7 °C)] 98.1 °F (36.7 °C)  Heart Rate:  [59-91] 77  Resp:  [18] 18  BP: ()/(51-68) 136/63  SpO2:  [92 %-100 %] 94 %  on  Flow (L/min) (Oxygen Therapy):  [2] 2;   Device (Oxygen Therapy): room air  Body mass index is 26.12 kg/m².    Physical Exam  Vitals and nursing note reviewed.   Constitutional:       General: She is not in acute distress.  Cardiovascular:      Rate and Rhythm: Normal rate and regular rhythm.   Pulmonary:      Effort: Pulmonary effort is normal.      Breath sounds: Normal breath sounds.   Abdominal:      General: Bowel sounds are normal.      Palpations: Abdomen is soft.      Tenderness: There is no abdominal tenderness.   Musculoskeletal:         General: No swelling.   Skin:     General: Skin is warm and dry.   Neurological:      Mental Status: She is alert. Mental status is at baseline.         Results Review      I reviewed the patient's new clinical results.  Results from last 7 days   Lab Units 25  0437 25  0421 25  1422   WBC 10*3/mm3 6.33 3.65 5.39   HEMOGLOBIN g/dL 12.7 12.7 13.9   PLATELETS 10*3/mm3 185 201 182     Results from last 7 days   Lab Units 25  0437 25  0422 25  1422   SODIUM mmol/L 144 137 142   POTASSIUM mmol/L 3.9 4.1 4.9   CHLORIDE mmol/L 107 103 107   CO2 mmol/L 26.5 26.0 28.2   BUN mg/dL 12 12 15   CREATININE mg/dL 0.68 0.64 0.73   GLUCOSE mg/dL 83 118* 102*   EGFR mL/min/1.73 88.2 89.5 83.3     Results from last 7 days   Lab Units 25  0437 25  0422 25  1422   CALCIUM mg/dL 8.6 9.0 9.4   ALBUMIN g/dL  --  3.4* 3.7   MAGNESIUM mg/dL  --   --  2.2     Results from last 7 days   Lab Units 25  0422  05/18/25 2002 05/18/25  1707 05/18/25  1422   PROCALCITONIN ng/mL  --   --   --  0.11   LACTATE mmol/L 0.9 1.2 2.6*  --      Lab Results   Component Value Date    STREPPNEUAG Negative 05/19/2025    LEGANTIGENUR Negative 05/19/2025     Results from last 7 days   Lab Units 05/18/25  1717   BLOODCX  No growth at 3 days       I have personally reviewed all medications:  Scheduled Medications  ascorbic acid, 1,000 mg, Oral, Daily  atorvastatin, 40 mg, Oral, Nightly  cefTRIAXone, 1,000 mg, Intravenous, Q24H  cholecalciferol, 2,000 Units, Oral, Daily  enoxaparin sodium, 40 mg, Subcutaneous, Q24H  guaiFENesin, 1,200 mg, Oral, Q12H  ipratropium-albuterol, 3 mL, Nebulization, Q4H - RT  pantoprazole, 40 mg, Oral, Q AM  predniSONE, 40 mg, Oral, Daily With Breakfast    Infusions   Diet  Diet: Regular/House; Fluid Consistency: Thin (IDDSI 0)    I have personally reviewed:  [x]  Laboratory   [x]  Microbiology   [x]  Radiology   [x]  EKG/Telemetry  [x]  Cardiology/Vascular   []  Pathology    []  Records         Assessment/Plan     Active Hospital Problems    Diagnosis  POA    **Pneumonia [J18.9]  Yes    History of radiation therapy [Z92.3]  Not Applicable    Hemoptysis [R04.2]  Unknown    Chronic obstructive pulmonary disease with acute exacerbation [J44.1]  Yes    Lung nodule [R91.1]  Yes    Gastroesophageal reflux disease [K21.9]  Yes      Resolved Hospital Problems   No resolved problems to display.       79 y.o. female with Pneumonia.    AFVSS  94% on room air  No labs  All cultures remain negative      Symptoms seem stable and she feels well enough to return home.  She has nebulizers and inhalers at home already.  Will complete a short course of prednisone and antibiotic for pneumonia.       Lovenox 40 mg SC daily for DVT prophylaxis.  Full code.  Discussed with patient and care team on multidisciplinary rounds.  Anticipate discharge home   Expected Discharge Date: 5/22/2025; Expected Discharge Time:       Tomas Reynoso  MD [facility]  05/22/25  09:49 EDT

## 2025-05-23 ENCOUNTER — TRANSITIONAL CARE MANAGEMENT TELEPHONE ENCOUNTER (OUTPATIENT)
Dept: CALL CENTER | Facility: HOSPITAL | Age: 80
End: 2025-05-23
Payer: MEDICARE

## 2025-05-23 LAB
BACTERIA SPEC AEROBE CULT: NORMAL
BACTERIA SPEC AEROBE CULT: NORMAL

## 2025-05-23 NOTE — OUTREACH NOTE
Call Center TCM Note      Flowsheet Row Responses   [facility] patient discharged from? [place]   Does the patient have one of the following disease processes/diagnoses(primary or secondary)? Pneumonia   TCM attempt successful? Yes  [VR- ]   Call start time 1155   Call end time 1158   Discharge diagnosis PNA, COPD exacerbation   Meds reviewed with patient/caregiver? Yes   Is the patient having any side effects they believe may be caused by any medication additions or changes? No   Does the patient have all medications ordered at discharge? Yes   Is the patient taking all medications as directed (includes completed medication regime)? Yes   Comments HOSP DC FU appt 5/28/25 915 am. TCm call complete.   Does the patient have an appointment with their PCP within 7-14 days of discharge? Yes   Has home health visited the patient within 72 hours of discharge? N/A   Pulse Ox monitoring Intermittent   Pulse Ox device source Patient   O2 Sat: education provided Sat levels, Monitoring frequency, When to seek care   Psychosocial issues? No   Did the patient receive a copy of their discharge instructions? Yes   Nursing interventions Reviewed instructions with patient   What is the patient's perception of their health status since discharge? Improving   Nursing Interventions Nurse provided patient education   Is the patient/caregiver able to teach back the hierarchy of who to call/visit for symptoms/problems? PCP, Specialist, Home health nurse, Urgent Care, ED, 911 Yes   Is the patient/caregiver able to teach back signs and symptoms of worsening condition: Fever/chills, Shortness of breath, Chest pain   TCM call completed? Yes   Wrap up additional comments Pt reports that she is improving some. No needs at this time. PCPappt set.   Call end time 1158            STEPHIE IRELAND - Registered Nurse    5/23/2025, 11:59 EDT

## 2025-05-28 ENCOUNTER — OFFICE VISIT (OUTPATIENT)
Dept: FAMILY MEDICINE CLINIC | Facility: CLINIC | Age: 80
End: 2025-05-28
Payer: MEDICARE

## 2025-05-28 VITALS
HEART RATE: 71 BPM | BODY MASS INDEX: 28.13 KG/M2 | TEMPERATURE: 97.5 F | OXYGEN SATURATION: 97 % | SYSTOLIC BLOOD PRESSURE: 124 MMHG | HEIGHT: 61 IN | WEIGHT: 149 LBS | DIASTOLIC BLOOD PRESSURE: 72 MMHG

## 2025-05-28 DIAGNOSIS — E78.2 MIXED HYPERLIPIDEMIA: ICD-10-CM

## 2025-05-28 DIAGNOSIS — J18.9 PNEUMONIA DUE TO INFECTIOUS ORGANISM, UNSPECIFIED LATERALITY, UNSPECIFIED PART OF LUNG: ICD-10-CM

## 2025-05-28 DIAGNOSIS — Z09 HOSPITAL DISCHARGE FOLLOW-UP: Primary | ICD-10-CM

## 2025-05-28 DIAGNOSIS — R04.2 HEMOPTYSIS: ICD-10-CM

## 2025-05-28 DIAGNOSIS — R19.8 ABNORMAL FINDINGS ON ESOPHAGOGASTRODUODENOSCOPY (EGD): ICD-10-CM

## 2025-05-28 DIAGNOSIS — R53.83 FATIGUE, UNSPECIFIED TYPE: ICD-10-CM

## 2025-05-28 DIAGNOSIS — R91.1 LUNG NODULE: ICD-10-CM

## 2025-05-28 DIAGNOSIS — K21.9 GASTROESOPHAGEAL REFLUX DISEASE, UNSPECIFIED WHETHER ESOPHAGITIS PRESENT: ICD-10-CM

## 2025-05-28 DIAGNOSIS — J44.1 CHRONIC OBSTRUCTIVE PULMONARY DISEASE WITH ACUTE EXACERBATION: ICD-10-CM

## 2025-05-28 NOTE — PATIENT INSTRUCTIONS
Schedule appointment with cardiology Dr. Head as previously referred.  Continue to monitor your blood pressure periodically and record results.  Continue to work on healthy diet and exercise.  Follow up in 3 months, or sooner if symptoms persist or worsen.  Follow up as scheduled for CT chest and as scheduled with radiation oncology.

## 2025-05-28 NOTE — PROGRESS NOTES
Subjective   Brigid Carballo is a 79 y.o. female.     Chief Complaint   Patient presents with    Hospital Follow Up Visit     On 5/18/2025 she went to the ER because she felt unwell. She was released on 5/22. Since then she said her oxygen has been staying in the 90's and she feels that is okay however she is having a lot of fatigue and spells were she gets really cold. She was given an antibiotic and steroids when she left the hospital which she has now finished       History of Present Illness  Patient presents for hospital discharge follow up; pt went to [facility] on 5/18/25 with c/o coughing up blood and not feeling well; found to have exacerbation of COPD and pneumonia and was admitted for further evaluation and treatment; started on antibiotics and breathing treatments as well as Solu-medrol due to wheezing; weaned off oxygen and discharged home on 5/22/25 with Rx for steroid x3 more days; will need repeat chest x-ray to document resolution of pneumonia.    O2 sat has been staying in 90s; O2 was 95% this morning; has checked O2 after walking and stays in 90s; has been having a lot of fatigue and episodes of feeling really cold; has finished antibiotic and steroids from when left hospital; has had more fatigue since has finished antibiotic and steroids; does not have any energy; no dyspnea; no fever; no cough; no chest pain or chest tightness; has not needed to use Albuterol inhaler; uses Advair twice daily and Spiriva daily; also uses incentive spirometer twice daily.    No recent follow up with pulmonary; has had 5 treatments of radiation and repeat scan noted tumor in lung is smaller; has another repeat CT scan chest on 6/2/25 and then follow up with radiation oncology on 6/9/25.    Recently found tiny tick embedded in left ear when noted tenderness when getting hair done; removed with putting alcohol close to tick and tick backed out; tick was not engorged.    Monitors BP, typically runs  120s/60-70s; no orthostasis.    F/U VANESA: takes Nexium daily; has to watch diet and avoid spicy foods; has had discoloration noted in stomach on EGD in past; no recent EGD.          Within 48 business hours after discharge our office contacted her via telephone to coordinate her care and needs.        7/6/2024     1:14 PM 8/16/2024     6:31 PM 9/8/2024     4:28 PM 5/22/2025     3:52 PM   Date of TCM Phone Call   [facility]   Date of Admission 6/28/2024 8/15/2024 9/6/2024 5/18/2025   Date of Discharge 7/6/2024 8/16/2024 9/8/2024 5/22/2025   Discharge Disposition Home or Self Care  Home or Self Care Home or Self Care     Risk for Readmission (LACE) Score: 13 (5/22/2025  6:00 AM)      Patient was admitted to [facility]   ALL records were obtained and reviewed.  Date of admission 5/18/25  Date of discharge 5/22/25  Diagnosis: Pneumonia; History of radiation therapy; Hemoptysis; COPD with acute exacerbation; Lung nodule; GERD  Medications upon discharge:  Reviewed and reconciled  Condition stable    Current outpatient and discharge medications have been reconciled for the patient.    I reviewed and requested records labs and diagnostics from the hospital with the patient and family.  The patient is to follow-up with specialist as discussed and directed.    If any problems arise or further questions develop patient is to call or to contact us for any needs.     The following portions of the patient's history were reviewed and updated as appropriate: allergies, current medications, past family history, past medical history, past social history, past surgical history and problem list.    Current Outpatient Medications   Medication Sig Dispense Refill    albuterol sulfate  (90 Base) MCG/ACT inhaler Inhale 2 puffs Every 4 (Four) Hours As Needed for Wheezing. 30 g 3    ascorbic acid (VITAMIN C) 1000 MG  tablet Take 1 tablet by mouth Daily.      aspirin 81 MG EC tablet Take 1 tablet by mouth Daily. 90 tablet 0    atorvastatin (LIPITOR) 40 MG tablet Take 1 tablet by mouth Every Night. 90 tablet 1    cholecalciferol (VITAMIN D3) 25 MCG (1000 UT) tablet Take 2 tablets by mouth Daily.      esomeprazole (nexIUM) 20 MG capsule Take 1 capsule by mouth Every Morning Before Breakfast.      Fluticasone-Salmeterol (ADVAIR/WIXELA) 250-50 MCG/ACT DISKUS Inhale 1 puff 2 (Two) Times a Day. 60 each 0    tiotropium bromide monohydrate (Spiriva Respimat) 2.5 MCG/ACT aerosol solution inhaler Inhale 2 puffs Daily. 4 g 0    vitamin B-12 (CYANOCOBALAMIN) 1000 MCG tablet Take 1 tablet by mouth Daily.       No current facility-administered medications for this visit.       Current Outpatient Medications on File Prior to Visit   Medication Sig    albuterol sulfate  (90 Base) MCG/ACT inhaler Inhale 2 puffs Every 4 (Four) Hours As Needed for Wheezing.    ascorbic acid (VITAMIN C) 1000 MG tablet Take 1 tablet by mouth Daily.    aspirin 81 MG EC tablet Take 1 tablet by mouth Daily.    atorvastatin (LIPITOR) 40 MG tablet Take 1 tablet by mouth Every Night.    cholecalciferol (VITAMIN D3) 25 MCG (1000 UT) tablet Take 2 tablets by mouth Daily.    esomeprazole (nexIUM) 20 MG capsule Take 1 capsule by mouth Every Morning Before Breakfast.    Fluticasone-Salmeterol (ADVAIR/WIXELA) 250-50 MCG/ACT DISKUS Inhale 1 puff 2 (Two) Times a Day.    tiotropium bromide monohydrate (Spiriva Respimat) 2.5 MCG/ACT aerosol solution inhaler Inhale 2 puffs Daily.    vitamin B-12 (CYANOCOBALAMIN) 1000 MCG tablet Take 1 tablet by mouth Daily.     No current facility-administered medications on file prior to visit.       Past Medical History:   Diagnosis Date    Angina pectoris     Anxiety     L[place]'s esophagus 09/29/2015    BPV (benign positional vertigo) 11/07/2021    Bunion of left foot 06/14/2016    Carpal tunnel syndrome     Chest pain 10/05/2020    COPD  (chronic obstructive pulmonary disease)     DDD (degenerative disc disease), cervical 11/04/2014    Deformity of wrist joint 05/03/2018    Insomnia 09/11/2017    Kidney stones     Low back pain 11/04/2014    Melanoma in situ of unspecified lower limb, including hip 06/2021    Occlusion of left vertebral artery 11/06/2021    1/10/22 CTA neck: beyond its origin, left vertebral artery is widely patent throughout its cervical and intracranial course to the vertebrobasilar junction without stenosis    Renal stone 03/02/2020    Spinal stenosis of lumbar region 12/29/2014    Tear of lateral meniscus of knee 09/30/2014    Tobacco use 06/29/2024    Unexplained weight loss 04/21/2021       Past Surgical History:   Procedure Laterality Date    APPENDECTOMY      BREAST BIOPSY      multiple    BRONCHOSCOPY WITH ION ROBOTIC ASSIST  9/6/2024    Procedure: BRONCHOSCOPY  WITH ENDOBRONCHIAL ULTRASOUND AND ION ROBOT WITH FNA'S AND BIOPSIES, BAL;  Surgeon: Ant Talbot MD;  Location: [facility] ENDOSCOPY;  Service: Robotics - Pulmonary;;  PRE- RUL LUNG NODULE  POST- SAME    CARDIAC CATHETERIZATION      CARDIAC CATHETERIZATION N/A 11/29/2022    Procedure: Left Heart Cath;  Surgeon: Brennen Nguyen MD;  Location:  [facility] CATH INVASIVE [facility]ATION;  Service: Cardiology;  Laterality: N/A;    CARDIAC CATHETERIZATION N/A 11/29/2022    Procedure: Coronary angiography;  Surgeon: Brennen Nguyen MD;  Location:  [facility]VASIVE [facility];  Service: Cardiology;  Laterality: N/A;    CARDIAC CATHETERIZATION N/A 11/29/2022    Procedure: Left ventriculography;  Surgeon: Brennen Nguyen MD;  Location:  [facility] CATH INVASIVE [facility];  Service: Cardiology;  Laterality: N/A;    CARPAL TUNNEL RELEASE Bilateral     COLONOSCOPY  05/17/2021    polyp removed; per Dr. Velasquez    CYSTOSCOPY W/ URETERAL STENT PLACEMENT  10/22/2019    HYSTERECTOMY      INGUINAL HERNIA REPAIR Right     KNEE ARTHROSCOPY Right     meniscus    TONSILLECTOMY      UPPER  GASTROINTESTINAL ENDOSCOPY  05/17/2021    per Dr. Velasquez       Family History   Problem Relation Age of Onset    Emphysema Mother     Heart disease Mother     Heart disease Father     Cancer Brother     Heart disease Brother     Skin cancer Brother     Stomach cancer Maternal Aunt     Breast cancer Maternal Grandmother     Breast cancer Paternal Grandmother        Social History     Socioeconomic History    Marital status:    Tobacco Use    Smoking status: Some Days     Current packs/day: 0.10     Average packs/day: 0.5 packs/day for 68.3 years (33.8 ttl pk-yrs)     Types: Cigarettes     Start date: 1958     Last attempt to quit: 6/28/2024     Passive exposure: Current    Smokeless tobacco: Never    Tobacco comments:     Smoking few cigarettes some days, but does not smoke whole cigarettes   Vaping Use    Vaping status: Former   Substance and Sexual Activity    Alcohol use: Not Currently     Comment: social--rarely    Drug use: Not Currently    Sexual activity: Not Currently       Review of Systems   Constitutional:  Positive for fatigue (will get really tired with activity; no SOA). Negative for appetite change (some decreased appetite; will feel full after eating half sandwich), fever, unexpected weight gain and unexpected weight loss. Chills: see HPI.  HENT:  Negative for ear pain, sinus pressure, sore throat and trouble swallowing.    Respiratory:  Negative for cough, chest tightness and shortness of breath.    Cardiovascular:  Negative for chest pain, palpitations and leg swelling (some swelling in right leg if up on feet for period of time, resolved with rest; had artery removed from right leg in past).   Gastrointestinal:  Negative for abdominal pain, blood in stool, constipation, diarrhea, GERD and indigestion.   Endocrine: Negative for cold intolerance (see HPI; gets cold spells to point of shivering at times) and heat intolerance. Polydipsia: drinks a lot of water.  Genitourinary:  Negative for  "dysuria and frequency.   Musculoskeletal:  Negative for back pain.   Skin:  Negative for rash. Dry skin: some on back that itches.  Neurological:  Negative for dizziness, syncope, light-headedness and headache.       Objective   Vitals:    05/28/25 0928   BP: 124/72   BP Location: Left arm   Patient Position: Sitting   Cuff Size: Adult   Pulse: 71   Temp: 97.5 °F (36.4 °C)   TempSrc: Temporal   SpO2: 97%   Weight: 67.6 kg (149 lb)   Height: 154.9 cm (60.98\")     Body mass index is 28.17 kg/m².      Physical Exam  Vitals and nursing note reviewed.   Constitutional:       General: She is not in acute distress.     Appearance: She is well-developed and well-groomed. She is not diaphoretic.   HENT:      Head: Normocephalic.      Right Ear: Tympanic membrane and external ear normal. No decreased hearing noted.      Left Ear: Tympanic membrane and external ear normal. No decreased hearing noted.      Nose: Nose normal.      Right Sinus: No maxillary sinus tenderness or frontal sinus tenderness.      Left Sinus: No maxillary sinus tenderness or frontal sinus tenderness.      Mouth/Throat:      Mouth: Mucous membranes are moist.      Pharynx: No oropharyngeal exudate. Posterior oropharyngeal erythema: mild.  Eyes:      Conjunctiva/sclera: Conjunctivae normal.   Neck:      Vascular: No carotid bruit.   Cardiovascular:      Rate and Rhythm: Normal rate and regular rhythm.      Pulses: Normal pulses.      Heart sounds: Normal heart sounds. No murmur heard.  Pulmonary:      Effort: Pulmonary effort is normal. No respiratory distress.      Breath sounds: Normal breath sounds. No decreased breath sounds or rales.   Abdominal:      General: Bowel sounds are normal.      Palpations: Abdomen is soft. There is no hepatomegaly or splenomegaly.      Tenderness: There is no abdominal tenderness. There is no guarding.   Musculoskeletal:      Cervical back: Normal range of motion and neck supple. No bony tenderness.      Thoracic back: No " bony tenderness.      Lumbar back: No bony tenderness.      Right lower leg: Right lower leg edema: trace pretibial and ankle.      Left lower leg: No edema.   Lymphadenopathy:      Cervical: No cervical adenopathy.   Skin:     General: Skin is warm and dry.      Findings: No rash.   Neurological:      Mental Status: She is alert and oriented to person, place, and time.      Gait: Gait normal.   Psychiatric:         Mood and Affect: Mood normal.         Behavior: Behavior normal.         Thought Content: Thought content normal.         Cognition and Memory: Cognition normal.         Judgment: Judgment normal.         Lab Results   Component Value Date    WBC 6.33 05/20/2025    RBC 3.90 05/20/2025    HGB 12.7 05/20/2025    HCT 37.5 05/20/2025    MCV 96.2 05/20/2025    MCH 32.6 05/20/2025    MCHC 33.9 05/20/2025    RDW 11.7 (L) 05/20/2025    RDWSD 40.8 05/20/2025    MPV 9.7 05/20/2025     05/20/2025    NEUTRORELPCT 82.5 (H) 05/19/2025    LYMPHORELPCT 13.7 (L) 05/19/2025    MONORELPCT 3.0 (L) 05/19/2025    EOSRELPCT 0.0 (L) 05/19/2025    BASORELPCT 0.3 05/19/2025    AUTOIGPER 0.5 05/19/2025    NEUTROABS 3.01 05/19/2025    LYMPHSABS 0.50 (L) 05/19/2025    MONOSABS 0.11 05/19/2025    EOSABS 0.00 05/19/2025    BASOSABS 0.01 05/19/2025    AUTOIGNUM 0.02 05/19/2025    NRBC 0.0 05/19/2025     Lab Results   Component Value Date    GLUCOSE 83 05/20/2025    BUN 12 05/20/2025    CREATININE 0.68 05/20/2025    EGFRIFNONA 125 11/07/2021    BCR 17.6 05/20/2025    K 3.9 05/20/2025    CO2 26.5 05/20/2025    CALCIUM 8.6 05/20/2025    ALBUMIN 3.4 (L) 05/19/2025    AST 12 05/19/2025    ALT 9 05/19/2025      Lab Results   Component Value Date    CHLPL 209 (H) 03/12/2025    TRIG 60 03/12/2025    HDL 72 03/12/2025    VLDL 11 03/12/2025     (H) 03/12/2025     Lab Results   Component Value Date    TSH 1.950 03/12/2025     Lab Results   Component Value Date    HGBA1C 4.96 11/07/2021     Lab Results   Component Value Date     COLORU Yellow 01/29/2024    CLARITYU Clear 01/29/2024    LEUKOCYTESUR Negative 01/29/2024    BILIRUBINUR Negative 01/29/2024        Records reviewed from [facility] from 5/18/25--5/22/25. Ms. Carballo is a 79 y.o. female with a history of COPD who presented to [facility] initially complaining of coughing up blood.  Please see the admitting history and physical for further details.  She was found to have acute exacerbation of COPD and pneumonia and was admitted to the hospital for further evaluation and treatment.  She started on antibiotics for bacterial pneumonia as well as scheduled breathing treatments and eventually Solu-Medrol as she developed some wheezing during her stay.  Cultures of all been negative and clinically she is much improved.  She has been weaned off oxygen and at this point seems stable for discharge to continue short course of antibiotics and steroids as outpatient.  She is instructed to follow-up with her primary care provider and was told she will need repeat x-ray to document resolution of pneumonia.      5/18/25 chest x-ray: FINDINGS: The heart size is borderline. Aorta is calcified. Pulmonary vasculature is unremarkable. No focal pulmonary consolidation, pleural effusion, or pneumothorax. No acute osseous process.    5/18/25 ECG: SR, probable left atrial enlargement, similar to previous     5/20/25 video swallow: FINDINGS:  1. There is no convincing evidence for acute pulmonary thromboemboli.  2. There is a new dense airspace consolidation throughout a significant portion of the medial aspect of the right middle lobe and there are new ill-defined opacities at both lung bases and a ground-glass opacity at the central aspect of the right lower lobe. Acute infectious infiltrates are suspected, largest and most dense at the right middle lobe. Follow-up is recommended. The tiny residual right apical pulmonary nodule status post SBRT is poorly seen secondary to  streak artifact from the right upper extremity injection and also due to breathing artifact. A standard chest CT recommended for conservative surveillance of the tiny remaining right apical opacity. Stable 1 cm left lower lobe opacity, image 107. Follow-up noncontrast chest CT is recommended in 2 months.  3. There are no pleural effusions. Stable minimal pericardial effusion. Stable mediastinal nodes.      Assessment & Plan .  Problem List Items Addressed This Visit       Hyperlipidemia    Current Assessment & Plan   Continue Atorvastatin daily.  Continue to work on healthy diet.         Gastroesophageal reflux disease    Current Assessment & Plan   Stable.  Continue Nexium daily.         Relevant Orders    Ambulatory Referral to Gastroenterology    Fatigue    Current Assessment & Plan   Schedule appointment with cardiology Dr. Head as previously referred.         Lung nodule    Current Assessment & Plan   Follow up as scheduled for CT scan.  Follow up as schedule with radiation oncology.         Chronic obstructive pulmonary disease with acute exacerbation    Current Assessment & Plan   Improving with treatment.  Continue Advair twice daily and Spiriva daily.  Continue Albuterol inhaler as needed.         Pneumonia    Current Assessment & Plan   Resolving.  Continue to monitor O2 saturation.         RESOLVED: Hemoptysis     Other Visit Diagnoses         Hospital discharge follow-up    -  Primary      Abnormal findings on esophagogastroduodenoscopy (EGD)        Relevant Orders    Ambulatory Referral to Gastroenterology            Return in about 3 months (around 8/28/2025) for Recheck.or sooner if symptoms persist or worsen.  Patient will need repeat CT chest to follow up pneumonia; pt has upcoming CT chest next week; will determine if additional imaging needed after upcoming CT chest.     Patient has had increased fatigue with activity, denies SOA; has strong family history of heart disease; pt referred to  cardiology at [facility] and pt will call to schedule appointment; will recheck labs if symptoms persist.

## 2025-05-29 PROBLEM — R04.2 HEMOPTYSIS: Status: RESOLVED | Noted: 2025-05-18 | Resolved: 2025-05-29

## 2025-05-29 NOTE — ASSESSMENT & PLAN NOTE
Improving with treatment.  Continue Advair twice daily and Spiriva daily.  Continue Albuterol inhaler as needed.

## 2025-06-02 ENCOUNTER — READMISSION MANAGEMENT (OUTPATIENT)
Dept: CALL CENTER | Facility: HOSPITAL | Age: 80
End: 2025-06-02
Payer: MEDICARE

## 2025-06-02 ENCOUNTER — HOSPITAL ENCOUNTER (OUTPATIENT)
Dept: PET IMAGING | Facility: HOSPITAL | Age: 80
Discharge: HOME | End: 2025-06-02
Admitting: RADIOLOGY
Payer: MEDICARE

## 2025-06-02 DIAGNOSIS — R91.1 LUNG NODULE: ICD-10-CM

## 2025-06-02 PROCEDURE — 71250 CT THORAX DX C-: CPT

## 2025-06-02 NOTE — OUTREACH NOTE
COPD/PN Week 2 Survey      Flowsheet Row Responses   [facility] patient discharged from? [place]   Does the patient have one of the following disease processes/diagnoses(primary or secondary)? Pneumonia   Week 2 attempt successful? No   Unsuccessful attempts Attempt 1            Christa Fernandes Registered Nurse

## 2025-06-06 ENCOUNTER — TELEPHONE (OUTPATIENT)
Dept: RADIATION ONCOLOGY | Facility: HOSPITAL | Age: 80
End: 2025-06-06
Payer: MEDICARE

## 2025-06-06 NOTE — PROGRESS NOTES
"   Cancer Staging cT1N0 right lung nodule presumed malignant    CC: right apical lung nodule suspicious for malignancy, cT1N0 8 month follow up                         S:     I had the pleasure of seeing Brigid [facility].  Brigid Carballo is a 79 y.o. female with cT1N0 right apical lung nodule suspicious for malignancy with biopsies . She completed SBRT to the right apical lung nodule on 10/9/24.  She received a dose of 50gy in 5 fractions.      She had a CT chest on 2/24/25 which showed the treated nodule in the anterior right apex is smaller and more linear. It now measures about 10 x 4 mm, previously 11 x 5 mm. There is a stablemicronodule in the right middle lobe.    She had a CT chest angiogram on 5/15/25 which showed new dense airspace consolidation throughout a significant portion of the medial aspect of the right middle lobe and there are new ill-defined opacities at both lung bases and a ground-glass opacity at the central aspect of the right lower lobe. Acute infectious infiltrates are suspected, largest and most dense at the right middle lobe.The tiny residual right apical pulmonary nodule status post SBRT is poorly seen secondary to streak artifact from the right upper extremity injection and also due to breathing artifact. A standard chest CT recommended for conservative surveillance of the tiny remaining right apical opacity. Stable 1 cm left lower lobe opacity,      She had a CT chest on 6/2/25 which showed unchanged 7 mm right apical solid nodule post SBRT and stable 1 cm left lower lobe solid nodule dating back to June 2024.    She was recently hospitalized for pneumonia but is doing better now.  She continues to work as a  at [employer] in [place] full time.  She continues to smoke \"a little\".            Review of Systems   Constitutional: Negative.    HENT: Negative.     Respiratory:  Positive for cough.    Cardiovascular: Negative.    Gastrointestinal: " Negative.    Musculoskeletal:  Positive for arthralgias.   Psychiatric/Behavioral: Negative.         Past Medical History:   Diagnosis Date    Angina pectoris     Anxiety     Lanc[place]'s esophagus 09/29/2015    BPV (benign positional vertigo) 11/07/2021    Bunion of left foot 06/14/2016    Carpal tunnel syndrome     Chest pain 10/05/2020    COPD (chronic obstructive pulmonary disease)     DDD (degenerative disc disease), cervical 11/04/2014    Deformity of wrist joint 05/03/2018    Insomnia 09/11/2017    Kidney stones     Low back pain 11/04/2014    Melanoma in situ of unspecified lower limb, including hip 06/2021    Occlusion of left vertebral artery 11/06/2021    1/10/22 CTA neck: beyond its origin, left vertebral artery is widely patent throughout its cervical and intracranial course to the vertebrobasilar junction without stenosis    Renal stone 03/02/2020    Spinal stenosis of lumbar region 12/29/2014    Tear of lateral meniscus of knee 09/30/2014    Tobacco use 06/29/2024    Unexplained weight loss 04/21/2021         Past Surgical History:   Procedure Laterality Date    APPENDECTOMY      BREAST BIOPSY      multiple    BRONCHOSCOPY WITH ION ROBOTIC ASSIST  9/6/2024    Procedure: BRONCHOSCOPY  WITH ENDOBRONCHIAL ULTRASOUND AND ION ROBOT WITH FNA'S AND BIOPSIES, BAL;  Surgeon: Ant Talbot MD;  Location: [facility] ENDOSCOPY;  Service: Robotics - Pulmonary;;  PRE- RUL LUNG NODULE  POST- SAME    CARDIAC CATHETERIZATION      CARDIAC CATHETERIZATION N/A 11/29/2022    Procedure: Left Heart Cath;  Surgeon: Brennen Nguyen MD;  Location: [facility] CATH INVASIVE LOCATION;  Service: Cardiology;  Laterality: N/A;    CARDIAC CATHETERIZATION N/A 11/29/2022    Procedure: Coronary angiography;  Surgeon: Brennen Nguyen MD;  Location: [facility] CATH INVASIVE LOCATION;  Service: Cardiology;  Laterality: N/A;    CARDIAC CATHETERIZATION N/A 11/29/2022    Procedure: Left ventriculography;  Surgeon: Brennen Nguyen MD;   Location: [facility] INVASIVE LOCATION;  Service: Cardiology;  Laterality: N/A;    CARPAL TUNNEL RELEASE Bilateral     COLONOSCOPY  05/17/2021    polyp removed; per Dr. Velasquez    CYSTOSCOPY W/ URETERAL STENT PLACEMENT  10/22/2019    HYSTERECTOMY      INGUINAL HERNIA REPAIR Right     KNEE ARTHROSCOPY Right     meniscus    TONSILLECTOMY      UPPER GASTROINTESTINAL ENDOSCOPY  05/17/2021    per Dr. Velasquez         Social History     Socioeconomic History    Marital status:    Tobacco Use    Smoking status: Some Days     Current packs/day: 0.10     Average packs/day: 0.5 packs/day for 68.3 years (33.8 ttl pk-yrs)     Types: Cigarettes     Start date: 1958     Last attempt to quit: 6/28/2024     Passive exposure: Current    Smokeless tobacco: Never    Tobacco comments:     Smoking few cigarettes some days, but does not smoke whole cigarettes   Vaping Use    Vaping status: Former   Substance and Sexual Activity    Alcohol use: Not Currently     Comment: social--rarely    Drug use: Not Currently    Sexual activity: Not Currently         Family History   Problem Relation Age of Onset    Emphysema Mother     Heart disease Mother     Heart disease Father     Cancer Brother     Heart disease Brother     Skin cancer Brother     Stomach cancer Maternal Aunt     Breast cancer Maternal Grandmother     Breast cancer Paternal Grandmother           Objective    Physical Exam  Constitutional:       Appearance: Normal appearance.   HENT:      Head: Atraumatic.   Pulmonary:      Effort: Pulmonary effort is normal. No respiratory distress.      Breath sounds: Wheezing present.   Neurological:      General: No focal deficit present.      Mental Status: She is alert.   Psychiatric:         Mood and Affect: Mood normal.         Current Outpatient Medications on File Prior to Visit   Medication Sig Dispense Refill    albuterol sulfate  (90 Base) MCG/ACT inhaler Inhale 2 puffs Every 4 (Four) Hours As Needed for Wheezing. 30 g 3     ascorbic acid (VITAMIN C) 1000 MG tablet Take 1 tablet by mouth Daily.      aspirin 81 MG EC tablet Take 1 tablet by mouth Daily. 90 tablet 0    atorvastatin (LIPITOR) 40 MG tablet Take 1 tablet by mouth Every Night. 90 tablet 1    cholecalciferol (VITAMIN D3) 25 MCG (1000 UT) tablet Take 2 tablets by mouth Daily.      esomeprazole (nexIUM) 20 MG capsule Take 1 capsule by mouth Every Morning Before Breakfast.      Fluticasone-Salmeterol (ADVAIR/WIXELA) 250-50 MCG/ACT DISKUS Inhale 1 puff 2 (Two) Times a Day. 60 each 0    tiotropium bromide monohydrate (Spiriva Respimat) 2.5 MCG/ACT aerosol solution inhaler Inhale 2 puffs Daily. 4 g 0    vitamin B-12 (CYANOCOBALAMIN) 1000 MCG tablet Take 1 tablet by mouth Daily.       No current facility-administered medications on file prior to visit.       ALLERGIES:    Allergies   Allergen Reactions    Amoxicillin-Pot Clavulanate Itching    Codeine Itching    Penicillins Unknown - High Severity    Adhesive Tape Rash     Skin redness       There were no vitals taken for this visit.          No data to display                  Assessment & Plan     Brigid Carballo is a 79 y.o. female with cT1N0 right apical lung nodule suspicious for malignancy with biopsies .  She is now 8 months out from SBRT to the right lung nodule.  I have personally reviewed her most recent CT chest which shows post radiation changes and no new nodules.  I will order a repeat CT chest in 4 months and see her back one week later to review.     I personally spent greater than 35 minutes today assessing, managing, discussing and documenting my visit with the patient. That time includes review of records, imaging and pathology reports, obtaining my own history, performing a medically appropriate evaluation, counseling and educating the patient, discussing goals, logistics, alternatives and risks of my recommendations, surveillance options and potential outcomes. It also includes the time documenting the clinical  information in the EMR and communicating my recommendations to the other involved physicians.             Thank you very much for allowing me to participate in the care of this very pleasant patient.    Sincerely,      Lynda Godinez MD

## 2025-06-09 ENCOUNTER — OFFICE VISIT (OUTPATIENT)
Dept: RADIATION ONCOLOGY | Facility: HOSPITAL | Age: 80
End: 2025-06-09
Payer: MEDICARE

## 2025-06-09 ENCOUNTER — READMISSION MANAGEMENT (OUTPATIENT)
Dept: CALL CENTER | Facility: HOSPITAL | Age: 80
End: 2025-06-09
Payer: MEDICARE

## 2025-06-09 VITALS
HEART RATE: 87 BPM | WEIGHT: 143 LBS | SYSTOLIC BLOOD PRESSURE: 130 MMHG | OXYGEN SATURATION: 96 % | DIASTOLIC BLOOD PRESSURE: 68 MMHG | BODY MASS INDEX: 27.03 KG/M2 | RESPIRATION RATE: 16 BRPM

## 2025-06-09 DIAGNOSIS — R91.1 LUNG NODULE: Primary | ICD-10-CM

## 2025-06-09 PROCEDURE — 1126F AMNT PAIN NOTED NONE PRSNT: CPT | Performed by: RADIOLOGY

## 2025-06-09 PROCEDURE — G0463 HOSPITAL OUTPT CLINIC VISIT: HCPCS | Performed by: RADIOLOGY

## 2025-06-09 PROCEDURE — 99214 OFFICE O/P EST MOD 30 MIN: CPT | Performed by: RADIOLOGY

## 2025-06-09 PROCEDURE — G2211 COMPLEX E/M VISIT ADD ON: HCPCS | Performed by: RADIOLOGY

## 2025-06-09 NOTE — OUTREACH NOTE
COPD/PN Week 2 Survey      Flowsheet Row Responses   [facility] facility patient discharged from? [facility]   Does the patient have one of the following disease processes/diagnoses(primary or secondary)? Pneumonia   Week 2 attempt successful? No   Unsuccessful attempts Attempt 2   Revoke Other            Esperanza H - Registered Nurse

## 2025-08-25 ENCOUNTER — OFFICE VISIT (OUTPATIENT)
Dept: FAMILY MEDICINE CLINIC | Facility: CLINIC | Age: 80
End: 2025-08-25
Payer: MEDICARE

## 2025-08-25 VITALS
TEMPERATURE: 97.5 F | HEIGHT: 61 IN | BODY MASS INDEX: 27.45 KG/M2 | WEIGHT: 145.4 LBS | SYSTOLIC BLOOD PRESSURE: 128 MMHG | OXYGEN SATURATION: 93 % | HEART RATE: 70 BPM | DIASTOLIC BLOOD PRESSURE: 74 MMHG

## 2025-08-25 DIAGNOSIS — L98.9 SKIN LESION OF FOOT: ICD-10-CM

## 2025-08-25 DIAGNOSIS — K21.9 GASTROESOPHAGEAL REFLUX DISEASE, UNSPECIFIED WHETHER ESOPHAGITIS PRESENT: ICD-10-CM

## 2025-08-25 DIAGNOSIS — E55.9 VITAMIN D DEFICIENCY: ICD-10-CM

## 2025-08-25 DIAGNOSIS — B07.0 PLANTAR WART OF LEFT FOOT: ICD-10-CM

## 2025-08-25 DIAGNOSIS — E78.2 MIXED HYPERLIPIDEMIA: Primary | ICD-10-CM

## 2025-08-25 DIAGNOSIS — H61.92 SKIN LESION OF LEFT EAR: ICD-10-CM

## 2025-08-25 PROCEDURE — 99214 OFFICE O/P EST MOD 30 MIN: CPT | Performed by: NURSE PRACTITIONER

## 2025-08-25 PROCEDURE — 1159F MED LIST DOCD IN RCRD: CPT | Performed by: NURSE PRACTITIONER

## 2025-08-25 PROCEDURE — 1160F RVW MEDS BY RX/DR IN RCRD: CPT | Performed by: NURSE PRACTITIONER

## 2025-08-25 PROCEDURE — G2211 COMPLEX E/M VISIT ADD ON: HCPCS | Performed by: NURSE PRACTITIONER

## 2025-08-26 LAB
25(OH)D3+25(OH)D2 SERPL-MCNC: 34.4 NG/ML (ref 30–100)
ALBUMIN SERPL-MCNC: 3.9 G/DL (ref 3.5–5.2)
ALBUMIN/GLOB SERPL: 2.3 G/DL
ALP SERPL-CCNC: 52 U/L (ref 39–117)
ALT SERPL-CCNC: 6 U/L (ref 1–33)
AST SERPL-CCNC: 13 U/L (ref 1–32)
BASOPHILS # BLD AUTO: 0.03 10*3/MM3 (ref 0–0.2)
BASOPHILS NFR BLD AUTO: 0.6 % (ref 0–1.5)
BILIRUB SERPL-MCNC: 0.5 MG/DL (ref 0–1.2)
BUN SERPL-MCNC: 11 MG/DL (ref 8–23)
BUN/CREAT SERPL: 16.2 (ref 7–25)
CALCIUM SERPL-MCNC: 8.7 MG/DL (ref 8.6–10.5)
CHLORIDE SERPL-SCNC: 107 MMOL/L (ref 98–107)
CHOLEST SERPL-MCNC: 201 MG/DL (ref 0–200)
CO2 SERPL-SCNC: 26.5 MMOL/L (ref 22–29)
CREAT SERPL-MCNC: 0.68 MG/DL (ref 0.57–1)
EGFRCR SERPLBLD CKD-EPI 2021: 88.2 ML/MIN/1.73
EOSINOPHIL # BLD AUTO: 0.11 10*3/MM3 (ref 0–0.4)
EOSINOPHIL NFR BLD AUTO: 2 % (ref 0.3–6.2)
ERYTHROCYTE [DISTWIDTH] IN BLOOD BY AUTOMATED COUNT: 12.2 % (ref 12.3–15.4)
GLOBULIN SER CALC-MCNC: 1.7 GM/DL
GLUCOSE SERPL-MCNC: 88 MG/DL (ref 65–99)
HCT VFR BLD AUTO: 45.5 % (ref 34–46.6)
HDLC SERPL-MCNC: 62 MG/DL (ref 40–60)
HGB BLD-MCNC: 14.6 G/DL (ref 12–15.9)
IMM GRANULOCYTES # BLD AUTO: 0.01 10*3/MM3 (ref 0–0.05)
IMM GRANULOCYTES NFR BLD AUTO: 0.2 % (ref 0–0.5)
LDLC SERPL CALC-MCNC: 124 MG/DL (ref 0–100)
LDLC/HDLC SERPL: 1.97 {RATIO}
LYMPHOCYTES # BLD AUTO: 1.31 10*3/MM3 (ref 0.7–3.1)
LYMPHOCYTES NFR BLD AUTO: 24.3 % (ref 19.6–45.3)
MCH RBC QN AUTO: 31.7 PG (ref 26.6–33)
MCHC RBC AUTO-ENTMCNC: 32.1 G/DL (ref 31.5–35.7)
MCV RBC AUTO: 98.9 FL (ref 79–97)
MONOCYTES # BLD AUTO: 0.48 10*3/MM3 (ref 0.1–0.9)
MONOCYTES NFR BLD AUTO: 8.9 % (ref 5–12)
NEUTROPHILS # BLD AUTO: 3.45 10*3/MM3 (ref 1.7–7)
NEUTROPHILS NFR BLD AUTO: 64 % (ref 42.7–76)
NRBC BLD AUTO-RTO: 0 /100 WBC (ref 0–0.2)
PLATELET # BLD AUTO: 183 10*3/MM3 (ref 140–450)
POTASSIUM SERPL-SCNC: 4.4 MMOL/L (ref 3.5–5.2)
PROT SERPL-MCNC: 5.6 G/DL (ref 6–8.5)
RBC # BLD AUTO: 4.6 10*6/MM3 (ref 3.77–5.28)
SODIUM SERPL-SCNC: 144 MMOL/L (ref 136–145)
TRIGL SERPL-MCNC: 83 MG/DL (ref 0–150)
VLDLC SERPL CALC-MCNC: 15 MG/DL (ref 5–40)
WBC # BLD AUTO: 5.39 10*3/MM3 (ref 3.4–10.8)

## 2025-08-27 DIAGNOSIS — E78.2 MIXED HYPERLIPIDEMIA: ICD-10-CM

## 2025-08-27 RX ORDER — ATORVASTATIN CALCIUM 40 MG/1
40 TABLET, FILM COATED ORAL NIGHTLY
Qty: 90 TABLET | Refills: 1 | Status: SHIPPED | OUTPATIENT
Start: 2025-08-27

## (undated) DEVICE — VISION PROBE ADAPTER AND SUCTION ADAPTER

## (undated) DEVICE — TUBING, SUCTION, 1/4" X 10', STRAIGHT: Brand: MEDLINE

## (undated) DEVICE — GW EMR FIX EXCHG J STD .035 3MM 260CM

## (undated) DEVICE — KT MANIFLD CARDIAC

## (undated) DEVICE — VISION PROBE: Brand: ION

## (undated) DEVICE — ADAPT SWVL FIBROPTIC BRONCH

## (undated) DEVICE — CATH DIAG IMPULSE PIG 5F 100CM

## (undated) DEVICE — BIOPSY NEEDLE, 23G: Brand: FLEXISION

## (undated) DEVICE — KIT,ANTI FOG,W/SPONGE & FLUID,SOFT PACK: Brand: MEDLINE

## (undated) DEVICE — PK CATH CARD 40

## (undated) DEVICE — ADAPT CLN BIOGUARD AIR/H2O DISP

## (undated) DEVICE — SWIVEL CONNECTOR

## (undated) DEVICE — RUNTHROUGH NS EXTRA FLOPPY PTCA GUIDEWIRE: Brand: RUNTHROUGH

## (undated) DEVICE — LARGE BORE STOPCOCK WITH EXTENSION SET, MALE LUER LOCK ADAPTER WITH RETRACTABLE COLLAR

## (undated) DEVICE — FRCP BIOP SUPER TRAX 1.7MM 110CM

## (undated) DEVICE — SINGLE USE BIOPSY VALVE MAJ-210: Brand: SINGLE USE BIOPSY VALVE (STERILE)

## (undated) DEVICE — CATHETER: Brand: ION

## (undated) DEVICE — SINGLE USE SUCTION VALVE MAJ-209: Brand: SINGLE USE SUCTION VALVE (STERILE)

## (undated) DEVICE — VITAL SIGNS™ JACKSON-REES CIRCUITS: Brand: VITAL SIGNS™

## (undated) DEVICE — SENSR O2 OXIMAX FNGR A/ 18IN NONSTR

## (undated) DEVICE — CATH DIAG IMPULSE FR5 5F 100CM

## (undated) DEVICE — GLIDESHEATH SLENDER STAINLESS STEEL KIT: Brand: GLIDESHEATH SLENDER

## (undated) DEVICE — CATH DIAG IMPULSE FL3.5 5F 100CM